# Patient Record
Sex: MALE | Race: WHITE | NOT HISPANIC OR LATINO | Employment: OTHER | URBAN - METROPOLITAN AREA
[De-identification: names, ages, dates, MRNs, and addresses within clinical notes are randomized per-mention and may not be internally consistent; named-entity substitution may affect disease eponyms.]

---

## 2017-02-10 ENCOUNTER — HOSPITAL ENCOUNTER (EMERGENCY)
Facility: HOSPITAL | Age: 72
Discharge: HOME | End: 2017-02-10
Attending: EMERGENCY MEDICINE | Admitting: EMERGENCY MEDICINE
Payer: MEDICARE

## 2017-02-10 VITALS
HEART RATE: 83 BPM | DIASTOLIC BLOOD PRESSURE: 66 MMHG | TEMPERATURE: 97.7 F | RESPIRATION RATE: 16 BRPM | WEIGHT: 233.91 LBS | SYSTOLIC BLOOD PRESSURE: 118 MMHG | OXYGEN SATURATION: 96 %

## 2017-02-10 DIAGNOSIS — S81.832A GUNSHOT WOUND OF LEFT LOWER LEG, INITIAL ENCOUNTER: Primary | ICD-10-CM

## 2017-02-10 PROCEDURE — 99282 EMERGENCY DEPT VISIT SF MDM: CPT

## 2017-02-10 RX ORDER — SIMVASTATIN 20 MG
20 TABLET ORAL
COMMUNITY
End: 2017-12-31

## 2017-02-10 RX ORDER — LISINOPRIL AND HYDROCHLOROTHIAZIDE 20; 12.5 MG/1; MG/1
2 TABLET ORAL DAILY
COMMUNITY
End: 2021-10-19

## 2017-02-10 RX ORDER — FENOFIBRATE 145 MG/1
145 TABLET, COATED ORAL DAILY
COMMUNITY
End: 2018-07-24

## 2017-02-10 RX ORDER — SILICONE ADHESIVE 1.5" X 3"
SHEET (EA) TOPICAL DAILY
COMMUNITY
End: 2017-12-31

## 2017-02-10 RX ORDER — GINSENG 100 MG
1 CAPSULE ORAL 2 TIMES DAILY
Status: DISCONTINUED | OUTPATIENT
Start: 2017-02-10 | End: 2017-02-10 | Stop reason: HOSPADM

## 2017-02-10 RX ORDER — OMEPRAZOLE 20 MG/1
20 CAPSULE, DELAYED RELEASE ORAL DAILY
COMMUNITY

## 2017-02-10 RX ORDER — CARVEDILOL 25 MG/1
25 TABLET ORAL 2 TIMES DAILY WITH MEALS
COMMUNITY

## 2017-02-10 RX ADMIN — BACITRACIN ZINC 1 SMALL APPLICATION: 500 OINTMENT TOPICAL at 17:03

## 2017-12-31 ENCOUNTER — HOSPITAL ENCOUNTER (EMERGENCY)
Facility: HOSPITAL | Age: 72
Discharge: HOME | End: 2017-12-31
Attending: EMERGENCY MEDICINE | Admitting: EMERGENCY MEDICINE
Payer: MEDICARE

## 2017-12-31 VITALS
TEMPERATURE: 97.3 F | WEIGHT: 226.63 LBS | OXYGEN SATURATION: 98 % | DIASTOLIC BLOOD PRESSURE: 70 MMHG | SYSTOLIC BLOOD PRESSURE: 122 MMHG | RESPIRATION RATE: 20 BRPM | HEART RATE: 69 BPM
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
## 2017-12-31 DIAGNOSIS — R53.1 GENERALIZED WEAKNESS: Primary | ICD-10-CM

## 2017-12-31 DIAGNOSIS — E83.42 HYPOMAGNESEMIA: ICD-10-CM

## 2017-12-31 LAB
ALBUMIN SERPL BCP-MCNC: 3.4 G/DL (ref 3.5–5)
ALP SERPL-CCNC: 80 U/L (ref 46–116)
ALT SERPL W P-5'-P-CCNC: 36 U/L (ref 12–78)
ANION GAP SERPL CALCULATED.3IONS-SCNC: 11 MMOL/L (ref 4–13)
AST SERPL W P-5'-P-CCNC: 24 U/L (ref 5–45)
BASOPHILS # BLD AUTO: 0.02 THOUSANDS/ΜL (ref 0–0.1)
BASOPHILS NFR BLD AUTO: 0 % (ref 0–1)
BILIRUB SERPL-MCNC: 0.5 MG/DL (ref 0.2–1)
BUN SERPL-MCNC: 20 MG/DL (ref 5–25)
CALCIUM SERPL-MCNC: 9.1 MG/DL (ref 8.3–10.1)
CHLORIDE SERPL-SCNC: 104 MMOL/L (ref 100–108)
CO2 SERPL-SCNC: 29 MMOL/L (ref 21–32)
CREAT SERPL-MCNC: 1.24 MG/DL (ref 0.6–1.3)
EOSINOPHIL # BLD AUTO: 0.1 THOUSAND/ΜL (ref 0–0.61)
EOSINOPHIL NFR BLD AUTO: 1 % (ref 0–6)
ERYTHROCYTE [DISTWIDTH] IN BLOOD BY AUTOMATED COUNT: 15.7 % (ref 11.6–15.1)
GFR SERPL CREATININE-BSD FRML MDRD: 59 ML/MIN/1.73SQ M
GLUCOSE SERPL-MCNC: 116 MG/DL (ref 65–140)
HCT VFR BLD AUTO: 41.2 % (ref 36.5–49.3)
HGB BLD-MCNC: 12.7 G/DL (ref 12–17)
LYMPHOCYTES # BLD AUTO: 2.18 THOUSANDS/ΜL (ref 0.6–4.47)
LYMPHOCYTES NFR BLD AUTO: 22 % (ref 14–44)
MAGNESIUM SERPL-MCNC: 1.5 MG/DL (ref 1.6–2.6)
MCH RBC QN AUTO: 28 PG (ref 26.8–34.3)
MCHC RBC AUTO-ENTMCNC: 30.8 G/DL (ref 31.4–37.4)
MCV RBC AUTO: 91 FL (ref 82–98)
MONOCYTES # BLD AUTO: 1.14 THOUSAND/ΜL (ref 0.17–1.22)
MONOCYTES NFR BLD AUTO: 12 % (ref 4–12)
NEUTROPHILS # BLD AUTO: 6.31 THOUSANDS/ΜL (ref 1.85–7.62)
NEUTS SEG NFR BLD AUTO: 65 % (ref 43–75)
PLATELET # BLD AUTO: 307 THOUSANDS/UL (ref 149–390)
PMV BLD AUTO: 10.4 FL (ref 8.9–12.7)
POTASSIUM SERPL-SCNC: 3.7 MMOL/L (ref 3.5–5.3)
PROT SERPL-MCNC: 7 G/DL (ref 6.4–8.2)
RBC # BLD AUTO: 4.53 MILLION/UL (ref 3.88–5.62)
SODIUM SERPL-SCNC: 144 MMOL/L (ref 136–145)
TSH SERPL DL<=0.05 MIU/L-ACNC: 1.7 UIU/ML (ref 0.36–3.74)
WBC # BLD AUTO: 9.75 THOUSAND/UL (ref 4.31–10.16)

## 2017-12-31 PROCEDURE — 83735 ASSAY OF MAGNESIUM: CPT | Performed by: EMERGENCY MEDICINE

## 2017-12-31 PROCEDURE — 96360 HYDRATION IV INFUSION INIT: CPT

## 2017-12-31 PROCEDURE — 80053 COMPREHEN METABOLIC PANEL: CPT | Performed by: EMERGENCY MEDICINE

## 2017-12-31 PROCEDURE — 36415 COLL VENOUS BLD VENIPUNCTURE: CPT | Performed by: EMERGENCY MEDICINE

## 2017-12-31 PROCEDURE — 85025 COMPLETE CBC W/AUTO DIFF WBC: CPT | Performed by: EMERGENCY MEDICINE

## 2017-12-31 PROCEDURE — 99284 EMERGENCY DEPT VISIT MOD MDM: CPT

## 2017-12-31 PROCEDURE — 84443 ASSAY THYROID STIM HORMONE: CPT | Performed by: EMERGENCY MEDICINE

## 2017-12-31 RX ORDER — MELATONIN
5000 DAILY
COMMUNITY

## 2017-12-31 RX ORDER — ACETAMINOPHEN 325 MG/1
650 TABLET ORAL 2 TIMES DAILY
COMMUNITY
End: 2021-10-25 | Stop reason: HOSPADM

## 2017-12-31 RX ORDER — PRAVASTATIN SODIUM 40 MG
40 TABLET ORAL DAILY
COMMUNITY
End: 2018-07-24

## 2017-12-31 RX ADMIN — Medication 800 MG: at 07:20

## 2017-12-31 RX ADMIN — SODIUM CHLORIDE 1000 ML: 0.9 INJECTION, SOLUTION INTRAVENOUS at 06:45

## 2017-12-31 NOTE — DISCHARGE INSTRUCTIONS
Hypomagnesemia   WHAT YOU NEED TO KNOW:   Hypomagnesemia is a condition that develops when the amount of magnesium in your body is too low  Magnesium is a mineral that helps your heart, muscles, and nerves work normally  It also helps strengthen your bones  DISCHARGE INSTRUCTIONS:   Return to the emergency department if:   · You have numbness and tingling in your arms or legs  · You have painful muscle spasms and tremors in your arms or legs  · You are not able to move your muscles, and you have trouble thinking clearly  · Your heartbeat is faster than usual, or is irregular  · You have a seizure  Contact your healthcare provider if:   · You have fatigue and muscle tremors or twitching  · You become irritable and have trouble sleeping  · You have questions or concerns about your condition or care  Prevent hypomagnesemia:   · Manage health conditions  by following your treatment plan  Health conditions such as congestive heart failure, diabetes, and chronic diarrhea can put you at risk for hypomagnesemia  · Eat foods that contain magnesium every day  Ask your dietitian or healthcare provider how much magnesium you need each day  · Limit or do not drink alcohol  Alcohol can prevent your body from absorbing magnesium  Alcohol also makes your body release large amounts of magnesium through your urine  · You may need to take a magnesium supplement  Ask your healthcare provider which supplement to take and how often to take it    Foods that contain magnesium:   · Almonds, cashews, peanuts, and peanut butter    · Dark green leafy vegetables, such as spinach    · Raisins, bananas, apples, broccoli, and carrots    · Soy milk and soy beans    · Black beans and kidney beans    · Whole-wheat bread and brown rice    · Shredded-wheat cereal, oatmeal, and other breakfast cereals fortified with magnesium    · Plain low-fat yogurt and milk    · Cooked halibut  Follow up with your healthcare provider or specialist as directed:  Write down your questions so you remember to ask them during your visits  © 2017 [employer] [employer] Information is for End User's use only and may not be sold, redistributed or otherwise used for commercial purposes  All illustrations and images included in CareNotes® are the copyrighted property of A D A M , Inc  or Cali Maldonado  The above information is an  only  It is not intended as medical advice for individual conditions or treatments  Talk to your doctor, nurse or pharmacist before following any medical regimen to see if it is safe and effective for you

## 2017-12-31 NOTE — ED PROVIDER NOTES
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  Pt  denies loc/n/v  History provided by:  Patient   used: No    80 y/o male with a hx of chronic muscle weakness stated due to agent orange exposure presented by EMS after a fall in bathroom and unable to get up  Denies striking head, headache, neck pain or any other injuries  States weakness is not really any worse than usual  Reports he's been trying to get the [place] to approve him for a motorized wheelchair  Has been to physical rehab in past without any improvement and is not interested in that again  Plan labs to r/o any significant electrolyte change, likely discharge  Prior to Admission Medications   Prescriptions Last Dose Informant Patient Reported? Taking?    CYANOCOBALAMIN PO   Yes No   Sig: Take 1,000 mcg by mouth     Liraglutide (VICTOZA) 18 MG/3ML SOPN   Yes No   Sig: Inject 0 6 mg under the skin daily     MAGNESIUM CARBONATE PO   Yes No   Sig: Take 400 mg by mouth daily     acetaminophen (TYLENOL) 325 mg tablet   Yes Yes   Sig: Take 650 mg by mouth 2 (two) times a day   apixaban (ELIQUIS) 5 mg   Yes No   Sig: Take 5 mg by mouth 2 (two) times a day   aspirin 81 MG tablet   Yes No   Sig: Take 81 mg by mouth daily   carvedilol (COREG) 25 mg tablet   Yes No   Sig: Take 25 mg by mouth 2 (two) times a day with meals   cholecalciferol (VITAMIN D3) 1,000 units tablet   Yes Yes   Sig: Take 5,000 Units by mouth daily   co-enzyme Q-10 50 MG capsule   Yes No   Sig: Take 100 mg by mouth daily   fenofibrate (TRICOR) 145 mg tablet   Yes No   Sig: Take 145 mg by mouth daily   insulin detemir (LEVEMIR) 100 units/mL subcutaneous injection   Yes No   Sig: Inject 30 Units under the skin daily at bedtime     lisinopril-hydrochlorothiazide (PRINZIDE,ZESTORETIC) 20-12 5 MG per tablet   Yes No Sig: Take 2 tablets by mouth daily   metFORMIN (GLUCOPHAGE) 1000 MG tablet   Yes No   Sig: Take 500 mg by mouth 2 (two) times a day with meals     omeprazole (PriLOSEC) 20 mg delayed release capsule   Yes No   Sig: Take 20 mg by mouth daily   pravastatin (PRAVACHOL) 40 mg tablet   Yes Yes   Sig: Take 40 mg by mouth daily      Facility-Administered Medications: None       Past Medical History:   Diagnosis Date    Atrial fibrillation ([facility] )     Diabetes ([facility] )     Hypertension        Past Surgical History:   Procedure Laterality Date    CHOLECYSTECTOMY      TOTAL SHOULDER REPLACEMENT Right        No family history on file  I have reviewed and agree with the history as documented  Social History   Substance Use Topics    Smoking status: Former Smoker    Smokeless tobacco: Not on file    Alcohol use No        Review of Systems   Constitutional: Negative for activity change and appetite change  Respiratory: Negative for chest tightness and shortness of breath  Gastrointestinal: Negative for abdominal pain  Musculoskeletal: Negative for back pain and neck pain  Skin: Negative for wound  Neurological: Positive for weakness  Negative for dizziness and headaches  All other systems reviewed and are negative  Physical Exam  ED Triage Vitals [12/31/17 0509]   Temperature Pulse Respirations Blood Pressure SpO2   (!) 97 3 °F (36 3 °C) 69 20 122/70 98 %      Temp Source Heart Rate Source Patient Position - Orthostatic VS BP Location FiO2 (%)   Oral -- -- -- --      Pain Score       No Pain           Orthostatic Vital Signs  Vitals:    12/31/17 0509   BP: 122/70   Pulse: 69       Physical Exam   Constitutional: He is oriented to person, place, and time  He appears well-developed and well-nourished  No distress  HENT:   Head: Normocephalic and atraumatic  Neck: Normal range of motion  No c-spine tenderness  Cardiovascular: Normal rate and regular rhythm      Pulmonary/Chest: Effort normal and breath sounds normal    Abdominal: Soft  There is no tenderness  Musculoskeletal: Normal range of motion  He exhibits no deformity  Neurological: He is alert and oriented to person, place, and time  He exhibits normal muscle tone  Coordination normal    Skin: Skin is warm and dry  Psychiatric: He has a normal mood and affect  His behavior is normal    Nursing note and vitals reviewed  ED Medications  Medications   sodium chloride 0 9 % bolus 1,000 mL (0 mL Intravenous Stopped 12/31/17 0745)   magnesium oxide (MAG-OX) tablet 800 mg (800 mg Oral Given 12/31/17 0720)       Diagnostic Studies  Results Reviewed     Procedure Component Value Units Date/Time    TSH [[id number]]  (Normal) Collected:  12/31/17 0559    Lab Status:  Final result Specimen:  Blood from Arm, Right Updated:  12/31/17 0645     TSH 3RD GENERATON 1 703 uIU/mL     Narrative:         Patients undergoing fluorescein dye angiography may retain small amounts of fluorescein in the body for 48-72 hours post procedure  Samples containing fluorescein can produce falsely depressed TSH values  If the patient had this procedure,a specimen should be resubmitted post fluorescein clearance      Magnesium [[id number]]  (Abnormal) Collected:  12/31/17 0559    Lab Status:  Final result Specimen:  Blood from Arm, Right Updated:  12/31/17 0645     Magnesium 1 5 (L) mg/dL     Comprehensive metabolic panel [[id number]]  (Abnormal) Collected:  12/31/17 0559    Lab Status:  Final result Specimen:  Blood from Arm, Right Updated:  12/31/17 0631     Sodium 144 mmol/L      Potassium 3 7 mmol/L      Chloride 104 mmol/L      CO2 29 mmol/L      Anion Gap 11 mmol/L      BUN 20 mg/dL      Creatinine 1 24 mg/dL      Glucose 116 mg/dL      Calcium 9 1 mg/dL      AST 24 U/L      ALT 36 U/L      Alkaline Phosphatase 80 U/L      Total Protein 7 0 g/dL      Albumin 3 4 (L) g/dL      Total Bilirubin 0 50 mg/dL      eGFR 59 ml/min/1 73sq m     Narrative:         National Kidney Disease Education Program recommendations are as follows:  GFR calculation is accurate only with a steady state creatinine  Chronic Kidney disease less than 60 ml/min/1 73 sq  meters  Kidney failure less than 15 ml/min/1 73 sq  meters  CBC and differential [[id number]]  (Abnormal) Collected:  12/31/17 0559    Lab Status:  Final result Specimen:  Blood from Arm, Right Updated:  12/31/17 0605     WBC 9 75 Thousand/uL      RBC 4 53 Million/uL      Hemoglobin 12 7 g/dL      Hematocrit 41 2 %      MCV 91 fL      MCH 28 0 pg      MCHC 30 8 (L) g/dL      RDW 15 7 (H) %      MPV 10 4 fL      Platelets 540 Thousands/uL      Neutrophils Relative 65 %      Lymphocytes Relative 22 %      Monocytes Relative 12 %      Eosinophils Relative 1 %      Basophils Relative 0 %      Neutrophils Absolute 6 31 Thousands/µL      Lymphocytes Absolute 2 18 Thousands/µL      Monocytes Absolute 1 14 Thousand/µL      Eosinophils Absolute 0 10 Thousand/µL      Basophils Absolute 0 02 Thousands/µL                  No orders to display              Procedures  Procedures       Phone Contacts  ED Phone Contact    ED Course  ED Course                                MDM  Number of Diagnoses or Management Options  Fall, initial encounter:   Generalized weakness:   Hypomagnesemia:   Diagnosis management comments: 80 y/o male with chronic generalized weakness presented after a fall without injuries but unable to get up  No trauma on exam  Labs notable for mild hypomagnesemia  Given supplement  Patient prefers discharge as opposed to rehab for PT  Stable for home at this time         Amount and/or Complexity of Data Reviewed  Clinical lab tests: reviewed and ordered    Patient Progress  Patient progress: stable    CritCare Time    Disposition  Final diagnoses:   Generalized weakness   Fall, initial encounter   Hypomagnesemia     Time reflects when diagnosis was documented in both MDM as applicable and the Disposition within this note     Time User Action Codes Description Comment    12/31/2017  7:00 AM Tea KRUEGER Add [R53 1] Generalized weakness     12/31/2017  7:00 AM Corinne Morrow Add [G20  TGCX] Fall, initial encounter     12/31/2017  7:00 AM Corinne Morrow Add [E83 42] Hypomagnesemia       ED Disposition     ED Disposition Condition Comment    Discharge  Sandra Jean discharge to home/self care      Condition at discharge: Stable        Follow-up Information    None       Discharge Medication List as of 12/31/2017  7:01 AM      CONTINUE these medications which have NOT CHANGED    Details   acetaminophen (TYLENOL) 325 mg tablet Take 650 mg by mouth 2 (two) times a day, Historical Med      cholecalciferol (VITAMIN D3) 1,000 units tablet Take 5,000 Units by mouth daily, Historical Med      pravastatin (PRAVACHOL) 40 mg tablet Take 40 mg by mouth daily, Historical Med      apixaban (ELIQUIS) 5 mg Take 5 mg by mouth 2 (two) times a day, Until Discontinued, Historical Med      aspirin 81 MG tablet Take 81 mg by mouth daily, Until Discontinued, Historical Med      carvedilol (COREG) 25 mg tablet Take 25 mg by mouth 2 (two) times a day with meals, Until Discontinued, Historical Med      co-enzyme Q-10 50 MG capsule Take 100 mg by mouth daily, Until Discontinued, Historical Med      CYANOCOBALAMIN PO Take 1,000 mcg by mouth  , Historical Med      fenofibrate (TRICOR) 145 mg tablet Take 145 mg by mouth daily, Until Discontinued, Historical Med      insulin detemir (LEVEMIR) 100 units/mL subcutaneous injection Inject 30 Units under the skin daily at bedtime  , Historical Med      Liraglutide (VICTOZA) 18 MG/3ML SOPN Inject 0 6 mg under the skin daily  , Historical Med      lisinopril-hydrochlorothiazide (PRINZIDE,ZESTORETIC) 20-12 5 MG per tablet Take 2 tablets by mouth daily, Historical Med      MAGNESIUM CARBONATE PO Take 400 mg by mouth daily  , Historical Med      metFORMIN (GLUCOPHAGE) 1000 MG tablet Take 500 mg by mouth 2 (two) times a day with meals  , Historical Med      omeprazole (PriLOSEC) 20 mg delayed release capsule Take 20 mg by mouth daily, Until Discontinued, Historical Med           No discharge procedures on file      ED Provider  Electronically Signed by           Rox Chaparro MD  01/04/18 8932

## 2017-12-31 NOTE — ED NOTES
Pt  Transported out of dept  In wheelchair, vss, no acute distress       Estela Mata, TEJAS  12/31/17 5865

## 2018-07-24 ENCOUNTER — HOSPITAL ENCOUNTER (EMERGENCY)
Facility: HOSPITAL | Age: 73
Discharge: HOME HEALTH | End: 2018-07-24
Attending: EMERGENCY MEDICINE | Admitting: EMERGENCY MEDICINE
Payer: MEDICARE

## 2018-07-24 VITALS
SYSTOLIC BLOOD PRESSURE: 171 MMHG | TEMPERATURE: 98.6 F | BODY MASS INDEX: 27.8 KG/M2 | HEART RATE: 64 BPM | WEIGHT: 216.49 LBS | DIASTOLIC BLOOD PRESSURE: 77 MMHG | RESPIRATION RATE: 16 BRPM | OXYGEN SATURATION: 99 %

## 2018-07-24 DIAGNOSIS — G62.9 NEUROPATHY: ICD-10-CM

## 2018-07-24 DIAGNOSIS — R03.0 ELEVATED BLOOD PRESSURE READING: Primary | ICD-10-CM

## 2018-07-24 LAB
ALBUMIN SERPL BCP-MCNC: 3.3 G/DL (ref 3.5–5)
ALP SERPL-CCNC: 85 U/L (ref 46–116)
ALT SERPL W P-5'-P-CCNC: 38 U/L (ref 12–78)
ANION GAP SERPL CALCULATED.3IONS-SCNC: 10 MMOL/L (ref 4–13)
AST SERPL W P-5'-P-CCNC: 23 U/L (ref 5–45)
ATRIAL RATE: 55 BPM
BASOPHILS # BLD AUTO: 0.01 THOUSANDS/ΜL (ref 0–0.1)
BASOPHILS NFR BLD AUTO: 0 % (ref 0–1)
BILIRUB SERPL-MCNC: 0.4 MG/DL (ref 0.2–1)
BUN SERPL-MCNC: 22 MG/DL (ref 5–25)
CALCIUM SERPL-MCNC: 8.7 MG/DL (ref 8.3–10.1)
CHLORIDE SERPL-SCNC: 102 MMOL/L (ref 100–108)
CO2 SERPL-SCNC: 30 MMOL/L (ref 21–32)
CREAT SERPL-MCNC: 1.01 MG/DL (ref 0.6–1.3)
EOSINOPHIL # BLD AUTO: 0.11 THOUSAND/ΜL (ref 0–0.61)
EOSINOPHIL NFR BLD AUTO: 1 % (ref 0–6)
ERYTHROCYTE [DISTWIDTH] IN BLOOD BY AUTOMATED COUNT: 16.2 % (ref 11.6–15.1)
GFR SERPL CREATININE-BSD FRML MDRD: 75 ML/MIN/1.73SQ M
GLUCOSE SERPL-MCNC: 142 MG/DL (ref 65–140)
HCT VFR BLD AUTO: 36.6 % (ref 36.5–49.3)
HGB BLD-MCNC: 11.1 G/DL (ref 12–17)
LYMPHOCYTES # BLD AUTO: 1.39 THOUSANDS/ΜL (ref 0.6–4.47)
LYMPHOCYTES NFR BLD AUTO: 15 % (ref 14–44)
MCH RBC QN AUTO: 25.9 PG (ref 26.8–34.3)
MCHC RBC AUTO-ENTMCNC: 30.3 G/DL (ref 31.4–37.4)
MCV RBC AUTO: 86 FL (ref 82–98)
MONOCYTES # BLD AUTO: 1.29 THOUSAND/ΜL (ref 0.17–1.22)
MONOCYTES NFR BLD AUTO: 14 % (ref 4–12)
NEUTROPHILS # BLD AUTO: 6.49 THOUSANDS/ΜL (ref 1.85–7.62)
NEUTS SEG NFR BLD AUTO: 70 % (ref 43–75)
P AXIS: 70 DEGREES
PLATELET # BLD AUTO: 290 THOUSANDS/UL (ref 149–390)
PMV BLD AUTO: 10.4 FL (ref 8.9–12.7)
POTASSIUM SERPL-SCNC: 3.8 MMOL/L (ref 3.5–5.3)
PR INTERVAL: 200 MS
PROT SERPL-MCNC: 6.7 G/DL (ref 6.4–8.2)
QRS AXIS: -83 DEGREES
QRSD INTERVAL: 164 MS
QT INTERVAL: 496 MS
QTC INTERVAL: 474 MS
RBC # BLD AUTO: 4.28 MILLION/UL (ref 3.88–5.62)
SODIUM SERPL-SCNC: 142 MMOL/L (ref 136–145)
T WAVE AXIS: 48 DEGREES
TROPONIN I SERPL-MCNC: <0.02 NG/ML
TSH SERPL DL<=0.05 MIU/L-ACNC: 0.74 UIU/ML (ref 0.36–3.74)
VENTRICULAR RATE: 55 BPM
WBC # BLD AUTO: 9.29 THOUSAND/UL (ref 4.31–10.16)

## 2018-07-24 PROCEDURE — 85025 COMPLETE CBC W/AUTO DIFF WBC: CPT | Performed by: EMERGENCY MEDICINE

## 2018-07-24 PROCEDURE — 99284 EMERGENCY DEPT VISIT MOD MDM: CPT

## 2018-07-24 PROCEDURE — 84484 ASSAY OF TROPONIN QUANT: CPT | Performed by: EMERGENCY MEDICINE

## 2018-07-24 PROCEDURE — 80053 COMPREHEN METABOLIC PANEL: CPT | Performed by: EMERGENCY MEDICINE

## 2018-07-24 PROCEDURE — 93005 ELECTROCARDIOGRAM TRACING: CPT

## 2018-07-24 PROCEDURE — 36415 COLL VENOUS BLD VENIPUNCTURE: CPT | Performed by: EMERGENCY MEDICINE

## 2018-07-24 PROCEDURE — 84443 ASSAY THYROID STIM HORMONE: CPT | Performed by: EMERGENCY MEDICINE

## 2018-07-24 PROCEDURE — 93010 ELECTROCARDIOGRAM REPORT: CPT | Performed by: INTERNAL MEDICINE

## 2018-07-24 RX ORDER — ROSUVASTATIN CALCIUM 5 MG/1
5 TABLET, COATED ORAL DAILY
COMMUNITY
End: 2019-01-17 | Stop reason: HOSPADM

## 2018-07-24 RX ORDER — TRAZODONE HYDROCHLORIDE 50 MG/1
50 TABLET ORAL
COMMUNITY

## 2018-07-24 RX ORDER — SERTRALINE HYDROCHLORIDE 100 MG/1
100 TABLET, FILM COATED ORAL DAILY
COMMUNITY

## 2018-07-24 NOTE — DISCHARGE INSTRUCTIONS
Continue taking your medications as previously prescribed  Record your blood pressure once to twice daily and review these readings with your primary care physician  If your blood pressure remains elevated adjustment may be needed in your daily medication  Referrals have been made for home nursing care and therapies  Please utilize these resources as available to you  Return as needed for any worsening/new concerns  Chronic Hypertension   WHAT YOU NEED TO KNOW:   Hypertension is high blood pressure (BP)  Your BP is the force of your blood moving against the walls of your arteries  Normal BP is less than 120/80  Prehypertension is between 120/80 and 139/89  Hypertension is 140/90 or higher  Hypertension causes your BP to get so high that your heart has to work much harder than normal  This can damage your heart  Chronic hypertension is a long-term condition that you can control with a healthy lifestyle or medicines  A controlled blood pressure helps protect your organs, such as your heart, lungs, brain, and kidneys  DISCHARGE INSTRUCTIONS:   Call 911 for any of the following:   · You have discomfort in your chest that feels like squeezing, pressure, fullness, or pain  · You become confused or have difficulty speaking  · You suddenly feel lightheaded or have trouble breathing  · You have pain or discomfort in your back, neck, jaw, stomach, or arm  Seek care immediately if:   · You have a severe headache or vision loss  · You have weakness in an arm or leg  Contact your healthcare provider if:   · You feel faint, dizzy, confused, or drowsy  · You have been taking your BP medicine and your BP is still higher than your healthcare provider says it should be  · You have questions or concerns about your condition or care  Medicines: You may need any of the following:  · Medicine  may be used to help lower your BP  You may need more than one type of medicine   Take the medicine exactly as directed  · Diuretics  help decrease extra fluid that collects in your body  This will help lower your BP  You may urinate more often while you take this medicine  · Cholesterol medicine  helps lower your cholesterol level  A low cholesterol level helps prevent heart disease and makes it easier to control your blood pressure  · Take your medicine as directed  Contact your healthcare provider if you think your medicine is not helping or if you have side effects  Tell him or her if you are allergic to any medicine  Keep a list of the medicines, vitamins, and herbs you take  Include the amounts, and when and why you take them  Bring the list or the pill bottles to follow-up visits  Carry your medicine list with you in case of an emergency  Follow up with your healthcare provider as directed: You will need to return to have your blood pressure checked and to have other lab tests done  Write down your questions so you remember to ask them during your visits  Manage chronic hypertension:  Talk with your healthcare provider about these and other ways to manage hypertension:  · Take your BP at home  Sit and rest for 5 minutes before you take your BP  Extend your arm and support it on a flat surface  Your arm should be at the same level as your heart  Follow the directions that came with your BP monitor  If possible, take at least 2 BP readings each time  Take your BP at least twice a day at the same times each day, such as morning and evening  Keep a record of your BP readings and bring it to your follow-up visits  Ask your healthcare provider what your blood pressure should be  · Limit sodium (salt) as directed  Too much sodium can affect your fluid balance  Check labels to find low-sodium or no-salt-added foods  Some low-sodium foods use potassium salts for flavor  Too much potassium can also cause health problems   Your healthcare provider will tell you how much sodium and potassium are safe for you to have in a day  He or she may recommend that you limit sodium to 2,300 mg a day  · Follow the meal plan recommended by your healthcare provider  A dietitian or your provider can give you more information on low-sodium plans or the DASH (Dietary Approaches to Stop Hypertension) eating plan  The DASH plan is low in sodium, unhealthy fats, and total fat  It is high in potassium, calcium, and fiber  · Exercise to maintain a healthy weight  Exercise at least 30 minutes per day, on most days of the week  This will help decrease your blood pressure  Ask about the best exercise plan for you  · Decrease stress  This may help lower your BP  Learn ways to relax, such as deep breathing or listening to music  · Limit alcohol  Women should limit alcohol to 1 drink a day  Men should limit alcohol to 2 drinks a day  A drink of alcohol is 12 ounces of beer, 5 ounces of wine, or 1½ ounces of liquor  · Do not smoke  Nicotine and other chemicals in cigarettes and cigars can increase your BP and also cause lung damage  Ask your healthcare provider for information if you currently smoke and need help to quit  E-cigarettes or smokeless tobacco still contain nicotine  Talk to your healthcare provider before you use these products  © 2017 [facility] [facility] Information is for End User's use only and may not be sold, redistributed or otherwise used for commercial purposes  All illustrations and images included in CareNotes® are the copyrighted property of A D A M [employer]  or Cali Maldonado  The above information is an  only  It is not intended as medical advice for individual conditions or treatments  Talk to your doctor, nurse or pharmacist before following any medical regimen to see if it is safe and effective for you

## 2018-07-24 NOTE — SOCIAL WORK
CM met with patient and spouse at bedside  Patient currently struggling with increase in medical concerns  Patient doesn't not have any need for inpatient admission and would benefit greatly from [facility] for PT, OT, SN, and SW services  [facility] but require more service than they can provide  CM will send referral to [facility] for follow up care  Attending will need to complete face to face  CM will follow through discharge  No other needs at this time

## 2018-07-24 NOTE — ED NOTES
Discharge instruction provided to patient  Patient has no questions or concerns at this time        Cele Crandall, RN  07/24/18 3872

## 2018-07-26 NOTE — ED NOTES
7/26/18 at 815am CM placed PC due to concerns with discharge plan  Cm placed call to patient at [phone] (H)  CM review with patient previous plan discussed in ED however, patient indicated that he went to PCP through [facility] and setup VNA and various [place] [id number] services through them  CM was also informed by patient that he spoke with him about his blood pressure concerns and they have him scheduled for appointment outpatient  At this time, patient appears to have setup everything with [facility] although everything was setup through [facility]  Patient indicated that the PCP understood what was going on with his heart and was not picked up through ED  CM informed patient that he could make contact at any point if need needed further follow up  Patient indicated that [facility] did not make contact with him  CM did not make contact with spouse Jose Ribeiro because patient answered the phone and reviewed information with CM

## 2018-10-15 ENCOUNTER — HOSPITAL ENCOUNTER (EMERGENCY)
Facility: HOSPITAL | Age: 73
Discharge: HOME | End: 2018-10-15
Attending: EMERGENCY MEDICINE | Admitting: EMERGENCY MEDICINE
Payer: MEDICARE

## 2018-10-15 VITALS
DIASTOLIC BLOOD PRESSURE: 82 MMHG | TEMPERATURE: 98.1 F | HEART RATE: 63 BPM | RESPIRATION RATE: 18 BRPM | OXYGEN SATURATION: 99 % | SYSTOLIC BLOOD PRESSURE: 163 MMHG

## 2018-10-15 DIAGNOSIS — M71.121 SEPTIC OLECRANON BURSITIS OF RIGHT ELBOW: Primary | ICD-10-CM

## 2018-10-15 PROCEDURE — 87077 CULTURE AEROBIC IDENTIFY: CPT | Performed by: EMERGENCY MEDICINE

## 2018-10-15 PROCEDURE — 99283 EMERGENCY DEPT VISIT LOW MDM: CPT

## 2018-10-15 PROCEDURE — 87186 SC STD MICRODIL/AGAR DIL: CPT | Performed by: EMERGENCY MEDICINE

## 2018-10-15 PROCEDURE — 87147 CULTURE TYPE IMMUNOLOGIC: CPT | Performed by: EMERGENCY MEDICINE

## 2018-10-15 PROCEDURE — 87205 SMEAR GRAM STAIN: CPT | Performed by: EMERGENCY MEDICINE

## 2018-10-15 PROCEDURE — 87070 CULTURE OTHR SPECIMN AEROBIC: CPT | Performed by: EMERGENCY MEDICINE

## 2018-10-15 RX ORDER — MORPHINE SULFATE 15 MG/1
15 TABLET ORAL EVERY 4 HOURS PRN
Qty: 10 TABLET | Refills: 0 | Status: SHIPPED | OUTPATIENT
Start: 2018-10-15 | End: 2018-10-25

## 2018-10-15 RX ORDER — LIDOCAINE HYDROCHLORIDE AND EPINEPHRINE 10; 10 MG/ML; UG/ML
1 INJECTION, SOLUTION INFILTRATION; PERINEURAL ONCE
Status: COMPLETED | OUTPATIENT
Start: 2018-10-15 | End: 2018-10-15

## 2018-10-15 RX ORDER — CEPHALEXIN 500 MG/1
500 CAPSULE ORAL 3 TIMES DAILY
Qty: 30 CAPSULE | Refills: 0 | Status: SHIPPED | OUTPATIENT
Start: 2018-10-15 | End: 2018-10-25

## 2018-10-15 RX ORDER — CEPHALEXIN 250 MG/1
500 CAPSULE ORAL ONCE
Status: COMPLETED | OUTPATIENT
Start: 2018-10-15 | End: 2018-10-15

## 2018-10-15 RX ADMIN — LIDOCAINE HYDROCHLORIDE,EPINEPHRINE BITARTRATE 1 ML: 10; .01 INJECTION, SOLUTION INFILTRATION; PERINEURAL at 17:26

## 2018-10-15 RX ADMIN — CEPHALEXIN 500 MG: 250 CAPSULE ORAL at 17:26

## 2018-10-15 NOTE — DISCHARGE INSTRUCTIONS
Elbow Bursitis   WHAT YOU NEED TO KNOW:   Elbow bursitis is inflammation of the bursa in your elbow  The bursa is a fluid-filled sac that acts as a cushion between a bone and a tendon  A tendon is a cord of strong tissue that connects muscles to bones  The bursa is located right under the point of your elbow  DISCHARGE INSTRUCTIONS:   Medicines:   · NSAIDs:  These medicines decrease swelling, pain, and fever  NSAIDs are available without a doctor's order  Ask your healthcare provider which medicine is right for you  Ask how much to take and when to take it  Take as directed  NSAIDs can cause stomach bleeding and kidney problems if not taken correctly  · Antibiotics: These help fight an infection caused by bacteria  You may need antibiotics if your bursitis is caused by infection  · Take your medicine as directed  Contact your healthcare provider if you think your medicine is not helping or if you have side effects  Tell him of her if you are allergic to any medicine  Keep a list of the medicines, vitamins, and herbs you take  Include the amounts, and when and why you take them  Bring the list or the pill bottles to follow-up visits  Carry your medicine list with you in case of an emergency  Manage your symptoms:   · Rest:  Rest your elbow as much as possible to decrease pain and swelling  Slowly start to do more each day  Return to your daily activities as directed  · Ice:  Ice helps decrease swelling and pain  Ice may also help prevent tissue damage  Use an ice pack, or put crushed ice in a plastic bag  Cover it with a towel and place it on your elbow for 15 to 20 minutes, 3 to 4 times each day, as directed  · Compress:  Healthcare providers may wrap your arm with tape or an elastic bandage to decrease swelling  Loosen the elastic bandage if you start to lose feeling in your fingers  · Elevate:  Raise your elbow above the level of your heart as often as you can   This will help decrease swelling and pain  Prop your elbow on pillows or blankets to keep it elevated comfortably  Physical therapy:  A physical therapist teaches you exercises to help improve movement and strength, and to decrease pain  Prevent another elbow injury:   · Avoid injury and pressure to your elbows: Wear elbow pads or protectors when you play sports  Do not lean on your elbows or clench your fists  Do not tightly  small items, such as tools or pens  · Stretch, warm up, and cool down:  Always stretch and do warmup and cool-down exercises before and after you exercise  This will help loosen your muscles and decrease stress on your elbow  Rest between workouts  Follow up with your healthcare provider as directed:  Write down your questions so you remember to ask them during your visits  Contact your healthcare provider if:   · Your pain and swelling increase  · Your symptoms do not improve after 10 days of treatment  · You have a fever  · You have questions or concerns about your condition or care  © 2017 [address] [facility] Information is for End User's use only and may not be sold, redistributed or otherwise used for commercial purposes  All illustrations and images included in CareNotes® are the copyrighted property of [employer]  or Cali Maldonado  The above information is an  only  It is not intended as medical advice for individual conditions or treatments  Talk to your doctor, nurse or pharmacist before following any medical regimen to see if it is safe and effective for you

## 2018-10-15 NOTE — ED PROVIDER NOTES
History  Chief Complaint   Patient presents with    Elbow Swelling     pt here with right elbow redness and swelling  progressively worsening  was told that it was infected and needs to be evaluated  started as a small opening  History provided by:  Patient and spouse  Elbow Swelling   Location:  Elbow  Elbow location:  R elbow  Injury: yes (Not true injury, patient states he has arm resting on the table during an MRI was in the same position believes through the vibrations machine when he was done with the test is small abrasion over the area)    Pain details:     Quality:  Aching    Radiates to:  Does not radiate    Severity:  Moderate    Onset quality:  Gradual    Duration:  2 days    Timing:  Constant    Progression:  Worsening  Dislocation: no    Relieved by:  None tried  Worsened by:  Nothing  Ineffective treatments:  None tried  Associated symptoms: no fever    Associated symptoms comment:  Overlying skin is becoming erythematous warm and tender      Prior to Admission Medications   Prescriptions Last Dose Informant Patient Reported? Taking?    CYANOCOBALAMIN PO   Yes No   Sig: Take 1,000 mcg by mouth     MAGNESIUM CARBONATE PO   Yes No   Sig: Take 400 mg by mouth daily     acetaminophen (TYLENOL) 325 mg tablet   Yes No   Sig: Take 650 mg by mouth 2 (two) times a day   apixaban (ELIQUIS) 5 mg   Yes No   Sig: Take 5 mg by mouth 2 (two) times a day   carvedilol (COREG) 25 mg tablet   Yes No   Sig: Take 25 mg by mouth 2 (two) times a day with meals   cholecalciferol (VITAMIN D3) 1,000 units tablet   Yes No   Sig: Take 5,000 Units by mouth daily   co-enzyme Q-10 50 MG capsule   Yes No   Sig: Take 100 mg by mouth daily   insulin aspart (NovoLOG) 100 units/mL injection   Yes No   Sig: Inject under the skin 3 (three) times a day before meals Sliding scale   insulin detemir (LEVEMIR) 100 units/mL subcutaneous injection   Yes No   Sig: Inject 30 Units under the skin daily at bedtime lisinopril-hydrochlorothiazide (PRINZIDE,ZESTORETIC) 20-12 5 MG per tablet   Yes No   Sig: Take 2 tablets by mouth daily   omeprazole (PriLOSEC) 20 mg delayed release capsule   Yes No   Sig: Take 20 mg by mouth daily   rosuvastatin (CRESTOR) 5 mg tablet   Yes No   Sig: Take 5 mg by mouth daily   sertraline (ZOLOFT) 100 mg tablet   Yes No   Sig: Take 50 mg by mouth daily   traZODone (DESYREL) 50 mg tablet   Yes No   Sig: Take 50 mg by mouth daily at bedtime   zolpidem (AMBIEN CR) 12 5 MG CR tablet   Yes No   Sig: Take 12 5 mg by mouth daily at bedtime as needed for sleep      Facility-Administered Medications: None       Past Medical History:   Diagnosis Date    [facility] [address]     [facility]     Hypertension        Past Surgical History:   Procedure Laterality Date    ABLATION OF DYSRHYTHMIC FOCUS      for a-fib    CHOLECYSTECTOMY      TOTAL SHOULDER REPLACEMENT Right        History reviewed  No pertinent family history  I have reviewed and agree with the history as documented  Social History   Substance Use Topics    Smoking status: Former Smoker    Smokeless tobacco: Never Used    Alcohol use No        Review of Systems   Constitutional: Negative for fever  Respiratory: Negative for chest tightness and shortness of breath  Cardiovascular: Negative for chest pain  Skin: Negative for rash  Neurological: Negative for dizziness, light-headedness and headaches  Physical Exam  Physical Exam   Constitutional: He appears well-developed  HENT:   Head: Atraumatic  Eyes: Conjunctivae are normal  Right eye exhibits no discharge  Left eye exhibits no discharge  No scleral icterus  Neck: Neck supple  No tracheal deviation present  Pulmonary/Chest: Effort normal  No stridor  No respiratory distress  Musculoskeletal: He exhibits no deformity  Erythema over a swollen right olecranon bursa central area of fluctuance   Neurological: He is alert  He exhibits normal muscle tone  Coordination normal    Skin: Skin is warm and dry  No rash noted  There is erythema  No pallor  Psychiatric: He has a normal mood and affect  Vitals reviewed  Vital Signs  ED Triage Vitals [10/15/18 1646]   Temperature Pulse Respirations Blood Pressure SpO2   98 1 °F (36 7 °C) 63 18 163/82 99 %      Temp Source Heart Rate Source Patient Position - Orthostatic VS BP Location FiO2 (%)   Oral Monitor Sitting Left arm --      Pain Score       No Pain           Vitals:    10/15/18 1646   BP: 163/82   Pulse: 63   Patient Position - Orthostatic VS: Sitting       Visual Acuity      ED Medications  Medications   lidocaine-epinephrine (XYLOCAINE/EPINEPHRINE) 1 %-1:100,000 injection 1 mL (1 mL Infiltration Given 10/15/18 1726)   cephalexin (KEFLEX) capsule 500 mg (500 mg Oral Given 10/15/18 1726)       Diagnostic Studies  Results Reviewed     Procedure Component Value Units Date/Time    Body fluid culture and Gram stain [[id number]] Collected:  10/15/18 1730    Lab Status: In process Specimen: Body Fluid from Abscess Updated:  10/15/18 1734                 No orders to display              Procedures  Orthopedic Injury  Date/Time: 10/15/2018 5:26 PM  Performed by: Kishore Amaya by: Kwaku Smart     Patient Location:  ED  Risks and benefits: Risks, benefits and alternatives were discussed    Consent given by:  Patient  Injury location:  Elbow  Location details:  Right elbow  Injury type: Soft tissue  Neurovascular status: Neurovascularly intact    Distal perfusion: normal    Neurological function: normal    Range of motion: normal    Local anesthesia used?: Yes    Anesthesia:  Local infiltration  Local anesthetic:  Lidocaine 1% without epinephrine  Anesthetic total (ml):  1   Patient with a infected olecranon bursa  , skin was prepped with chlorhexidine and allowed to dry    Then skin was anesthetized using a 27 gauge needle with 1% lidocaine with epi , then changed to an 18 gauge needle, 8 cc of cloudy serous sanguinous appearing fluid was obtained, placed in a sterile urine cup and sent down for culture  Patient tolerated procedure well  Area was covered with bandage           Phone Contacts  ED Phone Contact    ED Course                               MDM  Number of Diagnoses or Management Options  Septic olecranon bursitis of right elbow: new and requires workup  Diagnosis management comments: Infected right olecranon bursa, drain in ER placed on Keflex due to surrounding erythema       Amount and/or Complexity of Data Reviewed  Decide to obtain previous medical records or to obtain history from someone other than the patient: yes  Obtain history from someone other than the patient: yes  Review and summarize past medical records: yes      CritCare Time    Disposition  Final diagnoses:   Septic olecranon bursitis of right elbow     Time reflects when diagnosis was documented in both MDM as applicable and the Disposition within this note     Time User Action Codes Description Comment    10/15/2018  5:15 PM Mary Albarado Add [M37 223] Septic olecranon bursitis of right elbow       ED Disposition     ED Disposition Condition Comment    Discharge  Marilu Acevedo discharge to home/self care      Condition at discharge: Good        Follow-up Information     Follow up With Specialties Details Why Contact Info Additional [phone] [facility] [place] [facility] Call in 1 day To arrange for the next available appointment Diane [address] [place] [address], [address] [place] [address], [phone]          Patient's Medications   Discharge Prescriptions    CEPHALEXIN (KEFLEX) 500 MG CAPSULE    Take 1 capsule (500 mg total) by mouth 3 (three) times a day for 10 days       Start Date: 10/15/2018End Date: 10/25/2018       Order Dose: 500 mg       Quantity: 30 capsule Refills: 0    MORPHINE (MSIR) 15 MG TABLET    Take 1 tablet (15 mg total) by mouth every 4 (four) hours as needed for severe pain for up to 10 days Max Daily Amount: 90 mg       Start Date: 10/15/2018End Date: 10/25/2018       Order Dose: 15 mg       Quantity: 10 tablet    Refills: 0     No discharge procedures on file      ED Provider  Electronically Signed by           Lai Ceballos DO  10/15/18 0505

## 2018-10-18 LAB
BACTERIA SPEC BFLD CULT: ABNORMAL
GRAM STN SPEC: ABNORMAL
GRAM STN SPEC: ABNORMAL

## 2018-11-12 ENCOUNTER — HOSPITAL ENCOUNTER (EMERGENCY)
Facility: HOSPITAL | Age: 73
Discharge: HOME | End: 2018-11-12
Attending: EMERGENCY MEDICINE | Admitting: EMERGENCY MEDICINE
Payer: MEDICARE

## 2018-11-12 VITALS
SYSTOLIC BLOOD PRESSURE: 113 MMHG | RESPIRATION RATE: 18 BRPM | DIASTOLIC BLOOD PRESSURE: 54 MMHG | OXYGEN SATURATION: 100 % | TEMPERATURE: 97.9 F | HEART RATE: 64 BPM

## 2018-11-12 DIAGNOSIS — L03.90 CELLULITIS: ICD-10-CM

## 2018-11-12 DIAGNOSIS — M70.20 OLECRANON BURSITIS: Primary | ICD-10-CM

## 2018-11-12 PROCEDURE — 99283 EMERGENCY DEPT VISIT LOW MDM: CPT

## 2018-11-12 RX ORDER — CLINDAMYCIN HYDROCHLORIDE 150 MG/1
300 CAPSULE ORAL EVERY 8 HOURS SCHEDULED
Qty: 60 CAPSULE | Refills: 0 | Status: SHIPPED | OUTPATIENT
Start: 2018-11-12 | End: 2018-11-22

## 2018-11-12 RX ORDER — CLINDAMYCIN HYDROCHLORIDE 150 MG/1
300 CAPSULE ORAL ONCE
Status: COMPLETED | OUTPATIENT
Start: 2018-11-12 | End: 2018-11-12

## 2018-11-12 RX ORDER — OXYCODONE HYDROCHLORIDE AND ACETAMINOPHEN 5; 325 MG/1; MG/1
1 TABLET ORAL EVERY 4 HOURS PRN
Qty: 15 TABLET | Refills: 0 | Status: SHIPPED | OUTPATIENT
Start: 2018-11-12 | End: 2018-11-22

## 2018-11-12 RX ADMIN — CLINDAMYCIN HYDROCHLORIDE 300 MG: 150 CAPSULE ORAL at 17:25

## 2018-11-12 NOTE — ED PROVIDER NOTES
History  Chief Complaint   Patient presents with    Elbow Swelling     Pt states he was on Keflex for an elbow infection  He reports increased swelling today  Patient presents to the emergency department for evaluation of right elbow redness and swelling similar to 3 weeks ago when he was diagnosed with olecranon bursitis and placed on Keflex  He states the Keflex took the infection away but he did have a diarrheal episode from it  He states about 1 week ago the redness and swelling has returned to the same elbow  He denies trauma fever chills or other complaints at this time  He denies weakness or malaise  He has been eating and drinking appropriately for himself as per the patient and his wife  Prior to Admission Medications   Prescriptions Last Dose Informant Patient Reported? Taking?    CYANOCOBALAMIN PO   Yes No   Sig: Take 1,000 mcg by mouth     MAGNESIUM CARBONATE PO   Yes No   Sig: Take 400 mg by mouth daily     acetaminophen (TYLENOL) 325 mg tablet   Yes No   Sig: Take 650 mg by mouth 2 (two) times a day   apixaban (ELIQUIS) 5 mg   Yes No   Sig: Take 5 mg by mouth 2 (two) times a day   carvedilol (COREG) 25 mg tablet   Yes No   Sig: Take 25 mg by mouth 2 (two) times a day with meals   cholecalciferol (VITAMIN D3) 1,000 units tablet   Yes No   Sig: Take 5,000 Units by mouth daily   co-enzyme Q-10 50 MG capsule   Yes No   Sig: Take 100 mg by mouth daily   insulin aspart (NovoLOG) 100 units/mL injection   Yes No   Sig: Inject under the skin 3 (three) times a day before meals Sliding scale   insulin detemir (LEVEMIR) 100 units/mL subcutaneous injection   Yes No   Sig: Inject 30 Units under the skin daily at bedtime     lisinopril-hydrochlorothiazide (PRINZIDE,ZESTORETIC) 20-12 5 MG per tablet   Yes No   Sig: Take 2 tablets by mouth daily   omeprazole (PriLOSEC) 20 mg delayed release capsule   Yes No   Sig: Take 20 mg by mouth daily   rosuvastatin (CRESTOR) 5 mg tablet   Yes No   Sig: Take 5 mg by mouth daily   sertraline (ZOLOFT) 100 mg tablet   Yes No   Sig: Take 50 mg by mouth daily   traZODone (DESYREL) 50 mg tablet   Yes No   Sig: Take 50 mg by mouth daily at bedtime   zolpidem (AMBIEN CR) 12 5 MG CR tablet   Yes No   Sig: Take 12 5 mg by mouth daily at bedtime as needed for sleep      Facility-Administered Medications: None       Past Medical History:   Diagnosis Date    [facility] [address] [facility]     [facility]     Hypertension        Past Surgical History:   Procedure Laterality Date    ABLATION OF DYSRHYTHMIC FOCUS      for a-fib    CHOLECYSTECTOMY      TOTAL SHOULDER REPLACEMENT Right        History reviewed  No pertinent family history  I have reviewed and agree with the history as documented  Social History   Substance Use Topics    Smoking status: Former Smoker    Smokeless tobacco: Never Used    Alcohol use No        Review of Systems   Constitutional: Negative  Negative for activity change, appetite change, chills, diaphoresis, fatigue and fever  HENT: Negative  Negative for drooling, rhinorrhea, trouble swallowing and voice change  Eyes: Negative  Negative for photophobia and visual disturbance  Respiratory: Negative  Negative for cough, chest tightness, shortness of breath, wheezing and stridor  Cardiovascular: Negative  Negative for chest pain, palpitations and leg swelling  Gastrointestinal: Negative  Negative for abdominal pain, anal bleeding, blood in stool, nausea and vomiting  Endocrine: Negative  Genitourinary: Negative  Negative for dysuria, flank pain, hematuria and urgency  Musculoskeletal: Positive for arthralgias  Negative for joint swelling, neck pain and neck stiffness  Skin: Positive for rash and wound  Allergic/Immunologic: Negative  Neurological: Negative  Negative for dizziness, seizures, syncope, facial asymmetry, weakness, light-headedness, numbness and headaches  Hematological: Negative    Does not bruise/bleed easily  Psychiatric/Behavioral: Negative  Physical Exam  Physical Exam   Constitutional: He is oriented to person, place, and time  He appears well-developed and well-nourished  No distress  Nontoxic appearance  No respiratory distress  Patient looks comfortable and well sitting upright on the stretcher   Musculoskeletal: He exhibits tenderness  He exhibits no edema or deformity  Patient with a mild to moderate size fluid filled bursa with surrounding erythema  Normal range of motion including flexion extension pronation and supination at the elbow  No evidence of septic joint  Normal neurovascular status  Neurological: He is alert and oriented to person, place, and time  He displays normal reflexes  No cranial nerve deficit or sensory deficit  He exhibits normal muscle tone  Coordination normal    Skin: Skin is warm  No rash noted  He is not diaphoretic  There is erythema  Psychiatric: He has a normal mood and affect  His behavior is normal  Judgment and thought content normal    Nursing note and vitals reviewed  Vital Signs  ED Triage Vitals [11/12/18 1617]   Temperature Pulse Respirations Blood Pressure SpO2   97 9 °F (36 6 °C) 64 18 113/54 100 %      Temp Source Heart Rate Source Patient Position - Orthostatic VS BP Location FiO2 (%)   Oral Monitor Sitting Right arm --      Pain Score       --           Vitals:    11/12/18 1617   BP: 113/54   Pulse: 64   Patient Position - Orthostatic VS: Sitting       Visual Acuity      ED Medications  Medications   clindamycin (CLEOCIN) capsule 300 mg (300 mg Oral Given 11/12/18 1725)       Diagnostic Studies  Results Reviewed     None                 No orders to display              Procedures  Procedures       Phone Contacts  ED Phone Contact    ED Course  ED Course as of Nov 12 1800   Mon Nov 12, 2018   1717 Patient is stable for discharge  Will restart on antibiotics this times using clindamycin    Discussed signs and symptoms that would require return to the emergency department  [facility]  CritCare Time    Disposition  Final diagnoses:   Olecranon bursitis   Cellulitis     Time reflects when diagnosis was documented in both MDM as applicable and the Disposition within this note     Time User Action Codes Description Comment    11/12/2018  5:18 PM Henna Aburto Add [M70 20] Olecranon bursitis     11/12/2018  5:18 PM Tejinder Guzman 58 [D19 12] Cellulitis       ED Disposition     ED Disposition Condition Comment    Discharge  Trung Medina discharge to home/self care      Condition at discharge: Stable        Follow-up Information     Follow up With Specialties Details Why [phone], DO Orthopedic Surgery Schedule an appointment as soon as possible for a visit  [address] [place] [address]  [address] [facility]            Discharge Medication List as of 11/12/2018  5:21 PM      START taking these medications    Details   clindamycin (CLEOCIN) 150 mg capsule Take 2 capsules (300 mg total) by mouth every 8 (eight) hours for 10 days, Starting Mon 11/12/2018, Until Thu 11/22/2018, Normal      oxyCODONE-acetaminophen (PERCOCET) 5-325 mg per tablet Take 1 tablet by mouth every 4 (four) hours as needed for moderate pain for up to 10 days Max Daily Amount: 6 tablets, Starting Mon 11/12/2018, Until Thu 11/22/2018, Print         CONTINUE these medications which have NOT CHANGED    Details   acetaminophen (TYLENOL) 325 mg tablet Take 650 mg by mouth 2 (two) times a day, Historical Med      apixaban (ELIQUIS) 5 mg Take 5 mg by mouth 2 (two) times a day, Until Discontinued, Historical Med      carvedilol (COREG) 25 mg tablet Take 25 mg by mouth 2 (two) times a day with meals, Until Discontinued, Historical Med      cholecalciferol (VITAMIN D3) 1,000 units tablet Take 5,000 Units by mouth daily, Historical Med      co-enzyme Q-10 50 MG capsule Take 100 mg by mouth daily, Until Discontinued, Historical Med CYANOCOBALAMIN PO Take 1,000 mcg by mouth  , Historical Med      insulin aspart (NovoLOG) 100 units/mL injection Inject under the skin 3 (three) times a day before meals Sliding scale, Historical Med      insulin detemir (LEVEMIR) 100 units/mL subcutaneous injection Inject 30 Units under the skin daily at bedtime  , Historical Med      lisinopril-hydrochlorothiazide (PRINZIDE,ZESTORETIC) 20-12 5 MG per tablet Take 2 tablets by mouth daily, Historical Med      MAGNESIUM CARBONATE PO Take 400 mg by mouth daily  , Historical Med      omeprazole (PriLOSEC) 20 mg delayed release capsule Take 20 mg by mouth daily, Until Discontinued, Historical Med      rosuvastatin (CRESTOR) 5 mg tablet Take 5 mg by mouth daily, Historical Med      sertraline (ZOLOFT) 100 mg tablet Take 50 mg by mouth daily, Historical Med      traZODone (DESYREL) 50 mg tablet Take 50 mg by mouth daily at bedtime, Historical Med      zolpidem (AMBIEN CR) 12 5 MG CR tablet Take 12 5 mg by mouth daily at bedtime as needed for sleep, Historical Med           No discharge procedures on file      ED Provider  Electronically Signed by           Patricio Jewell MD  11/12/18 1800

## 2018-11-12 NOTE — DISCHARGE INSTRUCTIONS
Cellulitis, Ambulatory Care   GENERAL INFORMATION:   Cellulitis  is a skin infection caused by bacteria  Common symptoms include the following:   · Fever    · A red, warm, swollen area on your skin    · Pain when the area is touched    · Bumps or blisters (abscess) that may drain pus    · Bumpy, raised skin that feels like an orange peel  Seek immediate care for the following symptoms:   · An increase in pain, redness, warmth, and size    · Red streaks coming from the infected area    · A thin, gray-brown discharge coming from your infected skin area    · A crackling under your skin when you touch it    · Purple dots or bumps on your skin, or bleeding under your skin    · New swelling and pain in your legs    · Sudden trouble breathing or chest pain  Treatment for cellulitis  may include medicines to treat the bacterial infection or decrease pain  The infection may need to be cleaned out  Damaged, dead, or infected tissue may need to be cut away to help your wound heal   Manage your symptoms:   · Elevate your wound above the level of your heart  as often as you can  This will help decrease swelling and pain  Prop your wound on pillows or blankets to keep it elevated comfortably  · Clean your wound as directed  You may need to wash the wound with soap and water  Look for signs of infection  · Wear pressure stockings as directed  The stockings are tight and put pressure on your legs  This improves blood flow and decreases swelling  Prevent cellulitis:   · Wash your hands often  Use soap and water  Wash your hands after you use the bathroom, change a child's diapers, or sneeze  Wash your hands before you prepare or eat food  Use lotion to prevent dry, cracked skin  · Do not share personal items, such as towels, clothing, and razors  · Clean exercise equipment  with germ-killing  before and after you use it    Follow up with your healthcare provider as directed:  Write down your questions so you remember to ask them during your visits  CARE AGREEMENT:   You have the right to help plan your care  Learn about your health condition and how it may be treated  Discuss treatment options with your caregivers to decide what care you want to receive  You always have the right to refuse treatment  The above information is an  only  It is not intended as medical advice for individual conditions or treatments  Talk to your doctor, nurse or pharmacist before following any medical regimen to see if it is safe and effective for you  © 2014 [address] is for End User's use only and may not be sold, redistributed or otherwise used for commercial purposes  All illustrations and images included in CareNotes® are the copyrighted property of [employer]  or [employer]  Elbow Bursitis   WHAT YOU NEED TO KNOW:   What is elbow bursitis? Elbow bursitis is inflammation of the bursa in your elbow  The bursa is a fluid-filled sac that acts as a cushion between a bone and a tendon  A tendon is a cord of strong tissue that connects muscles to bones  The bursa is located right under the point of your elbow  What causes elbow bursitis? · An injury, such as a fall    · Overuse of your elbow, such as when you play tennis, vacuum, or swing a hammer    · Pressure on your elbows, such as when you lean on your elbows    · Bacterial infection    · Medical conditions, such as rheumatoid arthritis or gout  What are the signs and symptoms of elbow bursitis? · Pain or tenderness when you move your elbow    · Redness or swelling on or around the point of your elbow    · Decreased movement of your elbow    · Warmth of the skin over your elbow  How is elbow bursitis diagnosed? Your healthcare provider will examine your elbow and ask about your injury or activities  You may need any of the following:  · Blood tests:  Your blood is tested for signs of infection   Healthcare providers may also check for diseases that may be causing your bursitis, such as rheumatoid arthritis  · X-rays: These pictures will show bone position problems, arthritis, or a fracture  · MRI:  This scan uses powerful magnets and a computer to take pictures of your elbow  An MRI may show tissue damage or arthritis  You may be given dye to help the pictures show up better  Tell the healthcare provider if you have ever had an allergic reaction to contrast dye  Do not enter the MRI room with anything metal  Metal can cause serious injury  Tell the healthcare provider if you have any metal in or on your body  · Fluid culture:  Healthcare providers use a needle to drain fluid from your bursa  The fluid will be sent to a lab and tested for infection  Removal of bursa fluid may also help relieve your symptoms  How is elbow bursitis treated? · Medicine:      ¨ NSAIDs:  These medicines decrease swelling, pain, and fever  NSAIDs are available without a doctor's order  Ask your healthcare provider which medicine is right for you  Ask how much to take and when to take it  Take as directed  NSAIDs can cause stomach bleeding and kidney problems if not taken correctly  ¨ Antibiotics: These help fight an infection caused by bacteria  You may need antibiotics if your bursitis is caused by infection  ¨ Steroid injection: This shot will help decrease pain and swelling  · Surgery: You may need surgery to remove your bursa or part of your elbow bone  Surgery is only done when other treatments do not work  What are the risks of elbow bursitis? The infection may spread to nearby joints  You may develop long-term bursitis  This may include pain and severe limitation of movement  How can I manage my symptoms? · Rest:  Rest your elbow as much as possible to decrease pain and swelling  Slowly start to do more each day  Return to your daily activities as directed       · Ice:  Ice helps decrease swelling and pain  Ice may also help prevent tissue damage  Use an ice pack, or put crushed ice in a plastic bag  Cover it with a towel and place it on your elbow for 15 to 20 minutes, 3 to 4 times each day, as directed  · Compress:  Healthcare providers may wrap your arm with tape or an elastic bandage to decrease swelling  Loosen the elastic bandage if you start to lose feeling in your fingers  · Elevate:  Raise your elbow above the level of your heart as often as you can  This will help decrease swelling and pain  Prop your elbow on pillows or blankets to keep it elevated comfortably  · Physical therapy:  A physical therapist teaches you exercises to help improve movement and strength, and to decrease pain  How can I prevent elbow bursitis? · Avoid injury and pressure to your elbows: Wear elbow pads or protectors when you play sports  Do not lean on your elbows or clench your fists  Do not tightly  small items, such as tools or pens  · Stretch, warm up, and cool down:  Always stretch and do warmup and cool-down exercises before and after you exercise  This will help loosen your muscles and decrease stress on your elbows  Rest between workouts  When should I contact my healthcare provider? · Your pain and swelling increase  · Your symptoms do not improve after 10 days of treatment  · You have a fever  · You have questions or concerns about your condition or care  CARE AGREEMENT:   You have the right to help plan your care  Learn about your health condition and how it may be treated  Discuss treatment options with your caregivers to decide what care you want to receive  You always have the right to refuse treatment  The above information is an  only  It is not intended as medical advice for individual conditions or treatments  Talk to your doctor, nurse or pharmacist before following any medical regimen to see if it is safe and effective for you    © 2017 [employer] Information is for End User's use only and may not be sold, redistributed or otherwise used for commercial purposes  All illustrations and images included in CareNotes® are the copyrighted property of [employer]  or [employer]

## 2018-11-24 ENCOUNTER — HOSPITAL ENCOUNTER (INPATIENT)
Facility: HOSPITAL | Age: 73
LOS: 2 days | Discharge: HOME HEALTH | DRG: 872 | End: 2018-11-27
Attending: EMERGENCY MEDICINE | Admitting: HOSPITALIST
Payer: MEDICARE

## 2018-11-24 ENCOUNTER — APPOINTMENT (OUTPATIENT)
Dept: ULTRASOUND IMAGING | Facility: HOSPITAL | Age: 73
DRG: 872 | End: 2018-11-24
Payer: MEDICARE

## 2018-11-24 DIAGNOSIS — M71.121 SEPTIC OLECRANON BURSITIS OF RIGHT ELBOW: Primary | ICD-10-CM

## 2018-11-24 DIAGNOSIS — M70.21 OLECRANON BURSITIS OF RIGHT ELBOW: ICD-10-CM

## 2018-11-24 PROBLEM — R60.0 LOWER EXTREMITY EDEMA: Status: ACTIVE | Noted: 2018-11-24

## 2018-11-24 PROBLEM — I48.91 ATRIAL FIBRILLATION (HCC): Status: ACTIVE | Noted: 2018-11-24

## 2018-11-24 PROBLEM — R53.1 GENERALIZED WEAKNESS: Status: ACTIVE | Noted: 2018-11-24

## 2018-11-24 PROBLEM — I10 HYPERTENSION: Status: ACTIVE | Noted: 2018-11-24

## 2018-11-24 PROBLEM — E11.9 TYPE 2 DIABETES MELLITUS (HCC): Status: ACTIVE | Noted: 2018-11-24

## 2018-11-24 PROBLEM — A41.9 SEPSIS (HCC): Status: ACTIVE | Noted: 2018-11-24

## 2018-11-24 LAB
ALBUMIN SERPL BCP-MCNC: 3.7 G/DL (ref 3.5–5)
ALP SERPL-CCNC: 98 U/L (ref 46–116)
ALT SERPL W P-5'-P-CCNC: 43 U/L (ref 12–78)
ANION GAP SERPL CALCULATED.3IONS-SCNC: 8 MMOL/L (ref 4–13)
APTT PPP: 47 SECONDS (ref 26–38)
AST SERPL W P-5'-P-CCNC: 27 U/L (ref 5–45)
BASOPHILS # BLD AUTO: 0.04 THOUSANDS/ΜL (ref 0–0.1)
BASOPHILS NFR BLD AUTO: 0 % (ref 0–1)
BILIRUB SERPL-MCNC: 0.3 MG/DL (ref 0.2–1)
BUN SERPL-MCNC: 24 MG/DL (ref 5–25)
CALCIUM SERPL-MCNC: 9.3 MG/DL (ref 8.3–10.1)
CHLORIDE SERPL-SCNC: 102 MMOL/L (ref 100–108)
CO2 SERPL-SCNC: 30 MMOL/L (ref 21–32)
CREAT SERPL-MCNC: 1.29 MG/DL (ref 0.6–1.3)
CRP SERPL QL: <3 MG/L
EOSINOPHIL # BLD AUTO: 0.17 THOUSAND/ΜL (ref 0–0.61)
EOSINOPHIL NFR BLD AUTO: 2 % (ref 0–6)
ERYTHROCYTE [DISTWIDTH] IN BLOOD BY AUTOMATED COUNT: 16.8 % (ref 11.6–15.1)
ERYTHROCYTE [SEDIMENTATION RATE] IN BLOOD: 15 MM/HOUR (ref 0–10)
GFR SERPL CREATININE-BSD FRML MDRD: 56 ML/MIN/1.73SQ M
GLUCOSE SERPL-MCNC: 178 MG/DL (ref 65–140)
GLUCOSE SERPL-MCNC: 186 MG/DL (ref 65–140)
GLUCOSE SERPL-MCNC: 198 MG/DL (ref 65–140)
HCT VFR BLD AUTO: 37.5 % (ref 36.5–49.3)
HGB BLD-MCNC: 11.3 G/DL (ref 12–17)
IMM GRANULOCYTES # BLD AUTO: 0.03 THOUSAND/UL (ref 0–0.2)
IMM GRANULOCYTES NFR BLD AUTO: 0 % (ref 0–2)
INR PPP: 1.26 (ref 0.86–1.17)
LACTATE SERPL-SCNC: 2.1 MMOL/L (ref 0.5–2)
LACTATE SERPL-SCNC: 2.5 MMOL/L (ref 0.5–2)
LYMPHOCYTES # BLD AUTO: 1.8 THOUSANDS/ΜL (ref 0.6–4.47)
LYMPHOCYTES NFR BLD AUTO: 17 % (ref 14–44)
MCH RBC QN AUTO: 26 PG (ref 26.8–34.3)
MCHC RBC AUTO-ENTMCNC: 30.1 G/DL (ref 31.4–37.4)
MCV RBC AUTO: 86 FL (ref 82–98)
MONOCYTES # BLD AUTO: 1.14 THOUSAND/ΜL (ref 0.17–1.22)
MONOCYTES NFR BLD AUTO: 11 % (ref 4–12)
NEUTROPHILS # BLD AUTO: 7.24 THOUSANDS/ΜL (ref 1.85–7.62)
NEUTS SEG NFR BLD AUTO: 70 % (ref 43–75)
NRBC BLD AUTO-RTO: 0 /100 WBCS
PLATELET # BLD AUTO: 323 THOUSANDS/UL (ref 149–390)
PMV BLD AUTO: 10.3 FL (ref 8.9–12.7)
POTASSIUM SERPL-SCNC: 4.3 MMOL/L (ref 3.5–5.3)
PROT SERPL-MCNC: 7.7 G/DL (ref 6.4–8.2)
PROTHROMBIN TIME: 15.4 SECONDS (ref 11.8–14.2)
RBC # BLD AUTO: 4.34 MILLION/UL (ref 3.88–5.62)
SODIUM SERPL-SCNC: 140 MMOL/L (ref 136–145)
WBC # BLD AUTO: 10.42 THOUSAND/UL (ref 4.31–10.16)

## 2018-11-24 PROCEDURE — 0R9L3ZX DRAINAGE OF RIGHT ELBOW JOINT, PERCUTANEOUS APPROACH, DIAGNOSTIC: ICD-10-PCS | Performed by: PHYSICIAN ASSISTANT

## 2018-11-24 PROCEDURE — 85610 PROTHROMBIN TIME: CPT | Performed by: PHYSICIAN ASSISTANT

## 2018-11-24 PROCEDURE — 96365 THER/PROPH/DIAG IV INF INIT: CPT

## 2018-11-24 PROCEDURE — 87040 BLOOD CULTURE FOR BACTERIA: CPT | Performed by: PHYSICIAN ASSISTANT

## 2018-11-24 PROCEDURE — 80053 COMPREHEN METABOLIC PANEL: CPT | Performed by: PHYSICIAN ASSISTANT

## 2018-11-24 PROCEDURE — 99284 EMERGENCY DEPT VISIT MOD MDM: CPT

## 2018-11-24 PROCEDURE — 93970 EXTREMITY STUDY: CPT

## 2018-11-24 PROCEDURE — 87070 CULTURE OTHR SPECIMN AEROBIC: CPT | Performed by: PHYSICIAN ASSISTANT

## 2018-11-24 PROCEDURE — 86140 C-REACTIVE PROTEIN: CPT | Performed by: PHYSICIAN ASSISTANT

## 2018-11-24 PROCEDURE — 87205 SMEAR GRAM STAIN: CPT | Performed by: PHYSICIAN ASSISTANT

## 2018-11-24 PROCEDURE — 85025 COMPLETE CBC W/AUTO DIFF WBC: CPT | Performed by: PHYSICIAN ASSISTANT

## 2018-11-24 PROCEDURE — 87147 CULTURE TYPE IMMUNOLOGIC: CPT | Performed by: PHYSICIAN ASSISTANT

## 2018-11-24 PROCEDURE — 83605 ASSAY OF LACTIC ACID: CPT | Performed by: PHYSICIAN ASSISTANT

## 2018-11-24 PROCEDURE — 36415 COLL VENOUS BLD VENIPUNCTURE: CPT | Performed by: PHYSICIAN ASSISTANT

## 2018-11-24 PROCEDURE — 99220 PR INITIAL OBSERVATION CARE/DAY 70 MINUTES: CPT | Performed by: HOSPITALIST

## 2018-11-24 PROCEDURE — 93005 ELECTROCARDIOGRAM TRACING: CPT

## 2018-11-24 PROCEDURE — 85652 RBC SED RATE AUTOMATED: CPT | Performed by: PHYSICIAN ASSISTANT

## 2018-11-24 PROCEDURE — 85730 THROMBOPLASTIN TIME PARTIAL: CPT | Performed by: PHYSICIAN ASSISTANT

## 2018-11-24 PROCEDURE — 82948 REAGENT STRIP/BLOOD GLUCOSE: CPT

## 2018-11-24 RX ORDER — PANTOPRAZOLE SODIUM 40 MG/1
40 TABLET, DELAYED RELEASE ORAL
Status: DISCONTINUED | OUTPATIENT
Start: 2018-11-25 | End: 2018-11-27 | Stop reason: HOSPADM

## 2018-11-24 RX ORDER — TRAZODONE HYDROCHLORIDE 50 MG/1
50 TABLET ORAL
Status: DISCONTINUED | OUTPATIENT
Start: 2018-11-24 | End: 2018-11-27 | Stop reason: HOSPADM

## 2018-11-24 RX ORDER — LISINOPRIL 20 MG/1
20 TABLET ORAL DAILY
Status: DISCONTINUED | OUTPATIENT
Start: 2018-11-24 | End: 2018-11-24

## 2018-11-24 RX ORDER — LISINOPRIL 20 MG/1
20 TABLET ORAL DAILY
Status: DISCONTINUED | OUTPATIENT
Start: 2018-11-24 | End: 2018-11-27 | Stop reason: HOSPADM

## 2018-11-24 RX ORDER — SERTRALINE HYDROCHLORIDE 100 MG/1
100 TABLET, FILM COATED ORAL
Status: DISCONTINUED | OUTPATIENT
Start: 2018-11-24 | End: 2018-11-27 | Stop reason: HOSPADM

## 2018-11-24 RX ORDER — SODIUM CHLORIDE 9 MG/ML
50 INJECTION, SOLUTION INTRAVENOUS CONTINUOUS
Status: DISCONTINUED | OUTPATIENT
Start: 2018-11-24 | End: 2018-11-25

## 2018-11-24 RX ORDER — SODIUM CHLORIDE 9 MG/ML
125 INJECTION, SOLUTION INTRAVENOUS CONTINUOUS
Status: DISCONTINUED | OUTPATIENT
Start: 2018-11-24 | End: 2018-11-24

## 2018-11-24 RX ORDER — OXYCODONE HYDROCHLORIDE 5 MG/1
5 TABLET ORAL EVERY 6 HOURS PRN
Status: DISCONTINUED | OUTPATIENT
Start: 2018-11-24 | End: 2018-11-27 | Stop reason: HOSPADM

## 2018-11-24 RX ORDER — MELATONIN
5000 DAILY
Status: DISCONTINUED | OUTPATIENT
Start: 2018-11-25 | End: 2018-11-27 | Stop reason: HOSPADM

## 2018-11-24 RX ORDER — VANCOMYCIN HYDROCHLORIDE 1 G/200ML
10 INJECTION, SOLUTION INTRAVENOUS EVERY 12 HOURS
Status: DISCONTINUED | OUTPATIENT
Start: 2018-11-25 | End: 2018-11-25

## 2018-11-24 RX ORDER — ONDANSETRON 2 MG/ML
4 INJECTION INTRAMUSCULAR; INTRAVENOUS EVERY 6 HOURS PRN
Status: DISCONTINUED | OUTPATIENT
Start: 2018-11-24 | End: 2018-11-27 | Stop reason: HOSPADM

## 2018-11-24 RX ORDER — ACETAMINOPHEN 325 MG/1
650 TABLET ORAL EVERY 6 HOURS PRN
Status: DISCONTINUED | OUTPATIENT
Start: 2018-11-24 | End: 2018-11-27 | Stop reason: HOSPADM

## 2018-11-24 RX ORDER — OXYCODONE HYDROCHLORIDE AND ACETAMINOPHEN 5; 325 MG/1; MG/1
1 TABLET ORAL EVERY 6 HOURS PRN
Status: DISCONTINUED | OUTPATIENT
Start: 2018-11-24 | End: 2018-11-27 | Stop reason: HOSPADM

## 2018-11-24 RX ORDER — VANCOMYCIN HYDROCHLORIDE 1 G/200ML
10 INJECTION, SOLUTION INTRAVENOUS EVERY 8 HOURS
Status: DISCONTINUED | OUTPATIENT
Start: 2018-11-25 | End: 2018-11-24

## 2018-11-24 RX ORDER — CARVEDILOL 12.5 MG/1
25 TABLET ORAL 2 TIMES DAILY WITH MEALS
Status: DISCONTINUED | OUTPATIENT
Start: 2018-11-24 | End: 2018-11-27 | Stop reason: HOSPADM

## 2018-11-24 RX ORDER — CARVEDILOL 12.5 MG/1
25 TABLET ORAL 2 TIMES DAILY WITH MEALS
Status: DISCONTINUED | OUTPATIENT
Start: 2018-11-24 | End: 2018-11-24

## 2018-11-24 RX ORDER — ZOLPIDEM TARTRATE 5 MG/1
10 TABLET ORAL
Status: DISCONTINUED | OUTPATIENT
Start: 2018-11-24 | End: 2018-11-27 | Stop reason: HOSPADM

## 2018-11-24 RX ADMIN — INSULIN LISPRO 1 UNITS: 100 INJECTION, SOLUTION INTRAVENOUS; SUBCUTANEOUS at 21:37

## 2018-11-24 RX ADMIN — ZOLPIDEM TARTRATE 10 MG: 5 TABLET, COATED ORAL at 21:43

## 2018-11-24 RX ADMIN — SODIUM CHLORIDE 75 ML/HR: 0.9 INJECTION, SOLUTION INTRAVENOUS at 17:44

## 2018-11-24 RX ADMIN — CARVEDILOL 25 MG: 12.5 TABLET, FILM COATED ORAL at 18:33

## 2018-11-24 RX ADMIN — INSULIN DETEMIR 20 UNITS: 100 INJECTION, SOLUTION SUBCUTANEOUS at 21:41

## 2018-11-24 RX ADMIN — TRAZODONE HYDROCHLORIDE 50 MG: 50 TABLET ORAL at 21:37

## 2018-11-24 RX ADMIN — SERTRALINE HYDROCHLORIDE 100 MG: 100 TABLET ORAL at 21:37

## 2018-11-24 RX ADMIN — INSULIN LISPRO 1 UNITS: 100 INJECTION, SOLUTION INTRAVENOUS; SUBCUTANEOUS at 18:36

## 2018-11-24 RX ADMIN — VANCOMYCIN HYDROCHLORIDE 1500 MG: 1 INJECTION, POWDER, LYOPHILIZED, FOR SOLUTION INTRAVENOUS at 15:40

## 2018-11-24 RX ADMIN — LISINOPRIL 20 MG: 20 TABLET ORAL at 18:00

## 2018-11-24 RX ADMIN — CEFTRIAXONE SODIUM 1000 MG: 2 INJECTION, POWDER, FOR SOLUTION INTRAMUSCULAR; INTRAVENOUS at 14:42

## 2018-11-24 RX ADMIN — SODIUM CHLORIDE 1000 ML: 0.9 INJECTION, SOLUTION INTRAVENOUS at 15:14

## 2018-11-24 NOTE — ASSESSMENT & PLAN NOTE
No results found for: HGBA1C    Recent Labs      11/24/18   1630   POCGLU  198*       Blood Sugar Average: Last 72 hrs:  (P) 198   Continue home regimen, evimir 20 units at HS  Carb restricted diet   ACCU checks AC + HS with Lispro insulin sliding scale coverage

## 2018-11-24 NOTE — SEPSIS NOTE
Sepsis Note   Kiah Ramsey 81 y o  male MRN: [id number]  Unit/Bed#: ED 05 Encounter: [id number]            Initial Sepsis Screening     Row Name 11/24/18 5070                Is the patient's history suggestive of a new or worsening infection? (!)  Yes (Proceed)  -JG        Suspected source of infection soft tissue  -JG        Are two or more of the following signs & symptoms of infection both present and new to the patient? No  -JG        Indicate SIRS criteria          If the answer is yes to both questions, suspicion of sepsis is present          If severe sepsis is present AND tissue hypoperfusion perists in the hour after fluid resuscitation or lactate > 4, the patient meets criteria for SEPTIC SHOCK          Are any of the following organ dysfunction criteria present within 6 hours of suspected infection and SIRS criteria that are NOT considered to be chronic conditions?         Organ dysfunction Lactate > 2 0 mmol/L  -JG        Date of presentation of severe sepsis          Time of presentation of severe sepsis          Tissue hypoperfusion persists in the hour after crystalloid fluid administration, evidenced, by either:          Was hypotension present within one hour of the conclusion of crystalloid fluid administration?           Date of presentation of septic shock          Time of presentation of septic shock            User Key  (r) = Recorded By, (t) = Taken By, (c) = Cosigned By    [address] Name Provider Type    [address] Cesar PA-C Physician Assistant

## 2018-11-24 NOTE — ASSESSMENT & PLAN NOTE
Evidence by leukocytosis and and lactic acidosis due to olecranon bursitis of right elbow     Lactic 2 5 -> 2 1, s/p 1 L NS bolus  · Continue gentle IV hydration  · Continue IV Rocephin and vancomycin  · Follow up wound and blood cultures  · Monitor for worsening signs of sepsis  · Repeat lactic acid to less than 2  · Holding HCTZ

## 2018-11-24 NOTE — ED PROVIDER NOTES
History  Chief Complaint   Patient presents with    Elbow Pain     Pt  c/o right elbow swelling with pain for one month  Pt  seen for same in September and had fluid drained, cultured and dx  with Staph and tx with Keflex  Patient presents emergency room after being treated for olecranon bursitis that of was infected  He denies any fever chills  He was 1st on Keflex and then changed to clindamycin  He said that he stopped  the clindamycin 2 days ago  He noticed increased pain and mild redness over the posterior aspect of his right elbow 2 days later  His culture were reviewed and they  grew out 1+ Staph Radhagdunenesis  He denies any fever chills  He denies any nausea or vomiting  He denies diarrhea  Blood sugars have been controlled  Denies any recent illness  He is on Eliquis but he does not recall any recent trauma  Past medical history is positive for atrial fibrillation, diabetes, hypertension  History provided by:  Patient  Elbow Pain   Location:  Elbow  Elbow location:  R elbow  Injury: no    Pain details:     Quality:  Aching    Radiates to:  Does not radiate    Severity:  Moderate    Onset quality:  Sudden    Duration:  2 days    Timing:  Constant    Progression:  Waxing and waning  Handedness:  Right-handed  Dislocation: no    Foreign body present:  No foreign bodies  Prior injury to area:  No  Relieved by:  None tried  Worsened by: Movement (Palpation)  Associated symptoms: decreased range of motion, stiffness and swelling    Associated symptoms: no back pain, no fatigue, no fever, no muscle weakness, no neck pain, no numbness and no tingling    Risk factors: recent illness    Risk factors: no concern for non-accidental trauma, no known bone disorder and no frequent fractures    Risk factors comment:  Olecranon bursitis right elbow      Prior to Admission Medications   Prescriptions Last Dose Informant Patient Reported? Taking?    CYANOCOBALAMIN PO   Yes No   Sig: Take 1,000 mcg by mouth     MAGNESIUM CARBONATE PO   Yes No   Sig: Take 400 mg by mouth daily     acetaminophen (TYLENOL) 325 mg tablet   Yes No   Sig: Take 650 mg by mouth 2 (two) times a day   apixaban (ELIQUIS) 5 mg   Yes No   Sig: Take 5 mg by mouth 2 (two) times a day   carvedilol (COREG) 25 mg tablet   Yes No   Sig: Take 25 mg by mouth 2 (two) times a day with meals   cholecalciferol (VITAMIN D3) 1,000 units tablet   Yes No   Sig: Take 5,000 Units by mouth daily   co-enzyme Q-10 50 MG capsule   Yes No   Sig: Take 100 mg by mouth daily   insulin aspart (NovoLOG) 100 units/mL injection   Yes No   Sig: Inject under the skin 3 (three) times a day before meals Sliding scale   insulin detemir (LEVEMIR) 100 units/mL subcutaneous injection   Yes No   Sig: Inject 30 Units under the skin daily at bedtime     lisinopril-hydrochlorothiazide (PRINZIDE,ZESTORETIC) 20-12 5 MG per tablet   Yes No   Sig: Take 2 tablets by mouth daily   omeprazole (PriLOSEC) 20 mg delayed release capsule   Yes No   Sig: Take 20 mg by mouth daily   rosuvastatin (CRESTOR) 5 mg tablet   Yes No   Sig: Take 5 mg by mouth daily   sertraline (ZOLOFT) 100 mg tablet   Yes No   Sig: Take 50 mg by mouth daily   traZODone (DESYREL) 50 mg tablet   Yes No   Sig: Take 50 mg by mouth daily at bedtime   zolpidem (AMBIEN CR) 12 5 MG CR tablet   Yes No   Sig: Take 12 5 mg by mouth daily at bedtime as needed for sleep      Facility-Administered Medications: None       Past Medical History:   Diagnosis Date    Atrial fibrillation ([facility] [address] [facility]     Diabetes ([facility] [address] [facility]     Hypertension        Past Surgical History:   Procedure Laterality Date    ABLATION OF DYSRHYTHMIC FOCUS      for a-fib    CHOLECYSTECTOMY      TOTAL SHOULDER REPLACEMENT Right        History reviewed  No pertinent family history  I have reviewed and agree with the history as documented      Social History   Substance Use Topics    Smoking status: Former Smoker    Smokeless tobacco: Never Used    Alcohol use No Review of Systems   Constitutional: Positive for activity change  Negative for fatigue and fever  HENT: Negative for congestion, dental problem, ear discharge, ear pain, mouth sores, postnasal drip, rhinorrhea and sore throat  Eyes: Negative for pain, discharge, redness and itching  Respiratory: Negative for cough and wheezing  Cardiovascular: Negative for chest pain  Gastrointestinal: Negative for abdominal pain, diarrhea, nausea and vomiting  Genitourinary: Negative for dysuria, frequency and urgency  Musculoskeletal: Positive for arthralgias and stiffness  Negative for back pain and neck pain  Skin: Positive for color change  Negative for wound  Psychiatric/Behavioral: Negative for confusion  All other systems reviewed and are negative  Physical Exam  Physical Exam   Constitutional: He is oriented to person, place, and time  He appears well-developed and well-nourished  No distress  HENT:   Head: Normocephalic  Right Ear: External ear normal    Left Ear: External ear normal    Nose: Nose normal    Eyes: Conjunctivae are normal  Right eye exhibits no discharge  Left eye exhibits no discharge  Neck: Neck supple  No JVD present  No tracheal deviation present  No thyromegaly present  Cardiovascular: Normal rate, regular rhythm and normal heart sounds  Exam reveals no gallop and no friction rub  No murmur heard  Pulmonary/Chest: Effort normal and breath sounds normal  No stridor  No respiratory distress  He has no wheezes  He has no rales  Musculoskeletal: He exhibits no edema  Examination of the right elbow-there is swelling and erythema over the olecranon bursa  It is tender upon palpation  There is decreased range of motion secondary to guarding and pain  There are palpable pulses over the distal ulnar and radial pulses  Capillary refills less than 3 seconds  Lymphadenopathy:     He has no cervical adenopathy     Neurological: He is alert and oriented to person, place, and time  Skin: Capillary refill takes 2 to 3 seconds  He is not diaphoretic  Inspection of the right elbow-there is an inflamed right olecranon bursa  It is tender upon palpation  It is fluctuant on exam    Psychiatric: He has a normal mood and affect  His behavior is normal  Judgment and thought content normal    Nursing note and vitals reviewed  Vital Signs  ED Triage Vitals   Temperature Pulse Respirations Blood Pressure SpO2   11/24/18 1111 11/24/18 1111 11/24/18 1111 11/24/18 1111 11/24/18 1111   98 1 °F (36 7 °C) 62 20 165/79 100 %      Temp Source Heart Rate Source Patient Position - Orthostatic VS BP Location FiO2 (%)   11/24/18 1111 11/24/18 1403 11/24/18 1403 11/24/18 1403 --   Oral Monitor Sitting Left arm       Pain Score       11/24/18 1111       4           Vitals:    11/24/18 1111 11/24/18 1403 11/24/18 1415 11/24/18 1622   BP: 165/79 (!) 177/82 (!) 177/82 (!) 182/94   Pulse: 62 (!) 52 (!) 54 63   Patient Position - Orthostatic VS:  Sitting  Lying       Visual Acuity      ED Medications  Medications   vancomycin (VANCOCIN) 1,500 mg in sodium chloride 0 9 % 250 mL IVPB (1,500 mg Intravenous New Bag 11/24/18 1540)   sodium chloride 0 9 % infusion (not administered)   ceftriaxone (ROCEPHIN) 1 g/50 mL in dextrose IVPB (0 mg Intravenous Stopped 11/24/18 1540)   sodium chloride 0 9 % bolus 1,000 mL (1,000 mL Intravenous New Bag 11/24/18 1514)       Diagnostic Studies  Results Reviewed     Procedure Component Value Units Date/Time    Lactic acid, plasma [[id number]]  (Abnormal) Collected:  11/24/18 1513    Lab Status:  Final result Specimen:  Blood from Arm, Left Updated:  11/24/18 1556     LACTIC ACID 2 1 (HH) mmol/L     Narrative:         Result may be elevated if tourniquet was used during collection  Body fluid culture and Gram stain [[id number]] Collected:  11/24/18 1454    Lab Status: In process Specimen:   Body Fluid Updated:  11/24/18 1454    APTT [[id number]]  (Abnormal) Collected: 11/24/18 1407    Lab Status:  Final result Specimen:  Blood from Arm, Right Updated:  11/24/18 1429     PTT 47 (H) seconds     Protime-INR [[id number]]  (Abnormal) Collected:  11/24/18 1407    Lab Status:  Final result Specimen:  Blood from Arm, Right Updated:  11/24/18 1429     Protime 15 4 (H) seconds      INR 1 26 (H)    Sedimentation rate, automated [[id number]]  (Abnormal) Collected:  11/24/18 1302    Lab Status:  Final result Specimen:  Blood from Arm, Left Updated:  11/24/18 1426     Sed Rate 15 (H) mm/hour     Lactic acid, plasma [[id number]]  (Abnormal) Collected:  11/24/18 1308    Lab Status:  Final result Specimen:  Blood from Arm, Left Updated:  11/24/18 1401     LACTIC ACID 2 5 (HH) mmol/L     Narrative:         Result may be elevated if tourniquet was used during collection  Comprehensive metabolic panel [[id number]]  (Abnormal) Collected:  11/24/18 1307    Lab Status:  Final result Specimen:  Blood from Arm, Left Updated:  11/24/18 1356     Sodium 140 mmol/L      Potassium 4 3 mmol/L      Chloride 102 mmol/L      CO2 30 mmol/L      ANION GAP 8 mmol/L      BUN 24 mg/dL      Creatinine 1 29 mg/dL      Glucose 178 (H) mg/dL      Calcium 9 3 mg/dL      AST 27 U/L      ALT 43 U/L      Alkaline Phosphatase 98 U/L      Total Protein 7 7 g/dL      Albumin 3 7 g/dL      Total Bilirubin 0 30 mg/dL      eGFR 56 ml/min/1 73sq m     Narrative:         National Kid[employer] Disease Education Program recommendations are as follows:  GFR calculation is accurate only with a steady state creatinine  Chronic Kidney disease less than 60 ml/min/1 73 sq  meters  Kidney failure less than 15 ml/min/1 73 sq  meters      C-reactive protein [[id number]]  (Normal) Collected:  11/24/18 1307    Lab Status:  Final result Specimen:  Blood from Arm, Left Updated:  11/24/18 1352     CRP <3 0 mg/L     CBC and differential [[id number]]  (Abnormal) Collected:  11/24/18 1307    Lab Status:  Final result Specimen:  Blood from Arm, Left Updated:  11/24/18 1324     WBC 10 42 (H) Thousand/uL      RBC 4 34 Million/uL      Hemoglobin 11 3 (L) g/dL      Hematocrit 37 5 %      MCV 86 fL      MCH 26 0 (L) pg      MCHC 30 1 (L) g/dL      RDW 16 8 (H) %      MPV 10 3 fL      Platelets 842 Thousands/uL      nRBC 0 /100 WBCs      Neutrophils Relative 70 %      Immat GRANS % 0 %      Lymphocytes Relative 17 %      Monocytes Relative 11 %      Eosinophils Relative 2 %      Basophils Relative 0 %      Neutrophils Absolute 7 24 Thousands/µL      Immature Grans Absolute 0 03 Thousand/uL      Lymphocytes Absolute 1 80 Thousands/µL      Monocytes Absolute 1 14 Thousand/µL      Eosinophils Absolute 0 17 Thousand/µL      Basophils Absolute 0 04 Thousands/µL     Blood culture #2 [[id number]] Collected:  11/24/18 1313    Lab Status: In process Specimen:  Blood from Arm, Left Updated:  11/24/18 1320    Blood culture #1 [[id number]] Collected:  11/24/18 1313    Lab Status: In process Specimen:  Blood from Arm, Left Updated:  11/24/18 1320                 No orders to display              Procedures  Arthrocentesis  Date/Time: 11/24/2018 12:46 PM  Performed by: Jakub Ward  Authorized by: Jakub Ward     Location:  ED  Verbal consent obtained?: Yes    Consent given by:  Patient  Patient states understanding of procedure being performed: Yes    Site marked: Yes    Radiology Images displayed and confirmed  If images not available, report reviewed: Yes    Time out: Immediately prior to the procedure a time out was called    Indications: Possible infected olecranon bursitis    Body area:  Elbow  Joint:  Right elbow  Joint:  Right elbow  Joint:  Right elbow  Joint:  Right elbow  Joint:  Right elbow  Joint:  Right elbow  Joint:  Right elbow  Joint:  Right elbow  Joint:  Right elbow  Joint:  Right elbow  Joint:  Right elbow  Joint:  Right elbow  Joint:  Right elbow  Joint:  Right elbow  Joint:  Right elbow  Joint:  Right elbow  Joint:  Right elbow  Joint:  Right elbow  Joint:  Right elbow  Joint:  Right elbow  Joint:  Right elbow  Joint:  Right elbow  Joint:  Right elbow  Joint:  Right elbow  Joint:  Right elbow  Joint:  Right elbow  Joint:  Right elbow  Local anesthesia used?: No    Needle size:  18 G  Approach:  Lateral  Aspirate characteristics: jam hematoma, patient is on Eliquis  Patient tolerance:  Patient tolerated the procedure well with no immediate complications   1S6'U and ace bandage applied  Phone Contacts  ED Phone Contact    ED Course  ED Course as of Nov 24 1637   Sat Nov 24, 2018   1412 Lactic is 2 5  Plan IV hydration and repeat                          Initial Sepsis Screening     Row Name 11/24/18 1429                Is the patient's history suggestive of a new or worsening infection? (!)  Yes (Proceed)  -JG        Suspected source of infection soft tissue  -JG        Are two or more of the following signs & symptoms of infection both present and new to the patient? No  -JG        Indicate SIRS criteria          If the answer is yes to both questions, suspicion of sepsis is present          If severe sepsis is present AND tissue hypoperfusion perists in the hour after fluid resuscitation or lactate > 4, the patient meets criteria for SEPTIC SHOCK          Are any of the following organ dysfunction criteria present within 6 hours of suspected infection and SIRS criteria that are NOT considered to be chronic conditions?         Organ dysfunction Lactate > 2 0 mmol/L  -JG        Date of presentation of severe sepsis          Time of presentation of severe sepsis          Tissue hypoperfusion persists in the hour after crystalloid fluid administration, evidenced, by either:          Was hypotension present within one hour of the conclusion of crystalloid fluid administration?           Date of presentation of septic shock          Time of presentation of septic shock            User Key  (r) = Recorded By, (t) = Taken By, (c) = Cosigned By    [address] Name Provider Type    [address] Cesar PA-C Physician Assistant                  MDM  Number of Diagnoses or Management Options  Septic olecranon bursitis of right elbow: new and requires workup     Amount and/or Complexity of Data Reviewed  Clinical lab tests: ordered and reviewed  Tests in the radiology section of CPT®: ordered and reviewed  Tests in the medicine section of CPT®: ordered and reviewed    Risk of Complications, Morbidity, and/or Mortality  Presenting problems: high  Diagnostic procedures: high  Management options: high  General comments: Patient presents to the Emergency Room for recurrent infected a right olecranon bursitis  Was seen 2 times previous for the same condition  He was initially treated with Keflex which failed and the knee was placed on a course of clindamycin which she stopped 2 days ago  Today he complains of recurrence of the olecranon bursa swelling as well as erythema  He denies any fever chills  Was seen and examined  Laboratory tests were performed and reviewed  He did have an elevated white count as well as an elevated lactic acid which I felt was secondary to a dehydration a pattern more than acute sepsis  He did not meet acute SIRS criteria or acute sepsis  He did fail outpatient therapy so I did admit him to the hospital under [facility] for further evaluation and treatment  Was given his 1st dose of Rocephin and vancomycin here in the emergency room      Patient Progress  Patient progress: stable    CritCare Time    Disposition  Final diagnoses:   Septic olecranon bursitis of right elbow     Time reflects when diagnosis was documented in both MDM as applicable and the Disposition within this note     Time User Action Codes Description Comment    11/24/2018  2:58 PM Deysi  Add [[id number]] Septic olecranon bursitis of right elbow       ED Disposition     ED Disposition Condition Comment    Admit  Case was discussed with Dr Gloria Mccormack and the patient's admission status was agreed to be Admission Status: observation status to the service of Dr Keegan Hubbard     Follow-up Information    None         Current Discharge Medication List      CONTINUE these medications which have NOT CHANGED    Details   acetaminophen (TYLENOL) 325 mg tablet Take 650 mg by mouth 2 (two) times a day      apixaban (ELIQUIS) 5 mg Take 5 mg by mouth 2 (two) times a day      carvedilol (COREG) 25 mg tablet Take 25 mg by mouth 2 (two) times a day with meals      cholecalciferol (VITAMIN D3) 1,000 units tablet Take 5,000 Units by mouth daily      co-enzyme Q-10 50 MG capsule Take 100 mg by mouth daily      CYANOCOBALAMIN PO Take 1,000 mcg by mouth        insulin aspart (NovoLOG) 100 units/mL injection Inject under the skin 3 (three) times a day before meals Sliding scale      insulin detemir (LEVEMIR) 100 units/mL subcutaneous injection Inject 30 Units under the skin daily at bedtime        lisinopril-hydrochlorothiazide (PRINZIDE,ZESTORETIC) 20-12 5 MG per tablet Take 2 tablets by mouth daily      MAGNESIUM CARBONATE PO Take 400 mg by mouth daily        omeprazole (PriLOSEC) 20 mg delayed release capsule Take 20 mg by mouth daily      rosuvastatin (CRESTOR) 5 mg tablet Take 5 mg by mouth daily      sertraline (ZOLOFT) 100 mg tablet Take 50 mg by mouth daily      traZODone (DESYREL) 50 mg tablet Take 50 mg by mouth daily at bedtime      zolpidem (AMBIEN CR) 12 5 MG CR tablet Take 12 5 mg by mouth daily at bedtime as needed for sleep           No discharge procedures on file      ED Provider  Electronically Signed by           Cassi Olivarez PA-C  11/24/18 5159

## 2018-11-24 NOTE — ASSESSMENT & PLAN NOTE
Blood pressures elevated on admission, but patient missed his medications today   · Continue Coreg and Lisinopril with holding parameters   · Hold HCTZ in light of acute infection

## 2018-11-24 NOTE — ASSESSMENT & PLAN NOTE
Pt reports chronic BLE edema with erythema to LLE   Hst of AFib on Eliquis  · Obtain bilateral lower venous doppler to rule out acute or chronic DVT

## 2018-11-24 NOTE — H&P
H&P- Milady Bermudez 1945, 81 y o  male MRN: [id number]    Unit/Bed#: -01 Encounter: [id number]    Primary Care Provider: Cuco Barragan MD   Date and time admitted to hospital: 11/24/2018 11:36 AM        * Olecranon bursitis of right elbow   Assessment & Plan    Persistent bursitis  Likely infected WBC 10 42 and lactic acidosis  Prior wound culture from 10/18/2018 grew 1+ staph Lugdenunensis, susceptible to cefazolin and vancomycin  Completed a course of Keflex followed by Clindamycin  · S/p arthrocentesis today in ED with aspirate appearing like jam hematoma  Patient on Eliquis  · Follow up on repeat blood and body fluid cultures  · Continue IV Rocephin and Vancomycin   · Vanco trough prior to 4th dose  · Gentle IV hydration  · Orthopedic consultation for further recommendations  · Pain control     Sepsis ([facility] [address] )   Assessment & Plan    Evidence by leukocytosis and and lactic acidosis due to olecranon bursitis of right elbow  Lactic 2 5 -> 2 1, s/p 1 L NS bolus  · Continue gentle IV hydration  · Continue IV Rocephin and vancomycin  · Follow up wound and blood cultures  · Monitor for worsening signs of sepsis  · Repeat lactic acid to less than 2  · Holding HCTZ     Atrial fibrillation [facility])   Assessment & Plan    S/p Ablation at [facility]   On Eliquis and Coreg at home  · Continue home medications  · Obtain EKG     Lower extremity edema   Assessment & Plan    Pt reports chronic BLE edema with erythema to LLE  Hst of AFib on Eliquis  · Obtain bilateral lower venous doppler to rule out acute or chronic DVT     Hypertension   Assessment & Plan    Blood pressures elevated on admission, but patient missed his medications today   · Continue Coreg and Lisinopril with holding parameters   · Hold HCTZ in light of acute infection      Generalized weakness   Assessment & Plan    Pt reports weakness to BLE and generalized numbness/ tingling and attributes this to exposure to [employer]  Wheelchair bound  · Has a  and VNA services  · PT/OT eval and treat   · Supportive care, fall precautions        Type 2 diabetes mellitus ([facility] [address] )   Assessment & Plan    No results found for: HGBA1C    Recent Labs      11/24/18   1630   POCGLU  198*       Blood Sugar Average: Last 72 hrs:  (P) 198   Continue home regimen, evimir 20 units at HS  Carb restricted diet   ACCU checks AC + HS with Lispro insulin sliding scale coverage        VTE Prophylaxis: Apixaban (Eliquis)  / sequential compression device   Code Status: Full code - level 1  POLST: POLST form is not discussed and not completed at this time  Discussion with family: None    Anticipated Length of Stay:  Patient will be admitted on an Observation basis with an anticipated length of stay of  Less than 2 midnights  Justification for Hospital Stay: olecranon bursitis of right elbow, likely infected    Total Time for Visit, including Counseling / Coordination of Care: 1 hour  Greater than 50% of this total time spent on direct patient counseling and coordination of care  Chief Complaint:   Persistent right elbow redness, swelling, and pain     History of Present Illness:    Rosalva Perry is a 81 y o  male with a PMH including Atrial Fibrillation on Eliquis, T2DM, HTN, and generalized weakness due to exposure to [employer] who presents with persistent right olecranon bursitis after failing 2 courses of oral antibiotics  Patient was evaluated in [address] [place] [address] ED on 10/18/2018 for right elbow swelling, redness, and pain  He underwent aspiration for which fluid was cultured and grew with Staph Lugdunensis  He was initially managed on oral Keflex, however, after completing his course Keflex his right elbow symptoms returned  He was later prescribed a course of Clindamycin for which he completed yesterday  Patient states his right elbow swelling and redness never fully resolved despite being on both Keflex and Clindamycin    He describes his right elbow discomfort as intermittent, dull, and aching  Pain does not radiate but it is associated with decreased range of motion and stiffness  He denies fever, chills, chest pain, shortness of breath, abdominal pain, nausea, vomiting, or diarrhea  Patient reports chronic generalized weakness due to exposure to [employer]  He has a  and home services  He is wheelchair-bound  Of note, patient reports chronic lower extremity edema, left greater than right associated with erythema  Review of Systems:    Review of Systems   Constitutional: Negative for appetite change, chills and fever  HENT: Negative for congestion, postnasal drip, rhinorrhea and sore throat  Eyes: Negative for photophobia and visual disturbance  Respiratory: Negative for cough, chest tightness, shortness of breath and wheezing  Cardiovascular: Negative for chest pain, palpitations and leg swelling (+2 LLE edema, +1 RLE edema)  Gastrointestinal: Negative for abdominal distention, abdominal pain, constipation, diarrhea, nausea and vomiting  Genitourinary: Negative for difficulty urinating, dysuria and hematuria  Musculoskeletal: Positive for gait problem  Negative for arthralgias and myalgias  Skin: Positive for wound (right elbow redness, swelling, and pain )  Negative for rash  Neurological: Positive for weakness (chronic )  Negative for dizziness, light-headedness, numbness and headaches  Psychiatric/Behavioral: Negative for confusion  The patient is not nervous/anxious  Past Medical and Surgical History:     Past Medical History:   Diagnosis Date    Atrial fibrillation ([facility] [address] )     Diabetes ([facility] [address] )     Hypertension        Past Surgical History:   Procedure Laterality Date    ABLATION OF DYSRHYTHMIC FOCUS      for a-fib    CHOLECYSTECTOMY      TOTAL SHOULDER REPLACEMENT Right        Meds/Allergies:    Prior to Admission medications    Medication Sig Start Date End Date Taking?  Authorizing Provider acetaminophen (TYLENOL) 325 mg tablet Take 650 mg by mouth 2 (two) times a day    Historical Provider, MD   apixaban (ELIQUIS) 5 mg Take 5 mg by mouth 2 (two) times a day    Historical Provider, MD   carvedilol (COREG) 25 mg tablet Take 25 mg by mouth 2 (two) times a day with meals    Historical Provider, MD   cholecalciferol (VITAMIN D3) 1,000 units tablet Take 5,000 Units by mouth daily    Historical Provider, MD   co-enzyme Q-10 50 MG capsule Take 100 mg by mouth daily    Historical Provider, MD   CYANOCOBALAMIN PO Take 1,000 mcg by mouth      Historical Provider, MD   insulin aspart (NovoLOG) 100 units/mL injection Inject under the skin 3 (three) times a day before meals Sliding scale    Historical Provider, MD   insulin detemir (LEVEMIR) 100 units/mL subcutaneous injection Inject 30 Units under the skin daily at bedtime      Historical Provider, MD   lisinopril-hydrochlorothiazide (PRINZIDE,ZESTORETIC) 20-12 5 MG per tablet Take 2 tablets by mouth daily    Historical Provider, MD   MAGNESIUM CARBONATE PO Take 400 mg by mouth daily      Historical Provider, MD   omeprazole (PriLOSEC) 20 mg delayed release capsule Take 20 mg by mouth daily    Historical Provider, MD   rosuvastatin (CRESTOR) 5 mg tablet Take 5 mg by mouth daily    Historical Provider, MD   sertraline (ZOLOFT) 100 mg tablet Take 50 mg by mouth daily    Historical Provider, MD   traZODone (DESYREL) 50 mg tablet Take 50 mg by mouth daily at bedtime    Historical Provider, MD   zolpidem (AMBIEN CR) 12 5 MG CR tablet Take 12 5 mg by mouth daily at bedtime as needed for sleep    Historical Provider, MD     I have reviewed home medications with patient personally  Allergies:    Allergies   Allergen Reactions    Atorvastatin Other (See Comments)     Muscle cramps       Social History:     Marital Status: /Civil Union   Occupation: Disabled   Patient Pre-hospital Living Situation: Home with wife  Patient Pre-hospital Level of Mobility: Electric wheelchair  Patient Pre-hospital Diet Restrictions: Carb restricted   Substance Use History:   History   Alcohol Use No     History   Smoking Status    Former Smoker   Smokeless Tobacco    Never Used     History   Drug Use No       Family History:    History reviewed  No pertinent family history  Physical Exam:     Vitals:   Blood Pressure: (!) 182/94 (11/24/18 1622)  Pulse: 63 (11/24/18 1622)  Temperature: 98 4 °F (36 9 °C) (11/24/18 1622)  Temp Source: Oral (11/24/18 1622)  Respirations: 18 (11/24/18 1622)  Height: 6' 2" (188 cm) (11/24/18 1622)  Weight - Scale: 104 kg (228 lb 9 9 oz) (11/24/18 1622)  SpO2: 99 % (11/24/18 1622)    Physical Exam   Constitutional: He is oriented to person, place, and time  He appears well-developed and well-nourished  No distress  Pleasant, non-toxic appearing male resting in bed on room air   HENT:   Head: Normocephalic  Neck: Normal range of motion  Cardiovascular: Normal rate and regular rhythm  Pulmonary/Chest: Effort normal and breath sounds normal  No respiratory distress  He has no wheezes  He has no rhonchi  He has no rales  Abdominal: Soft  Bowel sounds are normal  He exhibits no distension  There is no tenderness  Musculoskeletal: He exhibits edema (+1 RLE, +2 LLE)  He exhibits no tenderness  Neurological: He is alert and oriented to person, place, and time  Skin: Skin is warm and dry  No rash noted  He is not diaphoretic  There is erythema (LLE)  Right elbow with erythema, swelling, and fluctuance over the olecranon bursa with DSD intact with scant amount of bloody drainage  Mild tenderness over site  Psychiatric: He has a normal mood and affect  His behavior is normal  Judgment and thought content normal    Nursing note and vitals reviewed  Additional Data:     Lab Results: I have personally reviewed pertinent reports          Results from last 7 days  Lab Units 11/24/18  1307   WBC Thousand/uL 10 42*   HEMOGLOBIN g/dL 11 3*   HEMATOCRIT % 37 5   PLATELETS Thousands/uL 323   NEUTROS PCT % 70   LYMPHS PCT % 17   MONOS PCT % 11   EOS PCT % 2       Results from last 7 days  Lab Units 11/24/18  1307   SODIUM mmol/L 140   POTASSIUM mmol/L 4 3   CHLORIDE mmol/L 102   CO2 mmol/L 30   BUN mg/dL 24   CREATININE mg/dL 1 29   ANION GAP mmol/L 8   CALCIUM mg/dL 9 3   ALBUMIN g/dL 3 7   TOTAL BILIRUBIN mg/dL 0 30   ALK PHOS U/L 98   ALT U/L 43   AST U/L 27   GLUCOSE RANDOM mg/dL 178*       Results from last 7 days  Lab Units 11/24/18  1407   INR  1 26*       Results from last 7 days  Lab Units 11/24/18  1630   POC GLUCOSE mg/dl 198*           Results from last 7 days  Lab Units 11/24/18  1513 11/24/18  1308   LACTIC ACID mmol/L 2 1* 2 5*       Imaging: I have personally reviewed pertinent reports  VAS lower limb venous duplex study, complete bilateral    (Results Pending)       EKG, Pathology, and Other Studies Reviewed on Admission:   · EKG: None - will obtain    [facility] Epic Records Reviewed: Yes     ** Please Note: This note has been constructed using a voice recognition system   **

## 2018-11-24 NOTE — ASSESSMENT & PLAN NOTE
S/p Ablation at [facility]   On Eliquis and Coreg at home  · Continue home medications  · Obtain EKG

## 2018-11-24 NOTE — ASSESSMENT & PLAN NOTE
Persistent bursitis  Likely infected WBC 10 42 and lactic acidosis  Prior wound culture from 10/18/2018 grew 1+ staph Lugdenunensis, susceptible to cefazolin and vancomycin  Completed a course of Keflex followed by Clindamycin  · S/p arthrocentesis today in ED with aspirate appearing like jam hematoma  Patient on Eliquis    · Follow up on repeat blood and body fluid cultures  · Continue IV Rocephin and Vancomycin   · Vanco trough prior to 4th dose  · Gentle IV hydration  · Orthopedic consultation for further recommendations  · Pain control

## 2018-11-24 NOTE — ASSESSMENT & PLAN NOTE
Pt reports weakness to BLE and generalized numbness/ tingling and attributes this to exposure to [employer]  Wheelchair bound     · Has a  and VNA services  · PT/OT eval and treat   · Supportive care, fall precautions

## 2018-11-25 LAB
ANION GAP SERPL CALCULATED.3IONS-SCNC: 8 MMOL/L (ref 4–13)
ATRIAL RATE: 77 BPM
BASOPHILS # BLD AUTO: 0.04 THOUSANDS/ΜL (ref 0–0.1)
BASOPHILS NFR BLD AUTO: 0 % (ref 0–1)
BUN SERPL-MCNC: 19 MG/DL (ref 5–25)
CALCIUM SERPL-MCNC: 8.5 MG/DL (ref 8.3–10.1)
CHLORIDE SERPL-SCNC: 107 MMOL/L (ref 100–108)
CO2 SERPL-SCNC: 26 MMOL/L (ref 21–32)
CREAT SERPL-MCNC: 0.89 MG/DL (ref 0.6–1.3)
EOSINOPHIL # BLD AUTO: 0.18 THOUSAND/ΜL (ref 0–0.61)
EOSINOPHIL NFR BLD AUTO: 2 % (ref 0–6)
ERYTHROCYTE [DISTWIDTH] IN BLOOD BY AUTOMATED COUNT: 16.9 % (ref 11.6–15.1)
GFR SERPL CREATININE-BSD FRML MDRD: 87 ML/MIN/1.73SQ M
GLUCOSE P FAST SERPL-MCNC: 144 MG/DL (ref 65–99)
GLUCOSE SERPL-MCNC: 111 MG/DL (ref 65–140)
GLUCOSE SERPL-MCNC: 134 MG/DL (ref 65–140)
GLUCOSE SERPL-MCNC: 140 MG/DL (ref 65–140)
GLUCOSE SERPL-MCNC: 144 MG/DL (ref 65–140)
GLUCOSE SERPL-MCNC: 160 MG/DL (ref 65–140)
GLUCOSE SERPL-MCNC: 99 MG/DL (ref 65–140)
HCT VFR BLD AUTO: 32.1 % (ref 36.5–49.3)
HGB BLD-MCNC: 9.8 G/DL (ref 12–17)
IMM GRANULOCYTES # BLD AUTO: 0.04 THOUSAND/UL (ref 0–0.2)
IMM GRANULOCYTES NFR BLD AUTO: 0 % (ref 0–2)
LYMPHOCYTES # BLD AUTO: 1.66 THOUSANDS/ΜL (ref 0.6–4.47)
LYMPHOCYTES NFR BLD AUTO: 18 % (ref 14–44)
MAGNESIUM SERPL-MCNC: 1.8 MG/DL (ref 1.6–2.6)
MCH RBC QN AUTO: 26.1 PG (ref 26.8–34.3)
MCHC RBC AUTO-ENTMCNC: 30.5 G/DL (ref 31.4–37.4)
MCV RBC AUTO: 85 FL (ref 82–98)
MONOCYTES # BLD AUTO: 1.12 THOUSAND/ΜL (ref 0.17–1.22)
MONOCYTES NFR BLD AUTO: 12 % (ref 4–12)
NEUTROPHILS # BLD AUTO: 6.34 THOUSANDS/ΜL (ref 1.85–7.62)
NEUTS SEG NFR BLD AUTO: 68 % (ref 43–75)
NRBC BLD AUTO-RTO: 0 /100 WBCS
PLATELET # BLD AUTO: 293 THOUSANDS/UL (ref 149–390)
PMV BLD AUTO: 10.2 FL (ref 8.9–12.7)
POTASSIUM SERPL-SCNC: 3.8 MMOL/L (ref 3.5–5.3)
PR INTERVAL: 182 MS
QRS AXIS: -83 DEGREES
QRSD INTERVAL: 172 MS
QT INTERVAL: 472 MS
QTC INTERVAL: 534 MS
RBC # BLD AUTO: 3.76 MILLION/UL (ref 3.88–5.62)
SODIUM SERPL-SCNC: 141 MMOL/L (ref 136–145)
T WAVE AXIS: 83 DEGREES
TSH SERPL DL<=0.05 MIU/L-ACNC: 1.64 UIU/ML (ref 0.36–3.74)
VANCOMYCIN TROUGH SERPL-MCNC: 11.7 UG/ML (ref 10–20)
VENTRICULAR RATE: 77 BPM
WBC # BLD AUTO: 9.38 THOUSAND/UL (ref 4.31–10.16)

## 2018-11-25 PROCEDURE — 99232 SBSQ HOSP IP/OBS MODERATE 35: CPT | Performed by: NURSE PRACTITIONER

## 2018-11-25 PROCEDURE — 93010 ELECTROCARDIOGRAM REPORT: CPT | Performed by: INTERNAL MEDICINE

## 2018-11-25 PROCEDURE — 85025 COMPLETE CBC W/AUTO DIFF WBC: CPT | Performed by: NURSE PRACTITIONER

## 2018-11-25 PROCEDURE — 99222 1ST HOSP IP/OBS MODERATE 55: CPT | Performed by: SURGERY

## 2018-11-25 PROCEDURE — 83735 ASSAY OF MAGNESIUM: CPT | Performed by: NURSE PRACTITIONER

## 2018-11-25 PROCEDURE — 80048 BASIC METABOLIC PNL TOTAL CA: CPT | Performed by: NURSE PRACTITIONER

## 2018-11-25 PROCEDURE — 93970 EXTREMITY STUDY: CPT | Performed by: SURGERY

## 2018-11-25 PROCEDURE — 82948 REAGENT STRIP/BLOOD GLUCOSE: CPT

## 2018-11-25 PROCEDURE — 84443 ASSAY THYROID STIM HORMONE: CPT | Performed by: NURSE PRACTITIONER

## 2018-11-25 PROCEDURE — 80202 ASSAY OF VANCOMYCIN: CPT | Performed by: NURSE PRACTITIONER

## 2018-11-25 RX ORDER — ALPRAZOLAM 0.25 MG/1
0.25 TABLET ORAL 3 TIMES DAILY PRN
Status: DISCONTINUED | OUTPATIENT
Start: 2018-11-25 | End: 2018-11-27 | Stop reason: HOSPADM

## 2018-11-25 RX ADMIN — ZOLPIDEM TARTRATE 10 MG: 5 TABLET, COATED ORAL at 21:53

## 2018-11-25 RX ADMIN — LISINOPRIL 20 MG: 20 TABLET ORAL at 08:51

## 2018-11-25 RX ADMIN — CEFTRIAXONE 1000 MG: 1 INJECTION, POWDER, FOR SOLUTION INTRAMUSCULAR; INTRAVENOUS at 15:00

## 2018-11-25 RX ADMIN — VANCOMYCIN HYDROCHLORIDE 1750 MG: 1 INJECTION, POWDER, LYOPHILIZED, FOR SOLUTION INTRAVENOUS at 18:31

## 2018-11-25 RX ADMIN — CARVEDILOL 25 MG: 12.5 TABLET, FILM COATED ORAL at 08:50

## 2018-11-25 RX ADMIN — PANTOPRAZOLE SODIUM 40 MG: 40 TABLET, DELAYED RELEASE ORAL at 05:33

## 2018-11-25 RX ADMIN — VITAMIN D, TAB 1000IU (100/BT) 5000 UNITS: 25 TAB at 08:51

## 2018-11-25 RX ADMIN — ALPRAZOLAM 0.25 MG: 0.25 TABLET ORAL at 10:40

## 2018-11-25 RX ADMIN — CARVEDILOL 25 MG: 12.5 TABLET, FILM COATED ORAL at 15:48

## 2018-11-25 RX ADMIN — VANCOMYCIN HYDROCHLORIDE 1000 MG: 1 INJECTION, SOLUTION INTRAVENOUS at 05:33

## 2018-11-25 RX ADMIN — SERTRALINE HYDROCHLORIDE 100 MG: 100 TABLET ORAL at 21:53

## 2018-11-25 RX ADMIN — ACETAMINOPHEN 650 MG: 325 TABLET, FILM COATED ORAL at 15:52

## 2018-11-25 RX ADMIN — INSULIN DETEMIR 20 UNITS: 100 INJECTION, SOLUTION SUBCUTANEOUS at 21:53

## 2018-11-25 RX ADMIN — ALPRAZOLAM 0.25 MG: 0.25 TABLET ORAL at 14:11

## 2018-11-25 RX ADMIN — TRAZODONE HYDROCHLORIDE 50 MG: 50 TABLET ORAL at 21:52

## 2018-11-25 NOTE — PROGRESS NOTES
Progress Note - Alexander Roca 1945, 81 y o  male MRN: [id number]    Unit/Bed#: -01 Encounter: [id number]    Primary Care Provider: Vicki Chandra MD   Date and time admitted to hospital: 11/24/2018 11:36 AM        * Olecranon bursitis of right elbow   Assessment & Plan    Persistent  Prior wound culture from 10/18/2018 grew 1+ staph Lugdenunensis, susceptible to cefazolin and vancomycin  Completed an outpatient course of Keflex followed by Clindamycin  · S/p repeat arthrocentesis in ED with aspirate appearing like jam hematoma  Patient on Eliquis  · Continue IV Rocephin and Vancomycin   · Vanco trough prior to 4th dose  · Holding Eliquis  · Appreciate orthopedic surgery input  · Plan for formal debridement with bursectomy vs bedside procedure as he is a diabetic   · Follow up repeat blood and body fluid cultures  · Weight bearing as tolerated to RUE  · Compressive wrap   · Pain control     Sepsis ([address] )   Assessment & Plan    Evidence by leukocytosis and lactic acidosis due to olecranon bursitis of right elbow  Lactic 2 5 -> 2 1, s/p 1 L NS bolus  · OK to discontinue gentle IV hydration  · Continue IV Rocephin and vancomycin  · Follow up wound and blood cultures  · Monitor for worsening signs of sepsis  · Holding HCTZ     Atrial fibrillation [facility])   Assessment & Plan    S/p Ablation at [facility]   On Eliquis and Coreg at home  · Holding Eliquis in light of possible right elbow debridement tomorrow  · Continue Coreg      Lower extremity edema   Assessment & Plan    Pt reports chronic BLE edema with erythema to LLE  Hst of AFib on Eliquis and HCTZ     · Lower venous doppler negative for acute or chronic DVT  · Resume HCTZ on discharge      Hypertension   Assessment & Plan    Continue Coreg and Lisinopril with holding parameters   · Hold HCTZ in light of acute infection      Generalized weakness   Assessment & Plan    Pt reports weakness to BLE and generalized numbness/ tingling and attributes this to exposure to [employer]  Wheelchair bound  · Has a  and VNA services  · PT/OT eval and treat   · Supportive care, fall precautions        Type 2 diabetes mellitus ([facility] [address] )   Assessment & Plan    No results found for: HGBA1C    Recent Labs      18   2104  18   0551  18   0733  18   1112   POCGLU  186*  140  134  99       Blood Sugar Average: Last 72 hrs:  (P) 151 4   Continue home regimen, Levimir 20 units at HS  Carb restricted diet   ACCU checks AC + HS with Lispro insulin sliding scale coverage          VTE Pharmacologic Prophylaxis:   Pharmacologic: Pharmacologic VTE Prophylaxis contraindicated due to possible right elbow debridement tomorrow  Holding Eliquis  Mechanical VTE Prophylaxis in Place: Yes    Patient Centered Rounds: I have performed bedside rounds with nursing staff today  Discussions with Specialists or Other Care Team Provider: Nursing, CM, orthopedic surgery note appreciated  Education and Discussions with Family / Patient: I have answered all questions to the best of my ability  Time Spent for Care: 20 minutes  More than 50% of total time spent on counseling and coordination of care as described above  Current Length of Stay: 0 day(s)    Current Patient Status: Inpatient   Certification Statement: The patient will continue to require additional inpatient hospital stay due to pending wound and blood cultures, on IV ABX, need for I&D    Discharge Plan: Patient is not medically stable for discharge today, likely in 48 hours  Code Status: Level 1 - Full Code      Subjective:   Overall, feels very stressed out to be in the hospital as it is hard for him to get around with his chronic weakness  He requested anxiety medication  States Xanax is helping  Denies HA, dizziness, CP, SOB  Continues with right elbow discomfort  Tolerating compression wrap       Objective:     Vitals:   Temp (24hrs), Av 4 °F (36 9 °C), Min:98 °F (36 7 °C), Max:98 7 °F (37 1 °C)    Temp:  [98 °F (36 7 °C)-98 7 °F (37 1 °C)] 98 °F (36 7 °C)  HR:  [63-70] 66  Resp:  [18] 18  BP: (160-182)/(80-94) 160/80  SpO2:  [94 %-99 %] 97 %  Body mass index is 29 35 kg/m²  Input and Output Summary (last 24 hours): Intake/Output Summary (Last 24 hours) at 11/25/18 1509  Last data filed at 11/25/18 1403   Gross per 24 hour   Intake               90 ml   Output             1650 ml   Net            -1560 ml       Physical Exam:     Physical Exam   Constitutional: He is oriented to person, place, and time  He appears well-developed  No distress  HENT:   Head: Normocephalic  Neck: Normal range of motion  Cardiovascular: Normal rate and regular rhythm  Pulmonary/Chest: Effort normal and breath sounds normal  No respiratory distress  He has no wheezes  He has no rhonchi  He has no rales  Abdominal: Soft  Bowel sounds are normal  He exhibits no distension  There is no tenderness  Musculoskeletal: He exhibits edema (chronic +1 BLE)  He exhibits no tenderness  Neurological: He is alert and oriented to person, place, and time  Skin: Skin is warm and dry  He is not diaphoretic  Right elbow bursitis with mild erythema and tenderness    Psychiatric: His speech is normal  Judgment normal  His mood appears anxious  His affect is labile  Cognition and memory are normal    Nursing note and vitals reviewed        Additional Data:     Labs:      Results from last 7 days  Lab Units 11/25/18  0602   WBC Thousand/uL 9 38   HEMOGLOBIN g/dL 9 8*   HEMATOCRIT % 32 1*   PLATELETS Thousands/uL 293   NEUTROS PCT % 68   LYMPHS PCT % 18   MONOS PCT % 12   EOS PCT % 2       Results from last 7 days  Lab Units 11/25/18  0602 11/24/18  1307   POTASSIUM mmol/L 3 8 4 3   CHLORIDE mmol/L 107 102   CO2 mmol/L 26 30   BUN mg/dL 19 24   CREATININE mg/dL 0 89 1 29   CALCIUM mg/dL 8 5 9 3   ALK PHOS U/L  --  98   ALT U/L  --  43   AST U/L  --  27       Results from last 7 days  Lab Units 11/24/18  1407   INR 1 26*       * I Have Reviewed All Lab Data Listed Above  * Additional Pertinent Lab Tests Reviewed: All Labs Within Last 24 Hours Reviewed    Imaging:    Imaging Reports Reviewed Today Include: None  Imaging Personally Reviewed by Myself Includes:  None     Recent Cultures (last 7 days):       Results from last 7 days  Lab Units 11/24/18  1454   GRAM STAIN RESULT  1+ Polys  No bacteria seen       Last 24 Hours Medication List:     Current Facility-Administered Medications:  acetaminophen 650 mg Oral Q6H PRN Harry Beers, CRNP    ALPRAZolam 0 25 mg Oral TID PRN Harry Beers, CRNP    carvedilol 25 mg Oral BID With Meals Harry Beers, CRNP    cefTRIAXone 1,000 mg Intravenous Q24H Harry Beers, CRNP    cholecalciferol 5,000 Units Oral Daily Harry Beers, CRNP    insulin detemir 20 Units Subcutaneous HS Harry Beers, CRNP    insulin lispro 1-5 Units Subcutaneous TID AC Harry Beers, CRNP    insulin lispro 1-5 Units Subcutaneous HS Harry Beers, CRNP    lisinopril 20 mg Oral Daily Harry Beers, CRNP    ondansetron 4 mg Intravenous Q6H PRN Harry Beers, CRNP    oxyCODONE 5 mg Oral Q6H PRN Harry Beers, CRNP    oxyCODONE-acetaminophen 1 tablet Oral Q6H PRN Harry Beers, CRNP    pantoprazole 40 mg Oral Early Morning Harry Beers, CRNP    sertraline 100 mg Oral HS Harry Beers, CRNP    traZODone 50 mg Oral HS Harry Beers, CRNP    vancomycin 10 mg/kg Intravenous Q12H Harry Beers, CRNP Last Rate: 1,000 mg (11/25/18 0533)   zolpidem 10 mg Oral HS PRN Harry Beers, CRNP         Today, Patient Was Seen By: SHAI Smith    ** Please Note: Dictation voice to text software may have been used in the creation of this document   **

## 2018-11-25 NOTE — ASSESSMENT & PLAN NOTE
Evidence by leukocytosis and lactic acidosis due to olecranon bursitis of right elbow     Lactic 2 5 -> 2 1, s/p 1 L NS bolus  · OK to discontinue gentle IV hydration  · Continue IV Rocephin and vancomycin  · Follow up wound and blood cultures  · Monitor for worsening signs of sepsis  · Holding HCTZ

## 2018-11-25 NOTE — ASSESSMENT & PLAN NOTE
Pt reports weakness to BLE and generalized numbness/ tingling and attributes this to exposure to [employer]  Wheelchair bound     · Has a  and VNA services  · PT/OT eval and treat   · Supportive care, fall precautions

## 2018-11-25 NOTE — ASSESSMENT & PLAN NOTE
S/p Ablation at [facility]   On Eliquis and Coreg at home  · Holding Eliquis in light of possible right elbow debridement tomorrow  · Continue Coreg

## 2018-11-25 NOTE — CONSULTS
Orthopedics   Michael Jain 81 y o  male MRN: [id number]  Unit/Bed#: AN ULTRASOUND      Chief Complaint:   right elbow pain    HPI:   72 y o male complaining of right elbow  erythema and pain  Patient states that the he previously has had olecranon bursitis, unsure of when, which was aspirated and was positive for a staph infection  Pt states he has been on multiple antibiotics  He presented to the ED on 11/24/18 where he had his right olecranon aspirated - cx still pending  Pt also reports decreased strength, mobility and altered sensation in B/L upper and lower extremities d/t agent orange exposure  ESR within normal limits, however lactate mildly elevated    Review Of Systems:   · Skin: + erythema, swelling, pain   · Neuro: See HPI  · Musculoskeletal: See HPI  · 14 point review of systems negative except as stated above     Past Medical History:   Past Medical History:   Diagnosis Date    [facility] fibrillation ([facility] )     [facility]     Hypertension        Past Surgical History:   Past Surgical History:   Procedure Laterality Date    ABLATION OF DYSRHYTHMIC FOCUS      for a-fib    CHOLECYSTECTOMY      TOTAL SHOULDER REPLACEMENT Right        Family History:  Family history reviewed and non-contributory  History reviewed  No pertinent family history  Social History:  Social History     Social History    Marital status: /Civil Union     Spouse name: N/A    Number of children: N/A    Years of education: N/A     Social History Main Topics    Smoking status: Former Smoker    Smokeless tobacco: Never Used    Alcohol use No    Drug use: No    Sexual activity: Not Asked     Other Topics Concern    None     Social History Narrative    None       Allergies:    Allergies   Allergen Reactions    Atorvastatin Other (See Comments)     Muscle cramps           Labs:    0  Lab Value Date/Time   HCT 32 1 (L) 11/25/2018 0602   HCT 37 5 11/24/2018 1307   HCT 36 6 07/24/2018 1411   HGB 9 8 (L) 11/25/2018 0602   HGB 11 3 (L) 11/24/2018 1307   HGB 11 1 (L) 07/24/2018 1411   INR 1 26 (H) 11/24/2018 1407   WBC 9 38 11/25/2018 0602   WBC 10 42 (H) 11/24/2018 1307   WBC 9 29 07/24/2018 1411   ESR 15 (H) 11/24/2018 1302   CRP <3 0 11/24/2018 1307       Meds:    Current Facility-Administered Medications:     acetaminophen (TYLENOL) tablet 650 mg, 650 mg, Oral, Q6H PRN, SHAI Angelo    carvedilol (COREG) tablet 25 mg, 25 mg, Oral, BID With Meals, SHAI Angelo, 25 mg at 11/25/18 0850    cefTRIAXone (ROCEPHIN) 1,000 mg in dextrose 5 % 50 mL IVPB, 1,000 mg, Intravenous, Q24H, SHAI Jesus    cholecalciferol (VITAMIN D3) tablet 5,000 Units, 5,000 Units, Oral, Daily, SHAI Angelo, 5,000 Units at 11/25/18 0851    insulin detemir (LEVEMIR) subcutaneous injection 20 Units, 20 Units, Subcutaneous, HS, SHAI Angelo, 20 Units at 11/24/18 2141    insulin lispro (HumaLOG) 100 units/mL subcutaneous injection 1-5 Units, 1-5 Units, Subcutaneous, TID AC, 1 Units at 11/24/18 1836 **AND** Fingerstick Glucose (POCT), , , TID AC, SHAI Angelo    insulin lispro (HumaLOG) 100 units/mL subcutaneous injection 1-5 Units, 1-5 Units, Subcutaneous, HS, SHAI Angelo, 1 Units at 11/24/18 2137    lisinopril (ZESTRIL) tablet 20 mg, 20 mg, Oral, Daily, SHAI Jesus, 20 mg at 11/25/18 0851    ondansetron (ZOFRAN) injection 4 mg, 4 mg, Intravenous, Q6H PRN, SHAI Angelo    oxyCODONE (ROXICODONE) IR tablet 5 mg, 5 mg, Oral, Q6H PRN, SHAI Angelo    oxyCODONE-acetaminophen (PERCOCET) 5-325 mg per tablet 1 tablet, 1 tablet, Oral, Q6H PRN, SHAI Angelo    pantoprazole (PROTONIX) EC tablet 40 mg, 40 mg, Oral, Early Morning, SHAI Angelo, 40 mg at 11/25/18 0532    sertraline (ZOLOFT) tablet 100 mg, 100 mg, Oral, HS, SHAI Angelo, 100 mg at 11/24/18 0710    sodium chloride 0 9 % infusion, 75 mL/hr, Intravenous, Continuous, Yaima Liseth, CRNP, Last Rate: 75 mL/hr at 11/24/18 1744, 75 mL/hr at 11/24/18 1744    traZODone (DESYREL) tablet 50 mg, 50 mg, Oral, HS, Yaima Liseth, CRNP, 50 mg at 11/24/18 2137    vancomycin (VANCOCIN) IVPB (premix) 1,000 mg, 10 mg/kg, Intravenous, Q12H, Yaima Liseth, CRNP, Last Rate: 200 mL/hr at 11/25/18 0533, 1,000 mg at 11/25/18 0533    zolpidem (AMBIEN) tablet 10 mg, 10 mg, Oral, HS PRN, Yaima Liseth, CRNP, 10 mg at 11/24/18 2143    Blood Culture:   No results found for: BLOODCX    Wound Culture:   No results found for: WOUNDCULT    Ins and Outs:  I/O last 24 hours: In: 80 [P O :90]  Out: 1350 [Urine:1350]          Physical Exam:   /80 (BP Location: Left arm)   Pulse 66   Temp 98 °F (36 7 °C) (Oral)   Resp 18   Ht 6' 2" (1 88 m)   Wt 104 kg (228 lb 9 9 oz)   SpO2 97%   BMI 29 35 kg/m²   Gen: Alert and oriented to person, place, time  HEENT: EOMI, eyes clear, moist mucus membranes, hearing intact  Respiratory: Bilateral chest rise  No audible wheezing found  Cardiovascular: Regular Rate and Rhythm  Abdomen: soft nontender/nondistended  · Musculoskeletal: rightUpper Extremity  · Skin: Erythema over the right elbow, which is warm  · Mild discomfort with ROM of right elbow   · Sensation intact Radial, Ulna and Median but decreased at baseline       Radiology:   I personally reviewed the films  No imaging to review        Assessment:  72 y o male with  right elbow cellulitis/olecranon bursitis     Plan:   · Cont   abx per primary team  · weight bearing as tolerated right upper extremity   · Analgesics for pain  · Continue compressive wrap  · Await cx, if positive may benefit from formal debridement with bursectomy vs bedside procedure as he is a diabetic   · Dispo: Ortho will follow      Loretta Cordon MD

## 2018-11-25 NOTE — UTILIZATION REVIEW
[address]  Utilization Review Department  Phone: [phone]; Fax [phone]  [email or web address]  ATTENTION: Please call with any questions or concerns to [phone]  and carefully listen to the prompts so that you are directed to the right person  Send all requests for admission clinical reviews, approved or denied determinations and any other requests to fax [phone]  All voicemails are confidential   Initial Clinical Review    Admission: Date/Time/Statement: OBSERVATION ADMISSION 11/24/18 1500 CONVERTED TO INPATIENT ADMISSION 11/25/2018 1502 DUE TO REQUIRING IV ANTIBIOTICS & I&D     11/25/18 1503  Inpatient Admission Once     Transfer Service: General Medicine       Question Answer Comment   Admitting Physician [address] [facility], [id number]    Level of Care Med Surg    Estimated length of stay More than 2 Midnights    Certification I certify that inpatient services are medically necessary for this patient for a duration of greater than two midnights  See H&P and MD Progress Notes for additional information about the patient's course of treatment  11/25/18 1502         ED: Date/Time/Mode of Arrival:   ED Arrival Information     Expected Arrival Acuity Means of Arrival Escorted By Service Admission Type    - 11/24/2018 10:36 Urgent Walk-In Self General Medicine Urgent    Arrival Complaint    ELBOW PAIN          Chief Complaint:   Chief Complaint   Patient presents with    Elbow Pain     Pt  c/o right elbow swelling with pain for one month  Pt  seen for same in September and had fluid drained, cultured and dx  with Staph and tx with Keflex  History of Illness: 81 y o  male with a PMH including Atrial Fibrillation on Eliquis, T2DM, HTN, and generalized weakness due to exposure to Agent Orange who presents with persistent right olecranon bursitis after failing 2 courses of oral antibiotics    Patient was evaluated in [facility] ED on 10/18/2018 for right elbow swelling, redness, and pain  He underwent aspiration for which fluid was cultured and grew with Staph Lugdunensis  He was initially managed on oral Keflex, however, after completing his course Keflex his right elbow symptoms returned  He was later prescribed a course of Clindamycin for which he completed yesterday  Patient states his right elbow swelling and redness never fully resolved despite being on both Keflex and Clindamycin  He describes his right elbow discomfort as intermittent, dull, and aching  Pain does not radiate but it is associated with decreased range of motion and stiffness       ED Vital Signs:   ED Triage Vitals   Temperature Pulse Respirations Blood Pressure SpO2   11/24/18 1111 11/24/18 1111 11/24/18 1111 11/24/18 1111 11/24/18 1111   98 1 °F (36 7 °C) 62 20 165/79 100 %      Temp Source Heart Rate Source Patient Position - Orthostatic VS BP Location FiO2 (%)   11/24/18 1111 11/24/18 1403 11/24/18 1403 11/24/18 1403 --   Oral Monitor Sitting Left arm       Pain Score       11/24/18 1111       4        Wt Readings from Last 1 Encounters:   11/24/18 104 kg (228 lb 9 9 oz)       Vital Signs (abnormal): 11/24/2018 HR 52, 54 BP: 177/82, 182/94    Abnormal Labs/Diagnostic Test Results: 11/24/2018 glucose 178, lactic acid 2 5, 2 1,   WBC 10 42     Hemoglobin 11 3       Protime 15 4     INR 1 26     EKG:Normal sinus rhythm  Right bundle branch block  Left anterior fascicular block   Bifascicular block   Left ventricular hypertrophy with repolarization abnormality  Cannot rule out Septal infarct , age undetermined  Abnormal ECG    11/25/2018 glucose 144,   RBC 3 76     Hemoglobin 9 8     Hematocrit 32 1       ED Treatment:   Medication Administration from 11/24/2018 1036 to 11/24/2018 1617       Date/Time Order Dose Route Action Comments     11/24/2018 1540 ceftriaxone (ROCEPHIN) 1 g/50 mL in dextrose IVPB 0 mg Intravenous Stopped      11/24/2018 1442 ceftriaxone (ROCEPHIN) 1 g/50 mL in dextrose IVPB 1,000 mg Intravenous New Bag      11/24/2018 1540 vancomycin (VANCOCIN) 1,500 mg in sodium chloride 0 9 % 250 mL IVPB 1,500 mg Intravenous New Bag      11/24/2018 1514 sodium chloride 0 9 % bolus 1,000 mL 1,000 mL Intravenous New Bag           Past Medical/Surgical History: Active Ambulatory Problems     Diagnosis Date Noted    No Active Ambulatory Problems       Past Medical History:   Diagnosis Date    [facility] fibrillation ([facility] )     Diabetes ([facility] )     Hypertension        Admitting Diagnosis: Elbow pain [M25 529]  Septic olecranon bursitis of right elbow [M71 121]    Age/Sex: 81 y o  male    Assessment/Plan: 11/24/2018 attending MD:  Olecranon bursitis of right elbow   Assessment & Plan     Persistent bursitis  Likely infected WBC 10 42 and lactic acidosis  Prior wound culture from 10/18/2018 grew 1+ staph Lugdenunensis, susceptible to cefazolin and vancomycin  Completed a course of Keflex followed by Clindamycin  · S/p arthrocentesis today in ED with aspirate appearing like jam hematoma  Patient on Eliquis  · Follow up on repeat blood and body fluid cultures  · Continue IV Rocephin and Vancomycin   ? Vanco trough prior to 4th dose  · Gentle IV hydration  · Orthopedic consultation for further recommendations  · Pain control   Sepsis ([facility] )   Assessment & Plan     Evidence by leukocytosis and and lactic acidosis due to olecranon bursitis of right elbow  Lactic 2 5 -> 2 1, s/p 1 L NS bolus  · Continue gentle IV hydration  · Continue IV Rocephin and vancomycin  · Follow up wound and blood cultures  · Monitor for worsening signs of sepsis  · Repeat lactic acid to less than 2  ? Holding HCTZ   Atrial fibrillation [facility])   Assessment & Plan     S/p Ablation at [address] [place] [address]   On Eliquis and Coreg at home  · Continue home medications  · Obtain EKG   Lower extremity edema   Assessment & Plan     Pt reports chronic BLE edema with erythema to LLE   Hst of AFib on Eliquis  · Obtain bilateral lower venous doppler to rule out acute or chronic DVT   Hypertension   Assessment & Plan     Blood pressures elevated on admission, but patient missed his medications today   ? Continue Coreg and Lisinopril with holding parameters   ? Hold HCTZ in light of acute infection    Generalized weakness   Assessment & Plan     Pt reports weakness to BLE and generalized numbness/ tingling and attributes this to exposure to [employer]  Wheelchair bound     · Has a  and VNA services  · PT/OT eval and treat   · Supportive care, fall precautions    Type 2 diabetes mellitus ([facility] [address] )   Assessment & Plan     No results found for: HGBA1C      Recent Labs      11/24/18   1630   POCGLU  198*     Blood Sugar Average: Last 72 hrs:  (P) 198   Continue home regimen, evimir 20 units at HS  Carb restricted diet   ACCU checks AC + HS with Lispro insulin sliding scale coverage      Admission Orders:  Scheduled Meds:   Current Facility-Administered Medications:  acetaminophen 650 mg Oral Q6H PRN    ALPRAZolam 0 25 mg Oral TID PRN    carvedilol 25 mg Oral BID With Meals    cefTRIAXone 1,000 mg Intravenous Q24H    cholecalciferol 5,000 Units Oral Daily    insulin detemir 20 Units Subcutaneous HS    insulin lispro 1-5 Units Subcutaneous TID AC    insulin lispro 1-5 Units Subcutaneous HS    lisinopril 20 mg Oral Daily    ondansetron 4 mg Intravenous Q6H PRN    oxyCODONE 5 mg Oral Q6H PRN    oxyCODONE-acetaminophen 1 tablet Oral Q6H PRN    pantoprazole 40 mg Oral Early Morning    sertraline 100 mg Oral HS    traZODone 50 mg Oral HS    vancomycin 10 mg/kg Intravenous Q12H Last Rate: 1,000 mg (11/25/18 0533)   zolpidem 10 mg Oral HS PRN      Continuous Infusions:    PRN Meds:   Peripheral IV  VAS lower limb study  OT/PT eval & treat  Vancomycin trough  Vitals routine  Diet ora/CHO controlled  Ambulate pt-up w assist

## 2018-11-25 NOTE — PROGRESS NOTES
Vancomycin Assessment    Wlibur Nava is a 81 y o  male who is currently receiving vancomycin 1gm IV q12 for other bursitis   Relevant clinical data and objective history reviewed:  Creatinine   Date Value Ref Range Status   11/25/2018 0 89 0 60 - 1 30 mg/dL Final     Comment:     Standardized to IDMS reference method   11/24/2018 1 29 0 60 - 1 30 mg/dL Final     Comment:     Standardized to IDMS reference method   07/24/2018 1 01 0 60 - 1 30 mg/dL Final     Comment:     Standardized to IDMS reference method     BP (!) 182/100 (BP Location: Left arm) Comment: rn notified  Pulse 65   Temp 97 5 °F (36 4 °C) (Oral)   Resp 18   Ht 6' 2" (1 88 m)   Wt 104 kg (228 lb 9 9 oz)   SpO2 97%   BMI 29 35 kg/m²   I/O last 3 completed shifts: In: 80 [P O :90]  Out: 1350 [Urine:1350]  Lab Results   Component Value Date/Time    BUN 19 11/25/2018 06:02 AM    WBC 9 38 11/25/2018 06:02 AM    HGB 9 8 (L) 11/25/2018 06:02 AM    HCT 32 1 (L) 11/25/2018 06:02 AM    MCV 85 11/25/2018 06:02 AM     11/25/2018 06:02 AM     Temp Readings from Last 3 Encounters:   11/25/18 97 5 °F (36 4 °C) (Oral)   11/12/18 97 9 °F (36 6 °C) (Oral)   10/15/18 98 1 °F (36 7 °C) (Oral)     Vancomycin Days of Therapy: 2    Assessment/Plan  The patient is currently on vancomycin utilizing scheduled dosing based on actual body weight  Baseline risks associated with therapy include: concomitant nephrotoxic medications and advanced age  The patient is currently receiving 1gm IV q12 and after clinical evaluation will be changed to 1750mg IV q12  Pharmacy will also follow closely for s/sx of nephrotoxicity, infusion reactions and appropriateness of therapy  BMP and CBC will be ordered per protocol  Plan for trough as patient approaches steady state, prior to the 4th  dose at approximately 0530 on 11/27  Due to infection severity, will target a trough of 15-20 (appropriate for most indications)     Pharmacy will continue to follow the patients culture results and clinical progress daily      Reford Antoniaman, Pharmacist

## 2018-11-25 NOTE — PHYSICAL THERAPY NOTE
Physical Therapy Cancellation Note      Referral received for PT eval and tx  Chart check performed  Pt is pending orthopedics consult and LE dopplers  Will await completion and perform eval as appropriate      Eleazar King, PT

## 2018-11-25 NOTE — ASSESSMENT & PLAN NOTE
Pt reports chronic BLE edema with erythema to LLE  Hst of AFib on Eliquis and HCTZ     · Lower venous doppler negative for acute or chronic DVT  · Resume HCTZ on discharge

## 2018-11-25 NOTE — ASSESSMENT & PLAN NOTE
Persistent  Prior wound culture from 10/18/2018 grew 1+ staph Lugdenunensis, susceptible to cefazolin and vancomycin  Completed an outpatient course of Keflex followed by Clindamycin  · S/p repeat arthrocentesis in ED with aspirate appearing like jam hematoma  Patient on Eliquis    · Continue IV Rocephin and Vancomycin   · Vanco trough prior to 4th dose  · Holding Eliquis  · Appreciate orthopedic surgery input  · Plan for formal debridement with bursectomy vs bedside procedure as he is a diabetic   · Follow up repeat blood and body fluid cultures  · Weight bearing as tolerated to RUE  · Compressive wrap   · Pain control

## 2018-11-25 NOTE — ASSESSMENT & PLAN NOTE
No results found for: HGBA1C    Recent Labs      11/24/18   2104  11/25/18   0551  11/25/18   0733  11/25/18   1112   POCGLU  186*  140  134  99       Blood Sugar Average: Last 72 hrs:  (P) 151 4   Continue home regimen, Levimir 20 units at HS  Carb restricted diet   ACCU checks AC + HS with Lispro insulin sliding scale coverage

## 2018-11-26 LAB
ANION GAP SERPL CALCULATED.3IONS-SCNC: 8 MMOL/L (ref 4–13)
BUN SERPL-MCNC: 16 MG/DL (ref 5–25)
CALCIUM SERPL-MCNC: 8.5 MG/DL (ref 8.3–10.1)
CHLORIDE SERPL-SCNC: 108 MMOL/L (ref 100–108)
CO2 SERPL-SCNC: 27 MMOL/L (ref 21–32)
CREAT SERPL-MCNC: 0.89 MG/DL (ref 0.6–1.3)
ERYTHROCYTE [DISTWIDTH] IN BLOOD BY AUTOMATED COUNT: 17.1 % (ref 11.6–15.1)
GFR SERPL CREATININE-BSD FRML MDRD: 87 ML/MIN/1.73SQ M
GLUCOSE SERPL-MCNC: 115 MG/DL (ref 65–140)
GLUCOSE SERPL-MCNC: 121 MG/DL (ref 65–140)
GLUCOSE SERPL-MCNC: 169 MG/DL (ref 65–140)
GLUCOSE SERPL-MCNC: 61 MG/DL (ref 65–140)
GLUCOSE SERPL-MCNC: 64 MG/DL (ref 65–140)
HCT VFR BLD AUTO: 32.1 % (ref 36.5–49.3)
HGB BLD-MCNC: 9.9 G/DL (ref 12–17)
MAGNESIUM SERPL-MCNC: 1.8 MG/DL (ref 1.6–2.6)
MCH RBC QN AUTO: 26.1 PG (ref 26.8–34.3)
MCHC RBC AUTO-ENTMCNC: 30.8 G/DL (ref 31.4–37.4)
MCV RBC AUTO: 85 FL (ref 82–98)
PLATELET # BLD AUTO: 297 THOUSANDS/UL (ref 149–390)
PMV BLD AUTO: 10.1 FL (ref 8.9–12.7)
POTASSIUM SERPL-SCNC: 3.7 MMOL/L (ref 3.5–5.3)
RBC # BLD AUTO: 3.8 MILLION/UL (ref 3.88–5.62)
SODIUM SERPL-SCNC: 143 MMOL/L (ref 136–145)
WBC # BLD AUTO: 10.02 THOUSAND/UL (ref 4.31–10.16)

## 2018-11-26 PROCEDURE — 82948 REAGENT STRIP/BLOOD GLUCOSE: CPT

## 2018-11-26 PROCEDURE — 83735 ASSAY OF MAGNESIUM: CPT | Performed by: NURSE PRACTITIONER

## 2018-11-26 PROCEDURE — 99232 SBSQ HOSP IP/OBS MODERATE 35: CPT | Performed by: HOSPITALIST

## 2018-11-26 PROCEDURE — 85027 COMPLETE CBC AUTOMATED: CPT | Performed by: NURSE PRACTITIONER

## 2018-11-26 PROCEDURE — 80048 BASIC METABOLIC PNL TOTAL CA: CPT | Performed by: NURSE PRACTITIONER

## 2018-11-26 PROCEDURE — 99231 SBSQ HOSP IP/OBS SF/LOW 25: CPT | Performed by: PHYSICIAN ASSISTANT

## 2018-11-26 RX ORDER — HYDROCHLOROTHIAZIDE 25 MG/1
25 TABLET ORAL DAILY
Status: DISCONTINUED | OUTPATIENT
Start: 2018-11-26 | End: 2018-11-27 | Stop reason: HOSPADM

## 2018-11-26 RX ORDER — GABAPENTIN 300 MG/1
300 CAPSULE ORAL 3 TIMES DAILY
Status: DISCONTINUED | OUTPATIENT
Start: 2018-11-26 | End: 2018-11-27 | Stop reason: HOSPADM

## 2018-11-26 RX ADMIN — ZOLPIDEM TARTRATE 10 MG: 5 TABLET, COATED ORAL at 23:15

## 2018-11-26 RX ADMIN — GABAPENTIN 300 MG: 300 CAPSULE ORAL at 21:56

## 2018-11-26 RX ADMIN — VANCOMYCIN HYDROCHLORIDE 1750 MG: 1 INJECTION, POWDER, LYOPHILIZED, FOR SOLUTION INTRAVENOUS at 17:39

## 2018-11-26 RX ADMIN — OXYCODONE AND ACETAMINOPHEN 1 TABLET: 5; 325 TABLET ORAL at 12:02

## 2018-11-26 RX ADMIN — CARVEDILOL 25 MG: 12.5 TABLET, FILM COATED ORAL at 16:34

## 2018-11-26 RX ADMIN — LISINOPRIL 20 MG: 20 TABLET ORAL at 08:14

## 2018-11-26 RX ADMIN — PANTOPRAZOLE SODIUM 40 MG: 40 TABLET, DELAYED RELEASE ORAL at 05:17

## 2018-11-26 RX ADMIN — TRAZODONE HYDROCHLORIDE 50 MG: 50 TABLET ORAL at 21:56

## 2018-11-26 RX ADMIN — APIXABAN 5 MG: 5 TABLET, FILM COATED ORAL at 17:38

## 2018-11-26 RX ADMIN — SERTRALINE HYDROCHLORIDE 100 MG: 100 TABLET ORAL at 21:56

## 2018-11-26 RX ADMIN — OXYCODONE AND ACETAMINOPHEN 1 TABLET: 5; 325 TABLET ORAL at 18:26

## 2018-11-26 RX ADMIN — VANCOMYCIN HYDROCHLORIDE 1750 MG: 1 INJECTION, POWDER, LYOPHILIZED, FOR SOLUTION INTRAVENOUS at 05:17

## 2018-11-26 RX ADMIN — VITAMIN D, TAB 1000IU (100/BT) 5000 UNITS: 25 TAB at 08:15

## 2018-11-26 RX ADMIN — INSULIN DETEMIR 20 UNITS: 100 INJECTION, SOLUTION SUBCUTANEOUS at 21:56

## 2018-11-26 RX ADMIN — CEFTRIAXONE 1000 MG: 1 INJECTION, POWDER, FOR SOLUTION INTRAMUSCULAR; INTRAVENOUS at 16:34

## 2018-11-26 RX ADMIN — GABAPENTIN 300 MG: 300 CAPSULE ORAL at 17:38

## 2018-11-26 RX ADMIN — HYDROCHLOROTHIAZIDE 25 MG: 25 TABLET ORAL at 13:32

## 2018-11-26 RX ADMIN — CARVEDILOL 25 MG: 12.5 TABLET, FILM COATED ORAL at 08:15

## 2018-11-26 NOTE — PROGRESS NOTES
Progress Note - Anna Cohen 1945, 81 y o  male MRN: [id number]    Unit/Bed#: -Tisha Encounter: [id number]    Primary Care Provider: Monisha Teixeira MD   Date and time admitted to hospital: 11/24/2018 11:36 AM    * Olecranon bursitis of right elbow   Assessment & Plan    Persistent  Prior wound culture from 10/18/2018 grew 1+ staph Lugdenunensis, susceptible to cefazolin and vancomycin  Completed an outpatient course of Keflex followed by Clindamycin  · S/p repeat arthrocentesis in ED with aspirate appearing like jam hematoma  · Continue IV Rocephin and Vancomycin   · Vanco trough prior to 4th dose  · Eliquis held (on for afib); will resume this evening and monitor bursa and hgb  · Appreciate orthopedic surgery input  · Await cultures; continue abx  · May need elective bursectomy if symptoms persist and remain bothersome or activity-limiting  · Body fluid cultures: now growth  · Suspect this is a sterile collection  · Weight bearing as tolerated to RUE  · Compressive wrap    · Pain control     Atrial fibrillation ([address] )   Assessment & Plan    S/p Ablation at [facility]   On Eliquis and Coreg at home  · eliquis held; will resume this evening and monitor bursa  · If rapid reaccumulation, may need a AC "holiday" to allow for complete resolution   · Continue Coreg      Type 2 diabetes mellitus [facility])   Assessment & Plan    No results found for: HGBA1C    Recent Labs      11/25/18   1559  11/25/18   2119  11/26/18   0803  11/26/18   1144   POCGLU  160*  111  61*  121       Blood Sugar Average: Last 72 hrs:  (P) 847 [id number]   Continue home regimen, Levimir 20 units at HS  Carb restricted diet   ACCU checks AC + HS with Lispro insulin sliding scale coverage      Sepsis ([facility] [address] )   Assessment & Plan    Evidence by leukocytosis and lactic acidosis due to olecranon bursitis of right elbow     Lactic 2 5 -> 2 1, s/p 1 L NS bolus  · OK to discontinue gentle IV hydration  · Continue IV Rocephin and vancomycin  · Follow up wound and blood cultures  · Monitor for worsening signs of sepsis  · Holding HCTZ     Lower extremity edema   Assessment & Plan    Pt reports chronic BLE edema with erythema to LLE  Hst of AFib on Eliquis and HCTZ  · Lower venous doppler negative for acute or chronic DVT  · Resume HCTZ on discharge      Generalized weakness   Assessment & Plan    Pt reports weakness to BLE and generalized numbness/ tingling and attributes this to exposure to [address]  Wheelchair bound  · Has a  and VNA services  · PT/OT eval and treat --> may benefit from outpatient OT and/or hand therapy as he is having progressive weakness in his hands   · Supportive care, fall precautions        Hypertension   Assessment & Plan    Continue Coreg and Lisinopril with holding parameters   Have resumed HCTZ          VTE Pharmacologic Prophylaxis:   Pharmacologic: Apixaban (Eliquis)  Mechanical VTE Prophylaxis in Place: No    Patient Centered Rounds: I have performed bedside rounds with nursing staff today  Discussions with Specialists or Other Care Team Provider: orthopedics     Education and Discussions with Family / Patient: patient     Time Spent for Care: 30 minutes  More than 50% of total time spent on counseling and coordination of care as described above  Current Length of Stay: 1 day(s)    Current Patient Status: Inpatient   Certification Statement: The patient will continue to require additional inpatient hospital stay due to olecranon bursitis on IV abx  Discharge Plan / Estimated Discharge Date: pending PT eval; resuming eliquis tonight to see if collection returns  Hopeful dc in 24 hours  Code Status: Level 1 - Full Code    Subjective:   Pt was doing well this am, but now feels his elbow is more painful than earlier  No fevers or chills       Objective:     Vitals:   Temp (24hrs), Av 6 °F (36 4 °C), Min:97 5 °F (36 4 °C), Max:97 7 °F (36 5 °C)    Temp:  [97 5 °F (36 4 °C)-97 7 °F (36 5 °C)] 97 5 °F (36 4 °C)  HR:  [58-71] 71  Resp:  [18] 18  BP: (168-182)/() 177/92  SpO2:  [95 %-97 %] 95 %  Body mass index is 29 35 kg/m²  Input and Output Summary (last 24 hours): Intake/Output Summary (Last 24 hours) at 11/26/18 1311  Last data filed at 11/26/18 0830   Gross per 24 hour   Intake              220 ml   Output             1175 ml   Net             -955 ml       Physical Exam:     Physical Exam   Constitutional: He is oriented to person, place, and time  No distress  HENT:   Head: Normocephalic and atraumatic  Cardiovascular: Normal rate and regular rhythm  Pulmonary/Chest: Effort normal and breath sounds normal  No respiratory distress  He has no wheezes  Abdominal: Soft  Bowel sounds are normal  He exhibits no distension  There is no tenderness  Musculoskeletal: He exhibits edema (less pronounced than on admission, but still with some swelling and recummulation of fluid ) and tenderness  ROM mildly impaired    Neurological: He is alert and oriented to person, place, and time  No cranial nerve deficit  Coordination normal    Skin: Skin is warm and dry  He is not diaphoretic  Nursing note and vitals reviewed  Additional Data:     Labs:      Results from last 7 days  Lab Units 11/26/18  0628 11/25/18  0602   WBC Thousand/uL 10 02 9 38   HEMOGLOBIN g/dL 9 9* 9 8*   HEMATOCRIT % 32 1* 32 1*   PLATELETS Thousands/uL 297 293   NEUTROS PCT %  --  68   LYMPHS PCT %  --  18   MONOS PCT %  --  12   EOS PCT %  --  2       Results from last 7 days  Lab Units 11/26/18  0628  11/24/18  1307   POTASSIUM mmol/L 3 7  < > 4 3   CHLORIDE mmol/L 108  < > 102   CO2 mmol/L 27  < > 30   BUN mg/dL 16  < > 24   CREATININE mg/dL 0 89  < > 1 29   CALCIUM mg/dL 8 5  < > 9 3   ALK PHOS U/L  --   --  98   ALT U/L  --   --  43   AST U/L  --   --  27   < > = values in this interval not displayed      Results from last 7 days  Lab Units 11/24/18  1407   INR  1 26*       * I Have Reviewed All Lab Data Listed Above  * Additional Pertinent Lab Tests Reviewed: All Labs Within Last 24 Hours Reviewed    Imaging:    Imaging Reports Reviewed Today Include:   Imaging Personally Reviewed by Myself Includes:      Recent Cultures (last 7 days):       Results from last 7 days  Lab Units 11/24/18  1454 11/24/18  1313   BLOOD CULTURE   --  No Growth at 24 hrs  No Growth at 24 hrs  GRAM STAIN RESULT  1+ Polys  No bacteria seen  --    BODY FLUID CULTURE, STERILE  No growth  --        Last 24 Hours Medication List:     Current Facility-Administered Medications:  acetaminophen 650 mg Oral Q6H PRN Mich Breech, CRNP    ALPRAZolam 0 25 mg Oral TID PRN Mich Breech, CRNP    carvedilol 25 mg Oral BID With Meals Mich Breech, CRNP    cefTRIAXone 1,000 mg Intravenous Q24H Mich Breech, CRNP Last Rate: 1,000 mg (11/25/18 1500)   cholecalciferol 5,000 Units Oral Daily Mich Breech, CRNP    hydrochlorothiazide 25 mg Oral Daily Mona Pandey MD    insulin detemir 20 Units Subcutaneous HS Mich Breech, CRNP    insulin lispro 1-5 Units Subcutaneous TID AC Mich Breech, CRNP    insulin lispro 1-5 Units Subcutaneous HS Mich Breech, CRNP    lisinopril 20 mg Oral Daily Mich Breech, CRNP    ondansetron 4 mg Intravenous Q6H PRN Mich Breech, CRNP    oxyCODONE 5 mg Oral Q6H PRN Mich Breech, CRNP    oxyCODONE-acetaminophen 1 tablet Oral Q6H PRN Mich Breech, CRNP    pantoprazole 40 mg Oral Early Morning Mich Breech, CRNP    sertraline 100 mg Oral HS Mich Breech, CRNP    traZODone 50 mg Oral HS Mich Breech, CRNP    vancomycin 17 5 mg/kg Intravenous Q12H Adama Werner PA-C Last Rate: 1,750 mg (11/26/18 0517)   zolpidem 10 mg Oral HS PRN Mich Breech, CRNP         Today, Patient Was Seen By: Mona Pandey MD    ** Please Note: This note has been constructed using a voice recognition system  **

## 2018-11-26 NOTE — PROGRESS NOTES
Orthopedics   Sandra Asa 81 y o  male MRN: [id number]  Unit/Bed#: -01      Subjective:  Patient seen and evaluated  Denies any right elbow pain at this time  No numbness or tingling  No fevers or chills      Labs:    0  Lab Value Date/Time   HCT 32 1 (L) 11/26/2018 0628   HCT 32 1 (L) 11/25/2018 0602   HCT 37 5 11/24/2018 1307   HGB 9 9 (L) 11/26/2018 0628   HGB 9 8 (L) 11/25/2018 0602   HGB 11 3 (L) 11/24/2018 1307   INR 1 26 (H) 11/24/2018 1407   WBC 10 02 11/26/2018 0628   WBC 9 38 11/25/2018 0602   WBC 10 42 (H) 11/24/2018 1307   ESR 15 (H) 11/24/2018 1302   CRP <3 0 11/24/2018 1307       Meds:    Current Facility-Administered Medications:     acetaminophen (TYLENOL) tablet 650 mg, 650 mg, Oral, Q6H PRN, SHAI Gaytan, 650 mg at 11/25/18 1552    ALPRAZolam Ivis Checo) tablet 0 25 mg, 0 25 mg, Oral, TID PRN, SHAI Gaytan, 0 25 mg at 11/25/18 1411    carvedilol (COREG) tablet 25 mg, 25 mg, Oral, BID With Meals, ARTEMIO GaytanNP, 25 mg at 11/25/18 1548    cefTRIAXone (ROCEPHIN) 1,000 mg in dextrose 5 % 50 mL IVPB, 1,000 mg, Intravenous, Q24H, SHAI Jesus, Last Rate: 100 mL/hr at 11/25/18 1500, 1,000 mg at 11/25/18 1500    cholecalciferol (VITAMIN D3) tablet 5,000 Units, 5,000 Units, Oral, Daily, Deal Shape, CRNP, 5,000 Units at 11/25/18 0851    insulin detemir (LEVEMIR) subcutaneous injection 20 Units, 20 Units, Subcutaneous, HS, Deal Shape, CRNP, 20 Units at 11/25/18 0112    insulin lispro (HumaLOG) 100 units/mL subcutaneous injection 1-5 Units, 1-5 Units, Subcutaneous, TID AC, 1 Units at 11/24/18 1836 **AND** Fingerstick Glucose (POCT), , , TID AC, Deal Shape, SHAI    insulin lispro (HumaLOG) 100 units/mL subcutaneous injection 1-5 Units, 1-5 Units, Subcutaneous, HS, Deal Shape, CRNP, 1 Units at 11/24/18 2137    lisinopril (ZESTRIL) tablet 20 mg, 20 mg, Oral, Daily, Lyndsey Donnelly, ARTEMIONP, 20 mg at 11/25/18 8153    ondansetron (ZOFRAN) injection 4 mg, 4 mg, Intravenous, Q6H PRN, SHAI Gipson    oxyCODONE (ROXICODONE) IR tablet 5 mg, 5 mg, Oral, Q6H PRN, SHAI Gipson    oxyCODONE-acetaminophen (PERCOCET) 5-325 mg per tablet 1 tablet, 1 tablet, Oral, Q6H PRN, SHAI Gipson    pantoprazole (PROTONIX) EC tablet 40 mg, 40 mg, Oral, Early Morning, SHAI Gipson, 40 mg at 11/26/18 8240    sertraline (ZOLOFT) tablet 100 mg, 100 mg, Oral, HS, SHAI Gipson, 100 mg at 11/25/18 2153    traZODone (DESYREL) tablet 50 mg, 50 mg, Oral, HS, SHAI Gipson, 50 mg at 11/25/18 2152    vancomycin (VANCOCIN) 1,750 mg in sodium chloride 0 9 % 500 mL IVPB, 17 5 mg/kg, Intravenous, Q12H, Homero Hilton PA-C, Last Rate: 250 mL/hr at 11/26/18 0517, 1,750 mg at 11/26/18 0517    zolpidem (AMBIEN) tablet 10 mg, 10 mg, Oral, HS PRN, SHAI Gipson, 10 mg at 11/25/18 2153      Physical exam:  Vitals:    11/25/18 2222   BP: 168/92   Pulse: 58   Resp: 18   Temp: 97 7 °F (36 5 °C)   SpO2: 96%      Right elbow:  · Skin intact  No erythema or ecchymosis  · Recurrent collection noted about the olecranon bursa which is nontender to palpation  · Elbow range of motion is intact and full with minimal discomfort noted at terminal elbow flexion  · Stable to varus and valgus stress  · Neurovascular intact right upper extremity  · 2+ radial pulse  · Olecranon bursa aspirate cultures with no growth to date    _*_*_*_*_*_*_*_*_*_*_*_*_*_*_*_*_*_*_*_*_*_*_*_*_*_*_*_*_*_*_*_*_*_*_*_*_*_*_*_*_*    Assessment: 72 y o male with right elbow cellulitis and likely reactive olecranon bursitis    Plan:  Abx per primary team  WBAT RUE  PT/OT  Pain Control  No surgical indications at this time  Follow-up final cultures      Marco A Soriano, TIFFANI

## 2018-11-26 NOTE — ASSESSMENT & PLAN NOTE
No results found for: HGBA1C    Recent Labs      11/25/18   1559  11/25/18   2119  11/26/18   0803  11/26/18   1144   POCGLU  160*  111  61*  121       Blood Sugar Average: Last 72 hrs:  (P) 419 [id number]   Continue home regimen, Levimir 20 units at HS  Carb restricted diet   ACCU checks AC + HS with Lispro insulin sliding scale coverage

## 2018-11-26 NOTE — ASSESSMENT & PLAN NOTE
Persistent  Prior wound culture from 10/18/2018 grew 1+ staph Lugdenunensis, susceptible to cefazolin and vancomycin  Completed an outpatient course of Keflex followed by Clindamycin  · S/p repeat arthrocentesis in ED with aspirate appearing like jam hematoma      · Continue IV Rocephin and Vancomycin   · Vanco trough prior to 4th dose  · Eliquis held (on for afib); will resume this evening and monitor bursa and hgb  · Appreciate orthopedic surgery input  · Await cultures; continue abx  · May need elective bursectomy if symptoms persist and remain bothersome or activity-limiting  · Body fluid cultures: now growth  · Suspect this is a sterile collection  · Weight bearing as tolerated to RUE  · Compressive wrap    · Pain control

## 2018-11-26 NOTE — PROGRESS NOTES
Vancomycin IV Pharmacy-to-Dose Consultation    Radha Stanley is a 81 y o  male who is currently receiving Vancomycin IV with management by the Pharmacy Consult service  Assessment/Plan:  The patient was reviewed  Renal function is stable and no signs or symptoms of nephrotoxicity and/or infusion reactions were documented in the chart  Based on todays assessment, continue current vancomycin dosing of  1750 mg IV q12h  with a plan for trough to be drawn at 0530  on 11/27  We will continue to follow the patients culture results and clinical progress daily      Jamie Haynes, Pharmacist

## 2018-11-26 NOTE — ASSESSMENT & PLAN NOTE
Pt reports weakness to BLE and generalized numbness/ tingling and attributes this to exposure to [employer]  Wheelchair bound     · Has a  and VNA services  · PT/OT eval and treat --> may benefit from outpatient OT and/or hand therapy as he is having progressive weakness in his hands   · Supportive care, fall precautions

## 2018-11-26 NOTE — ASSESSMENT & PLAN NOTE
S/p Ablation at [facility]   On Eliquis and Coreg at home  · eliquis held; will resume this evening and monitor bursa  · If rapid reaccumulation, may need a AC "holiday" to allow for complete resolution   · Continue Coreg

## 2018-11-26 NOTE — PHYSICIAN ADVISOR
Current patient class: Inpatient  The patient is currently on Hospital Day: 2 at [facility]      The patient was admitted to the hospital at [phone] on 11/25/18 for the following diagnosis:  Elbow pain [M25 529]  Septic olecranon bursitis of right elbow [M71 121]       There is documentation in the medical record of an expected length of stay of at least 2 midnights  The patient is therefore expected to satisfy the 2 midnight benchmark and given the 2 midnight presumption is appropriate for INPATIENT ADMISSION  Given this expectation of a satisfying stay, CMS instructs us that the patient is most often appropriate for inpatient admission under part A provided medical necessity is documented in the chart  After review of the relevant documentation, labs, vital signs and test results, the patient is appropriate for INPATIENT ADMISSION  Admission to the hospital as an inpatient is a complex decision making process which requires the practitioner to consider the patients presenting complaint, history and physical examination and all relevant testing  With this in mind, in this case, the patient was deemed appropriate for INPATIENT ADMISSION  After review of the documentation and testing available at the time of the admission I concur with this clinical determination of medical necessity  Rationale is as follows: The patient is a 81 yrs old Male who presented to the ED at 11/24/2018 11:36 AM with a chief complaint of Elbow Pain (Pt  c/o right elbow swelling with pain for one month  Pt  seen for same in September and had fluid drained, cultured and dx  with Staph and tx with Keflex )     Given the need for further hospitalization, and along with the documentation of medical necessity present in the chart, the patient is appropriate for inpatient admission  The patient is expected to satisfy the 2 midnight benchmark, and will require further acute medical care   The patient does have comorbid conditions which increases the risk for significant adverse outcome  Given this the patient is appropriate for inpatient admission        The patients vitals on arrival were ED Triage Vitals   Temperature Pulse Respirations Blood Pressure SpO2   11/24/18 1111 11/24/18 1111 11/24/18 1111 11/24/18 1111 11/24/18 1111   98 1 °F (36 7 °C) 62 20 165/79 100 %      Temp Source Heart Rate Source Patient Position - Orthostatic VS BP Location FiO2 (%)   11/24/18 1111 11/24/18 1403 11/24/18 1403 11/24/18 1403 --   Oral Monitor Sitting Left arm       Pain Score       11/24/18 1111       4           Past Medical History:   Diagnosis Date    [facility] fibrillation [address] [address]     Diabetes [address] [address] )     Hypertension      Past Surgical History:   Procedure Laterality Date    ABLATION OF DYSRHYTHMIC FOCUS      for a-fib    CHOLECYSTECTOMY      TOTAL SHOULDER REPLACEMENT Right            Consults have been placed to:   IP CONSULT TO ORTHOPEDIC SURGERY  IP CONSULT TO PHARMACY    Vitals:    11/24/18 1622 11/24/18 2152 11/25/18 0700 11/25/18 1558   BP: (!) 182/94 162/82 160/80 (!) 182/100   BP Location: Left arm Left arm Left arm Left arm   Pulse: 63 70 66 65   Resp: 18 18 18 18   Temp: 98 4 °F (36 9 °C) 98 7 °F (37 1 °C) 98 °F (36 7 °C) 97 5 °F (36 4 °C)   TempSrc: Oral Oral Oral Oral   SpO2: 99% 94% 97% 97%   Weight: 104 kg (228 lb 9 9 oz)      Height: 6' 2" (1 88 m)          Most recent labs:    Recent Labs      11/24/18   1307  11/24/18   1407  11/25/18   0602   WBC  10 42*   --   9 38   HGB  11 3*   --   9 8*   HCT  37 5   --   32 1*   PLT  323   --   293   K  4 3   --   3 8   CALCIUM  9 3   --   8 5   BUN  24   --   19   CREATININE  1 29   --   0 89   INR   --   1 26*   --    AST  27   --    --    ALT  43   --    --    ALKPHOS  98   --    --        Scheduled Meds:  Current Facility-Administered Medications:  acetaminophen 650 mg Oral Q6H PRN SHAI Koch    ALPRAZolam 0 25 mg Oral TID PRN [place], CRNP    carvedilol 25 mg Oral BID With Meals [place] Shape, CRNP    cefTRIAXone 1,000 mg Intravenous Q24H [place] Shape, CRNP Last Rate: 1,000 mg (11/25/18 1500)   cholecalciferol 5,000 Units Oral Daily [place] Shape, CRNP    insulin detemir 20 Units Subcutaneous HS [place] Shape, CRNP    insulin lispro 1-5 Units Subcutaneous TID AC [place] Shape, CRNP    insulin lispro 1-5 Units Subcutaneous HS [place] Shape, CRNP    lisinopril 20 mg Oral Daily [place] Shape, CRNP    ondansetron 4 mg Intravenous Q6H PRN [place] Shape, CRNP    oxyCODONE 5 mg Oral Q6H PRN [place] Shape, CRNP    oxyCODONE-acetaminophen 1 tablet Oral Q6H PRN [place] Shape, CRNP    pantoprazole 40 mg Oral Early Morning [place] Shape, CRNP    sertraline 100 mg Oral HS [place] Shape, CRNP    traZODone 50 mg Oral HS [place] Shape, CRNP    vancomycin 17 5 mg/kg Intravenous Q12H Dale Cade PA-C Last Rate: 1,750 mg (11/25/18 1831)   zolpidem 10 mg Oral HS PRN [place] Shape, CRNP      Continuous Infusions:   PRN Meds:   acetaminophen    ALPRAZolam    ondansetron    oxyCODONE    oxyCODONE-acetaminophen    zolpidem    Surgical procedures (if appropriate):

## 2018-11-27 VITALS
RESPIRATION RATE: 18 BRPM | TEMPERATURE: 98.2 F | WEIGHT: 228.62 LBS | HEART RATE: 61 BPM | SYSTOLIC BLOOD PRESSURE: 159 MMHG | HEIGHT: 74 IN | BODY MASS INDEX: 29.34 KG/M2 | DIASTOLIC BLOOD PRESSURE: 75 MMHG | OXYGEN SATURATION: 98 %

## 2018-11-27 LAB
ANION GAP SERPL CALCULATED.3IONS-SCNC: 8 MMOL/L (ref 4–13)
BASOPHILS # BLD AUTO: 0.05 THOUSANDS/ΜL (ref 0–0.1)
BASOPHILS NFR BLD AUTO: 1 % (ref 0–1)
BUN SERPL-MCNC: 15 MG/DL (ref 5–25)
CALCIUM SERPL-MCNC: 8.8 MG/DL (ref 8.3–10.1)
CHLORIDE SERPL-SCNC: 107 MMOL/L (ref 100–108)
CO2 SERPL-SCNC: 27 MMOL/L (ref 21–32)
CREAT SERPL-MCNC: 1.02 MG/DL (ref 0.6–1.3)
EOSINOPHIL # BLD AUTO: 0.17 THOUSAND/ΜL (ref 0–0.61)
EOSINOPHIL NFR BLD AUTO: 2 % (ref 0–6)
ERYTHROCYTE [DISTWIDTH] IN BLOOD BY AUTOMATED COUNT: 16.9 % (ref 11.6–15.1)
GFR SERPL CREATININE-BSD FRML MDRD: 74 ML/MIN/1.73SQ M
GLUCOSE SERPL-MCNC: 103 MG/DL (ref 65–140)
GLUCOSE SERPL-MCNC: 137 MG/DL (ref 65–140)
GLUCOSE SERPL-MCNC: 177 MG/DL (ref 65–140)
GLUCOSE SERPL-MCNC: 88 MG/DL (ref 65–140)
HCT VFR BLD AUTO: 33 % (ref 36.5–49.3)
HGB BLD-MCNC: 10.1 G/DL (ref 12–17)
IMM GRANULOCYTES # BLD AUTO: 0.04 THOUSAND/UL (ref 0–0.2)
IMM GRANULOCYTES NFR BLD AUTO: 0 % (ref 0–2)
LYMPHOCYTES # BLD AUTO: 1.46 THOUSANDS/ΜL (ref 0.6–4.47)
LYMPHOCYTES NFR BLD AUTO: 15 % (ref 14–44)
MCH RBC QN AUTO: 26 PG (ref 26.8–34.3)
MCHC RBC AUTO-ENTMCNC: 30.6 G/DL (ref 31.4–37.4)
MCV RBC AUTO: 85 FL (ref 82–98)
MONOCYTES # BLD AUTO: 1.27 THOUSAND/ΜL (ref 0.17–1.22)
MONOCYTES NFR BLD AUTO: 13 % (ref 4–12)
NEUTROPHILS # BLD AUTO: 6.79 THOUSANDS/ΜL (ref 1.85–7.62)
NEUTS SEG NFR BLD AUTO: 69 % (ref 43–75)
NRBC BLD AUTO-RTO: 0 /100 WBCS
PLATELET # BLD AUTO: 306 THOUSANDS/UL (ref 149–390)
PMV BLD AUTO: 10.2 FL (ref 8.9–12.7)
POTASSIUM SERPL-SCNC: 3.9 MMOL/L (ref 3.5–5.3)
RBC # BLD AUTO: 3.88 MILLION/UL (ref 3.88–5.62)
SODIUM SERPL-SCNC: 142 MMOL/L (ref 136–145)
VANCOMYCIN TROUGH SERPL-MCNC: 25.6 UG/ML (ref 10–20)
WBC # BLD AUTO: 9.78 THOUSAND/UL (ref 4.31–10.16)

## 2018-11-27 PROCEDURE — G8979 MOBILITY GOAL STATUS: HCPCS

## 2018-11-27 PROCEDURE — 97163 PT EVAL HIGH COMPLEX 45 MIN: CPT

## 2018-11-27 PROCEDURE — 80048 BASIC METABOLIC PNL TOTAL CA: CPT | Performed by: PHYSICIAN ASSISTANT

## 2018-11-27 PROCEDURE — 80202 ASSAY OF VANCOMYCIN: CPT | Performed by: PHYSICIAN ASSISTANT

## 2018-11-27 PROCEDURE — 82948 REAGENT STRIP/BLOOD GLUCOSE: CPT

## 2018-11-27 PROCEDURE — G8978 MOBILITY CURRENT STATUS: HCPCS

## 2018-11-27 PROCEDURE — 99231 SBSQ HOSP IP/OBS SF/LOW 25: CPT | Performed by: PHYSICIAN ASSISTANT

## 2018-11-27 PROCEDURE — 85025 COMPLETE CBC W/AUTO DIFF WBC: CPT | Performed by: HOSPITALIST

## 2018-11-27 PROCEDURE — 99239 HOSP IP/OBS DSCHRG MGMT >30: CPT | Performed by: INTERNAL MEDICINE

## 2018-11-27 RX ORDER — OXYCODONE HYDROCHLORIDE 5 MG/1
5 TABLET ORAL EVERY 6 HOURS PRN
Qty: 30 TABLET | Refills: 0 | Status: SHIPPED | OUTPATIENT
Start: 2018-11-27 | End: 2018-12-07

## 2018-11-27 RX ORDER — SACCHAROMYCES BOULARDII 250 MG
250 CAPSULE ORAL 2 TIMES DAILY
Qty: 14 CAPSULE | Refills: 0 | Status: SHIPPED | OUTPATIENT
Start: 2018-11-27 | End: 2018-12-04

## 2018-11-27 RX ORDER — CEPHALEXIN 500 MG/1
500 CAPSULE ORAL 4 TIMES DAILY
Qty: 28 CAPSULE | Refills: 0 | Status: SHIPPED | OUTPATIENT
Start: 2018-11-27 | End: 2018-12-04

## 2018-11-27 RX ORDER — CEPHALEXIN 250 MG/1
500 CAPSULE ORAL 4 TIMES DAILY
Qty: 56 CAPSULE | Refills: 0 | Status: SHIPPED | OUTPATIENT
Start: 2018-11-27 | End: 2018-11-27

## 2018-11-27 RX ADMIN — PANTOPRAZOLE SODIUM 40 MG: 40 TABLET, DELAYED RELEASE ORAL at 05:48

## 2018-11-27 RX ADMIN — VANCOMYCIN HYDROCHLORIDE 1250 MG: 10 INJECTION, POWDER, LYOPHILIZED, FOR SOLUTION INTRAVENOUS at 10:35

## 2018-11-27 RX ADMIN — GABAPENTIN 300 MG: 300 CAPSULE ORAL at 08:06

## 2018-11-27 RX ADMIN — LISINOPRIL 20 MG: 20 TABLET ORAL at 08:06

## 2018-11-27 RX ADMIN — APIXABAN 5 MG: 5 TABLET, FILM COATED ORAL at 17:13

## 2018-11-27 RX ADMIN — CARVEDILOL 25 MG: 12.5 TABLET, FILM COATED ORAL at 16:16

## 2018-11-27 RX ADMIN — GABAPENTIN 300 MG: 300 CAPSULE ORAL at 16:16

## 2018-11-27 RX ADMIN — CEFTRIAXONE 1000 MG: 1 INJECTION, POWDER, FOR SOLUTION INTRAMUSCULAR; INTRAVENOUS at 16:16

## 2018-11-27 RX ADMIN — OXYCODONE AND ACETAMINOPHEN 1 TABLET: 5; 325 TABLET ORAL at 08:21

## 2018-11-27 RX ADMIN — HYDROCHLOROTHIAZIDE 25 MG: 25 TABLET ORAL at 08:06

## 2018-11-27 RX ADMIN — CARVEDILOL 25 MG: 12.5 TABLET, FILM COATED ORAL at 08:06

## 2018-11-27 RX ADMIN — INSULIN LISPRO 1 UNITS: 100 INJECTION, SOLUTION INTRAVENOUS; SUBCUTANEOUS at 11:28

## 2018-11-27 RX ADMIN — VITAMIN D, TAB 1000IU (100/BT) 5000 UNITS: 25 TAB at 08:05

## 2018-11-27 RX ADMIN — APIXABAN 5 MG: 5 TABLET, FILM COATED ORAL at 08:06

## 2018-11-27 NOTE — PLAN OF CARE
MUSCULOSKELETAL - ADULT     Maintain or return mobility to safest level of function Progressing        Potential for Falls     Patient will remain free of falls Progressing        Prexisting or High Potential for Compromised Skin Integrity     Skin integrity is maintained or improved Progressing        SKIN/TISSUE INTEGRITY - ADULT     Skin integrity remains intact Progressing     Incision(s), wounds(s) or drain site(s) healing without S/S of infection Progressing

## 2018-11-27 NOTE — PROGRESS NOTES
Vancomycin Assessment    Michael Jain is a 81 y o  male who is currently receiving vancomycin 1750mg IV q12 for other bursitis   Relevant clinical data and objective history reviewed:  Creatinine   Date Value Ref Range Status   11/27/2018 1 02 0 60 - 1 30 mg/dL Final     Comment:     Standardized to IDMS reference method   11/26/2018 0 89 0 60 - 1 30 mg/dL Final     Comment:     Standardized to IDMS reference method   11/25/2018 0 89 0 60 - 1 30 mg/dL Final     Comment:     Standardized to IDMS reference method     /82 (BP Location: Left arm)   Pulse 59   Temp 97 9 °F (36 6 °C) (Oral)   Resp 18   Ht 6' 2" (1 88 m)   Wt 104 kg (228 lb 9 9 oz)   SpO2 96%   BMI 29 35 kg/m²   I/O last 3 completed shifts: In: 220 [P O :220]  Out: 1625 [Urine:1625]  Lab Results   Component Value Date/Time    BUN 15 11/27/2018 05:38 AM    WBC 9 78 11/27/2018 05:38 AM    HGB 10 1 (L) 11/27/2018 05:38 AM    HCT 33 0 (L) 11/27/2018 05:38 AM    MCV 85 11/27/2018 05:38 AM     11/27/2018 05:38 AM     Temp Readings from Last 3 Encounters:   11/26/18 97 9 °F (36 6 °C) (Oral)   11/12/18 97 9 °F (36 6 °C) (Oral)   10/15/18 98 1 °F (36 7 °C) (Oral)     Vancomycin Days of Therapy: 3    Assessment/Plan  The patient is currently on vancomycin utilizing scheduled dosing  Baseline risks associated with therapy include: concomitant nephrotoxic medications and advanced age  The patient is receiving 1750mg IV q12 with the most recent vancomycin level being at steady-state and supratherapeutic based on a goal of 15-20 (appropriate for most indications) ; therefore, after clinical evaluation will be changed to 1250mg IV q12   Pharmacy will continue to follow closely for s/sx of nephrotoxicity, infusion reactions and appropriateness of therapy  BMP and CBC will be ordered per protocol  Plan for trough as patient approaches steady state, prior to the 4th  dose at approximately 2200 on 11/28   Pharmacy will continue to follow the patients culture results and clinical progress daily      Melanie Nair, Pharmacist

## 2018-11-27 NOTE — DISCHARGE INSTRUCTIONS
Discharge Instructions - Orthopedics  Marcelyn Aase 81 y o  male MRN: [id number]  Unit/Bed#: -01    Weight Bearing Status:                                           Weight bearing as tolerated right upper extremity    Pain:  Continue analgesics as directed    PT/OT:  Continue PT/OT on outpatient basis as/if directed    Appt Instructions:    If your symptoms persist or worsen for your right elbow bursitis, please call the clinic at [phone] to f/u with Dr Celestino Bear

## 2018-11-27 NOTE — PHYSICAL THERAPY NOTE
PHYSICAL THERAPY EVALUATION  NAME:  Emanuel Edward  DATE: 11/27/18    AGE:   81 y o  Mrn:   [id number]  ADMIT DX:  Elbow pain [M25 529]  Septic olecranon bursitis of right elbow [M71 121]    Past Medical History:   Diagnosis Date    Atrial fibrillation ([facility] [address] )     Diabetes ([facility] )     Hypertension      Length Of Stay: 2  Performed at least 2 patient identifiers during session: Name, Zoya Bailey and ID bracelet    PHYSICAL THERAPY EVALUATION :    11/27/18 1205   Note Type   Note type Eval only   Pain Assessment   Pain Assessment 0-10   Pain Score 8   Pain Location Shoulder   Pain Orientation Left   Clinical Progression Not changed   Effect of Pain on Daily Activities limited overhead mobility   Patient's Stated Pain Goal No pain   Hospital Pain Intervention(s) Ambulation/increased activity;Repositioned   Response to Interventions tolerated well   Home Living   Type of [address] Two level  (has ramp to enter home)   Home Equipment Wheelchair-electric;Stair glide  (ramp, rail for bed, pole for bed)   Prior Function   Level of Midnight Needs assistance with ADLs and functional mobility; Needs assistance with IADLs   Lives With Spouse; Family   Receives Help From Family   ADL Assistance Needs assistance   IADLs Needs assistance   Falls in the last 6 months 1 to 4  (transfer to wheelchair )   Vocational On disability   Restrictions/Precautions   Weight Bearing Precautions Per Order Yes   RUE Weight Bearing Per Order WBAT   Other Precautions Pain; Fall Risk;Bed Alarm   General   Additional Pertinent History Per H&P: 81 y o  male with a PMH including Atrial Fibrillation on Eliquis, T2DM, HTN, and generalized weakness due to exposure to Agent Orange who presents with persistent right olecranon bursitis after failing 2 courses of oral antibiotics  Patient reports chronic generalized weakness due to exposure to Agent Orange   Per PT eval: Pt has limited shoulder mobility bilaterally and L LE is significantly weaker than R LE  Pt uses electric wheelchair for mobilization, performs stand pivot transfers in/out of chair  Family/Caregiver Present Yes  (daughter)   Cognition   Overall Cognitive Status WFL   Arousal/Participation Alert   Orientation Level Oriented X4   Memory Within functional limits   Following Commands Follows all commands and directions without difficulty   RUE Assessment   RUE Assessment X  (Sh ROM <70 degrees, WFL elbow ROM, limited  strength)   LUE Assessment   LUE Assessment X  (Sh ROM <70 degrees)   Strength RLE   R Hip Flexion 3+/5   R Knee Extension 4/5   R Ankle Dorsiflexion 4/5   R Ankle Plantar Flexion 4/5   Strength LLE   L Hip Flexion 2+/5   L Knee Extension 2+/5   L Ankle Dorsiflexion 0/5   L Ankle Plantar Flexion 0/5   Coordination   Movements are Fluid and Coordinated 0   Coordination and Movement Description bradykinetic   Sensation X  (vision and hearing intact)   Light Touch   RLE Light Touch Absent   RLE Light Touch Comments at toes, ankles   LLE Light Touch Absent   LLE Light Touch Comments at toes, ankles   Bed Mobility   Supine to Sit 5  Supervision   Additional items Assist x 1;HOB elevated; Bedrails; Increased time required   Sit to Supine Unable to assess   Additional items (pt seated EOB at end of session)   Transfers   Sit to Stand 4  Minimal assistance  (Pt is able to stand for >5 seconds )   Additional items Assist x 1;Bedrails; Increased time required  (steadying assist)   Stand to Sit 4  Minimal assistance   Additional items Assist x 1;Bedrails; Increased time required  (steadying A for controlled descent)   Ambulation/Elevation   Gait pattern Not appropriate   Balance   Static Sitting Fair   Static Standing Poor   Endurance Deficit   Endurance Deficit Yes   Endurance Deficit Description Pt states that if he does too much, he will be unable to get out of bed tomorrow   Activity Tolerance   Activity Tolerance Patient limited by pain; Patient limited by fatigue   Nurse Made Aware Spoke to Herve Ayala RN   Assessment   Prognosis Fair   Problem List Decreased strength;Decreased range of motion;Decreased endurance; Impaired balance;Decreased mobility; Decreased coordination;Pain;Orthopedic restrictions   Assessment Pt is a 81 y o  male that [facility] w/ olecranon bursitis of R elbow  Prior to admission, pt lived in a 2 story home, has a ramp to enter, w/ family, where he used an electric wheelchair for mobility, needing assistance for ADLs  Upon evaluation: pt requires supervision for bed mobility w/ HOB elevated, min A for transfers, and is able to stand for <5 seconds w/ unilateral UE support and steadying assist  Pt states that once he is in his own environment, he feels that he will be able to safely transfer and mobilize as needed and that he has plenty of support to help him when necessary  Pt's condition is currently unstable/unpredictable given the functional mobility deficits above, HTN, decreased H&H, T2DM, neuropathy in B LE, and increased risk of falls w/ mobility  From PT/ mobility standpoint, recommendation at time of d/c would be home w/ family support to maximize pt's functional independence  Recommend d/c from IPPT services at this time  Barriers to Discharge None   Barriers to Discharge Comments Pt has accomodations at home and seems to have support as needed according to pt   Goals   Patient Goals to get back home and have his normal set up   Treatment Day 0   Plan   Treatment/Interventions Functional transfer training;LE strengthening/ROM; Therapeutic exercise; Endurance training;Patient/family training;Bed mobility;Spoke to nursing   Recommendation   Recommendation Home with family support   Equipment Recommended Other (Comment)  (electric wheelchair)   Barthel Index   Feeding 5   Bathing 0   Grooming Score 0   Dressing Score 5   Bladder Score 10   Bowels Score 10   Toilet Use Score 5   Transfers (Bed/Chair) Score 5   Mobility (Level Surface) Score 0   Stairs Score 0   Barthel Index Score 40        Lenin Mccormick, SPT

## 2018-11-27 NOTE — OCCUPATIONAL THERAPY NOTE
Occupational Therapy Screen        Patient Name: Emanuel Edward  Today's Date: 11/27/2018    OT orders received and chart review completed  Spoke w/ PT, Edwar Howard  Per PT, pt has no concerns managing self care upon discharge from acute care  Taty Alas reports pt has support / assist and DME at home  No OT needs at this time   D/C OT    Lanie Bloch, OTR/L

## 2018-11-27 NOTE — SOCIAL WORK
CM met with pt at bedside to determine if pt had any DC needs  Pt stated he has [facility] nurses through the [place] and his home has ramps and lifts in place  Pt stated he lives with his family and has no dc needs

## 2018-11-27 NOTE — PROGRESS NOTES
Progress Note - Orthopedics   Nicci Kline 81 y o  male MRN: [id number]  Unit/Bed#: -01      Subjective:    72 y o male with right elbow olecranon bursitis  No acute events, no fevers or chills  Pain is overall well controlled, elbow feeling better since yesterday  No numbness or tingling      Labs:    0  Lab Value Date/Time   HCT 33 0 (L) 11/27/2018 0538   HCT 32 1 (L) 11/26/2018 0628   HCT 32 1 (L) 11/25/2018 0602   HGB 10 1 (L) 11/27/2018 0538   HGB 9 9 (L) 11/26/2018 0628   HGB 9 8 (L) 11/25/2018 0602   INR 1 26 (H) 11/24/2018 1407   WBC 9 78 11/27/2018 0538   WBC 10 02 11/26/2018 0628   WBC 9 38 11/25/2018 0602   ESR 15 (H) 11/24/2018 1302   CRP <3 0 11/24/2018 1307       Meds:    Current Facility-Administered Medications:     acetaminophen (TYLENOL) tablet 650 mg, 650 mg, Oral, Q6H PRN, SHAI Dickey, 650 mg at 11/25/18 1552    ALPRAZolam Clark Niece) tablet 0 25 mg, 0 25 mg, Oral, TID PRN, SHAI Dickey, 0 25 mg at 11/25/18 1411    apixaban (ELIQUIS) tablet 5 mg, 5 mg, Oral, BID, Teddy Corley MD, 5 mg at 11/27/18 0806    carvedilol (COREG) tablet 25 mg, 25 mg, Oral, BID With Meals, SHAI Dickey, 25 mg at 11/27/18 0806    cefTRIAXone (ROCEPHIN) 1,000 mg in dextrose 5 % 50 mL IVPB, 1,000 mg, Intravenous, Q24H, SHAI Jesus, Last Rate: 100 mL/hr at 11/26/18 1634, 1,000 mg at 11/26/18 1634    cholecalciferol (VITAMIN D3) tablet 5,000 Units, 5,000 Units, Oral, Daily, SHAI Dickey, 5,000 Units at 11/27/18 0805    gabapentin (NEURONTIN) capsule 300 mg, 300 mg, Oral, TID, Teddy Corley MD, 300 mg at 11/27/18 0806    hydrochlorothiazide (HYDRODIURIL) tablet 25 mg, 25 mg, Oral, Daily, Teddy Corley MD, 25 mg at 11/27/18 0806    insulin detemir (LEVEMIR) subcutaneous injection 20 Units, 20 Units, Subcutaneous, HS, SHAI Dickey, 20 Units at 11/26/18 2156    insulin lispro (HumaLOG) 100 units/mL subcutaneous injection 1-5 Units, 1-5 Units, Subcutaneous, TID AC, 1 Units at 11/24/18 1836 **AND** Fingerstick Glucose (POCT), , , TID AC, Harry Beers, CRNP    insulin lispro (HumaLOG) 100 units/mL subcutaneous injection 1-5 Units, 1-5 Units, Subcutaneous, HS, Harry Beers, CRNP, 1 Units at 11/24/18 2137    lisinopril (ZESTRIL) tablet 20 mg, 20 mg, Oral, Daily, Harry Beers, CRNP, 20 mg at 11/27/18 0806    ondansetron (ZOFRAN) injection 4 mg, 4 mg, Intravenous, Q6H PRN, Harry Beers, CRNP    oxyCODONE (ROXICODONE) IR tablet 5 mg, 5 mg, Oral, Q6H PRN, Harry Beers, CRNP    oxyCODONE-acetaminophen (PERCOCET) 5-325 mg per tablet 1 tablet, 1 tablet, Oral, Q6H PRN, Harry Beers, CRNP, 1 tablet at 11/27/18 0818    pantoprazole (PROTONIX) EC tablet 40 mg, 40 mg, Oral, Early Morning, Harry Beers, CRNP, 40 mg at 11/27/18 0548    sertraline (ZOLOFT) tablet 100 mg, 100 mg, Oral, HS, Harry Beers, CRNP, 100 mg at 11/26/18 2156    traZODone (DESYREL) tablet 50 mg, 50 mg, Oral, HS, Harry Beers, CRNP, 50 mg at 11/26/18 2156    vancomycin (VANCOCIN) 1,250 mg in sodium chloride 0 9 % 250 mL IVPB, 1,250 mg, Intravenous, Q12H, Homero Hilton PA-C    zolpidem (AMBIEN) tablet 10 mg, 10 mg, Oral, HS PRN, Harry Beers, CRNP, 10 mg at 11/26/18 2315    Blood Culture:   Lab Results   Component Value Date    BLOODCX No Growth at 48 hrs  11/24/2018    BLOODCX No Growth at 48 hrs  11/24/2018       Wound Culture:   No results found for: WOUNDCULT    Ins and Outs:  I/O last 24 hours:   In: 220 [P O :220]  Out: 3844 [[id number]]          Physical:  Vitals:    11/27/18 0700   BP: (!) 190/88   Pulse: 63   Resp: 18   Temp: 97 7 °F (36 5 °C)   SpO2: 97%     Musculoskeletal: right Upper Extremity  · Skin intact, no ecchymosis  · Collection of fluid over the olecranon process, soft, fluctuant and warm to touch but not tender to palpation  · Full elbow ROM  · Sensation intact throughout RUE  · 2+ radial pulse    _*_*_*_*_*_*_*_*_*_*_*_*_*_*_*_*_*_*_*_*_*_*_*_*_*_*_*_*_*_*_*_*_*_*_*_*_*_*_*_*_*    Assessment:    72 y o male with right olecranon bursitis and cellulitis     Plan:  · WBAT RUE  · Abx per primary  · PT/OT  · Analgesics  · DVT ppx  · F/u in final cultures, at this time no indication for surgical I&D  If cultures found to be positive pt will benefit from I&D of the bursa, OR vs bedside  If cultures are negative, might consider therapeutic aspiration again and have pt follow up outpatient with holli - Dr Sina Jerez - if re-accumulation occurs      Venancio Santiago PA-C

## 2018-11-27 NOTE — DISCHARGE SUMMARY
Discharge Summary - [address] [facility]    Patient Information: Henrik Chu 81 y o  male MRN: [id number]  Unit/Bed#: -01 Encounter: [id number]    Discharging Physician / Practitioner: Carla Clemens MD  PCP: Rayburn Favre, MD  Admission Date: 11/24/2018  Discharge Date: 11/27/18    Disposition:     Home    Reason for Admission:  Right elbow pain and swelling    Discharge Diagnoses:     Principal Problem:    Olecranon bursitis of right elbow  Active Problems:    Hypertension    Atrial fibrillation (HCC)    Type 2 diabetes mellitus ([facility] [address] )    Lower extremity edema    Generalized weakness    Sepsis ([facility] )  Resolved Problems:    * No resolved hospital problems  *      Consultations During Hospital Stay:  · Orthopedics    Procedures Performed:     · None      Hospital Course:     Henrik Chu is a 81 y o  male patient who originally presented to the hospital on 11/24/2018 due to persistent right elbow redness swelling and pain  Patient was last seen in the emergency department on 10/18/2018 for right elbow swelling redness and pain and he underwent aspiration  The cultures grew 1+ staph Lugdenunensis  Patient was treated with Keflex and then with clindamycin as an outpatient  However in spite of taking the antibiotic he did not see much improvement  He came to the emergency department for evaluation, again joint was aspirated  There was no growth on the culture this time  Patient was again seen by Orthopedics and recommended no inpatient intervention  Patient's symptoms have improved with IV vancomycin and ceftriaxone  He is being discharged with p o  Keflex  Was also to follow up with Orthopedics in outpatient setting  Condition at Discharge: good     Discharge Day Visit / Exam:     Subjective:  Feeling okay today  Offers no complaints    Swelling and pain may be somewhat better  Vitals: Blood Pressure: (!) 190/88 (11/27/18 0700)  Pulse: 63 (11/27/18 0700)  Temperature: 97 7 °F (36 5 °C) (11/27/18 0700)  Temp Source: Oral (11/27/18 0700)  Respirations: 18 (11/27/18 0700)  Height: 6' 2" (188 cm) (11/24/18 1622)  Weight - Scale: 104 kg (228 lb 9 9 oz) (11/24/18 1622)  SpO2: 97 % (11/27/18 0700)  Exam:  Physical Exam     Gen -Patient comfortable at rest  Neck- Supple  No thyromegaly or lymphadenopathy  Lungs-Clear bilaterally without any wheeze or rales   Heart S1-S2, regular rate and rhythm, no murmurs  Abdomen-soft nontender, no organomegaly  Bowel sounds present  Extremities-no cyanosi,  clubbing or edema  Right elbow erythema and swelling  No drainage  Skin- no rash  Neuro-nonfocal     Discharge instructions/Information to patient and family:   See after visit summary for information provided to patient and family  Provisions for Follow-Up Care:  See after visit summary for information related to follow-up care and any pertinent home health orders  Planned Readmission: no     Discharge Statement:  I spent 35 minutes discharging the patient  This time was spent on the day of discharge  I had direct contact with the patient on the day of discharge  Greater than 50% of the total time was spent examining patient, answering all patient questions, arranging and discussing plan of care with patient as well as directly providing post-discharge instructions  Additional time then spent on discharge activities  Discharge Medications:  See after visit summary for reconciled discharge medications provided to patient and family        ** Please Note: This note has been constructed using a voice recognition system **

## 2018-11-28 LAB
BACTERIA SPEC BFLD CULT: NO GROWTH
GRAM STN SPEC: NORMAL
GRAM STN SPEC: NORMAL

## 2018-11-28 NOTE — PROGRESS NOTES
Informed by the lab that 1/2 of patient's blood cultures had returned with a positive preliminary results of Gram positive cocci in clusters  Second culture prelim result is still negative for any growth at 72 hours  Called to discuss with patient and wife  Wife states that overall patient is happy to be home and does not feel his symptoms have worsened  No fevers or chills upon his arrival home  Discussed with wife that should patient worsen clinically for if any fever or chills or to developed, she should present with the patient to the ER immediately  Continue p o  Keflex and assured her that we will follow up on final results of the blood cultures  Wife expresses her understanding and will relay this to her

## 2018-11-29 LAB
BACTERIA BLD CULT: ABNORMAL
BACTERIA BLD CULT: NORMAL
GRAM STN SPEC: ABNORMAL

## 2018-11-29 NOTE — PROGRESS NOTES
I called the patient regards to his positive blood culture  This positive for Staph coag-negative on 1 set  Patient is not febrile  He is feeling well  He was discharged home on antibiotics  He has a follow-up appointment with Orthopedics to discuss resection of the olecranon bursa  He will return to the emergency room if he has any other issues or signs of infection

## 2018-12-10 ENCOUNTER — OFFICE VISIT (OUTPATIENT)
Dept: OBGYN CLINIC | Facility: CLINIC | Age: 73
End: 2018-12-10
Payer: MEDICARE

## 2018-12-10 ENCOUNTER — APPOINTMENT (OUTPATIENT)
Dept: LAB | Facility: CLINIC | Age: 73
End: 2018-12-10
Payer: MEDICARE

## 2018-12-10 ENCOUNTER — TRANSCRIBE ORDERS (OUTPATIENT)
Dept: LAB | Facility: CLINIC | Age: 73
End: 2018-12-10

## 2018-12-10 ENCOUNTER — APPOINTMENT (OUTPATIENT)
Dept: RADIOLOGY | Facility: CLINIC | Age: 73
End: 2018-12-10
Payer: MEDICARE

## 2018-12-10 VITALS
SYSTOLIC BLOOD PRESSURE: 147 MMHG | HEART RATE: 61 BPM | HEIGHT: 74 IN | BODY MASS INDEX: 29.35 KG/M2 | DIASTOLIC BLOOD PRESSURE: 74 MMHG

## 2018-12-10 DIAGNOSIS — M25.521 PAIN IN RIGHT ELBOW: ICD-10-CM

## 2018-12-10 DIAGNOSIS — M70.21 OLECRANON BURSITIS OF RIGHT ELBOW: ICD-10-CM

## 2018-12-10 DIAGNOSIS — M70.21 OLECRANON BURSITIS OF RIGHT ELBOW: Primary | ICD-10-CM

## 2018-12-10 LAB
ANION GAP SERPL CALCULATED.3IONS-SCNC: 11 MMOL/L (ref 4–13)
BUN SERPL-MCNC: 32 MG/DL (ref 5–25)
CALCIUM SERPL-MCNC: 8.9 MG/DL (ref 8.3–10.1)
CHLORIDE SERPL-SCNC: 104 MMOL/L (ref 100–108)
CO2 SERPL-SCNC: 26 MMOL/L (ref 21–32)
CREAT SERPL-MCNC: 1.09 MG/DL (ref 0.6–1.3)
GFR SERPL CREATININE-BSD FRML MDRD: 68 ML/MIN/1.73SQ M
GLUCOSE SERPL-MCNC: 246 MG/DL (ref 65–140)
POTASSIUM SERPL-SCNC: 3.6 MMOL/L (ref 3.5–5.3)
SODIUM SERPL-SCNC: 141 MMOL/L (ref 136–145)

## 2018-12-10 PROCEDURE — 99213 OFFICE O/P EST LOW 20 MIN: CPT | Performed by: SURGERY

## 2018-12-10 PROCEDURE — 36415 COLL VENOUS BLD VENIPUNCTURE: CPT

## 2018-12-10 PROCEDURE — 80048 BASIC METABOLIC PNL TOTAL CA: CPT

## 2018-12-10 PROCEDURE — 73080 X-RAY EXAM OF ELBOW: CPT

## 2018-12-10 NOTE — H&P
Meryl TAPIA  Attending, Orthopaedic Surgery  Hand, Wrist, and Elbow Surgery  [facility]      ORTHOPAEDIC HAND, WRIST, AND ELBOW OFFICE  VISIT       ASSESSMENT/PLAN:      Right olecranon bursitis     Surgical intervention was discussed with the patient and his wife today  They agree to proceed with surgery  The patient will need clearance from his PCP and cardiologist prior to surgery  A discussion was had about the blood thinners the patient is on and how that could effect surgery  Risks and benefits were discussed and all questions were answered  Would prefer if patient was off of Eliquis for 3 days prior to surgery    The patient verbalized understanding of exam findings and treatment plan  We engaged in the shared decision-making process and treatment options were discussed at length with the patient  Surgical and conservative management discussed today along with risks and benefits  Diagnoses and all orders for this visit:    Olecranon bursitis of right elbow    Pain in right elbow  -     XR elbow 3+ vw right; Future        Follow Up:  Return for after surgery   To Do Next Visit:  Re-evaluation of current issue and Sutures out      General Discussions:      Operative Discussions:  Standard Consent: The risks and benefits of the procedure were explained to the patient, which include, but are not limited to: Bleeding, infection, recurrence, pain, scar, damage to tendons, damage to nerves, and damage to blood vessels, failure to give desired results and complications related to anesthesia  These risks, along with alternative conservative treatment options, and postoperative protocols were voiced back and understood by the patient  All questions were answered to the patient's satisfaction  The patient agrees to comply with a standard postoperative protocol, and is willing to proceed  Education was provided via written and auditory forms    There were no barriers to learning  Written handouts regarding wound care, incision and scar care, and general preoperative information was provided to the patient  Prior to surgery, the patient may be requested to stop all anti-inflammatory medications  Prophylactic aspirin, Plavix, and Coumadin may be allowed to be continued  Medications including vitamin E , ginkgo, and fish oil are requested to be stopped approximately one week prior to surgery  Hypertensive medications and beta blockers, if taken, should be continued  ____________________________________________________________________________________________________________________________________________      CHIEF COMPLAINT:  Chief Complaint   Patient presents with    Right Elbow - Pain       SUBJECTIVE:  Anna Cohen is a 81y o  year old RHD male who presents with right elbow pain  He was seen in the ED 11/24 for olecranon bursitis of right elbow with evidence of infection  Today he still has symptoms associated with the bursitis  His pain has not improved  He has been compliant in taking his antibiotics  He is here today to discuss a potential surgical intervention  Prior treatment   · NSAIDsNo   · Injections No   · Bracing/Orthotics No    Physical Therapy No     I have personally reviewed all the relevant PMH, PSH, SH, FH, Medications and allergies      PAST MEDICAL HISTORY:  Past Medical History:   Diagnosis Date    [facility] [address] [facility]     [facility]     Hypertension        PAST SURGICAL HISTORY:  Past Surgical History:   Procedure Laterality Date    ABLATION OF DYSRHYTHMIC FOCUS      for a-fib    CHOLECYSTECTOMY      TOTAL SHOULDER REPLACEMENT Right        FAMILY HISTORY:  History reviewed  No pertinent family history      SOCIAL HISTORY:  Social History   Substance Use Topics    Smoking status: Former Smoker    Smokeless tobacco: Never Used    Alcohol use No       MEDICATIONS:    Current Outpatient Prescriptions:     acetaminophen (TYLENOL) 325 mg tablet, Take 650 mg by mouth 2 (two) times a day, Disp: , Rfl:     apixaban (ELIQUIS) 5 mg, Take 5 mg by mouth 2 (two) times a day, Disp: , Rfl:     carvedilol (COREG) 25 mg tablet, Take 25 mg by mouth 2 (two) times a day with meals, Disp: , Rfl:     cholecalciferol (VITAMIN D3) 1,000 units tablet, Take 5,000 Units by mouth daily, Disp: , Rfl:     co-enzyme Q-10 50 MG capsule, Take 100 mg by mouth daily, Disp: , Rfl:     CYANOCOBALAMIN PO, Take 1,000 mcg by mouth  , Disp: , Rfl:     insulin aspart (NovoLOG) 100 units/mL injection, Inject under the skin 3 (three) times a day before meals Sliding scale, Disp: , Rfl:     insulin detemir (LEVEMIR) 100 units/mL subcutaneous injection, Inject 30 Units under the skin daily at bedtime  , Disp: , Rfl:     lisinopril-hydrochlorothiazide (PRINZIDE,ZESTORETIC) 20-12 5 MG per tablet, Take 2 tablets by mouth daily, Disp: , Rfl:     MAGNESIUM CARBONATE PO, Take 400 mg by mouth daily  , Disp: , Rfl:     omeprazole (PriLOSEC) 20 mg delayed release capsule, Take 20 mg by mouth daily, Disp: , Rfl:     rosuvastatin (CRESTOR) 5 mg tablet, Take 5 mg by mouth daily, Disp: , Rfl:     sertraline (ZOLOFT) 100 mg tablet, Take 50 mg by mouth daily, Disp: , Rfl:     traZODone (DESYREL) 50 mg tablet, Take 50 mg by mouth daily at bedtime, Disp: , Rfl:     zolpidem (AMBIEN CR) 12 5 MG CR tablet, Take 12 5 mg by mouth daily at bedtime as needed for sleep, Disp: , Rfl:     ALLERGIES:  Allergies   Allergen Reactions    Atorvastatin Other (See Comments)     Muscle cramps           REVIEW OF SYSTEMS:  Review of Systems   Constitutional: Negative for chills, fever and unexpected weight change  HENT: Negative for hearing loss, nosebleeds and sore throat  Eyes: Negative for pain, redness and visual disturbance  Respiratory: Negative for cough, shortness of breath and wheezing  Cardiovascular: Negative for chest pain, palpitations and leg swelling  Gastrointestinal: Negative for abdominal pain, nausea and vomiting  Endocrine: Negative for polydipsia and polyuria  Genitourinary: Negative for dysuria and hematuria  Musculoskeletal: Positive for arthralgias and myalgias  Skin: Negative for rash and wound  Neurological: Negative for dizziness, numbness and headaches  Psychiatric/Behavioral: Negative for agitation, decreased concentration and suicidal ideas  VITALS:  Vitals:    12/10/18 1127   BP: 147/74   Pulse: 61       LABS:  HgA1c: No results found for: HGBA1C  BMP:   Lab Results   Component Value Date    CALCIUM 8 8 11/27/2018    K 3 9 11/27/2018    CO2 27 11/27/2018     11/27/2018    BUN 15 11/27/2018    CREATININE 1 02 11/27/2018       _____________________________________________________  PHYSICAL EXAMINATION:  General: well developed and well nourished, alert, oriented times 3 and appears comfortable  Psychiatric: Normal  HEENT: Normocephalic, Atraumatic Trachea Midline, No torticollis  Pulmonary: No audible wheezing or respiratory distress   Cardiovascular: No pitting edema, 2+ radial pulse   Skin: olecrone bursitisi   Neurovascular: Sensation Intact to the Median, Ulnar, Radial Nerve, Motor Intact to the Median, Ulnar, Radial Nerve and Pulses Intact  Musculoskeletal: Normal, except as noted in detailed exam and in HPI        MUSCULOSKELETAL EXAMINATION:  Right Elbow :  There is swelling about the elbow localizing to the olecranon bursa   There is no ecchymosis and positive erythema   The ROM is 0 extension, 140 flexion, 80 supination, 80pronation   There is no  varus /no valgus instability       ___________________________________________________  STUDIES REVIEWED:  I have personally reviewed AP lateral and oblique radiographs of the right elbow demonstrates no acute fracture dislocation no large traction osteophyte noted          PROCEDURES PERFORMED:  Procedures  No Procedures performed today    _____________________________________________________      Alis Carey    I,:   Franchesca Romo MA am acting as a scribe while in the presence of the attending physician :        I,:   Sarina Lunsford MD personally performed the services described in this documentation    as scribed in my presence :                    Attestation signed by Sarina Lunsford MD at 12/10/2018 12:07 PM:  I saw and examined the patient personally and agree with my physician extender's note and plan   I formulated  the plan and gave  explicit instructions to the patient

## 2018-12-10 NOTE — PROGRESS NOTES
Caro TAPIA  Attending, Orthopaedic Surgery  Hand, Wrist, and Elbow Surgery  [facility]      ORTHOPAEDIC HAND, WRIST, AND ELBOW OFFICE  VISIT       ASSESSMENT/PLAN:      Right olecranon bursitis     Surgical intervention was discussed with the patient and his wife today  They agree to proceed with surgery  The patient will need clearance from his PCP and cardiologist prior to surgery  A discussion was had about the blood thinners the patient is on and how that could effect surgery  Risks and benefits were discussed and all questions were answered  Would prefer if patient was off of Eliquis for 3 days prior to surgery    The patient verbalized understanding of exam findings and treatment plan  We engaged in the shared decision-making process and treatment options were discussed at length with the patient  Surgical and conservative management discussed today along with risks and benefits  Diagnoses and all orders for this visit:    Olecranon bursitis of right elbow    Pain in right elbow  -     XR elbow 3+ vw right; Future        Follow Up:  Return for after surgery   To Do Next Visit:  Re-evaluation of current issue and Sutures out      General Discussions:      Operative Discussions:  Standard Consent: The risks and benefits of the procedure were explained to the patient, which include, but are not limited to: Bleeding, infection, recurrence, pain, scar, damage to tendons, damage to nerves, and damage to blood vessels, failure to give desired results and complications related to anesthesia  These risks, along with alternative conservative treatment options, and postoperative protocols were voiced back and understood by the patient  All questions were answered to the patient's satisfaction  The patient agrees to comply with a standard postoperative protocol, and is willing to proceed  Education was provided via written and auditory forms    There were no barriers to learning  Written handouts regarding wound care, incision and scar care, and general preoperative information was provided to the patient  Prior to surgery, the patient may be requested to stop all anti-inflammatory medications  Prophylactic aspirin, Plavix, and Coumadin may be allowed to be continued  Medications including vitamin E , ginkgo, and fish oil are requested to be stopped approximately one week prior to surgery  Hypertensive medications and beta blockers, if taken, should be continued  ____________________________________________________________________________________________________________________________________________      CHIEF COMPLAINT:  Chief Complaint   Patient presents with    Right Elbow - Pain       SUBJECTIVE:  Rosendo Rhodes is a 81y o  year old RHD male who presents with right elbow pain  He was seen in the ED 11/24 for olecranon bursitis of right elbow with evidence of infection  Today he still has symptoms associated with the bursitis  His pain has not improved  He has been compliant in taking his antibiotics  He is here today to discuss a potential surgical intervention  Prior treatment   · NSAIDsNo   · Injections No   · Bracing/Orthotics No    Physical Therapy No     I have personally reviewed all the relevant PMH, PSH, SH, FH, Medications and allergies      PAST MEDICAL HISTORY:  Past Medical History:   Diagnosis Date    [facility] fibrillation ([facility] [address] [facility]     Diabetes ([facility] [address] [facility]     Hypertension        PAST SURGICAL HISTORY:  Past Surgical History:   Procedure Laterality Date    ABLATION OF DYSRHYTHMIC FOCUS      for a-fib    CHOLECYSTECTOMY      TOTAL SHOULDER REPLACEMENT Right        FAMILY HISTORY:  History reviewed  No pertinent family history      SOCIAL HISTORY:  Social History   Substance Use Topics    Smoking status: Former Smoker    Smokeless tobacco: Never Used    Alcohol use No       MEDICATIONS:    Current Outpatient Prescriptions:     acetaminophen (TYLENOL) 325 mg tablet, Take 650 mg by mouth 2 (two) times a day, Disp: , Rfl:     apixaban (ELIQUIS) 5 mg, Take 5 mg by mouth 2 (two) times a day, Disp: , Rfl:     carvedilol (COREG) 25 mg tablet, Take 25 mg by mouth 2 (two) times a day with meals, Disp: , Rfl:     cholecalciferol (VITAMIN D3) 1,000 units tablet, Take 5,000 Units by mouth daily, Disp: , Rfl:     co-enzyme Q-10 50 MG capsule, Take 100 mg by mouth daily, Disp: , Rfl:     CYANOCOBALAMIN PO, Take 1,000 mcg by mouth  , Disp: , Rfl:     insulin aspart (NovoLOG) 100 units/mL injection, Inject under the skin 3 (three) times a day before meals Sliding scale, Disp: , Rfl:     insulin detemir (LEVEMIR) 100 units/mL subcutaneous injection, Inject 30 Units under the skin daily at bedtime  , Disp: , Rfl:     lisinopril-hydrochlorothiazide (PRINZIDE,ZESTORETIC) 20-12 5 MG per tablet, Take 2 tablets by mouth daily, Disp: , Rfl:     MAGNESIUM CARBONATE PO, Take 400 mg by mouth daily  , Disp: , Rfl:     omeprazole (PriLOSEC) 20 mg delayed release capsule, Take 20 mg by mouth daily, Disp: , Rfl:     rosuvastatin (CRESTOR) 5 mg tablet, Take 5 mg by mouth daily, Disp: , Rfl:     sertraline (ZOLOFT) 100 mg tablet, Take 50 mg by mouth daily, Disp: , Rfl:     traZODone (DESYREL) 50 mg tablet, Take 50 mg by mouth daily at bedtime, Disp: , Rfl:     zolpidem (AMBIEN CR) 12 5 MG CR tablet, Take 12 5 mg by mouth daily at bedtime as needed for sleep, Disp: , Rfl:     ALLERGIES:  Allergies   Allergen Reactions    Atorvastatin Other (See Comments)     Muscle cramps           REVIEW OF SYSTEMS:  Review of Systems   Constitutional: Negative for chills, fever and unexpected weight change  HENT: Negative for hearing loss, nosebleeds and sore throat  Eyes: Negative for pain, redness and visual disturbance  Respiratory: Negative for cough, shortness of breath and wheezing  Cardiovascular: Negative for chest pain, palpitations and leg swelling  Gastrointestinal: Negative for abdominal pain, nausea and vomiting  Endocrine: Negative for polydipsia and polyuria  Genitourinary: Negative for dysuria and hematuria  Musculoskeletal: Positive for arthralgias and myalgias  Skin: Negative for rash and wound  Neurological: Negative for dizziness, numbness and headaches  Psychiatric/Behavioral: Negative for agitation, decreased concentration and suicidal ideas  VITALS:  Vitals:    12/10/18 1127   BP: 147/74   Pulse: 61       LABS:  HgA1c: No results found for: HGBA1C  BMP:   Lab Results   Component Value Date    CALCIUM 8 8 11/27/2018    K 3 9 11/27/2018    CO2 27 11/27/2018     11/27/2018    BUN 15 11/27/2018    CREATININE 1 02 11/27/2018       _____________________________________________________  PHYSICAL EXAMINATION:  General: well developed and well nourished, alert, oriented times 3 and appears comfortable  Psychiatric: Normal  HEENT: Normocephalic, Atraumatic Trachea Midline, No torticollis  Pulmonary: No audible wheezing or respiratory distress   Cardiovascular: No pitting edema, 2+ radial pulse   Skin: olecrone bursitisi   Neurovascular: Sensation Intact to the Median, Ulnar, Radial Nerve, Motor Intact to the Median, Ulnar, Radial Nerve and Pulses Intact  Musculoskeletal: Normal, except as noted in detailed exam and in HPI        MUSCULOSKELETAL EXAMINATION:  Right Elbow :  There is swelling about the elbow localizing to the olecranon bursa   There is no ecchymosis and positive erythema   The ROM is 0 extension, 140 flexion, 80 supination, 80pronation   There is no  varus /no valgus instability       ___________________________________________________  STUDIES REVIEWED:  I have personally reviewed AP lateral and oblique radiographs of the right elbow demonstrates no acute fracture dislocation no large traction osteophyte noted          PROCEDURES PERFORMED:  Procedures  No Procedures performed today    _____________________________________________________      Rao Doran    I,:   Jenelle Carrillo MA am acting as a scribe while in the presence of the attending physician :        I,:   Berta Quan MD personally performed the services described in this documentation    as scribed in my presence :

## 2019-01-09 ENCOUNTER — APPOINTMENT (INPATIENT)
Dept: RADIOLOGY | Facility: HOSPITAL | Age: 74
DRG: 872 | End: 2019-01-09
Payer: MEDICARE

## 2019-01-09 ENCOUNTER — HOSPITAL ENCOUNTER (INPATIENT)
Facility: HOSPITAL | Age: 74
LOS: 8 days | Discharge: HOME HEALTH | DRG: 872 | End: 2019-01-17
Attending: EMERGENCY MEDICINE | Admitting: INTERNAL MEDICINE
Payer: MEDICARE

## 2019-01-09 DIAGNOSIS — R78.81 BACTEREMIA: ICD-10-CM

## 2019-01-09 DIAGNOSIS — R26.2 AMBULATORY DYSFUNCTION: ICD-10-CM

## 2019-01-09 DIAGNOSIS — M48.00 SPINAL STENOSIS: ICD-10-CM

## 2019-01-09 DIAGNOSIS — I10 ESSENTIAL HYPERTENSION: ICD-10-CM

## 2019-01-09 DIAGNOSIS — R53.1 GENERALIZED WEAKNESS: Primary | ICD-10-CM

## 2019-01-09 DIAGNOSIS — A41.9 SEPSIS, DUE TO UNSPECIFIED ORGANISM: ICD-10-CM

## 2019-01-09 PROBLEM — R50.9 FEVER: Status: ACTIVE | Noted: 2019-01-09

## 2019-01-09 LAB
ALBUMIN SERPL BCP-MCNC: 3 G/DL (ref 3.5–5)
ALP SERPL-CCNC: 102 U/L (ref 46–116)
ALT SERPL W P-5'-P-CCNC: 56 U/L (ref 12–78)
ANION GAP SERPL CALCULATED.3IONS-SCNC: 9 MMOL/L (ref 4–13)
AST SERPL W P-5'-P-CCNC: 49 U/L (ref 5–45)
ATRIAL RATE: 66 BPM
BASOPHILS # BLD MANUAL: 0 THOUSAND/UL (ref 0–0.1)
BASOPHILS NFR MAR MANUAL: 0 % (ref 0–1)
BILIRUB SERPL-MCNC: 0.4 MG/DL (ref 0.2–1)
BUN SERPL-MCNC: 26 MG/DL (ref 5–25)
CALCIUM SERPL-MCNC: 9.1 MG/DL (ref 8.3–10.1)
CHLORIDE SERPL-SCNC: 101 MMOL/L (ref 100–108)
CK MB SERPL-MCNC: 1.6 % (ref 0–2.5)
CK MB SERPL-MCNC: 1.7 % (ref 0–2.5)
CK MB SERPL-MCNC: 8.4 NG/ML (ref 0–5)
CK MB SERPL-MCNC: 8.9 NG/ML (ref 0–5)
CK SERPL-CCNC: 524 U/L (ref 39–308)
CK SERPL-CCNC: 530 U/L (ref 39–308)
CO2 SERPL-SCNC: 28 MMOL/L (ref 21–32)
CREAT SERPL-MCNC: 1.19 MG/DL (ref 0.6–1.3)
CRP SERPL QL: >90 MG/L
CRP SERPL QL: >90 MG/L
EOSINOPHIL # BLD MANUAL: 0 THOUSAND/UL (ref 0–0.4)
EOSINOPHIL NFR BLD MANUAL: 0 % (ref 0–6)
ERYTHROCYTE [DISTWIDTH] IN BLOOD BY AUTOMATED COUNT: 16.6 % (ref 11.6–15.1)
ERYTHROCYTE [SEDIMENTATION RATE] IN BLOOD: 32 MM/HOUR (ref 0–10)
GFR SERPL CREATININE-BSD FRML MDRD: 61 ML/MIN/1.73SQ M
GLUCOSE SERPL-MCNC: 138 MG/DL (ref 65–140)
GLUCOSE SERPL-MCNC: 98 MG/DL (ref 65–140)
HCT VFR BLD AUTO: 36.7 % (ref 36.5–49.3)
HGB BLD-MCNC: 11 G/DL (ref 12–17)
HOLD SPECIMEN: NORMAL
LACTATE SERPL-SCNC: 1.4 MMOL/L (ref 0.5–2)
LACTATE SERPL-SCNC: 2.1 MMOL/L (ref 0.5–2)
LYMPHOCYTES # BLD AUTO: 0.66 THOUSAND/UL (ref 0.6–4.47)
LYMPHOCYTES # BLD AUTO: 9 % (ref 14–44)
MCH RBC QN AUTO: 25.1 PG (ref 26.8–34.3)
MCHC RBC AUTO-ENTMCNC: 30 G/DL (ref 31.4–37.4)
MCV RBC AUTO: 84 FL (ref 82–98)
MONOCYTES # BLD AUTO: 0.88 THOUSAND/UL (ref 0–1.22)
MONOCYTES NFR BLD: 12 % (ref 4–12)
NEUTROPHILS # BLD MANUAL: 5.79 THOUSAND/UL (ref 1.85–7.62)
NEUTS SEG NFR BLD AUTO: 79 % (ref 43–75)
NRBC BLD AUTO-RTO: 0 /100 WBCS
OVALOCYTES BLD QL SMEAR: PRESENT
P AXIS: 77 DEGREES
PLATELET # BLD AUTO: 256 THOUSANDS/UL (ref 149–390)
PLATELET BLD QL SMEAR: ADEQUATE
PMV BLD AUTO: 10.4 FL (ref 8.9–12.7)
POTASSIUM SERPL-SCNC: 3.9 MMOL/L (ref 3.5–5.3)
PR INTERVAL: 198 MS
PROT SERPL-MCNC: 7.3 G/DL (ref 6.4–8.2)
QRS AXIS: -84 DEGREES
QRSD INTERVAL: 164 MS
QT INTERVAL: 446 MS
QTC INTERVAL: 467 MS
RBC # BLD AUTO: 4.39 MILLION/UL (ref 3.88–5.62)
SODIUM SERPL-SCNC: 138 MMOL/L (ref 136–145)
T WAVE AXIS: 68 DEGREES
TOTAL CELLS COUNTED SPEC: 100
TSH SERPL DL<=0.05 MIU/L-ACNC: 1.86 UIU/ML (ref 0.36–3.74)
VENTRICULAR RATE: 66 BPM
WBC # BLD AUTO: 7.33 THOUSAND/UL (ref 4.31–10.16)

## 2019-01-09 PROCEDURE — 85652 RBC SED RATE AUTOMATED: CPT | Performed by: INTERNAL MEDICINE

## 2019-01-09 PROCEDURE — 84145 PROCALCITONIN (PCT): CPT | Performed by: INTERNAL MEDICINE

## 2019-01-09 PROCEDURE — 86140 C-REACTIVE PROTEIN: CPT | Performed by: INTERNAL MEDICINE

## 2019-01-09 PROCEDURE — 85007 BL SMEAR W/DIFF WBC COUNT: CPT | Performed by: EMERGENCY MEDICINE

## 2019-01-09 PROCEDURE — 87040 BLOOD CULTURE FOR BACTERIA: CPT | Performed by: INTERNAL MEDICINE

## 2019-01-09 PROCEDURE — 71045 X-RAY EXAM CHEST 1 VIEW: CPT

## 2019-01-09 PROCEDURE — 82553 CREATINE MB FRACTION: CPT | Performed by: INTERNAL MEDICINE

## 2019-01-09 PROCEDURE — 87181 SC STD AGAR DILUTION PER AGT: CPT | Performed by: INTERNAL MEDICINE

## 2019-01-09 PROCEDURE — 36415 COLL VENOUS BLD VENIPUNCTURE: CPT | Performed by: EMERGENCY MEDICINE

## 2019-01-09 PROCEDURE — 96360 HYDRATION IV INFUSION INIT: CPT

## 2019-01-09 PROCEDURE — 80053 COMPREHEN METABOLIC PANEL: CPT | Performed by: EMERGENCY MEDICINE

## 2019-01-09 PROCEDURE — 1123F ACP DISCUSS/DSCN MKR DOCD: CPT | Performed by: INTERNAL MEDICINE

## 2019-01-09 PROCEDURE — 93005 ELECTROCARDIOGRAM TRACING: CPT

## 2019-01-09 PROCEDURE — 87631 RESP VIRUS 3-5 TARGETS: CPT | Performed by: INTERNAL MEDICINE

## 2019-01-09 PROCEDURE — 84443 ASSAY THYROID STIM HORMONE: CPT | Performed by: INTERNAL MEDICINE

## 2019-01-09 PROCEDURE — 99223 1ST HOSP IP/OBS HIGH 75: CPT | Performed by: INTERNAL MEDICINE

## 2019-01-09 PROCEDURE — 93010 ELECTROCARDIOGRAM REPORT: CPT | Performed by: INTERNAL MEDICINE

## 2019-01-09 PROCEDURE — 87077 CULTURE AEROBIC IDENTIFY: CPT | Performed by: INTERNAL MEDICINE

## 2019-01-09 PROCEDURE — 82948 REAGENT STRIP/BLOOD GLUCOSE: CPT

## 2019-01-09 PROCEDURE — 83605 ASSAY OF LACTIC ACID: CPT | Performed by: INTERNAL MEDICINE

## 2019-01-09 PROCEDURE — 85027 COMPLETE CBC AUTOMATED: CPT | Performed by: EMERGENCY MEDICINE

## 2019-01-09 PROCEDURE — 99285 EMERGENCY DEPT VISIT HI MDM: CPT

## 2019-01-09 PROCEDURE — 82550 ASSAY OF CK (CPK): CPT | Performed by: INTERNAL MEDICINE

## 2019-01-09 RX ORDER — PANTOPRAZOLE SODIUM 40 MG/1
40 TABLET, DELAYED RELEASE ORAL
Status: DISCONTINUED | OUTPATIENT
Start: 2019-01-10 | End: 2019-01-17 | Stop reason: HOSPADM

## 2019-01-09 RX ORDER — ONDANSETRON 2 MG/ML
4 INJECTION INTRAMUSCULAR; INTRAVENOUS EVERY 6 HOURS PRN
Status: DISCONTINUED | OUTPATIENT
Start: 2019-01-09 | End: 2019-01-17 | Stop reason: HOSPADM

## 2019-01-09 RX ORDER — ACETAMINOPHEN 325 MG/1
650 TABLET ORAL ONCE
Status: COMPLETED | OUTPATIENT
Start: 2019-01-09 | End: 2019-01-09

## 2019-01-09 RX ORDER — CARVEDILOL 12.5 MG/1
25 TABLET ORAL ONCE
Status: COMPLETED | OUTPATIENT
Start: 2019-01-09 | End: 2019-01-09

## 2019-01-09 RX ORDER — SODIUM CHLORIDE 9 MG/ML
125 INJECTION, SOLUTION INTRAVENOUS CONTINUOUS
Status: DISCONTINUED | OUTPATIENT
Start: 2019-01-09 | End: 2019-01-12

## 2019-01-09 RX ORDER — SERTRALINE HYDROCHLORIDE 100 MG/1
100 TABLET, FILM COATED ORAL DAILY
Status: DISCONTINUED | OUTPATIENT
Start: 2019-01-10 | End: 2019-01-17 | Stop reason: HOSPADM

## 2019-01-09 RX ORDER — IBUPROFEN 400 MG/1
400 TABLET ORAL EVERY 6 HOURS PRN
Status: DISCONTINUED | OUTPATIENT
Start: 2019-01-09 | End: 2019-01-17 | Stop reason: HOSPADM

## 2019-01-09 RX ORDER — TRAZODONE HYDROCHLORIDE 50 MG/1
50 TABLET ORAL
Status: DISCONTINUED | OUTPATIENT
Start: 2019-01-09 | End: 2019-01-10

## 2019-01-09 RX ORDER — GABAPENTIN 300 MG/1
300 CAPSULE ORAL 3 TIMES DAILY
Status: DISCONTINUED | OUTPATIENT
Start: 2019-01-10 | End: 2019-01-10

## 2019-01-09 RX ORDER — CHOLECALCIFEROL (VITAMIN D3) 125 MCG
1000 CAPSULE ORAL DAILY
Status: DISCONTINUED | OUTPATIENT
Start: 2019-01-10 | End: 2019-01-17 | Stop reason: HOSPADM

## 2019-01-09 RX ORDER — ACETAMINOPHEN 325 MG/1
650 TABLET ORAL EVERY 6 HOURS PRN
Status: DISCONTINUED | OUTPATIENT
Start: 2019-01-09 | End: 2019-01-17 | Stop reason: HOSPADM

## 2019-01-09 RX ORDER — LORAZEPAM 2 MG/ML
0.5 INJECTION INTRAMUSCULAR ONCE
Status: COMPLETED | OUTPATIENT
Start: 2019-01-09 | End: 2019-01-09

## 2019-01-09 RX ORDER — GABAPENTIN 300 MG/1
300 CAPSULE ORAL 3 TIMES DAILY
COMMUNITY
End: 2019-07-13 | Stop reason: HOSPADM

## 2019-01-09 RX ORDER — ACETAMINOPHEN 325 MG/1
650 TABLET ORAL 2 TIMES DAILY
Status: DISCONTINUED | OUTPATIENT
Start: 2019-01-09 | End: 2019-01-17 | Stop reason: HOSPADM

## 2019-01-09 RX ORDER — ZOLPIDEM TARTRATE 5 MG/1
10 TABLET ORAL
Status: DISCONTINUED | OUTPATIENT
Start: 2019-01-09 | End: 2019-01-17 | Stop reason: HOSPADM

## 2019-01-09 RX ORDER — CHOLECALCIFEROL (VITAMIN D3) 125 MCG
100 CAPSULE ORAL DAILY
Status: DISCONTINUED | OUTPATIENT
Start: 2019-01-10 | End: 2019-01-17 | Stop reason: HOSPADM

## 2019-01-09 RX ORDER — GABAPENTIN 300 MG/1
300 CAPSULE ORAL ONCE
Status: COMPLETED | OUTPATIENT
Start: 2019-01-09 | End: 2019-01-09

## 2019-01-09 RX ORDER — ACETAMINOPHEN 325 MG/1
650 TABLET ORAL EVERY 6 HOURS PRN
Status: DISCONTINUED | OUTPATIENT
Start: 2019-01-09 | End: 2019-01-09

## 2019-01-09 RX ORDER — CARVEDILOL 12.5 MG/1
25 TABLET ORAL 2 TIMES DAILY WITH MEALS
Status: DISCONTINUED | OUTPATIENT
Start: 2019-01-10 | End: 2019-01-17 | Stop reason: HOSPADM

## 2019-01-09 RX ORDER — MELATONIN
5000 DAILY
Status: DISCONTINUED | OUTPATIENT
Start: 2019-01-10 | End: 2019-01-17 | Stop reason: HOSPADM

## 2019-01-09 RX ORDER — TRAZODONE HYDROCHLORIDE 50 MG/1
50 TABLET ORAL ONCE
Status: COMPLETED | OUTPATIENT
Start: 2019-01-09 | End: 2019-01-09

## 2019-01-09 RX ADMIN — VANCOMYCIN HYDROCHLORIDE 1500 MG: 1 INJECTION, POWDER, LYOPHILIZED, FOR SOLUTION INTRAVENOUS at 22:35

## 2019-01-09 RX ADMIN — CEFEPIME HYDROCHLORIDE 2000 MG: 2 INJECTION, POWDER, FOR SOLUTION INTRAVENOUS at 21:15

## 2019-01-09 RX ADMIN — IBUPROFEN 400 MG: 400 TABLET ORAL at 22:35

## 2019-01-09 RX ADMIN — SODIUM CHLORIDE 125 ML/HR: 0.9 INJECTION, SOLUTION INTRAVENOUS at 21:14

## 2019-01-09 RX ADMIN — ACETAMINOPHEN 650 MG: 325 TABLET, FILM COATED ORAL at 20:22

## 2019-01-09 RX ADMIN — SODIUM CHLORIDE 250 ML: 0.9 INJECTION, SOLUTION INTRAVENOUS at 15:18

## 2019-01-09 RX ADMIN — ACETAMINOPHEN 650 MG: 325 TABLET, FILM COATED ORAL at 21:21

## 2019-01-09 RX ADMIN — GABAPENTIN 300 MG: 300 CAPSULE ORAL at 20:28

## 2019-01-09 RX ADMIN — TRAZODONE HYDROCHLORIDE 50 MG: 50 TABLET ORAL at 20:28

## 2019-01-09 RX ADMIN — LORAZEPAM 0.5 MG: 2 INJECTION INTRAMUSCULAR; INTRAVENOUS at 20:23

## 2019-01-09 RX ADMIN — CARVEDILOL 25 MG: 12.5 TABLET, FILM COATED ORAL at 20:30

## 2019-01-09 NOTE — ED NOTES
Patient requesting meal at this time-okay to order diet per MD [facility], RN  01/09/19 0903

## 2019-01-09 NOTE — ED PROVIDER NOTES
History  Chief Complaint   Patient presents with    Weakness - Generalized     Pt states that he has been feeling fatigued and unable to stand on his own to transfer to his wheelchair  Pt states that he normally is wheelchair bound but now needs assistance to stand up  72 YR  MALE - WITH SEVERAL CHRONIC MEDICAL CONDITIONS--  MINIMALLY AMBULATORY AT BASELINE- USUALLY IS KEN TO STAND AND TRANSFER- SINCE YESTERDAY - HAS BEEN UNABLE TO STAND  AT ALL- WITH GENERALIZED WEAKNESS-- NO OTHER COM PS-- NO FEVERS/ URI/COUGH- NO CP/SOB/ NO ABD PAIN - N/V/D- NO MELENA/BRBPR- NOP  COMPS- NO RASH SKIN BREAKDOWN        History provided by:  Patient and relative   used: No        Prior to Admission Medications   Prescriptions Last Dose Informant Patient Reported? Taking?    CYANOCOBALAMIN PO   Yes Yes   Sig: Take 1,000 mcg by mouth     MAGNESIUM CARBONATE PO   Yes Yes   Sig: Take 400 mg by mouth daily     acetaminophen (TYLENOL) 325 mg tablet   Yes Yes   Sig: Take 650 mg by mouth 2 (two) times a day   apixaban (ELIQUIS) 5 mg   Yes Yes   Sig: Take 5 mg by mouth 2 (two) times a day   carvedilol (COREG) 25 mg tablet   Yes Yes   Sig: Take 25 mg by mouth 2 (two) times a day with meals   cholecalciferol (VITAMIN D3) 1,000 units tablet   Yes Yes   Sig: Take 5,000 Units by mouth daily   co-enzyme Q-10 50 MG capsule   Yes Yes   Sig: Take 100 mg by mouth daily   gabapentin (NEURONTIN) 300 mg capsule   Yes Yes   Sig: Take 100 mg by mouth 3 (three) times a day   insulin aspart (NovoLOG) 100 units/mL injection   Yes Yes   Sig: Inject under the skin 3 (three) times a day before meals Sliding scale   insulin detemir (LEVEMIR) 100 units/mL subcutaneous injection   Yes Yes   Sig: Inject 30 Units under the skin daily at bedtime     lisinopril-hydrochlorothiazide (PRINZIDE,ZESTORETIC) 20-12 5 MG per tablet   Yes Yes   Sig: Take 2 tablets by mouth daily   omeprazole (PriLOSEC) 20 mg delayed release capsule   Yes Yes   Sig: Take 20 mg by mouth daily   rosuvastatin (CRESTOR) 5 mg tablet   Yes Yes   Sig: Take 5 mg by mouth daily   sertraline (ZOLOFT) 100 mg tablet   Yes Yes   Sig: Take 50 mg by mouth daily   traZODone (DESYREL) 50 mg tablet   Yes Yes   Sig: Take 50 mg by mouth daily at bedtime   zolpidem (AMBIEN CR) 12 5 MG CR tablet   Yes Yes   Sig: Take 12 5 mg by mouth daily at bedtime as needed for sleep      Facility-Administered Medications: None       Past Medical History:   Diagnosis Date    [facility]     [facility]     H/O agent Orange exposure     Hypertension        Past Surgical History:   Procedure Laterality Date    ABLATION OF DYSRHYTHMIC FOCUS      for a-fib    CHOLECYSTECTOMY      TOTAL SHOULDER REPLACEMENT Right        History reviewed  No pertinent family history  I have reviewed and agree with the history as documented  Social History   Substance Use Topics    Smoking status: Former Smoker     Quit date: 1/1/1987    Smokeless tobacco: Never Used    Alcohol use No        Review of Systems   Constitutional: Positive for activity change  Negative for appetite change, chills, diaphoresis, fatigue, fever and unexpected weight change  HENT: Negative  Eyes: Negative  Respiratory: Negative  Cardiovascular: Negative  Gastrointestinal: Negative  Endocrine: Negative  Genitourinary: Negative  Musculoskeletal: Negative  Skin: Negative  Allergic/Immunologic: Negative  Neurological: Negative  Hematological: Negative  Psychiatric/Behavioral: Negative  Physical Exam  Physical Exam   Constitutional: He is oriented to person, place, and time  No distress  AVSS--  PULSE OX 99 % ON RA- INTERPRETATION IS NORMAL- NO INTERVENTION - IN NAD- CHRONICALLY ILL APPEARING   HENT:   Head: Normocephalic and atraumatic  Eyes: Pupils are equal, round, and reactive to light  Conjunctivae and EOM are normal  Right eye exhibits no discharge  Left eye exhibits no discharge  No scleral icterus  MM PINK   Neck: Normal range of motion  Neck supple  No JVD present  No tracheal deviation present  No thyromegaly present  NO PMT C/T/LS / SPINE   Cardiovascular: Normal rate, regular rhythm, normal heart sounds and intact distal pulses  Exam reveals no gallop and no friction rub  No murmur heard  Pulmonary/Chest: Effort normal and breath sounds normal  No stridor  No respiratory distress  He has no wheezes  He has no rales  He exhibits no tenderness  Abdominal: Soft  Bowel sounds are normal  He exhibits no distension and no mass  There is no tenderness  There is no rebound and no guarding  No hernia  SOFT- NT- NO PERITONEAL SIGNS- NO CVA TENDERNESS- NO PULSATILE ABD MASS/BRUIT   Musculoskeletal: He exhibits no tenderness or deformity  EQUAL BILATERAL RADIAL/DP PULSES- 1 PLSU BEL PRETIBIAL EDEMA- NT- NO ASSYM/ ERYTHEMA   Lymphadenopathy:     He has no cervical adenopathy  Neurological: He is alert and oriented to person, place, and time  A sensory deficit is present  No cranial nerve deficit  PT UNABLE TO RAISE BOTH ARMS ABOVE SHOULDERS- OLD- PT WITH 3/5 BEL STRENGHT PROX/DISTALLY -- DECREASED SENSATION FROM TOES TO MID THIGH BILATERALLY -- OLD- FROM NEUROPATHY   Skin: Capillary refill takes less than 2 seconds  No rash noted  He is not diaphoretic  No erythema  There is pallor  Psychiatric:   SAD AFFECT   Nursing note and vitals reviewed        Vital Signs  ED Triage Vitals   Temperature Pulse Respirations Blood Pressure SpO2   01/09/19 1310 01/09/19 1310 01/09/19 1310 01/09/19 1310 01/09/19 1310   97 5 °F (36 4 °C) 66 16 125/55 100 %      Temp Source Heart Rate Source Patient Position - Orthostatic VS BP Location FiO2 (%)   01/09/19 1310 01/09/19 1310 01/09/19 1522 01/09/19 1522 --   Oral Monitor Lying Left arm       Pain Score       01/09/19 1522       No Pain           Vitals:    01/09/19 1310 01/09/19 1522   BP: 125/55 140/67   Pulse: 66 67   Patient Position - Orthostatic VS:  Lying       Visual Acuity      ED Medications  Medications   sodium chloride 0 9 % bolus 250 mL (250 mL Intravenous New Bag 1/9/19 1518)       Diagnostic Studies  Results Reviewed     Procedure Component Value Units Date/Time    CBC and differential [[id number]]  (Abnormal) Collected:  01/09/19 1516    Lab Status:  Final result Specimen:  Blood from Hand, Right Updated:  01/09/19 1607     WBC 7 33 Thousand/uL      RBC 4 39 Million/uL      Hemoglobin 11 0 (L) g/dL      Hematocrit 36 7 %      MCV 84 fL      MCH 25 1 (L) pg      MCHC 30 0 (L) g/dL      RDW 16 6 (H) %      MPV 10 4 fL      Platelets 900 Thousands/uL      nRBC 0 /100 WBCs     Narrative: This is an appended report  These results have been appended to a previously verified report  Comprehensive metabolic panel [[id number]]  (Abnormal) Collected:  01/09/19 1516    Lab Status:  Final result Specimen:  Blood from Hand, Right Updated:  01/09/19 1545     Sodium 138 mmol/L      Potassium 3 9 mmol/L      Chloride 101 mmol/L      CO2 28 mmol/L      ANION GAP 9 mmol/L      BUN 26 (H) mg/dL      Creatinine 1 19 mg/dL      Glucose 98 mg/dL      Calcium 9 1 mg/dL      AST 49 (H) U/L      ALT 56 U/L      Alkaline Phosphatase 102 U/L      Total Protein 7 3 g/dL      Albumin 3 0 (L) g/dL      Total Bilirubin 0 40 mg/dL      eGFR 61 ml/min/1 73sq m     Narrative:         National Kidney Disease Education Program recommendations are as follows:  GFR calculation is accurate only with a steady state creatinine  Chronic Kidney disease less than 60 ml/min/1 73 sq  meters  Kidney failure less than 15 ml/min/1 73 sq  meters  Critz draw [[id number]] Collected:  01/09/19 1516    Lab Status: In process Specimen:  Blood Updated:  01/09/19 1525    Narrative: The following orders were created for panel order Critz draw    Procedure                               Abnormality         Status                     --------- -----------         ------                     Buffy León Top on SRYR[[id number]]                           In process                 Green / Black tube on WMPY[[id number]]                       In process                   Please view results for these tests on the individual orders  No orders to display              Procedures  Procedures       Phone Contacts  ED Phone Contact    ED Course  ED Course as of Jan 09 1638 Wed Jan 09, 2019   1614 - ER MD NOTE- RECENT LABS REVIEWED BY ER MD -- DISPOSITION D/W PT AND DAUGHTER WHO IS NOW PRESENT--- PT WIFE HAS PARKINSONS// ISSUES AT HOME WITH CARE-- THEY HAVE SOMEONE [facility] FOR 10 HRS A WEEK -- BUT THEY ARE GOING TO LOSE HER SERVICES-- PT OFFERED HOPS ADMIT FOR POSSIBLE REHAB PLACEMENT-- PT WANTS TO TALK ABOUT THIS WITH DAUGHTER -- PT IS VERY EMOTIONAL    1618 - ER MD NOTE- AFTER DISCUSSION WITH DAUGHTER - PT FEELS THAT HE CAN NOT GO HOME  WITH PRESENT DECREASED MOBILITY -- WILL TIGER TEXT SLIM FOR ADMIT    1635 ER MD NOTE-  CASE D/W--  TIRSE- CASE D/W WITH HER- WHO LOOKED UP PT'S INFORMATION- PT WILL NOT BE ABLE TO BE PLACED FROM ER- WILL NEED 3 DAY ADMIT-  EVEN THOUGH PT WAS ADMITTED 11/24/18- SLIM PAGED                                MDM  CritCare Time    Disposition  Final diagnoses:   None     ED Disposition     None      Follow-up Information    None         Patient's Medications   Discharge Prescriptions    No medications on file     No discharge procedures on file      ED Provider  Electronically Signed by           Lito Ortiz MD  01/09/19 8693

## 2019-01-09 NOTE — ED PROCEDURE NOTE
PROCEDURE  ECG 12 Lead Documentation  Date/Time: 1/9/2019 3:29 PM  Performed by: Maeve Cisneros  Authorized by: Maeve Cisneros     Indications / Diagnosis:  WEAKNESS  ECG reviewed by me, the ED Provider: yes    Patient location:  ED and bedside  Previous ECG:     Previous ECG:  Compared to current    Comparison ECG info:  11/24/18    Similarity:  No change    Comparison to cardiac monitor: Yes    Interpretation:     Interpretation: non-specific    Rate:     ECG rate:  66    ECG rate assessment: normal    Rhythm:     Rhythm: sinus rhythm    Ectopy:     Ectopy: none    QRS:     QRS axis:  Left    QRS intervals:   Wide  Conduction:     Conduction: abnormal      Abnormal conduction: complete RBBB    ST segments:     ST segments:  Normal  T waves:     T waves: flattening      Flattening:  AVL, V1 and V2  Other findings:     Other findings: U wave    Comments:      NO ECG SIGNS OF ISCHEMIA/ INJURY         Jimbo Pino MD  01/09/19 3313

## 2019-01-09 NOTE — H&P
History and Physical - [address] [place] [address] Internal Medicine    Patient Information: Milady Bermudez 72 y o  male MRN: [id number]  Unit/Bed#: ED 25 Encounter: [id number]  Admitting Physician: Niko Milan DO  PCP: Cuco Barragan MD  Date of Admission:  01/09/19    Assessment/Plan:    Hospital Problem List:     Principal Problem:    Fever  Active Problems:    Hypertension    Atrial fibrillation ([facility] )    Type 2 diabetes mellitus ([facility] )    Generalized weakness      Plan for the Primary Problem(s):  · Fever -   · On examination of patient today, he does have rigors  A rectal temperature reveals a temperature over 102  · Antibiotics - vancomycin and cefepime initially  · Antipyretic - Tylenol will be given  · Evaluation -   · We will obtain blood cultures x2   · Check urinalysis with reflex to culture  · Check influenza PCR  · Get a chest x-ray and procalcitonin level  · Get ESR and CRP  Check a lactic acid level  · Given the increasing weakness and pain in the back will check MRI of the cervical and lumbar spines  · Infectious Disease consultation if source of infection remains elusive  · Ambulatory Dysfunction -   · PT / OT evaluation  · MRI Cervical and Lumbar Spine  · Consider neurosurgery vs  Orthopedic evaluation  Plan for Additional Problems:   · Diabetes Mellitus type 2 - levemir at home dose  Insulin sliding scale  Monitor blood sugars  · Atrial Fibrillation -   · Rate / Rhythm control - coreg  · Anticoagulation - eliquis  · Essential Hypertension - continue home meds  Monitor blood pressures  ** Updated daughters at bedside  VTE Prophylaxis: Apixaban (Eliquis)  / sequential compression device   Code Status: Full Code - Level 1  POLST: There is no POLST form on file for this patient (pre-hospital)    Anticipated Length of Stay:  Patient will be admitted on an Inpatient basis with an anticipated length of stay of  > 2 midnights     Justification for Hospital Stay: evaluation and treatment of above disease  Total Time for Visit, including Counseling / Coordination of Care: 45 minutes  Greater than 50% of this total time spent on direct patient counseling and coordination of care  Chief Complaint:   Ambulatory dysfunction / weakness    History of Present Illness:    Michelle Rascon is a 72 y o  male who presents with ambulatory dysfunction  The patient does have chronic ambulatory dysfunction with history of a lumbar fusion in 2012 as well as a prior lumbar spinal surgery in 2011  The patient states that he never recovered since that time  However, it was in April of 2018 that the patient had progressively worsening weakness in his upper body as well as the lower body  The patient, around that time, had been prescribed a motorized wheelchair  The patient has been able to get around with the wheelchair although does complain that this wheelchair barely fit through his doorways at home and has had visiting nurse aides from the [place] who help with limited physical therapy for about 10 hours per week he states  The patient states that he has had increasing weakness of the upper body over the last 6-7 months and can even left in arm to put a cup of coffee into the microwave  The patient attributes a lot of his weakness dating back for quite some time to atrophy related to Agent Orange exposure  The patient states that he was told that he needs a fusion in his cervical spine but that he cannot get this done right now  The patient has been functioning in the wheelchair but normally can transfer from 1 chair to another  However, over the last couple of days the patient notes that he is so weak that he cannot even make a transfer from 1 chair to another  The patient has had headaches off and on for couple weeks on most days of the week  The patient denies any vision changes or blurring  He denies any sore throat, cough, or shortness of breath    The patient has had general aches and has also had some increased back pain appearing very uncomfortable during my visit with him while lying on the stretcher in the emergency department  The patient, while I am seeing him, does become emotional and his voice begins to quiver  The patient then does begin to have rigors  Initially I did feel that this may be anxiety reaction but he did feel slightly warm to touch and I did ask for nursing to obtain a rectal temperature as his mouth is currently dry and rectal temperature came back at 102 8  Thus far in the emergency department, no infectious workup has been done but will be initiating this at the time of admission  The patient has not noticed any fevers or chills at home  On talking with the emergency room attending physician he did not have any rigors earlier when he had assessed the patient  The patient denies any dysuria but did have an episode of incontinence in the emergency department  The patient denies any flank pain  He denies any diarrhea, abdominal pain, or nausea/vomiting  The patient did recently have an olecranon bursitis in the right arm which was treated in November 2018  The patient was treated, at that time, with IV vancomycin as well as ceftriaxone  Review of Systems:    Review of Systems   Constitutional: Positive for fatigue  Negative for chills, diaphoresis and fever (but this is discovered in the ER)  HENT: Negative for congestion, ear discharge, ear pain, rhinorrhea, sinus pain, sinus pressure and sore throat  Eyes: Negative  Respiratory: Negative for cough, shortness of breath and wheezing  Cardiovascular: Negative  Negative for chest pain, palpitations and leg swelling  Gastrointestinal: Negative  Negative for abdominal distention, abdominal pain, constipation, diarrhea, nausea and vomiting  Endocrine: Negative  Genitourinary: Negative for difficulty urinating, dysuria, flank pain, frequency, hematuria and urgency     Musculoskeletal: Positive for back pain and myalgias  Negative for neck pain  Skin: Negative for rash  Neurological: Positive for weakness and headaches  Negative for dizziness, syncope, speech difficulty and light-headedness  All other systems reviewed and are negative  Past Medical and Surgical History:     Past Medical History:   Diagnosis Date    [facility]     Diabetes [facility]     H/O agent Orange exposure     Hypertension     PTSD (post-traumatic stress disorder)        Past Surgical History:   Procedure Laterality Date    ABLATION OF DYSRHYTHMIC FOCUS      for a-fib    CHOLECYSTECTOMY      TOTAL SHOULDER REPLACEMENT Right        Meds/Allergies:    Prior to Admission medications    Medication Sig Start Date End Date Taking?  Authorizing Provider   acetaminophen (TYLENOL) 325 mg tablet Take 650 mg by mouth 2 (two) times a day   Yes Historical Provider, MD   apixaban (ELIQUIS) 5 mg Take 5 mg by mouth 2 (two) times a day   Yes Historical Provider, MD   carvedilol (COREG) 25 mg tablet Take 25 mg by mouth 2 (two) times a day with meals   Yes Historical Provider, MD   cholecalciferol (VITAMIN D3) 1,000 units tablet Take 5,000 Units by mouth daily   Yes Historical Provider, MD   co-enzyme Q-10 50 MG capsule Take 100 mg by mouth daily   Yes Historical Provider, MD   CYANOCOBALAMIN PO Take 1,000 mcg by mouth     Yes Historical Provider, MD   gabapentin (NEURONTIN) 300 mg capsule Take 100 mg by mouth 3 (three) times a day   Yes Historical Provider, MD   insulin aspart (NovoLOG) 100 units/mL injection Inject under the skin 3 (three) times a day before meals Sliding scale   Yes Historical Provider, MD   insulin detemir (LEVEMIR) 100 units/mL subcutaneous injection Inject 30 Units under the skin daily at bedtime     Yes Historical Provider, MD   lisinopril-hydrochlorothiazide (PRINZIDE,ZESTORETIC) 20-12 5 MG per tablet Take 2 tablets by mouth daily   Yes Historical Provider, MD   MAGNESIUM CARBONATE PO Take 400 mg by mouth daily Yes Historical Provider, MD   omeprazole (PriLOSEC) 20 mg delayed release capsule Take 20 mg by mouth daily   Yes Historical Provider, MD   rosuvastatin (CRESTOR) 5 mg tablet Take 5 mg by mouth daily   Yes Historical Provider, MD   sertraline (ZOLOFT) 100 mg tablet Take 50 mg by mouth daily   Yes Historical Provider, MD   traZODone (DESYREL) 50 mg tablet Take 50 mg by mouth daily at bedtime   Yes Historical Provider, MD   zolpidem (AMBIEN CR) 12 5 MG CR tablet Take 12 5 mg by mouth daily at bedtime as needed for sleep   Yes Historical Provider, MD     I have reviewed home medications with patient personally  Allergies: Allergies   Allergen Reactions    Atorvastatin Other (See Comments)     Muscle cramps       Social History:     Marital Status: /Civil Union   Occupation:   Patient Pre-hospital Living Situation: lives at home  Has [employer] services  Patient Pre-hospital Level of Mobility: motorized wheelchair but can do transfers  Patient Pre-hospital Diet Restrictions: None  Substance Use History:   History   Alcohol Use No     History   Smoking Status    Former Smoker    Quit date: 1/1/1987   Smokeless Tobacco    Never Used     History   Drug Use No       Family History:    non-contributory    Physical Exam:     Vitals:   Blood Pressure: (!) 240/120 (01/09/19 1958)  Pulse: (!) 107 (01/09/19 1958)  Temperature: (!) 102 8 °F (39 3 °C) (01/09/19 1958)  Temp Source: Rectal (01/09/19 1958)  Respirations: 22 (01/09/19 1958)  Height: 6' 2" (188 cm) (01/09/19 1522)  Weight - Scale: 97 5 kg (215 lb) (01/09/19 1522)  SpO2: 99 % (01/09/19 1522)    Physical Exam   Constitutional: He is oriented to person, place, and time  He appears distressed (appears uncomfortable wtih back pain and also has shaking chills/ rigors  )  HENT:   Mouth/Throat: No oropharyngeal exudate  Dry oral mucosa   Eyes: Pupils are equal, round, and reactive to light  Conjunctivae are normal    Neck: Neck supple  No JVD present  Cardiovascular:   No murmur heard  Mildly tachycardic rate with slightly irregular rhythm   Pulmonary/Chest: Effort normal and breath sounds normal  He has no wheezes  He has no rales  Abdominal: Soft  Bowel sounds are normal  He exhibits no distension  There is no tenderness  There is no rebound  Musculoskeletal: He exhibits no edema or tenderness  Tenderness in the lumbar region  Lymphadenopathy:     He has no cervical adenopathy  Neurological: He is alert and oriented to person, place, and time  No cranial nerve deficit  4/5 motor to RLE and 3-4 / 5 to the LLE   5/5  bilaterally  At elbow he is 4 5 / 5 - 5/5 bilaterally  He is a tangential historian  Skin: No rash noted  He is not diaphoretic  There is pallor  Warm to touch   Psychiatric:   Tearful / emotional at times when talking about his disability  Vitals reviewed  Additional Data:     Lab Results: I have personally reviewed pertinent reports  Results from last 7 days  Lab Units 01/09/19  1516   WBC Thousand/uL 7 33   HEMOGLOBIN g/dL 11 0*   HEMATOCRIT % 36 7   PLATELETS Thousands/uL 256   LYMPHO PCT % 9*   MONO PCT % 12   EOS PCT % 0       Results from last 7 days  Lab Units 01/09/19  1516   POTASSIUM mmol/L 3 9   CHLORIDE mmol/L 101   CO2 mmol/L 28   BUN mg/dL 26*   CREATININE mg/dL 1 19   CALCIUM mg/dL 9 1   ALK PHOS U/L 102   ALT U/L 56   AST U/L 49*           Imaging:     No results found  Allscripts / Epic Records Reviewed: Yes     ** Please Note: This note has been constructed using a voice recognition system   **

## 2019-01-10 ENCOUNTER — APPOINTMENT (INPATIENT)
Dept: RADIOLOGY | Facility: HOSPITAL | Age: 74
DRG: 872 | End: 2019-01-10
Payer: MEDICARE

## 2019-01-10 LAB
ALBUMIN SERPL BCP-MCNC: 1.3 G/DL (ref 3.5–5)
ALBUMIN SERPL BCP-MCNC: 2 G/DL (ref 3.5–5)
ALP SERPL-CCNC: 61 U/L (ref 46–116)
ALP SERPL-CCNC: 93 U/L (ref 46–116)
ALT SERPL W P-5'-P-CCNC: 26 U/L (ref 12–78)
ALT SERPL W P-5'-P-CCNC: 41 U/L (ref 12–78)
ANION GAP SERPL CALCULATED.3IONS-SCNC: 11 MMOL/L (ref 4–13)
ANION GAP SERPL CALCULATED.3IONS-SCNC: 9 MMOL/L (ref 4–13)
AST SERPL W P-5'-P-CCNC: 26 U/L (ref 5–45)
AST SERPL W P-5'-P-CCNC: 45 U/L (ref 5–45)
BASOPHILS # BLD MANUAL: 0 THOUSAND/UL (ref 0–0.1)
BASOPHILS NFR MAR MANUAL: 0 % (ref 0–1)
BILIRUB SERPL-MCNC: 0.2 MG/DL (ref 0.2–1)
BILIRUB SERPL-MCNC: 0.4 MG/DL (ref 0.2–1)
BUN SERPL-MCNC: 22 MG/DL (ref 5–25)
BUN SERPL-MCNC: 30 MG/DL (ref 5–25)
CALCIUM SERPL-MCNC: 5.6 MG/DL (ref 8.3–10.1)
CALCIUM SERPL-MCNC: 7.5 MG/DL (ref 8.3–10.1)
CHLORIDE SERPL-SCNC: 104 MMOL/L (ref 100–108)
CHLORIDE SERPL-SCNC: 114 MMOL/L (ref 100–108)
CO2 SERPL-SCNC: 17 MMOL/L (ref 21–32)
CO2 SERPL-SCNC: 24 MMOL/L (ref 21–32)
CREAT SERPL-MCNC: 0.99 MG/DL (ref 0.6–1.3)
CREAT SERPL-MCNC: 1.43 MG/DL (ref 0.6–1.3)
EOSINOPHIL # BLD MANUAL: 0 THOUSAND/UL (ref 0–0.4)
EOSINOPHIL NFR BLD MANUAL: 0 % (ref 0–6)
ERYTHROCYTE [DISTWIDTH] IN BLOOD BY AUTOMATED COUNT: 17 % (ref 11.6–15.1)
FLUAV AG SPEC QL: NORMAL
FLUBV AG SPEC QL: NORMAL
GFR SERPL CREATININE-BSD FRML MDRD: 49 ML/MIN/1.73SQ M
GFR SERPL CREATININE-BSD FRML MDRD: 76 ML/MIN/1.73SQ M
GLUCOSE SERPL-MCNC: 104 MG/DL (ref 65–140)
GLUCOSE SERPL-MCNC: 143 MG/DL (ref 65–140)
GLUCOSE SERPL-MCNC: 151 MG/DL (ref 65–140)
GLUCOSE SERPL-MCNC: 155 MG/DL (ref 65–140)
GLUCOSE SERPL-MCNC: 159 MG/DL (ref 65–140)
GLUCOSE SERPL-MCNC: 170 MG/DL (ref 65–140)
HCT VFR BLD AUTO: 28.2 % (ref 36.5–49.3)
HGB BLD-MCNC: 8.5 G/DL (ref 12–17)
LYMPHOCYTES # BLD AUTO: 0.68 THOUSAND/UL (ref 0.6–4.47)
LYMPHOCYTES # BLD AUTO: 5 % (ref 14–44)
MCH RBC QN AUTO: 25.1 PG (ref 26.8–34.3)
MCHC RBC AUTO-ENTMCNC: 30.1 G/DL (ref 31.4–37.4)
MCV RBC AUTO: 83 FL (ref 82–98)
MONOCYTES # BLD AUTO: 0.81 THOUSAND/UL (ref 0–1.22)
MONOCYTES NFR BLD: 6 % (ref 4–12)
NEUTROPHILS # BLD MANUAL: 12.06 THOUSAND/UL (ref 1.85–7.62)
NEUTS BAND NFR BLD MANUAL: 1 % (ref 0–8)
NEUTS SEG NFR BLD AUTO: 88 % (ref 43–75)
NRBC BLD AUTO-RTO: 0 /100 WBCS
PLATELET # BLD AUTO: 200 THOUSANDS/UL (ref 149–390)
PLATELET BLD QL SMEAR: ADEQUATE
PMV BLD AUTO: 10.5 FL (ref 8.9–12.7)
POTASSIUM SERPL-SCNC: 2.9 MMOL/L (ref 3.5–5.3)
POTASSIUM SERPL-SCNC: 4.5 MMOL/L (ref 3.5–5.3)
PROCALCITONIN SERPL-MCNC: 2.71 NG/ML
PROT SERPL-MCNC: 3.6 G/DL (ref 6.4–8.2)
PROT SERPL-MCNC: 5.4 G/DL (ref 6.4–8.2)
RBC # BLD AUTO: 3.38 MILLION/UL (ref 3.88–5.62)
RSV B RNA SPEC QL NAA+PROBE: NORMAL
S PYO AG THROAT QL: NEGATIVE
SODIUM SERPL-SCNC: 137 MMOL/L (ref 136–145)
SODIUM SERPL-SCNC: 142 MMOL/L (ref 136–145)
TOTAL CELLS COUNTED SPEC: 100
WBC # BLD AUTO: 13.55 THOUSAND/UL (ref 4.31–10.16)

## 2019-01-10 PROCEDURE — 99232 SBSQ HOSP IP/OBS MODERATE 35: CPT | Performed by: INTERNAL MEDICINE

## 2019-01-10 PROCEDURE — 70100 X-RAY EXAM OF JAW <4VIEWS: CPT

## 2019-01-10 PROCEDURE — 82948 REAGENT STRIP/BLOOD GLUCOSE: CPT

## 2019-01-10 PROCEDURE — 80053 COMPREHEN METABOLIC PANEL: CPT | Performed by: PHYSICIAN ASSISTANT

## 2019-01-10 PROCEDURE — 87040 BLOOD CULTURE FOR BACTERIA: CPT | Performed by: INTERNAL MEDICINE

## 2019-01-10 PROCEDURE — 73552 X-RAY EXAM OF FEMUR 2/>: CPT

## 2019-01-10 PROCEDURE — 85007 BL SMEAR W/DIFF WBC COUNT: CPT | Performed by: INTERNAL MEDICINE

## 2019-01-10 PROCEDURE — 85027 COMPLETE CBC AUTOMATED: CPT | Performed by: INTERNAL MEDICINE

## 2019-01-10 PROCEDURE — 80053 COMPREHEN METABOLIC PANEL: CPT | Performed by: INTERNAL MEDICINE

## 2019-01-10 PROCEDURE — 87430 STREP A AG IA: CPT | Performed by: INTERNAL MEDICINE

## 2019-01-10 RX ORDER — ALBUMIN (HUMAN) 12.5 G/50ML
25 SOLUTION INTRAVENOUS ONCE
Status: COMPLETED | OUTPATIENT
Start: 2019-01-10 | End: 2019-01-10

## 2019-01-10 RX ORDER — GABAPENTIN 300 MG/1
300 CAPSULE ORAL
Status: DISCONTINUED | OUTPATIENT
Start: 2019-01-11 | End: 2019-01-12

## 2019-01-10 RX ADMIN — SODIUM CHLORIDE 125 ML/HR: 0.9 INJECTION, SOLUTION INTRAVENOUS at 18:56

## 2019-01-10 RX ADMIN — SODIUM CHLORIDE 1000 ML: 0.9 INJECTION, SOLUTION INTRAVENOUS at 06:34

## 2019-01-10 RX ADMIN — VANCOMYCIN HYDROCHLORIDE 1500 MG: 1 INJECTION, POWDER, LYOPHILIZED, FOR SOLUTION INTRAVENOUS at 11:09

## 2019-01-10 RX ADMIN — CARVEDILOL 25 MG: 12.5 TABLET, FILM COATED ORAL at 18:33

## 2019-01-10 RX ADMIN — GABAPENTIN 300 MG: 300 CAPSULE ORAL at 18:33

## 2019-01-10 RX ADMIN — ALBUMIN HUMAN 25 G: 0.25 SOLUTION INTRAVENOUS at 07:47

## 2019-01-10 RX ADMIN — SERTRALINE HYDROCHLORIDE 100 MG: 100 TABLET ORAL at 14:52

## 2019-01-10 RX ADMIN — INSULIN LISPRO 1 UNITS: 100 INJECTION, SOLUTION INTRAVENOUS; SUBCUTANEOUS at 21:15

## 2019-01-10 RX ADMIN — SODIUM CHLORIDE 1000 ML: 0.9 INJECTION, SOLUTION INTRAVENOUS at 03:45

## 2019-01-10 RX ADMIN — VANCOMYCIN HYDROCHLORIDE 1500 MG: 1 INJECTION, POWDER, LYOPHILIZED, FOR SOLUTION INTRAVENOUS at 21:16

## 2019-01-10 RX ADMIN — ACETAMINOPHEN 650 MG: 325 TABLET, FILM COATED ORAL at 22:07

## 2019-01-10 RX ADMIN — CEFEPIME HYDROCHLORIDE 2000 MG: 2 INJECTION, POWDER, FOR SOLUTION INTRAVENOUS at 20:26

## 2019-01-10 RX ADMIN — ACETAMINOPHEN 650 MG: 325 TABLET, FILM COATED ORAL at 18:33

## 2019-01-10 RX ADMIN — CEFEPIME HYDROCHLORIDE 2000 MG: 2 INJECTION, POWDER, FOR SOLUTION INTRAVENOUS at 10:30

## 2019-01-10 RX ADMIN — SODIUM CHLORIDE 125 ML/HR: 0.9 INJECTION, SOLUTION INTRAVENOUS at 06:37

## 2019-01-10 RX ADMIN — INSULIN DETEMIR 22 UNITS: 100 INJECTION, SOLUTION SUBCUTANEOUS at 21:13

## 2019-01-10 RX ADMIN — Medication 100 MG: at 14:53

## 2019-01-10 RX ADMIN — VITAMIN D, TAB 1000IU (100/BT) 5000 UNITS: 25 TAB at 14:53

## 2019-01-10 RX ADMIN — CYANOCOBALAMIN TAB 500 MCG 1000 MCG: 500 TAB at 14:52

## 2019-01-10 RX ADMIN — GABAPENTIN 300 MG: 300 CAPSULE ORAL at 20:26

## 2019-01-10 RX ADMIN — LISINOPRIL: 20 TABLET ORAL at 14:51

## 2019-01-10 RX ADMIN — APIXABAN 5 MG: 5 TABLET, FILM COATED ORAL at 18:33

## 2019-01-10 NOTE — OCCUPATIONAL THERAPY NOTE
Occupational Therapy Note        Patient Name: Iraida Sanon  Today's Date: 1/10/2019      OT orders received and chart review completed  Spoke w/ PT, Ann Albright  Per RN, pt not appropriate for OT eval at this time  Will continue to follow to complete eval as appropriate and schedule allows      Black Bailey, OT

## 2019-01-10 NOTE — UTILIZATION REVIEW
Initial Clinical Review    Admission: Date/Time/Statement: 1/9/19 @ 1701     Orders Placed This Encounter   Procedures    Inpatient Admission (expected length of stay for this patient is greater than two midnights)     Standing Status:   Standing     Number of Occurrences:   1     Order Specific Question:   Admitting Physician     Answer:   Esteban Halsted [[id number]]     Order Specific Question:   Level of Care     Answer:   Med Surg [[id number]]     Order Specific Question:   Estimated length of stay     Answer:   More than 2 Midnights     Order Specific Question:   Certification     Answer:   I certify that inpatient services are medically necessary for this patient for a duration of greater than two midnights  See H&P and MD Progress Notes for additional information about the patient's course of treatment  ED: Date/Time/Mode of Arrival:   ED Arrival Information     Expected Arrival Acuity Means of Arrival Escorted By Service Admission Type    - 1/9/2019 13:00 Urgent Ambulance [facility] General Medicine Urgent    Arrival Complaint    weakness          Chief Complaint:   Chief Complaint   Patient presents with    Weakness - Generalized     Pt states that he has been feeling fatigued and unable to stand on his own to transfer to his wheelchair  Pt states that he normally is wheelchair bound but now needs assistance to stand up  History of Illness: 72 y o  male who presents with ambulatory dysfunction  The patient does have chronic ambulatory dysfunction with history of a lumbar fusion in 2012 as well as a prior lumbar spinal surgery in 2011  The patient states that he never recovered since that time  However, it was in April of 2018 that the patient had progressively worsening weakness in his upper body as well as the lower body  The patient, around that time, had been prescribed a motorized wheelchair    The patient has been able to get around with the wheelchair The patient states that he has had increasing weakness of the upper body over the last 6-7 months and can even left in arm to put a cup of coffee into the microwave  The patient attributes a lot of his weakness dating back for quite some time to atrophy related to Agent Orange exposure  The patient states that he was told that he needs a fusion in his cervical spine but that he cannot get this done right now  The patient has been functioning in the wheelchair but normally can transfer from 1 chair to another  However, over the last couple of days the patient notes that he is so weak that he cannot even make a transfer from 1 chair to another  The patient has had headaches off and on for couple weeks on most days of the week  The patient has had general aches and has also had some increased back pain appearing very uncomfortable during my visit with him while lying on the stretcher in the emergency department  The patient, while I am seeing him, does become emotional and his voice begins to quiver  The patient then does begin to have rigors  Initially I did feel that this may be anxiety reaction but he did feel slightly warm to touch and I did ask for nursing to obtain a rectal temperature as his mouth is currently dry and rectal temperature came back at 102 8  Thus far in the emergency department, no infectious workup has been done but will be initiating this at the time of admission  The patient has not noticed any fevers or chills at home  On talking with the emergency room attending physician he did not have any rigors earlier when he had assessed the patient  The patient denies any dysuria but did have an episode of incontinence in the emergency department  The patient did recently have an olecranon bursitis in the right arm which was treated in November 2018   The patient was treated, at that time, with IV vancomycin as well as ceftriaxone        ED Vital Signs:   ED Triage Vitals   Temperature Pulse Respirations Blood Pressure SpO2 01/09/19 1310 01/09/19 1310 01/09/19 1310 01/09/19 1310 01/09/19 1310   97 5 °F (36 4 °C) 66 16 125/55 100 %      Temp Source Heart Rate Source Patient Position - Orthostatic VS BP Location FiO2 (%)   01/09/19 1310 01/09/19 1310 01/09/19 1522 01/09/19 1522 --   Oral Monitor Lying Left arm       Pain Score       01/09/19 1522       No Pain        Wt Readings from Last 1 Encounters:   01/10/19 100 kg (220 lb 14 4 oz)       Vital Signs (abnormal):   01/09/19 2030   102 8 °F (39 3 °C)   109   25   180/90  --  96 %  --  --   BP: manual at 01/09/19 2030   SpO2: room air at 01/09/19 2030 01/09/19 1958   102 8 °F (39 3 °C)   107  22   240/120  --  --  --       10/19 0542  97 5 °F (36 4 °C)  63  --   84/50  --  --  --  Lying   01/10/19 0300  99 1 °F (37 3 °C)  70  18   72/50  --  98 %  Nasal cannula       Exam - patient with visual hallucinations and disoriented while in ED>       Abnormal Labs/Diagnostic Test Results:   Wbc 7 33, hgb 11, hct 36 7  Bun 26  Albumin 3    procalcitonin 2 71  CRP >90 0  Total   MB 8 4    CxR- Cardiomegaly with possible vascular congestion  1/10/2019-  Wbc 13 55  hgb 8 5, hct 28 2     0548 -  K 2 9  Calcium 5 6  Total protein 3 6  Albumin 1 3    0713- Bun 30  Creatinine 1 43  Glucose 151  Calcium 7 5  Total protein 5 4  Albumin 2        ED Treatment: blood cutures  Medication Administration from 01/09/2019 1300 to 01/09/2019 2038       Date/Time Order Dose Route Action Comments     01/09/2019 1623 sodium chloride 0 9 % bolus 250 mL 0 mL Intravenous Stopped      01/09/2019 1518 sodium chloride 0 9 % bolus 250 mL 250 mL Intravenous New Bag      01/09/2019 2022 acetaminophen (TYLENOL) tablet 650 mg 650 mg Oral Given      01/09/2019 2023 LORazepam (ATIVAN) 2 mg/mL injection 0 5 mg 0 5 mg Intravenous Given      01/09/2019 2030 carvedilol (COREG) tablet 25 mg 25 mg Oral Given      01/09/2019 2028 traZODone (DESYREL) tablet 50 mg 50 mg Oral Given      01/09/2019 2028 gabapentin (NEURONTIN) capsule 300 mg 300 mg Oral Given           Past Medical/Surgical History:   Past Medical History:   Diagnosis Date    Atrial fibrillation ([facility] [address]     Diabetes ([facility] [address] [facility]     H/O agent Orange exposure     Hypertension     PTSD (post-traumatic stress disorder)        Admitting Diagnosis: Weakness [R53 1]  Generalized weakness [R53 1]  Ambulatory dysfunction [R26 2]    Age/Sex: 72 y o  male    · Assessment/Plan: Fever -   ? On examination of patient today, he does have rigors  A rectal temperature reveals a temperature over 102  ? Antibiotics - vancomycin and cefepime initially  ? Antipyretic - Tylenol will be given  ? Evaluation -   ? We will obtain blood cultures x2   ? Check urinalysis with reflex to culture  ? Check influenza PCR  ? Get a chest x-ray and procalcitonin level  ? Get ESR and CRP  Check a lactic acid level  ? Given the increasing weakness and pain in the back will check MRI of the cervical and lumbar spines  ? Infectious Disease consultation if source of infection remains elusive  · Ambulatory Dysfunction -   ? PT / OT evaluation  ? MRI Cervical and Lumbar Spine  ? Consider neurosurgery vs  Orthopedic evaluation      Plan for Additional Problems:   · Diabetes Mellitus type 2 - levemir at home dose  Insulin sliding scale  Monitor blood sugars  · Atrial Fibrillation -   ? Rate / Rhythm control - coreg  ? Anticoagulation - eliquis  Essential Hypertension - continue home meds    Monitor blood pressures    Admission Orders:  1/9/2019  1701 INPATIENT   Scheduled Meds:   Current Facility-Administered Medications:  acetaminophen 650 mg Oral BID    acetaminophen 650 mg Oral Q6H PRN    apixaban 5 mg Oral BID    carvedilol 25 mg Oral BID With Meals    cefepime 2,000 mg Intravenous Q12H Last Rate: 2,000 mg (01/10/19 1030)   cholecalciferol 5,000 Units Oral Daily    co-enzyme Q-10 100 mg Oral Daily    cyanocobalamin 1,000 mcg Oral Daily    gabapentin 300 mg Oral TID    ibuprofen 400 mg Oral Q6H PRN    insulin detemir 22 Units Subcutaneous HS    insulin lispro 1-5 Units Subcutaneous HS    insulin lispro 2-12 Units Subcutaneous TID AC    lisinopril-hydrochlorothiazide (PRINZIDE 20/12  5) combo dose  Oral Daily    magnesium oxide 400 mg Oral Daily    ondansetron 4 mg Intravenous Q6H PRN    pantoprazole 40 mg Oral Early Morning    sertraline 100 mg Oral Daily    sodium chloride 125 mL/hr Intravenous Continuous Last Rate: 125 mL/hr (01/10/19 6957)   traZODone 50 mg Oral HS    vancomycin 15 mg/kg Intravenous Q12H Last Rate: 1,500 mg (01/10/19 3310)   zolpidem 10 mg Oral HS PRN      Continuous Infusions:   sodium chloride 125 mL/hr Last Rate: 125 mL/hr (01/10/19 0050)     PRN Meds:     Ibuprofen - used x 1  Fingerstick glucose qid  scds  Xray right femur     Urine culture  PT/OT

## 2019-01-10 NOTE — PHYSICAL THERAPY NOTE
PHYSICAL THERAPY NOTE          Patient Name: Frantz Kurtz  LZESY'Y Date: 1/10/2019  PT consult received  PT attempted evaluation, but cancelled 2* per RN, Edmar Corrigan, patient not appropriate for PT at this time  PT will continue to follow for initial evaluation as schedule permits and patient appropriate      Hernando Do, PT,DPT

## 2019-01-10 NOTE — ED NOTES
Patient with change in mental status during SLIM evaluation  Patient appears disoriented and having visual hallucinations  MD made aware  Placed orders appropriately  Patient with increased BP, tachycardia, and febrile  Patient sweaty and diaphoretic-change in skin color to pale  Daughters at bedside  Will evaluate further by ER MD prior to transportation to floor  Called floor RN to make aware of patient change in status  Patient's bed changed from urination episode prior to obtaining rectal temperature of 102 8  Patient repositioned and re-dressed for comfort care measures  Transported to floor with RN and tech monitoring  ER MD will place orders for night-time and rescue medications  Blood cultures and other BW obtained per verbal order by CHRISTOPHER CAMILO  2nd IV placed to [facility] without difficulty         [id number] [facility] 28, RN  01/09/19 2444

## 2019-01-10 NOTE — SOCIAL WORK
CM receive call from Magalie Swain at [facility]  Patient is currently active with them and will require NICKY prior to discharge  CM will place referral for NICKY for patient

## 2019-01-10 NOTE — PLAN OF CARE
Problem: Potential for Falls  Goal: Patient will remain free of falls  INTERVENTIONS:  - Assess patient frequently for physical needs  -  Identify cognitive and physical deficits and behaviors that affect risk of falls    -  [place] fall precautions as indicated by assessment   - Educate patient/family on patient safety including physical limitations  - Instruct patient to call for assistance with activity based on assessment  - Modify environment to reduce risk of injury  - Consider OT/PT consult to assist with strengthening/mobility   Outcome: Progressing

## 2019-01-11 ENCOUNTER — APPOINTMENT (INPATIENT)
Dept: MRI IMAGING | Facility: HOSPITAL | Age: 74
DRG: 872 | End: 2019-01-11
Payer: MEDICARE

## 2019-01-11 ENCOUNTER — APPOINTMENT (INPATIENT)
Dept: CT IMAGING | Facility: HOSPITAL | Age: 74
DRG: 872 | End: 2019-01-11
Payer: MEDICARE

## 2019-01-11 ENCOUNTER — APPOINTMENT (INPATIENT)
Dept: NON INVASIVE DIAGNOSTICS | Facility: HOSPITAL | Age: 74
DRG: 872 | End: 2019-01-11
Payer: MEDICARE

## 2019-01-11 ENCOUNTER — APPOINTMENT (INPATIENT)
Dept: ULTRASOUND IMAGING | Facility: HOSPITAL | Age: 74
DRG: 872 | End: 2019-01-11
Payer: MEDICARE

## 2019-01-11 LAB
ALBUMIN SERPL BCP-MCNC: 2.2 G/DL (ref 3.5–5)
ALP SERPL-CCNC: 87 U/L (ref 46–116)
ALT SERPL W P-5'-P-CCNC: 34 U/L (ref 12–78)
ANION GAP SERPL CALCULATED.3IONS-SCNC: 10 MMOL/L (ref 4–13)
AST SERPL W P-5'-P-CCNC: 29 U/L (ref 5–45)
BACTERIA UR QL AUTO: ABNORMAL /HPF
BASOPHILS # BLD MANUAL: 0.06 THOUSAND/UL (ref 0–0.1)
BASOPHILS NFR MAR MANUAL: 1 % (ref 0–1)
BILIRUB SERPL-MCNC: 0.3 MG/DL (ref 0.2–1)
BILIRUB UR QL STRIP: NEGATIVE
BUN SERPL-MCNC: 34 MG/DL (ref 5–25)
CALCIUM SERPL-MCNC: 7.8 MG/DL (ref 8.3–10.1)
CHLORIDE SERPL-SCNC: 108 MMOL/L (ref 100–108)
CLARITY UR: CLEAR
CO2 SERPL-SCNC: 23 MMOL/L (ref 21–32)
COLOR UR: YELLOW
CREAT SERPL-MCNC: 1.33 MG/DL (ref 0.6–1.3)
EOSINOPHIL # BLD MANUAL: 0.24 THOUSAND/UL (ref 0–0.4)
EOSINOPHIL NFR BLD MANUAL: 4 % (ref 0–6)
ERYTHROCYTE [DISTWIDTH] IN BLOOD BY AUTOMATED COUNT: 17.1 % (ref 11.6–15.1)
GFR SERPL CREATININE-BSD FRML MDRD: 53 ML/MIN/1.73SQ M
GLUCOSE SERPL-MCNC: 122 MG/DL (ref 65–140)
GLUCOSE SERPL-MCNC: 131 MG/DL (ref 65–140)
GLUCOSE SERPL-MCNC: 140 MG/DL (ref 65–140)
GLUCOSE SERPL-MCNC: 217 MG/DL (ref 65–140)
GLUCOSE SERPL-MCNC: 278 MG/DL (ref 65–140)
GLUCOSE UR STRIP-MCNC: NEGATIVE MG/DL
HCT VFR BLD AUTO: 26.9 % (ref 36.5–49.3)
HGB BLD-MCNC: 8.2 G/DL (ref 12–17)
HGB UR QL STRIP.AUTO: NEGATIVE
KETONES UR STRIP-MCNC: NEGATIVE MG/DL
LEUKOCYTE ESTERASE UR QL STRIP: NEGATIVE
LG PLATELETS BLD QL SMEAR: PRESENT
LYMPHOCYTES # BLD AUTO: 0.48 THOUSAND/UL (ref 0.6–4.47)
LYMPHOCYTES # BLD AUTO: 8 % (ref 14–44)
MCH RBC QN AUTO: 25.7 PG (ref 26.8–34.3)
MCHC RBC AUTO-ENTMCNC: 30.5 G/DL (ref 31.4–37.4)
MCV RBC AUTO: 84 FL (ref 82–98)
MONOCYTES # BLD AUTO: 0.71 THOUSAND/UL (ref 0–1.22)
MONOCYTES NFR BLD: 12 % (ref 4–12)
NEUTROPHILS # BLD MANUAL: 4.46 THOUSAND/UL (ref 1.85–7.62)
NEUTS BAND NFR BLD MANUAL: 2 % (ref 0–8)
NEUTS SEG NFR BLD AUTO: 73 % (ref 43–75)
NITRITE UR QL STRIP: NEGATIVE
NON-SQ EPI CELLS URNS QL MICRO: ABNORMAL /HPF
NRBC BLD AUTO-RTO: 0 /100 WBCS
OTHER STN SPEC: ABNORMAL
PH UR STRIP.AUTO: 6 [PH] (ref 4.5–8)
PLATELET # BLD AUTO: 194 THOUSANDS/UL (ref 149–390)
PLATELET BLD QL SMEAR: ADEQUATE
PMV BLD AUTO: 10.9 FL (ref 8.9–12.7)
POTASSIUM SERPL-SCNC: 3.6 MMOL/L (ref 3.5–5.3)
PROCALCITONIN SERPL-MCNC: 7.18 NG/ML
PROT SERPL-MCNC: 5.6 G/DL (ref 6.4–8.2)
PROT UR STRIP-MCNC: ABNORMAL MG/DL
RBC # BLD AUTO: 3.19 MILLION/UL (ref 3.88–5.62)
RBC #/AREA URNS AUTO: ABNORMAL /HPF
SODIUM SERPL-SCNC: 141 MMOL/L (ref 136–145)
SP GR UR STRIP.AUTO: 1.02 (ref 1–1.03)
TOTAL CELLS COUNTED SPEC: 100
UROBILINOGEN UR QL STRIP.AUTO: 0.2 E.U./DL
VANCOMYCIN TROUGH SERPL-MCNC: 24.4 UG/ML (ref 10–20)
WBC # BLD AUTO: 5.95 THOUSAND/UL (ref 4.31–10.16)
WBC #/AREA URNS AUTO: ABNORMAL /HPF

## 2019-01-11 PROCEDURE — 76770 US EXAM ABDO BACK WALL COMP: CPT

## 2019-01-11 PROCEDURE — 84145 PROCALCITONIN (PCT): CPT | Performed by: INTERNAL MEDICINE

## 2019-01-11 PROCEDURE — 82948 REAGENT STRIP/BLOOD GLUCOSE: CPT

## 2019-01-11 PROCEDURE — 85007 BL SMEAR W/DIFF WBC COUNT: CPT | Performed by: INTERNAL MEDICINE

## 2019-01-11 PROCEDURE — 80053 COMPREHEN METABOLIC PANEL: CPT | Performed by: INTERNAL MEDICINE

## 2019-01-11 PROCEDURE — 99232 SBSQ HOSP IP/OBS MODERATE 35: CPT | Performed by: INTERNAL MEDICINE

## 2019-01-11 PROCEDURE — 80202 ASSAY OF VANCOMYCIN: CPT | Performed by: INTERNAL MEDICINE

## 2019-01-11 PROCEDURE — 72158 MRI LUMBAR SPINE W/O & W/DYE: CPT

## 2019-01-11 PROCEDURE — 99223 1ST HOSP IP/OBS HIGH 75: CPT | Performed by: INTERNAL MEDICINE

## 2019-01-11 PROCEDURE — 93306 TTE W/DOPPLER COMPLETE: CPT | Performed by: INTERNAL MEDICINE

## 2019-01-11 PROCEDURE — A9585 GADOBUTROL INJECTION: HCPCS | Performed by: INTERNAL MEDICINE

## 2019-01-11 PROCEDURE — 93306 TTE W/DOPPLER COMPLETE: CPT

## 2019-01-11 PROCEDURE — 70486 CT MAXILLOFACIAL W/O DYE: CPT

## 2019-01-11 PROCEDURE — 72156 MRI NECK SPINE W/O & W/DYE: CPT

## 2019-01-11 PROCEDURE — 81001 URINALYSIS AUTO W/SCOPE: CPT | Performed by: INTERNAL MEDICINE

## 2019-01-11 PROCEDURE — 85027 COMPLETE CBC AUTOMATED: CPT | Performed by: INTERNAL MEDICINE

## 2019-01-11 RX ORDER — VANCOMYCIN HYDROCHLORIDE 1 G/200ML
1000 INJECTION, SOLUTION INTRAVENOUS EVERY 12 HOURS
Status: DISCONTINUED | OUTPATIENT
Start: 2019-01-11 | End: 2019-01-11

## 2019-01-11 RX ADMIN — Medication 100 MG: at 08:44

## 2019-01-11 RX ADMIN — PANTOPRAZOLE SODIUM 40 MG: 40 TABLET, DELAYED RELEASE ORAL at 05:47

## 2019-01-11 RX ADMIN — SODIUM CHLORIDE 125 ML/HR: 0.9 INJECTION, SOLUTION INTRAVENOUS at 02:21

## 2019-01-11 RX ADMIN — INSULIN LISPRO 4 UNITS: 100 INJECTION, SOLUTION INTRAVENOUS; SUBCUTANEOUS at 16:56

## 2019-01-11 RX ADMIN — GABAPENTIN 300 MG: 300 CAPSULE ORAL at 21:30

## 2019-01-11 RX ADMIN — GADOBUTROL 9 ML: 604.72 INJECTION INTRAVENOUS at 13:47

## 2019-01-11 RX ADMIN — CYANOCOBALAMIN TAB 500 MCG 1000 MCG: 500 TAB at 08:44

## 2019-01-11 RX ADMIN — VANCOMYCIN HYDROCHLORIDE 1000 MG: 1 INJECTION, SOLUTION INTRAVENOUS at 14:32

## 2019-01-11 RX ADMIN — INSULIN DETEMIR 22 UNITS: 100 INJECTION, SOLUTION SUBCUTANEOUS at 21:30

## 2019-01-11 RX ADMIN — CEFEPIME HYDROCHLORIDE 2000 MG: 2 INJECTION, POWDER, FOR SOLUTION INTRAVENOUS at 08:42

## 2019-01-11 RX ADMIN — ACETAMINOPHEN 650 MG: 325 TABLET, FILM COATED ORAL at 16:55

## 2019-01-11 RX ADMIN — CARVEDILOL 25 MG: 12.5 TABLET, FILM COATED ORAL at 16:54

## 2019-01-11 RX ADMIN — CARVEDILOL 25 MG: 12.5 TABLET, FILM COATED ORAL at 08:44

## 2019-01-11 RX ADMIN — CEFTRIAXONE SODIUM 2000 MG: 10 INJECTION, POWDER, FOR SOLUTION INTRAVENOUS at 20:45

## 2019-01-11 RX ADMIN — MAGNESIUM OXIDE TAB 400 MG (241.3 MG ELEMENTAL MG) 400 MG: 400 (241.3 MG) TAB at 08:44

## 2019-01-11 RX ADMIN — INSULIN LISPRO 3 UNITS: 100 INJECTION, SOLUTION INTRAVENOUS; SUBCUTANEOUS at 21:31

## 2019-01-11 RX ADMIN — VITAMIN D, TAB 1000IU (100/BT) 5000 UNITS: 25 TAB at 08:44

## 2019-01-11 RX ADMIN — SODIUM CHLORIDE 125 ML/HR: 0.9 INJECTION, SOLUTION INTRAVENOUS at 13:50

## 2019-01-11 RX ADMIN — SERTRALINE HYDROCHLORIDE 100 MG: 100 TABLET ORAL at 08:44

## 2019-01-11 RX ADMIN — ACETAMINOPHEN 650 MG: 325 TABLET, FILM COATED ORAL at 08:44

## 2019-01-11 NOTE — PROGRESS NOTES
Vancomycin Assessment    Prieto Poe is a 72 y o  male who is currently receiving vancomycin 1500 mg IV q12h for bacteremia     Relevant clinical data and objective history reviewed:  Creatinine   Date Value Ref Range Status   01/11/2019 1 33 (H) 0 60 - 1 30 mg/dL Final     Comment:     Standardized to IDMS reference method   01/10/2019 1 43 (H) 0 60 - 1 30 mg/dL Final     Comment:     Standardized to IDMS reference method   01/10/2019 0 99 0 60 - 1 30 mg/dL Final     Comment:     Standardized to IDMS reference method     /69 (BP Location: Left arm)   Pulse 57   Temp (!) 97 4 °F (36 3 °C) (Oral)   Resp 20   Ht 6' 3" (1 905 m)   Wt 103 kg (227 lb 1 2 oz)   SpO2 99%   BMI 28 38 kg/m²   I/O last 3 completed shifts: In: 12 [P O :960]  Out: -   Lab Results   Component Value Date/Time    BUN 34 (H) 01/11/2019 03:45 AM    WBC 5 95 01/11/2019 03:45 AM    HGB 8 2 (L) 01/11/2019 03:45 AM    HCT 26 9 (L) 01/11/2019 03:45 AM    MCV 84 01/11/2019 03:45 AM     01/11/2019 03:45 AM     Temp Readings from Last 3 Encounters:   01/11/19 (!) 97 4 °F (36 3 °C) (Oral)   11/27/18 98 2 °F (36 8 °C) (Oral)   11/12/18 97 9 °F (36 6 °C) (Oral)     Vancomycin Days of Therapy: 3    Assessment/Plan  The patient is currently on vancomycin utilizing scheduled dosing based on adjusted body weight (due to obesity)  Baseline risks associated with therapy include: pre-existing renal impairment, concomitant nephrotoxic medications and advanced age  The patient is currently receiving 1500 mg IV q12h and after clinical evaluation will be changed to 1000 mg iv q12h due to supra-therapeutic trough Pharmacy will also follow closely for s/sx of nephrotoxicity, infusion reactions and appropriateness of therapy  BMP and CBC will be ordered per protocol    Plan for trough as patient approaches steady state, prior to the 5th  dose at approximately 1430 on 01/13  Due to infection severity, will target a trough of 15-20 (appropriate for most indications)   Pharmacy will continue to follow the patients culture results and clinical progress daily      Anna Cesar, Pharmacist

## 2019-01-11 NOTE — PROGRESS NOTES
[facility] Internal Medicine Progress Note  Patient: Iraida Sanon 72 y o  male   MRN: [id number]  PCP: Mamta Richardson MD  Unit/Bed#: -Tisha Encounter: [id number]  Date Of Visit: 01/11/19    Assessment:    Principal Problem:    Fever  Active Problems:    Hypertension    Atrial fibrillation (HCC)    Type 2 diabetes mellitus ([address] )    Generalized weakness      Plan:    · Bacteremia with Gram Positive Cocci / Sepsis POA-   · Source - dental vs  Respiratory vs  Osteo spine  Echo negative for endocarditis  Right elbow does not seem tender, swollen or inflamed to suggest a bursitis to be present  · Antibiotics - vancomycin and cefepime initially  Scale back when possible based on culture results  · Antipyretic - Tylenol will be given  · Evaluation -   · Follow up final results of original blood cultures and repeat blood cultures  · Nursing never able to get a urinalysis with reflex to culture due to incontinence  Now, unfortunately, it would not likely show anything  · Trend WBC counts and procalcitonin levels  · Check MRI of the cervical and lumbar spines  · Mandibular xray shows moderate periodontal disease  · No clear vegetations on transthoracic echocardiogram   · Follow up recommendations from Infectious Disease consultation  · Ambulatory Dysfunction -   · PT / OT evaluation  · MRI Cervical and Lumbar Spine  · Consider neurosurgery vs  Orthopedic evaluation  · Diabetes Mellitus type 2 - levemir at home dose  Insulin sliding scale  Monitor blood sugars  · Atrial Fibrillation -   · Rate / Rhythm control - coreg  · Anticoagulation - Eliquis may be restarted now  · Acute Kidney Injury -   · Continue holding gabapentin, lisinopril HCTZ, Trazodone for now  · Get renal ultrasound  · Continue IV fluids for now  Likely this was related to hypotension overnight  · If any worsening, then will get nephrology evaluation as well  · Monitor I/O   · Essential Hypertension - continue home meds    Monitor blood pressures  VTE Pharmacologic Prophylaxis:   Pharmacologic: Apixaban (Eliquis)  Mechanical VTE Prophylaxis in Place: Yes    Patient Centered Rounds: I have performed bedside rounds with nursing staff today  Discussions with Specialists or Other Care Team Provider: Libertad Smith with ID team     Education and Discussions with Family / Patient:  Wife updated at bedside  Time Spent for Care: 30 minutes  More than 50% of total time spent on counseling and coordination of care as described above  Current Length of Stay: 2 day(s)    Current Patient Status: Inpatient   Certification Statement: The patient will continue to require additional inpatient hospital stay due to evaluation and treatment of infection / bacteremia  Discharge Plan / Estimated Discharge Date: to be determined  Code Status: Level 1 - Full Code      Subjective: The patient continues to feel better than when he came in  He has had no further fevers or rigors  The patient did tell us about the dental issues that he has had recently  Additionally the patient has been having problems with his right olecranon bursa for some time but recently it has felt okay  The patient has no diarrhea, abdominal bloating, or pain  He denies any nausea or vomiting  The patient's mobility is still not doing well overall but he will be seen by physical therapy after his MRI is back  The patient did just go for his MRI this afternoon  Objective:     Vitals:   Temp (24hrs), Av 9 °F (36 6 °C), Min:97 4 °F (36 3 °C), Max:98 4 °F (36 9 °C)    Temp:  [97 4 °F (36 3 °C)-98 4 °F (36 9 °C)] 98 1 °F (36 7 °C)  HR:  [56-62] 56  Resp:  [18-20] 18  BP: (114-126)/(64-70) 126/69  SpO2:  [97 %-100 %] 97 %  Body mass index is 28 38 kg/m²  Input and Output Summary (last 24 hours):        Intake/Output Summary (Last 24 hours) at 19 1416  Last data filed at 19 1350   Gross per 24 hour   Intake             1140 ml   Output 400 ml   Net              740 ml       Physical Exam:     Physical Exam   Constitutional: He is oriented to person, place, and time  More comfortable again today  HENT:   Mouth/Throat: Oropharynx is clear and moist  No oropharyngeal exudate  Eyes: Pupils are equal, round, and reactive to light  Conjunctivae are normal    Cardiovascular: Normal rate  Slightly irregular rhythm   Pulmonary/Chest: Effort normal  He has no wheezes  He has no rales  Abdominal: Soft  Bowel sounds are normal  He exhibits no distension  There is no tenderness  Musculoskeletal: He exhibits no edema  Neurological: He is alert and oriented to person, place, and time  Motor is 3/5 to left lower extremity and 4/5 proximal RLE and 5/5 distal RLE  Motor in the  is 5/5 bilaterally, but at shoulder, range of motion is grossly limited with strength of 4/5 bilaterally  Skin:   Overall his color looks better without pallor as he had yesterday  Vitals reviewed  Additional Data:     Labs:      Results from last 7 days  Lab Units 01/11/19  0345   WBC Thousand/uL 5 95   HEMOGLOBIN g/dL 8 2*   HEMATOCRIT % 26 9*   PLATELETS Thousands/uL 194   LYMPHO PCT % 8*   MONO PCT % 12   EOS PCT % 4       Results from last 7 days  Lab Units 01/11/19  0345   POTASSIUM mmol/L 3 6   CHLORIDE mmol/L 108   CO2 mmol/L 23   BUN mg/dL 34*   CREATININE mg/dL 1 33*   CALCIUM mg/dL 7 8*   ALK PHOS U/L 87   ALT U/L 34   AST U/L 29           * I Have Reviewed All Lab Data Listed Above  * Additional Pertinent Lab Tests Reviewed:  [facility] Admission Reviewed    Imaging:    Imaging Reports Reviewed Today Include: all imaging for admission  Imaging Personally Reviewed by Myself Includes:  None    Recent Cultures (last 7 days):       Results from last 7 days  Lab Units 01/09/19 2106 01/09/19 2027   GRAM STAIN RESULT   --  Gram positive cocci in chains  Gram positive cocci in pairs and chains   INFLUENZA B PCR  None Detected  -- RSV PCR  None Detected  --        Last 24 Hours Medication List:     Current Facility-Administered Medications:  acetaminophen 650 mg Oral BID Carolin Day, DO    acetaminophen 650 mg Oral Q6H PRN Carolin Day, DO    carvedilol 25 mg Oral BID With Meals Carolin Day, DO    cefepime 2,000 mg Intravenous Q12H Carolin Day, DO Last Rate: 2,000 mg (01/11/19 8853)   cholecalciferol 5,000 Units Oral Daily Carolin Day, DO    co-enzyme Q-10 100 mg Oral Daily Carolin Day, DO    cyanocobalamin 1,000 mcg Oral Daily Carolin Day, DO    gabapentin 300 mg Oral HS Carolin Day, DO    ibuprofen 400 mg Oral Q6H PRN Paul Farley PA-C    insulin detemir 22 Units Subcutaneous HS Carolin Day, DO    insulin lispro 1-5 Units Subcutaneous HS Marty Fields, DO    insulin lispro 2-12 Units Subcutaneous TID AC Marty Black, DO    magnesium oxide 400 mg Oral Daily Carolin Day, DO    ondansetron 4 mg Intravenous Q6H PRN Carolin Day, DO    pantoprazole 40 mg Oral Early Morning Carolin Day, DO    sertraline 100 mg Oral Daily Carolin Day, DO    sodium chloride 125 mL/hr Intravenous Continuous Carolin Day, DO Last Rate: 125 mL/hr (01/11/19 1350)   vancomycin 1,000 mg Intravenous Q12H Carolin Day, DO    zolpidem 10 mg Oral HS PRN Carolinalejandro Lizs, DO         Today, Patient Was Seen By: Carolin Bernstein DO    ** Please Note: This note has been constructed using a voice recognition system   **

## 2019-01-11 NOTE — PROGRESS NOTES
[facility] Internal Medicine Progress Note  Patient: Emanuel Edward 72 y o  male   MRN: [id number]  PCP: Lynette De Leon MD  Unit/Bed#: -01 Encounter: [id number]  Date Of Visit: 01/10/19    Assessment:    Principal Problem:    Fever  Active Problems:    Hypertension    Atrial fibrillation (HCC)    Type 2 diabetes mellitus (HCC)    Generalized weakness      Plan:    · Bacteremia with Gram Positive Cocci / Sepsis POA-   · Antibiotics - vancomycin and cefepime initially  · Antipyretic - Tylenol will be given  · Evaluation -   · Follow up final results of blood cultures  · Check urinalysis with reflex to culture  · Trend WBC counts and procalcitonin levels  · Check MRI of the cervical and lumbar spines  · Get an Echocardiogram   · Infectious Disease consultation to help investigate this bacteremia  · Repeat blood cultures  · Ambulatory Dysfunction -   · PT / OT evaluation  · MRI Cervical and Lumbar Spine  · Consider neurosurgery vs  Orthopedic evaluation  · Diabetes Mellitus type 2 - levemir at home dose  Insulin sliding scale  Monitor blood sugars  · Atrial Fibrillation -   · Rate / Rhythm control - coreg  · Anticoagulation - eliquis will be held due to SANDRA  · Acute Kidney Injury -   · Hold gabapentin, lisinopril HCTZ, Trazodone for now  · Continue IV fluids for now  Likely this was related to hypotension overnight  · If no improvement, then imaging kidneys  If any worsening, then will get nephrology evaluation as well  · Monitor I/O   · Essential Hypertension - continue home meds  Monitor blood pressures  VTE Pharmacologic Prophylaxis:   Pharmacologic: Apixaban (Eliquis)  Mechanical VTE Prophylaxis in Place: Yes    Patient Centered Rounds: I have performed bedside rounds with nursing staff today      Discussions with Specialists or Other Care Team Provider: None    Education and Discussions with Family / Patient:  Family updated at bedside (patient's wife)    Time Spent for Care: 27 minutes  More than 50% of total time spent on counseling and coordination of care as described above  Current Length of Stay: 1 day(s)    Current Patient Status: Inpatient   Certification Statement: The patient will continue to require additional inpatient hospital stay due to evaluation and treatment of infection / bacteremia  Discharge Plan / Estimated Discharge Date: to be determined  Code Status: Level 1 - Full Code      Subjective: The patient has been afebrile today  He has no worsening of the back pain or any weakness  He has had still persistent urinary incontinence which has been a problem for the last 1 week  He has no bowel incontinence  Patient denies any diarrhea or upset stomach  The patient has had no nausea or vomiting  Today the patient's blood cultures came back positive with Gram-positive cocci  Objective:     Vitals:   Temp (24hrs), Av 2 °F (37 9 °C), Min:97 5 °F (36 4 °C), Max:104 4 °F (40 2 °C)    Temp:  [97 5 °F (36 4 °C)-104 4 °F (40 2 °C)] 97 7 °F (36 5 °C)  HR:  [] 61  Resp:  [18-25] 18  BP: ()/() 119/64  SpO2:  [93 %-100 %] 100 %  Body mass index is 27 61 kg/m²  Input and Output Summary (last 24 hours):     No intake or output data in the 24 hours ending 01/10/19 1937    Physical Exam:     Physical Exam   Constitutional: He is oriented to person, place, and time  The patient appears much more comfortable today  He has no rigors  No motion a liability  The patient is not diaphoretic  HENT:   Mouth/Throat: Oropharynx is clear and moist  No oropharyngeal exudate  Eyes: Pupils are equal, round, and reactive to light  Conjunctivae are normal    Neck: Neck supple  No JVD present  Cardiovascular: Normal rate  Slightly irregular rhythm   Pulmonary/Chest: Effort normal  He has no wheezes  He has no rales  Abdominal: Soft  Bowel sounds are normal  He exhibits no distension  There is no tenderness     Musculoskeletal: He exhibits no edema    Lymphadenopathy:     He has no cervical adenopathy  Neurological: He is alert and oriented to person, place, and time  Skin:   Overall his color looks better without pallor as he had yesterday  Additional Data:     Labs:      Results from last 7 days  Lab Units 01/10/19  0746   WBC Thousand/uL 13 55*   HEMOGLOBIN g/dL 8 5*   HEMATOCRIT % 28 2*   PLATELETS Thousands/uL 200   LYMPHO PCT % 5*   MONO PCT % 6   EOS PCT % 0       Results from last 7 days  Lab Units 01/10/19  0713   POTASSIUM mmol/L 4 5   CHLORIDE mmol/L 104   CO2 mmol/L 24   BUN mg/dL 30*   CREATININE mg/dL 1 43*   CALCIUM mg/dL 7 5*   ALK PHOS U/L 93   ALT U/L 41   AST U/L 45           * I Have Reviewed All Lab Data Listed Above  * Additional Pertinent Lab Tests Reviewed:  Qasim Minaya Admission Reviewed    Imaging:    Imaging Reports Reviewed Today Include: all imaging for admission  Imaging Personally Reviewed by Myself Includes:  None    Recent Cultures (last 7 days):       Results from last 7 days  Lab Units 01/09/19 2106 01/09/19 2027   GRAM STAIN RESULT   --  Gram positive cocci in pairs and chains  Gram positive cocci in chains   INFLUENZA B PCR  None Detected  --    RSV PCR  None Detected  --        Last 24 Hours Medication List:     Current Facility-Administered Medications:  acetaminophen 650 mg Oral BID Clarnce Kinds, DO    acetaminophen 650 mg Oral Q6H PRN Clarnce Kinds, DO    apixaban 5 mg Oral BID Clarnce Kinds, DO    carvedilol 25 mg Oral BID With Meals Clarnce Kinds, DO    cefepime 2,000 mg Intravenous Q12H Clarnce Kinds, DO Last Rate: 2,000 mg (01/10/19 1030)   cholecalciferol 5,000 Units Oral Daily aMrty Fields, DO    co-enzyme Q-10 100 mg Oral Daily Clarnce Kinds, DO    cyanocobalamin 1,000 mcg Oral Daily Clarnce Kinds, DO    gabapentin 300 mg Oral TID Clarnce Kinds, DO    ibuprofen 400 mg Oral Q6H PRN Josesito Birch PA-C    insulin detemir 22 Units Subcutaneous HS Marty Donnie, DO    insulin lispro 1-5 Units Subcutaneous HS Colon Reamer, DO    insulin lispro 2-12 Units Subcutaneous TID AC Marty Fields, DO    lisinopril-hydrochlorothiazide (PRINZIDE 20/12  5) combo dose  Oral Daily Colon Reamer, DO    magnesium oxide 400 mg Oral Daily Colon Reamer, DO    ondansetron 4 mg Intravenous Q6H PRN Colon Reamer, DO    pantoprazole 40 mg Oral Early Morning Colon Reamer, DO    sertraline 100 mg Oral Daily Colon Reamer, DO    sodium chloride 125 mL/hr Intravenous Continuous Colon Reamer, DO Last Rate: 125 mL/hr (01/10/19 6696)   traZODone 50 mg Oral HS Colon Reamer, DO    vancomycin 15 mg/kg Intravenous Q12H Colon Reamer, DO Last Rate: 1,500 mg (01/10/19 1109)   zolpidem 10 mg Oral HS PRN Colon Reamer, DO         Today, Patient Was Seen By: Colon Reamer, DO    ** Please Note: This note has been constructed using a voice recognition system   **

## 2019-01-11 NOTE — CONSULTS
Consultation - Infectious Disease   Ulysses Forbes 72 y o  male MRN: [id number]  Unit/Bed#: -01 Encounter: [id number]    IMPRESSION & RECOMMENDATIONS:   1  Sepsis  POA  Fever and leukocytosis  Secondary to alpha hemolytic strep bacteremia  T-max was 104 4°  Flu/RSV PCR was negative  Urinalysis was bland  Patient's procalcitonin is elevated at 7  18  TTE was performed and was negative for valvular vegetation  Patient has MRI of the lumbar and cervical spine results pending, concerned that his bacteremia may have seeded given his excruciating back pain  Today the patient is clinically stable  He is afebrile and his WBC count has normalized  -antibiotic as below  -check CBCD and BMP tomorrow  -follow up blood cultures  -follow up repeat blood cultures  -follow-up MRI results of the lumbar and cervical spine  -monitor vitals  -supportive care    2  Alpha hemolytic strep bacteremia  Likely secondary to oral source  Patient with recent foreign body stuck in his left lower gum  He does still have some palpable fluid in his right elbow but the elbow itself does not appear erythematous or painful at this time  I reviewed patient's x-ray of the mandible which showed peridontal disease and dental caries along the lower posterior molars  Patient would benefit from additional imaging with CT scan as well as assessment by OMFS  Patient had TTE today which was negative for valvular vegetation  He does have hardware in his right shoulder but is not experiencing any right shoulder pain or erythema over the joint at this time  Concern has bacteremia may have seeded to his back given his excruciating back pain, MRI of the cervical lumbar spine results are pending  Patient has been taking IV vancomycin and IV cefepime    Will deescalate patient to IV ceftriaxone today   -stop IV vancomycin  -stop IV cefepime  -start high-dose IV ceftriaxone, 2 g every 24 hr  -check CBC and BMP tomorrow  -follow up blood cultures  -follow up repeat blood cultures  -check CT of the facial bones  -follow-up MRI of the cervical lumbar spine  -monitor vitals    3  Acute kidney injury  Unfortunately patient's creatinine has continued to increase since his admission  Consider Nephrology consult   -check BMP tomorrow  -dose adjust antibiotics renal function as needed  -consider Nephrology consult    4  Type 2 diabetes mellitus  Patient's blood cultures since admission have ranged between 122 and 170  No hemoglobin A1c in medical record to review  Recommend tight glycemic control for overall health as well as in the setting of acute infection   -blood glucose management per primary service  -recommend checking hemoglobin A1c      5  AFib  Patient is now back on his Eliquis  Discussed in detail with patient and his daughter at the bedside  Discussed in detail with [facility] attending, Dr Zayda Bustamante  HISTORY OF PRESENT ILLNESS:  Reason for Consult:  Sepsis due to unspecified organism, bacteremia  HPI: Emanuel Edward is a 72y o  year old male who presented to the [facility] Emergency Department on 01/09/2019 with generalized weakness  Patient has been significantly debilitated for while and has limited ability to ambulate  He has no other acute complaints  Upon arrival to the ED the patient's temperature was 97 5° with a heart rate of 66  His white blood cell count was 7 33  His creatinine was 1 19 with a GFR of 61  Patient became very emotional in the ED and upon further conversation he admitted that he is really unable to take care of himself anymore  He is also upset because his wife has advanced Parkinson's disease and he does not feel that he can care for her either  The patient was admitted for additional medical management  Shortly after his admission the patient had a change in mental status  He developed a fever of 102 8  Blood cultures were checked  A flu/RSV PCR was checked    Urinalysis was performed  His procalcitonin was checked and was elevated at 2 71  A chest x-ray was performed which showed cardiomegaly with possible vascular congestion  Patient was started on IV vancomycin and cefepime  Patient reported significant pain in his lower back  MRI of the lumbar and cervical spine are pending  TTE was performed today and was negative  Patient's procalcitonin has increased to 7 18 today  His blood cultures are now showing alpha hemolytic strep  Repeat blood cultures are pending  We have been asked for formal culture for sepsis due to unspecified organism in bacteremia  Patient's daughter is at the bedside  She reports he's had many skin issues over the past year  Reports he routinely has toe wounds from jamming his feet with his electric wheel chair  Also reports that he had a large bursitis of the R elbow which required drainage and antibiotics at [facility]  Reports he was supposed to go back for a second drainage but never did because he felt it was better  Reports she was told it was a staph infection  The patient reports that recently he had pain in the L lower jaw and found a "piece of plastic" stuck into his gum  Reports he pulled it out and the pain went away  Patient has a past medical and surgical history significant for hypertension, AFib, PTSD, type 2 diabetes mellitus, Agent Orange exposure, cholecystectomy, right shoulder replacement, and cardiac ablation  He has an allergy to atorvastatin  REVIEW OF SYSTEMS:  Patient reports he is having a lot of back pain  Reports pain is worse in the neck and the lumbar region  He reports he normally has trouble moving his legs but he feels the problems worse today because of the pain  Reports he has had some sinus congestion and runny nose with clear discharge  No sore throat or cough  He does not feel short of breath  Denies chest pain  Denies abdominal pain, nausea, vomiting, diarrhea  Denies dysuria    Denies headache and dizziness  Reports his feelings of weakness persist and he feels he has not got any stronger since his admission  He does not have pain in his left lower jaw today  He is able to chew and swallow his food without difficulty  A complete 12 point system-based review of systems is otherwise negative  PAST MEDICAL HISTORY:  Past Medical History:   Diagnosis Date    [facility] [address] [facility]     Diabetes [facility] [address] [facility]     H/O agent Orange exposure     Hypertension     PTSD (post-traumatic stress disorder)      Past Surgical History:   Procedure Laterality Date    ABLATION OF DYSRHYTHMIC FOCUS      for a-fib    CHOLECYSTECTOMY      TOTAL SHOULDER REPLACEMENT Right      FAMILY HISTORY:  Non-contributory    SOCIAL HISTORY:  Social History   History   Alcohol Use No     History   Drug Use No     History   Smoking Status    Former Smoker    Quit date: 1987   Smokeless Tobacco    Never Used     ALLERGIES:  Allergies   Allergen Reactions    Atorvastatin Other (See Comments)     Muscle cramps     MEDICATIONS:  All current active medications have been reviewed      ANTIBIOTICS:  Vancomycin 3  Cefepime 3  Antibiotics 3    PHYSICAL EXAM:  Temp:  [97 4 °F (36 3 °C)-98 4 °F (36 9 °C)] 98 1 °F (36 7 °C)  HR:  [56-62] 56  Resp:  [18-20] 18  BP: (114-126)/(64-70) 126/69  SpO2:  [97 %-100 %] 97 %  Temp (24hrs), Av 9 °F (36 6 °C), Min:97 4 °F (36 3 °C), Max:98 4 °F (36 9 °C)  Current: Temperature: 98 1 °F (36 7 °C)    Intake/Output Summary (Last 24 hours) at 19 1138  Last data filed at 19 1183   Gross per 24 hour   Intake             1140 ml   Output              200 ml   Net              940 ml     General Appearance:  Appearing chronically ill, in no distress, somewhat disheveled   Head:  Normocephalic, without obvious abnormality, atraumatic   Eyes:  Conjunctiva pink and sclera anicteric, both eyes   Nose: Nares normal, mucosa normal, no drainage   Throat: Oropharynx moist without lesions   Mouth: Poor lower dentition with multiple caries and silver crowns, some teeth are missing on the lower jaw, gums do not appear acutely inflamed or erythematous   Neck: Supple, symmetrical, no adenopathy, no tenderness/mass/nodules   Back:   Symmetric, no curvature, ROM limited due to pain, no CVA tenderness   Lungs:   Clear to auscultation bilaterally, respirations unlabored   Chest Wall:  No tenderness or deformity   Heart:  RRR; no murmur, rub or gallop   Abdomen:   Soft, non-tender, non-distended, positive bowel sounds    Extremities: No cyanosis or clubbing, no edema   Skin: No rashes or lesions  Small intact scabs scattered along the right toes, no erythema or edema, right foot is cold to touch; patient with a superficial ulceration on the left dorsal 2nd toe, small amount of serous drainage on Band-Aid, trace surrounding erythema which extends down the left 2nd toe, left foot is cold to touch   Lymph nodes: Cervical, supraclavicular nodes normal   Neurologic: Alert and oriented times 3, lower extremity strength extremely diminished, equal hand grasps, follows all commands     LABS, IMAGING, & OTHER STUDIES:  Lab Results:  I have personally reviewed pertinent labs      Results from last 7 days  Lab Units 01/11/19  0345 01/10/19  0746 01/09/19  1516   WBC Thousand/uL 5 95 13 55* 7 33   HEMOGLOBIN g/dL 8 2* 8 5* 11 0*   PLATELETS Thousands/uL 194 200 256       Results from last 7 days  Lab Units 01/11/19  0345 01/10/19  0713 01/10/19  0548   POTASSIUM mmol/L 3 6 4 5 2 9*   CHLORIDE mmol/L 108 104 114*   CO2 mmol/L 23 24 17*   BUN mg/dL 34* 30* 22   CREATININE mg/dL 1 33* 1 43* 0 99   EGFR ml/min/1 73sq m 53 49 76   CALCIUM mg/dL 7 8* 7 5* 5 6*   AST U/L 29 45 26   ALT U/L 34 41 26   ALK PHOS U/L 87 93 61       Results from last 7 days  Lab Units 01/09/19  2106 01/09/19 2027   GRAM STAIN RESULT   --  Gram positive cocci in chains  Gram positive cocci in pairs and chains   INFLUENZA B PCR  None Detected  --    RSV PCR  None Detected  --      Imaging Studies:   I have personally reviewed pertinent imaging study reports and images in PACS  XR MANDIBLE 1/11/19  FINDINGS:  Moderate periodontal disease   Dental caries involving the lower posterior most molars bilaterally   Fractured crown of the lower right posterior most molar  No evidence of acute fracture  No lytic or blastic lesion  The paranasal sinuses are clear  Impression:     Moderate periodontal disease  XR FEMUR 2 VW RIGHT 1/10/19  FINDINGS:  There is no fracture or dislocation  No degenerative changes  No lytic or blastic lesions are seen   Bowel gas projecting over the right inferior pubic ramus limits evaluation of the pubic bone  Vascular calcifications are noted medial and dorsal to the femur   There appears to be a 1 2 cm metallic or heavily calcified object projecting over the distal medial thigh   On the lateral projection however, this appears to be extrinsic to the patient  Impression:     No acute osseous abnormality   No radiopaque foreign body identified to preclude the patient from MR imaging  XR CHEST PORTABLE 1/10/19  FINDINGS:  Heart shadow is enlarged but unchanged from prior exam   The lungs are clear  There may be mild vascular congestion though this appearance may be technical  No pneumothorax or pleural effusion  There is severe degenerative change of the right shoulder  Impression:     Cardiomegaly with possible vascular congestion  This would be better evaluated with PA and lateral x-rays there is clinical concern       Other Studies:   Repeat blood cultures are pending

## 2019-01-11 NOTE — PHYSICAL THERAPY NOTE
PHYSICAL THERAPY NOTE  Patient Name: Milady Bermudez  Today's Date: 1/11/2019  MRI performed, but imaging not read  Will follow   Luiz Lester PT

## 2019-01-12 PROBLEM — R26.2 AMBULATORY DYSFUNCTION: Status: ACTIVE | Noted: 2019-01-09

## 2019-01-12 PROBLEM — N17.9 AKI (ACUTE KIDNEY INJURY) (HCC): Status: RESOLVED | Noted: 2019-01-12 | Resolved: 2019-01-12

## 2019-01-12 PROBLEM — A41.9 SEPSIS (HCC): Status: RESOLVED | Noted: 2018-11-24 | Resolved: 2019-01-12

## 2019-01-12 PROBLEM — N17.9 AKI (ACUTE KIDNEY INJURY) (HCC): Status: ACTIVE | Noted: 2019-01-12

## 2019-01-12 PROBLEM — R78.81 BACTEREMIA: Status: ACTIVE | Noted: 2019-01-12

## 2019-01-12 LAB
ANION GAP SERPL CALCULATED.3IONS-SCNC: 11 MMOL/L (ref 4–13)
BUN SERPL-MCNC: 27 MG/DL (ref 5–25)
CALCIUM SERPL-MCNC: 8.1 MG/DL (ref 8.3–10.1)
CHLORIDE SERPL-SCNC: 109 MMOL/L (ref 100–108)
CO2 SERPL-SCNC: 22 MMOL/L (ref 21–32)
CREAT SERPL-MCNC: 1.12 MG/DL (ref 0.6–1.3)
GFR SERPL CREATININE-BSD FRML MDRD: 66 ML/MIN/1.73SQ M
GLUCOSE SERPL-MCNC: 115 MG/DL (ref 65–140)
GLUCOSE SERPL-MCNC: 134 MG/DL (ref 65–140)
GLUCOSE SERPL-MCNC: 180 MG/DL (ref 65–140)
GLUCOSE SERPL-MCNC: 191 MG/DL (ref 65–140)
GLUCOSE SERPL-MCNC: 213 MG/DL (ref 65–140)
POTASSIUM SERPL-SCNC: 3.8 MMOL/L (ref 3.5–5.3)
SODIUM SERPL-SCNC: 142 MMOL/L (ref 136–145)

## 2019-01-12 PROCEDURE — 99232 SBSQ HOSP IP/OBS MODERATE 35: CPT | Performed by: INTERNAL MEDICINE

## 2019-01-12 PROCEDURE — 82948 REAGENT STRIP/BLOOD GLUCOSE: CPT

## 2019-01-12 PROCEDURE — 80048 BASIC METABOLIC PNL TOTAL CA: CPT | Performed by: INTERNAL MEDICINE

## 2019-01-12 RX ORDER — LISINOPRIL 20 MG/1
20 TABLET ORAL DAILY
Status: DISCONTINUED | OUTPATIENT
Start: 2019-01-12 | End: 2019-01-13

## 2019-01-12 RX ORDER — TRAZODONE HYDROCHLORIDE 50 MG/1
50 TABLET ORAL
Status: DISCONTINUED | OUTPATIENT
Start: 2019-01-12 | End: 2019-01-17 | Stop reason: HOSPADM

## 2019-01-12 RX ORDER — GABAPENTIN 300 MG/1
300 CAPSULE ORAL 3 TIMES DAILY
Status: DISCONTINUED | OUTPATIENT
Start: 2019-01-12 | End: 2019-01-17 | Stop reason: HOSPADM

## 2019-01-12 RX ORDER — HYDRALAZINE HYDROCHLORIDE 20 MG/ML
10 INJECTION INTRAMUSCULAR; INTRAVENOUS EVERY 6 HOURS PRN
Status: DISCONTINUED | OUTPATIENT
Start: 2019-01-12 | End: 2019-01-17 | Stop reason: HOSPADM

## 2019-01-12 RX ADMIN — PANTOPRAZOLE SODIUM 40 MG: 40 TABLET, DELAYED RELEASE ORAL at 06:10

## 2019-01-12 RX ADMIN — ACETAMINOPHEN 650 MG: 325 TABLET, FILM COATED ORAL at 08:04

## 2019-01-12 RX ADMIN — INSULIN DETEMIR 22 UNITS: 100 INJECTION, SOLUTION SUBCUTANEOUS at 21:35

## 2019-01-12 RX ADMIN — INSULIN LISPRO 4 UNITS: 100 INJECTION, SOLUTION INTRAVENOUS; SUBCUTANEOUS at 12:00

## 2019-01-12 RX ADMIN — SERTRALINE HYDROCHLORIDE 100 MG: 100 TABLET ORAL at 08:05

## 2019-01-12 RX ADMIN — LISINOPRIL 20 MG: 20 TABLET ORAL at 16:07

## 2019-01-12 RX ADMIN — INSULIN LISPRO 2 UNITS: 100 INJECTION, SOLUTION INTRAVENOUS; SUBCUTANEOUS at 16:08

## 2019-01-12 RX ADMIN — INSULIN LISPRO 1 UNITS: 100 INJECTION, SOLUTION INTRAVENOUS; SUBCUTANEOUS at 21:36

## 2019-01-12 RX ADMIN — ACETAMINOPHEN 650 MG: 325 TABLET, FILM COATED ORAL at 17:05

## 2019-01-12 RX ADMIN — MAGNESIUM OXIDE TAB 400 MG (241.3 MG ELEMENTAL MG) 400 MG: 400 (241.3 MG) TAB at 08:04

## 2019-01-12 RX ADMIN — CARVEDILOL 25 MG: 12.5 TABLET, FILM COATED ORAL at 16:06

## 2019-01-12 RX ADMIN — GABAPENTIN 300 MG: 300 CAPSULE ORAL at 16:06

## 2019-01-12 RX ADMIN — APIXABAN 5 MG: 5 TABLET, FILM COATED ORAL at 17:05

## 2019-01-12 RX ADMIN — SODIUM CHLORIDE 125 ML/HR: 0.9 INJECTION, SOLUTION INTRAVENOUS at 07:52

## 2019-01-12 RX ADMIN — GABAPENTIN 300 MG: 300 CAPSULE ORAL at 21:35

## 2019-01-12 RX ADMIN — CARVEDILOL 25 MG: 12.5 TABLET, FILM COATED ORAL at 08:05

## 2019-01-12 RX ADMIN — TRAZODONE HYDROCHLORIDE 50 MG: 50 TABLET ORAL at 21:35

## 2019-01-12 RX ADMIN — CYANOCOBALAMIN TAB 500 MCG 1000 MCG: 500 TAB at 08:05

## 2019-01-12 RX ADMIN — VITAMIN D, TAB 1000IU (100/BT) 5000 UNITS: 25 TAB at 08:04

## 2019-01-12 RX ADMIN — Medication 100 MG: at 08:05

## 2019-01-12 RX ADMIN — CEFTRIAXONE SODIUM 2000 MG: 10 INJECTION, POWDER, FOR SOLUTION INTRAVENOUS at 21:35

## 2019-01-12 RX ADMIN — SODIUM CHLORIDE 125 ML/HR: 0.9 INJECTION, SOLUTION INTRAVENOUS at 00:51

## 2019-01-12 NOTE — CONSULTS
The patient's Vancomycin therapy has been completed/discontinued  Thank you for this consult; Pharmacy will sign-off now

## 2019-01-12 NOTE — PHYSICAL THERAPY NOTE
Physical Therapy Cancellation Note        Chart check performed  Pt is pending neurosurgery consult regarding MRI findings  Will follow and perform eval as appropriate      Jose Alberto Lopez PT

## 2019-01-12 NOTE — PROGRESS NOTES
Progress Note - Infectious Disease   [place] Vicky 72 y o  male MRN: [id number]  Unit/Bed#: -01 Encounter: [id number]      Impression/Recommendations:  1  Sepsis  POA  Fever and leukocytosis  Due to #2  Flu/RSV PCR was negative  Urinalysis was bland  Clinically stable  Improving  -antibiotic as below  -Follow temperatures closely  -supportive care     2  Alpha hemolytic strep bacteremia  Likely secondary to #2  Repeat blood cultures and TTE negative    -continue high-dose IV ceftriaxone  -  -follow up repeat blood cultures     3  Dental infection  History of foreign body versus broken tooth left mandible  CT also shows periapical abscess right maxillary 2nd molar    -outpatient dental evaluation  4   Acute on chronic back pain  Consider discitis/osteomyelitis given #2      -follow up MRI     5  Acute kidney injury  Likely multifactorial   Improving  -check BMP tomorrow  -dose adjust antibiotics renal function as needed     6  Type 2 diabetes mellitus    -blood glucose management per primary service  -recommend checking hemoglobin A1c      Antibiotics:  Ceftriaxone #2  Antibiotics #4    Subjective:  Patient seen on AM rounds  Denies fevers, chills, sweats, nausea, vomiting, or diarrhea  24 Hour Events:  No documented fevers, chills, sweats, nausea, vomiting, or diarrhea  Objective:  Vitals:  Temp:  [98 °F (36 7 °C)-98 2 °F (36 8 °C)] 98 1 °F (36 7 °C)  HR:  [56-64] 63  Resp:  [18-20] 20  BP: (129-182)/(65-90) 182/90  SpO2:  [96 %-98 %] 98 %  Temp (24hrs), Av 1 °F (36 7 °C), Min:98 °F (36 7 °C), Max:98 2 °F (36 8 °C)  Current: Temperature: 98 1 °F (36 7 °C)    Physical Exam:   General:  No acute distress  Psychiatric:  Awake and alert  Mouth:  Multiple capped teeth    Gum discoloration right posterior maxilla without tenderness  Pulmonary:  Normal respiratory excursion without accessory muscle use  CV:  No murmurs  Abdomen:  Soft, nontender  Extremities:  No edema  Skin:  No brody    Lab Results:  I have personally reviewed pertinent labs  Results from last 7 days  Lab Units 01/12/19  0605 01/11/19  0345 01/10/19  0713 01/10/19  0548   POTASSIUM mmol/L 3 8 3 6 4 5 2 9*   CHLORIDE mmol/L 109* 108 104 114*   CO2 mmol/L 22 23 24 17*   BUN mg/dL 27* 34* 30* 22   CREATININE mg/dL 1 12 1 33* 1 43* 0 99   EGFR ml/min/1 73sq m 66 53 49 76   CALCIUM mg/dL 8 1* 7 8* 7 5* 5 6*   AST U/L  --  29 45 26   ALT U/L  --  34 41 26   ALK PHOS U/L  --  87 93 61       Results from last 7 days  Lab Units 01/11/19  0345 01/10/19  0746 01/09/19  1516   WBC Thousand/uL 5 95 13 55* 7 33   HEMOGLOBIN g/dL 8 2* 8 5* 11 0*   PLATELETS Thousands/uL 194 200 256       Results from last 7 days  Lab Units 01/10/19  2109 01/09/19  2106 01/09/19  2027   BLOOD CULTURE  No Growth at 24 hrs  No Growth at 24 hrs   --  Alpha Hemolytic Streptococcus NOT Enterococcus, NOT S  pneumoniae*  Alpha Hemolytic Streptococcus NOT Enterococcus, NOT S  pneumoniae*   GRAM STAIN RESULT   --   --  Gram positive cocci in pairs and chains  Gram positive cocci in chains   INFLUENZA B PCR   --  None Detected  --    RSV PCR   --  None Detected  --        Imaging Studies:   I have personally reviewed pertinent imaging study reports and images in PACS  EKG, Pathology, and Other Studies:   I have personally reviewed pertinent reports

## 2019-01-12 NOTE — PROGRESS NOTES
[address] Internal Medicine Progress Note  Patient: Marcelyn Aase 72 y o  male   MRN: [id number]  PCP: Pavan Damian MD  Unit/Bed#: -Tisha Encounter: [id number]  Date Of Visit: 01/12/19    Assessment:    Principal Problem:    Bacteremia  Active Problems:    Hypertension    Atrial fibrillation ([facility] [address] )    Type 2 diabetes mellitus ([facility] [address] )    Generalized weakness    Ambulatory dysfunction      Plan:    · Alpha Hemolytic Strep Bacteremia with Sepsis POA-   · Source - Likely due to dental infection  · Antibiotics - Rocephin high dose currently  Antibiotics day # 4  · Antipyretic - Tylenol will be given  · Evaluation -   · Follow up final results of repeat blood cultures  · Nursing never able to get a urinalysis with reflex to culture due to incontinence  Now, unfortunately, it would not likely show anything  · Trend WBC counts and procalcitonin levels  · CT facial bones shows a tiny periapical abscess involving roots of the right maxillary 2nd molar  Outpatient dental evaluation  · Negative testing -   · Urinalysis negative  · No clear vegetations on transthoracic echocardiogram   · No osteomyelitis on MRI of the cervical and lumbar spines  · ID team following  · Ambulatory Dysfunction -   · PT / OT evaluation  · Severe spinal disease seen on MRI imaging  We can consult neurosurgery to see patient  · Consider neurosurgery vs  Orthopedic evaluation  · Diabetes Mellitus type 2 - levemir at home dose  Insulin sliding scale  Monitor blood sugars  · Atrial Fibrillation -   · Rate / Rhythm control - coreg  · Anticoagulation - Eliquis being restarted  · Acute Kidney Injury -   · Resolved  · Resume home doses of gabapentin and trazodone  · No obstruction on renal ultrasound  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  · Hold IV fluids  · Monitor I/O   · BMP in am   · Essential Hypertension - resume lisinopril, but hold HCTZ still  Add PRN hydralazine  OK to stop IV fluids  Monitor blood pressures  VTE Pharmacologic Prophylaxis:   Pharmacologic: Apixaban (Eliquis)  Mechanical VTE Prophylaxis in Place: Yes    Patient Centered Rounds: I have performed bedside rounds with nursing staff today  Discussions with Specialists or Other Care Team Provider: Dr Eric Acevedo  Education and Discussions with Family / Patient:  Updated daughter at bedside  Time Spent for Care: 30 minutes  More than 50% of total time spent on counseling and coordination of care as described above  Current Length of Stay: 3 day(s)    Current Patient Status: Inpatient   Certification Statement: The patient will continue to require additional inpatient hospital stay due to evaluation and treatment of infection / bacteremia  Discharge Plan / Estimated Discharge Date: to be determined  Code Status: Level 1 - Full Code      Subjective: The patient continues to feel better each day  He has had no fevers or chills  No rigors  The patient had a little bit of nausea around lunchtime so he skipped lunch but otherwise seems to be eating and drinking okay  He denies any abdominal pain, diarrhea, or constipation  The patient denies any dyspnea  His blood pressures have been running a little bit higher today having been off his lisinopril and hydrochlorothiazide and running IV fluids continuously since admission  Objective:     Vitals:   Temp (24hrs), Av 1 °F (36 7 °C), Min:98 °F (36 7 °C), Max:98 2 °F (36 8 °C)    Temp:  [98 °F (36 7 °C)-98 2 °F (36 8 °C)] 98 1 °F (36 7 °C)  HR:  [61-64] 64  Resp:  [18-20] 18  BP: (129-182)/(65-90) 165/79  SpO2:  [96 %-98 %] 97 %  Body mass index is 27 69 kg/m²  Input and Output Summary (last 24 hours):        Intake/Output Summary (Last 24 hours) at 19 1435  Last data filed at 19 1101   Gross per 24 hour   Intake              880 ml   Output              929 ml   Net              -49 ml       Physical Exam:     Physical Exam   Constitutional: He is oriented to person, place, and time  No distress  Seen is much more awake alert and sitting up at a 45 degree angle today which is better than previous visits  HENT:   Mouth/Throat: Oropharynx is clear and moist  No oropharyngeal exudate  Eyes: Pupils are equal, round, and reactive to light  Conjunctivae are normal    Cardiovascular: Normal rate  Slightly irregular rhythm   Pulmonary/Chest: Effort normal  He has no wheezes  He has no rales  Abdominal: Soft  Bowel sounds are normal  He exhibits no distension  There is no tenderness  Obese   Musculoskeletal: He exhibits no edema  Neurological: He is alert and oriented to person, place, and time  Motor is 3/5 to left lower extremity and 4/5 proximal RLE and 5/5 distal RLE  Motor in the  is 5/5 bilaterally, but at shoulder, range of motion is grossly limited with strength of 4/5 bilaterally  Skin: Skin is warm and dry  Vitals reviewed  Additional Data:     Labs:      Results from last 7 days  Lab Units 01/11/19  0345   WBC Thousand/uL 5 95   HEMOGLOBIN g/dL 8 2*   HEMATOCRIT % 26 9*   PLATELETS Thousands/uL 194   LYMPHO PCT % 8*   MONO PCT % 12   EOS PCT % 4       Results from last 7 days  Lab Units 01/12/19  0605 01/11/19  0345   POTASSIUM mmol/L 3 8 3 6   CHLORIDE mmol/L 109* 108   CO2 mmol/L 22 23   BUN mg/dL 27* 34*   CREATININE mg/dL 1 12 1 33*   CALCIUM mg/dL 8 1* 7 8*   ALK PHOS U/L  --  87   ALT U/L  --  34   AST U/L  --  29           * I Have Reviewed All Lab Data Listed Above  * Additional Pertinent Lab Tests Reviewed: All Labs Within Last 24 Hours Reviewed    Imaging:    Imaging Reports Reviewed Today Include: MRI spines, renal ultrasound, and CT facial bones  Imaging Personally Reviewed by Myself Includes:  None    Recent Cultures (last 7 days):       Results from last 7 days  Lab Units 01/10/19  2109 01/09/19  2106 01/09/19  2027   BLOOD CULTURE  No Growth at 24 hrs    No Growth at 24 hrs   --  Streptococcus anginosus*  Gemella morbillorum* Streptococcus constellatus*   GRAM STAIN RESULT   --   --  Gram positive cocci in chains  Gram positive cocci in pairs and chains   INFLUENZA B PCR   --  None Detected  --    RSV PCR   --  None Detected  --        Last 24 Hours Medication List:     Current Facility-Administered Medications:  acetaminophen 650 mg Oral BID Armani Soto, DO    acetaminophen 650 mg Oral Q6H PRN Armani Soto DO    carvedilol 25 mg Oral BID With Meals Armani Soto DO    cefTRIAXone 2,000 mg Intravenous Q24H SHAI Hernandez Last Rate: 2,000 mg (01/11/19 2045)   cholecalciferol 5,000 Units Oral Daily Armani Soto, DO    co-enzyme Q-10 100 mg Oral Daily Armani Soto, DO    cyanocobalamin 1,000 mcg Oral Daily Armani Soto, DO    gabapentin 300 mg Oral HS Armani Soto, DO    ibuprofen 400 mg Oral Q6H PRN Mayra Akhtar PA-C    insulin detemir 22 Units Subcutaneous HS Armani Soto, DO    insulin lispro 1-5 Units Subcutaneous HS Marty Fields, DO    insulin lispro 2-12 Units Subcutaneous TID AC Marty Bustamante, DO    magnesium oxide 400 mg Oral Daily Marty Fields,     ondansetron 4 mg Intravenous Q6H PRN Armani Soto, DO    pantoprazole 40 mg Oral Early Morning Armani Soto, DO    sertraline 100 mg Oral Daily Armani Soto,     sodium chloride 125 mL/hr Intravenous Continuous Armani Soto DO Last Rate: 125 mL/hr (01/12/19 0752)   zolpidem 10 mg Oral HS PRN Armani Soto DO         Today, Patient Was Seen By: Armani Soto DO    ** Please Note: This note has been constructed using a voice recognition system   **

## 2019-01-13 LAB
ALBUMIN SERPL BCP-MCNC: 2.3 G/DL (ref 3.5–5)
ALP SERPL-CCNC: 105 U/L (ref 46–116)
ALT SERPL W P-5'-P-CCNC: 33 U/L (ref 12–78)
ANION GAP SERPL CALCULATED.3IONS-SCNC: 9 MMOL/L (ref 4–13)
AST SERPL W P-5'-P-CCNC: 21 U/L (ref 5–45)
BASOPHILS # BLD AUTO: 0.02 THOUSANDS/ΜL (ref 0–0.1)
BASOPHILS NFR BLD AUTO: 0 % (ref 0–1)
BILIRUB SERPL-MCNC: 0.2 MG/DL (ref 0.2–1)
BUN SERPL-MCNC: 21 MG/DL (ref 5–25)
CALCIUM SERPL-MCNC: 8.4 MG/DL (ref 8.3–10.1)
CHLORIDE SERPL-SCNC: 110 MMOL/L (ref 100–108)
CO2 SERPL-SCNC: 24 MMOL/L (ref 21–32)
CREAT SERPL-MCNC: 0.99 MG/DL (ref 0.6–1.3)
EOSINOPHIL # BLD AUTO: 0.1 THOUSAND/ΜL (ref 0–0.61)
EOSINOPHIL NFR BLD AUTO: 1 % (ref 0–6)
ERYTHROCYTE [DISTWIDTH] IN BLOOD BY AUTOMATED COUNT: 16.8 % (ref 11.6–15.1)
GFR SERPL CREATININE-BSD FRML MDRD: 76 ML/MIN/1.73SQ M
GLUCOSE SERPL-MCNC: 158 MG/DL (ref 65–140)
GLUCOSE SERPL-MCNC: 167 MG/DL (ref 65–140)
GLUCOSE SERPL-MCNC: 167 MG/DL (ref 65–140)
GLUCOSE SERPL-MCNC: 76 MG/DL (ref 65–140)
GLUCOSE SERPL-MCNC: 78 MG/DL (ref 65–140)
HCT VFR BLD AUTO: 28.1 % (ref 36.5–49.3)
HGB BLD-MCNC: 8.6 G/DL (ref 12–17)
IMM GRANULOCYTES # BLD AUTO: 0.04 THOUSAND/UL (ref 0–0.2)
IMM GRANULOCYTES NFR BLD AUTO: 1 % (ref 0–2)
LYMPHOCYTES # BLD AUTO: 1.62 THOUSANDS/ΜL (ref 0.6–4.47)
LYMPHOCYTES NFR BLD AUTO: 23 % (ref 14–44)
MCH RBC QN AUTO: 25.5 PG (ref 26.8–34.3)
MCHC RBC AUTO-ENTMCNC: 30.6 G/DL (ref 31.4–37.4)
MCV RBC AUTO: 83 FL (ref 82–98)
MONOCYTES # BLD AUTO: 0.79 THOUSAND/ΜL (ref 0.17–1.22)
MONOCYTES NFR BLD AUTO: 11 % (ref 4–12)
NEUTROPHILS # BLD AUTO: 4.64 THOUSANDS/ΜL (ref 1.85–7.62)
NEUTS SEG NFR BLD AUTO: 64 % (ref 43–75)
NRBC BLD AUTO-RTO: 0 /100 WBCS
PLATELET # BLD AUTO: 263 THOUSANDS/UL (ref 149–390)
PMV BLD AUTO: 10.3 FL (ref 8.9–12.7)
POTASSIUM SERPL-SCNC: 3.7 MMOL/L (ref 3.5–5.3)
PROT SERPL-MCNC: 6.3 G/DL (ref 6.4–8.2)
RBC # BLD AUTO: 3.37 MILLION/UL (ref 3.88–5.62)
SODIUM SERPL-SCNC: 143 MMOL/L (ref 136–145)
WBC # BLD AUTO: 7.21 THOUSAND/UL (ref 4.31–10.16)

## 2019-01-13 PROCEDURE — 99232 SBSQ HOSP IP/OBS MODERATE 35: CPT | Performed by: INTERNAL MEDICINE

## 2019-01-13 PROCEDURE — 80053 COMPREHEN METABOLIC PANEL: CPT | Performed by: INTERNAL MEDICINE

## 2019-01-13 PROCEDURE — 97163 PT EVAL HIGH COMPLEX 45 MIN: CPT

## 2019-01-13 PROCEDURE — 97530 THERAPEUTIC ACTIVITIES: CPT

## 2019-01-13 PROCEDURE — 85025 COMPLETE CBC W/AUTO DIFF WBC: CPT | Performed by: INTERNAL MEDICINE

## 2019-01-13 PROCEDURE — G8978 MOBILITY CURRENT STATUS: HCPCS

## 2019-01-13 PROCEDURE — 82948 REAGENT STRIP/BLOOD GLUCOSE: CPT

## 2019-01-13 PROCEDURE — G8979 MOBILITY GOAL STATUS: HCPCS

## 2019-01-13 RX ORDER — LISINOPRIL 20 MG/1
20 TABLET ORAL 2 TIMES DAILY
Status: DISCONTINUED | OUTPATIENT
Start: 2019-01-13 | End: 2019-01-17 | Stop reason: HOSPADM

## 2019-01-13 RX ORDER — FUROSEMIDE 20 MG/1
20 TABLET ORAL ONCE
Status: COMPLETED | OUTPATIENT
Start: 2019-01-13 | End: 2019-01-13

## 2019-01-13 RX ADMIN — INSULIN LISPRO 1 UNITS: 100 INJECTION, SOLUTION INTRAVENOUS; SUBCUTANEOUS at 22:10

## 2019-01-13 RX ADMIN — Medication 100 MG: at 08:07

## 2019-01-13 RX ADMIN — INSULIN LISPRO 2 UNITS: 100 INJECTION, SOLUTION INTRAVENOUS; SUBCUTANEOUS at 17:32

## 2019-01-13 RX ADMIN — CEFTRIAXONE SODIUM 2000 MG: 10 INJECTION, POWDER, FOR SOLUTION INTRAVENOUS at 21:00

## 2019-01-13 RX ADMIN — INSULIN DETEMIR 22 UNITS: 100 INJECTION, SOLUTION SUBCUTANEOUS at 22:09

## 2019-01-13 RX ADMIN — VITAMIN D, TAB 1000IU (100/BT) 5000 UNITS: 25 TAB at 08:02

## 2019-01-13 RX ADMIN — ACETAMINOPHEN 650 MG: 325 TABLET, FILM COATED ORAL at 17:23

## 2019-01-13 RX ADMIN — LISINOPRIL 20 MG: 20 TABLET ORAL at 17:23

## 2019-01-13 RX ADMIN — ACETAMINOPHEN 650 MG: 325 TABLET, FILM COATED ORAL at 08:01

## 2019-01-13 RX ADMIN — CARVEDILOL 25 MG: 12.5 TABLET, FILM COATED ORAL at 08:02

## 2019-01-13 RX ADMIN — MAGNESIUM OXIDE TAB 400 MG (241.3 MG ELEMENTAL MG) 400 MG: 400 (241.3 MG) TAB at 08:07

## 2019-01-13 RX ADMIN — APIXABAN 5 MG: 5 TABLET, FILM COATED ORAL at 17:23

## 2019-01-13 RX ADMIN — GABAPENTIN 300 MG: 300 CAPSULE ORAL at 22:09

## 2019-01-13 RX ADMIN — LISINOPRIL 20 MG: 20 TABLET ORAL at 08:07

## 2019-01-13 RX ADMIN — FUROSEMIDE 20 MG: 20 TABLET ORAL at 11:17

## 2019-01-13 RX ADMIN — HYDRALAZINE HYDROCHLORIDE 10 MG: 20 INJECTION INTRAMUSCULAR; INTRAVENOUS at 12:02

## 2019-01-13 RX ADMIN — SERTRALINE HYDROCHLORIDE 100 MG: 100 TABLET ORAL at 08:02

## 2019-01-13 RX ADMIN — TRAZODONE HYDROCHLORIDE 50 MG: 50 TABLET ORAL at 22:09

## 2019-01-13 RX ADMIN — CYANOCOBALAMIN TAB 500 MCG 1000 MCG: 500 TAB at 08:07

## 2019-01-13 RX ADMIN — APIXABAN 5 MG: 5 TABLET, FILM COATED ORAL at 08:06

## 2019-01-13 RX ADMIN — INSULIN LISPRO 2 UNITS: 100 INJECTION, SOLUTION INTRAVENOUS; SUBCUTANEOUS at 12:02

## 2019-01-13 RX ADMIN — GABAPENTIN 300 MG: 300 CAPSULE ORAL at 17:22

## 2019-01-13 RX ADMIN — CARVEDILOL 25 MG: 12.5 TABLET, FILM COATED ORAL at 17:22

## 2019-01-13 RX ADMIN — PANTOPRAZOLE SODIUM 40 MG: 40 TABLET, DELAYED RELEASE ORAL at 05:46

## 2019-01-13 RX ADMIN — GABAPENTIN 300 MG: 300 CAPSULE ORAL at 08:06

## 2019-01-13 NOTE — PROGRESS NOTES
[address] Internal Medicine Progress Note  Patient: Julio Cesar Moser 72 y o  male   MRN: [id number]  PCP: Rosemary Matta MD  Unit/Bed#: -01 Encounter: [id number]  Date Of Visit: 01/13/19    Assessment:    Principal Problem:    Bacteremia  Active Problems:    Hypertension    Atrial fibrillation ([facility] [address] )    Type 2 diabetes mellitus ([facility] [address] )    Generalized weakness    Ambulatory dysfunction      Plan:    · Alpha Hemolytic Strep Bacteremia with Sepsis POA-   · Source - Likely due to dental infection  · Antibiotics - Rocephin high dose currently  Antibiotics day # 5  · Antipyretic - Tylenol will be given  · Evaluation -   · Follow up final results of repeat blood cultures  · Nursing never able to get a urinalysis with reflex to culture due to incontinence  Now, unfortunately, it would not likely show anything  · Trend WBC counts and procalcitonin levels  · CT facial bones shows a tiny periapical abscess involving roots of the right maxillary 2nd molar  Outpatient dental evaluation  · Negative testing -   · Urinalysis negative  · No clear vegetations on transthoracic echocardiogram   · No osteomyelitis on MRI of the cervical and lumbar spines  · ID team following  · Ambulatory Dysfunction / Spinal Stenosis -   · PT / OT evaluation  · Severe spinal disease seen on MRI imaging  Follow up recommendations from neurosurgery  · Diabetes Mellitus type 2 - levemir at home dose  Insulin sliding scale  Monitor blood sugars  · Atrial Fibrillation -   · Rate / Rhythm control - coreg  · Anticoagulation - Eliquis  · Acute Kidney Injury -   · Resolved  · No obstruction on renal ultrasound  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  · Monitor I/O   · BMP in am   · Essential Hypertension - Change lisinopril to 40 mg daily, but hold HCTZ still  Will give single dose lasix 20 mg PO x 1 today  PRN hydralazine  Monitor blood pressures         VTE Pharmacologic Prophylaxis:   Pharmacologic: Apixaban (Eliquis)  Mechanical VTE Prophylaxis in Place: Yes    Patient Centered Rounds: I have performed bedside rounds with nursing staff today  Discussions with Specialists or Other Care Team Provider: None today  Education and Discussions with Family / Patient:  Patient only  Time Spent for Care: 30 minutes  More than 50% of total time spent on counseling and coordination of care as described above  Current Length of Stay: 4 day(s)    Current Patient Status: Inpatient   Certification Statement: The patient will continue to require additional inpatient hospital stay due to evaluation and treatment of infection / bacteremia  Discharge Plan / Estimated Discharge Date: to be determined  Code Status: Level 1 - Full Code      Subjective: The patient has no acute complaints today  Overall his pain is tolerable and controlled with complaints only of his typical sciatica pain  The patient states that he would like to get home to his wife who has Parkinson's disease with mental status issues related to it as soon as he can and he is eager to work with physical therapy to see where he stands  The patient has no diarrhea but is having good bowel movements  The patient denies any nausea  The patient has had no fevers or chills  His blood pressure is running a little bit high still today  The patient states that his urine output has started to really  and the last 24 hours  Objective:     Vitals:   Temp (24hrs), Av 1 °F (36 7 °C), Min:97 9 °F (36 6 °C), Max:98 2 °F (36 8 °C)    Temp:  [97 9 °F (36 6 °C)-98 2 °F (36 8 °C)] 97 9 °F (36 6 °C)  HR:  [60-65] 60  Resp:  [18] 18  BP: (152-189)/(76-98) 178/98  SpO2:  [95 %-97 %] 95 %  Body mass index is 28 22 kg/m²  Input and Output Summary (last 24 hours):        Intake/Output Summary (Last 24 hours) at 19 0912  Last data filed at 19 0430   Gross per 24 hour   Intake             1000 ml   Output              900 ml   Net              100 ml       Physical Exam: Physical Exam   Constitutional: He is oriented to person, place, and time  No distress  Seen is much more awake alert and sitting up at a 45 degree angle today which is better than previous visits  HENT:   Mouth/Throat: Oropharynx is clear and moist  No oropharyngeal exudate  Eyes: Pupils are equal, round, and reactive to light  Conjunctivae are normal    Cardiovascular: Normal rate  Slightly irregular rhythm   Pulmonary/Chest: Effort normal  He has no wheezes  He has no rales  Abdominal: Soft  Bowel sounds are normal  He exhibits no distension  There is no tenderness  Obese   Musculoskeletal: He exhibits no edema  Neurological: He is alert and oriented to person, place, and time  Motor is 3/5 to left lower extremity and 4/5 proximal RLE and 5/5 distal RLE  Motor in the  is 5/5 bilaterally, but at shoulder, range of motion is grossly limited with strength of 4/5 bilaterally  Skin: Skin is warm and dry  Vitals reviewed  Additional Data:     Labs:      Results from last 7 days  Lab Units 01/13/19  0448   WBC Thousand/uL 7 21   HEMOGLOBIN g/dL 8 6*   HEMATOCRIT % 28 1*   PLATELETS Thousands/uL 263   NEUTROS PCT % 64   LYMPHS PCT % 23   MONOS PCT % 11   EOS PCT % 1       Results from last 7 days  Lab Units 01/13/19  0448   POTASSIUM mmol/L 3 7   CHLORIDE mmol/L 110*   CO2 mmol/L 24   BUN mg/dL 21   CREATININE mg/dL 0 99   CALCIUM mg/dL 8 4   ALK PHOS U/L 105   ALT U/L 33   AST U/L 21           * I Have Reviewed All Lab Data Listed Above  * Additional Pertinent Lab Tests Reviewed: All Labs Within Last 24 Hours Reviewed    Imaging:    Imaging Reports Reviewed Today Include: No new imaging to review  Imaging Personally Reviewed by Myself Includes:  None    Recent Cultures (last 7 days):       Results from last 7 days  Lab Units 01/10/19  2109 01/09/19  2106 01/09/19  2027   BLOOD CULTURE  No Growth at 48 hrs  No Growth at 48 hrs    --  Streptococcus anginosus*  Gemella morbillorum* Streptococcus constellatus*   GRAM STAIN RESULT   --   --  Gram positive cocci in chains  Gram positive cocci in pairs and chains   INFLUENZA B PCR   --  None Detected  --    RSV PCR   --  None Detected  --        Last 24 Hours Medication List:     Current Facility-Administered Medications:  acetaminophen 650 mg Oral BID Ryan Spinner, DO    acetaminophen 650 mg Oral Q6H PRN Ryan Spinner, DO    apixaban 5 mg Oral BID Ryan Spinner, DO    carvedilol 25 mg Oral BID With Meals Ryan Spinner, DO    cefTRIAXone 2,000 mg Intravenous Q24H SHAI Woodruff Last Rate: 2,000 mg (01/12/19 2139)   cholecalciferol 5,000 Units Oral Daily Ryan Spinner, DO    co-enzyme Q-10 100 mg Oral Daily Ryan Spinner, DO    cyanocobalamin 1,000 mcg Oral Daily Ryan Spinner, DO    gabapentin 300 mg Oral TID Ryan Spinner, DO    hydrALAZINE 10 mg Intravenous Q6H PRN Ryan Spinner, DO    ibuprofen 400 mg Oral Q6H PRN Terra Becker PA-C    insulin detemir 22 Units Subcutaneous HS Ryan Spinner, DO    insulin lispro 1-5 Units Subcutaneous HS Marty Fields, DO    insulin lispro 2-12 Units Subcutaneous TID AC Marty Naylor Flair, DO    lisinopril 20 mg Oral Daily Marty Fields, DO    magnesium oxide 400 mg Oral Daily Marty Fields, DO    ondansetron 4 mg Intravenous Q6H PRN Ryan Spinner, DO    pantoprazole 40 mg Oral Early Morning Ryan Spinner, DO    sertraline 100 mg Oral Daily Ryan Spinner, DO    traZODone 50 mg Oral HS Ryan Spinner, DO    zolpidem 10 mg Oral HS PRN Ryan Spinner, DO         Today, Patient Was Seen By: Ryan Henson DO    ** Please Note: This note has been constructed using a voice recognition system   **

## 2019-01-13 NOTE — PHYSICAL THERAPY NOTE
PHYSICAL THERAPY EVALUATION NOTE    Patient Name: Bryanna Tai  Today's Date: 1/13/2019  AGE:   72 y o  Mrn:   [id number]  ADMIT DX:  Weakness [R53 1]  Generalized weakness [R53 1]  Ambulatory dysfunction [R26 2]    Past Medical History:   Diagnosis Date    Atrial fibrillation ([facility] [address] )     Diabetes ([facility] [address] )     H/O agent Orange exposure     Hypertension     PTSD (post-traumatic stress disorder)      Length Of Stay: 4  PHYSICAL THERAPY EVALUATION :    01/13/19 1232   Pain Assessment   Pain Assessment No/denies pain   Home Living   Type of [address] [place] [address] Two level;Ramped entrance; Other (Comment)  (stairglide to 2nd floor)   Additional Comments lives w/ spouse and step son  mostly independently w/ ADLs  mobilizes w/ power wheelchair  performs stand pivot transfer w/o assistive device  no falls in last 6 months  Prior Function   Comments pt seen supine in bed  agreed to participate in PT eval  denied pain or dizziness  states feeling weak and tired  expresses concern regarding ability to function at home and wife's ability to assist    Restrictions/Precautions   Other Precautions Bed Alarm; Fall Risk   General   Additional Pertinent History contacted Dr Landon Bain by phone prior to session - confirmed pt is appropriate for PT eval and mobility assessment despite pending Neurosurgery consult     Family/Caregiver Present No   Cognition   Arousal/Participation Alert   Orientation Level Oriented X4;Other (Comment)  (pt was identified w/ full name, birth date)   Following Commands Follows one step commands with increased time or repetition   Comments room air resting pulse ox 95% and 86 BPM    RUE Assessment   RUE Assessment X  (3/5, shoulder 3-/5)   LUE Assessment   LUE Assessment X  (3/5, shoulder 3-/5)   RLE Assessment   RLE Assessment X  (3/5, ankle 3-/5)   LLE Assessment   LLE Assessment X  (3-/5, ankle dorsiflexion 1/5 plantarflexion 2+/5)   Coordination   Movements are Fluid and Coordinated 0   Coordination and Movement Description impaired coordination all extremities   Sensation X  (light touch impaired hands and right foot, absent left foot)   Bed Mobility   Supine to Sit 2  Maximal assistance   Additional items Assist x 1;HOB elevated; Increased time required;Verbal cues;LE management   Additional Comments pt stood 15 seconds, 20 seconds w/ total x1  seated rest break x 4 minutes  additional standing not possible due to fatigue  pt was unable to stand pivot transfer despite total assist    Transfers   Sit to Stand 1  Dependent   Additional items Assist x 1; Increased time required;Verbal cues  (for hand placement, LE positioning)   Stand to Sit 2  Maximal assistance   Additional items Assist x 1;Verbal cues  (for hand placement, controlled descent)   Stand pivot Unable to assess   Ambulation/Elevation   Gait pattern Not appropriate   Balance   Static Sitting Fair -   Dynamic Sitting Poor   Static Standing Zero   Activity Tolerance   Activity Tolerance Patient limited by fatigue   Nurse Made Aware spoke to Dr Paulina DONNELLY, Svetlana Cortes CM   Assessment   Prognosis Fair   Problem List Decreased strength;Decreased range of motion;Decreased endurance; Impaired balance;Decreased mobility; Decreased coordination;Decreased safety awareness; Impaired sensation   Assessment Pt presents with ambulatory dysfunction  Over the last couple of days pt notes that he is so weak that he cannot even make a transfer from 1 chair to another  Dx: alpha hemolytic strep bacteremia w/ sepsis, ambulatory dysfunction, spinal stenosis, DM, a-fib, SANDRA, and essential HTN  order placed for PT eval and tx  pt presents w/ comorbidities of a-fib, DM, agent orange exposure, HTN, PTSD, and right total shoulder replacement and personal factors of living in 2 story house and mobilizing w/ assistive device   pt presents w/ weakness, decreased ROM, decreased endurance, impaired balance, altered sensation, impaired coordination, decreased safety awareness and fall risk  these impairments are evident in findings from physical examination (weakness, decreased ROM and impaired coordination), mobility assessment (need for max to total assist for bed mobility and transfers, inability to mobilize out of bed, need for input for mobility technique), and Barthel Index: 30/100  pt needed input for mobility technique and safety  pt is at risk for falls due to physical and safety awareness deficits  pt's clinical presentation is unstable/unpredictable (evident in need for assist w/ all phases of mobility when usually mobilizing independently, inability to mobilize out of bed, need for input for mobility technique/safety)  pt needs inpatient PT tx to improve mobility deficits and progress mobility as appropriate  discharge recommendation is for inpatient rehab to reduce fall risk and maximize level of functional independence  Goals   Patient Goals be able to get up   STG Expiration Date 01/23/19   Short Term Goal #1 pt will: Increase bilateral LE strength 1/2 grade to facilitate independent mobility, Perform all bed mobility tasks w/ minx1 to decrease fall risk factors, Perform sit <---> stand transfers w/ minx1 to improve independence, Complete stand pivot transfer with least restrictive assistive device w/ modx1 w/o LOB, Increase static standing balance 1 grade to decrease risk for falls, Complete exercise program independently to increase activity tolerance, Tolerate 3 hr OOB to faciliate upright tolerance, Tolerate standing 2 minutes w/ least restrictive assistive device w/ minx1 to facilitate functional task performance and Improve Barthel Index score to 55 or greater to facilitate independence  Plan   Treatment/Interventions Functional transfer training;LE strengthening/ROM; Therapeutic exercise; Endurance training;Patient/family training;Equipment eval/education; Bed mobility   PT Frequency 5x/wk   Recommendation   Recommendation Short-term skilled PT   Barthel Index   Feeding 10   Bathing 0   Grooming Score 0   Dressing Score 0   Bladder Score 5   Bowels Score 10   Toilet Use Score 0   Transfers (Bed/Chair) Score 5   Mobility (Level Surface) Score 0   Stairs Score 0   Barthel Index Score 30     Skilled PT recommended while in hospital and upon DC to progress pt toward treatment goals       Brea Green, PT

## 2019-01-13 NOTE — PLAN OF CARE
Problem: Potential for Falls  Goal: Patient will remain free of falls  INTERVENTIONS:  - Assess patient frequently for physical needs  -  Identify cognitive and physical deficits and behaviors that affect risk of falls    -  [place] fall precautions as indicated by assessment   - Educate patient/family on patient safety including physical limitations  - Instruct patient to call for assistance with activity based on assessment  - Modify environment to reduce risk of injury  - Consider OT/PT consult to assist with strengthening/mobility   Outcome: Progressing      Problem: Prexisting or High Potential for Compromised Skin Integrity  Goal: Skin integrity is maintained or improved  INTERVENTIONS:  - Identify patients at risk for skin breakdown  - Assess and monitor skin integrity  - Assess and monitor nutrition and hydration status  - Monitor labs (i e  albumin)  - Assess for incontinence   - Turn and reposition patient  - Assist with mobility/ambulation  - Relieve pressure over bony prominences  - Avoid friction and shearing  - Provide appropriate hygiene as needed including keeping skin clean and dry  - Evaluate need for skin moisturizer/barrier cream  - Collaborate with interdisciplinary team (i e  Nutrition, Rehabilitation, etc )   - Patient/family teaching   Outcome: Progressing

## 2019-01-13 NOTE — PLAN OF CARE
Problem: PHYSICAL THERAPY ADULT  Goal: Performs mobility at highest level of function for planned discharge setting  See evaluation for individualized goals  Treatment/Interventions: Functional transfer training, LE strengthening/ROM, Therapeutic exercise, Endurance training, Patient/family training, Equipment eval/education, Bed mobility          See flowsheet documentation for full assessment, interventions and recommendations  Outcome: Progressing  Prognosis: Fair  Problem List: Decreased strength, Decreased range of motion, Decreased endurance, Impaired balance, Decreased mobility, Decreased coordination, Decreased safety awareness, Impaired sensation  Assessment: Therapist introduced roller walker use w/ mobility to address impairments noted during evaluation  Pt was noted to have improvement in mobility status w/ use of walker w/ introduction of stand pivot transfer and decreased level of assistance  Pt was unable to mobilize back to bed and needed total assist  Pt is unable to mobilize on edge of bed adequately to warrant use of slideboard  Pt continues to be at risk for falling  continued inpatient PT tx is indicated to reduce fall risk factors and progress mobility training as appropriate  Recommendation: Short-term skilled PT          See flowsheet documentation for full assessment

## 2019-01-13 NOTE — PLAN OF CARE
Problem: PHYSICAL THERAPY ADULT  Goal: Performs mobility at highest level of function for planned discharge setting  See evaluation for individualized goals  Treatment/Interventions: Functional transfer training, LE strengthening/ROM, Therapeutic exercise, Endurance training, Patient/family training, Equipment eval/education, Bed mobility          See flowsheet documentation for full assessment, interventions and recommendations  Prognosis: Fair  Problem List: Decreased strength, Decreased range of motion, Decreased endurance, Impaired balance, Decreased mobility, Decreased coordination, Decreased safety awareness, Impaired sensation  Assessment: Pt presents with ambulatory dysfunction  Over the last couple of days pt notes that he is so weak that he cannot even make a transfer from 1 chair to another  Dx: alpha hemolytic strep bacteremia w/ sepsis, ambulatory dysfunction, spinal stenosis, DM, a-fib, SANDRA, and essential HTN  order placed for PT eval and tx  pt presents w/ comorbidities of a-fib, DM, agent orange exposure, HTN, PTSD, and right total shoulder replacement and personal factors of living in 2 story house and mobilizing w/ assistive device  pt presents w/ weakness, decreased ROM, decreased endurance, impaired balance, altered sensation, impaired coordination, decreased safety awareness and fall risk  these impairments are evident in findings from physical examination (weakness, decreased ROM and impaired coordination), mobility assessment (need for max to total assist for bed mobility and transfers, inability to mobilize out of bed, need for input for mobility technique), and Barthel Index: 30/100  pt needed input for mobility technique and safety  pt is at risk for falls due to physical and safety awareness deficits   pt's clinical presentation is unstable/unpredictable (evident in need for assist w/ all phases of mobility when usually mobilizing independently, inability to mobilize out of bed, need for input for mobility technique/safety)  pt needs inpatient PT tx to improve mobility deficits and progress mobility as appropriate  discharge recommendation is for inpatient rehab to reduce fall risk and maximize level of functional independence  Recommendation: Short-term skilled PT          See flowsheet documentation for full assessment

## 2019-01-13 NOTE — PHYSICAL THERAPY NOTE
PHYSICAL THERAPY TREATMENT NOTE    Patient Name: Nicci Kline  WRFWK'M Date: 1/13/2019 01/13/19 1302   Pain Assessment   Pain Assessment No/denies pain   Restrictions/Precautions   Other Precautions Bed Alarm; Fall Risk   General   Chart Reviewed Yes   Family/Caregiver Present No   Cognition   Arousal/Participation Alert; Cooperative   Attention Attends with cues to redirect   Orientation Level Oriented X4;Other (Comment)  (pt was identified w/ full name, birth date)   Following Commands Follows one step commands with increased time or repetition   Subjective   Subjective pt agreed to participate in PT intervention  Bed Mobility   Additional Comments pt completed stand pivot transfer to chair using roller walker  pt was unable to complete stand pivot transfer back to bed  therapist performed dependent stand pivot transfer using trunk support  Transfers   Sit to Stand 2  Maximal assistance   Additional items Assist x 1; Increased time required;Verbal cues  (for hand placement, LE positioning)   Stand to Sit 2  Maximal assistance   Additional items Assist x 1;Verbal cues  (for hand placement)   Stand pivot 2  Maximal assistance   Additional items Assist x 1; Increased time required;Verbal cues  (for walker placement, posture, LE positioning)   Additional Comments when seated on edge of bed, pt performed lateral mobilization 3 feet bilaterally w/ total x1  utilized pad under pt to facilitate lateral movement  pt is not appropriate slideboard transfers     Ambulation/Elevation   Gait pattern Not appropriate   Assistive Device Rolling walker   Balance   Static Sitting Fair -   Dynamic Sitting Poor   Static Standing Zero  (w/ roller walker)   Activity Tolerance   Activity Tolerance Patient limited by fatigue   Nurse Made Aware spoke to Dr Tk DONNELLY, Tika Brown CM   Assessment   Prognosis Fair   Problem List Decreased strength;Decreased range of motion;Decreased endurance; Impaired balance;Decreased mobility; Decreased coordination;Decreased safety awareness; Impaired sensation   Assessment Therapist introduced roller walker use w/ mobility to address impairments noted during evaluation  Pt was noted to have improvement in mobility status w/ use of walker w/ introduction of stand pivot transfer and decreased level of assistance  Pt was unable to mobilize back to bed and needed total assist  Pt is unable to mobilize on edge of bed adequately to warrant use of slideboard  Pt continues to be at risk for falling  continued inpatient PT tx is indicated to reduce fall risk factors and progress mobility training as appropriate  Goals   Patient Goals be able to get up   STG Expiration Date 01/23/19   Short Term Goal #1 pt will: Increase bilateral LE strength 1/2 grade to facilitate independent mobility, Perform all bed mobility tasks w/ minx1 to decrease fall risk factors, Perform sit <---> stand transfers w/ minx1 to improve independence, Complete stand pivot transfer with least restrictive assistive device w/ modx1 w/o LOB, Increase static standing balance 1 grade to decrease risk for falls, Complete exercise program independently to increase activity tolerance, Tolerate 3 hr OOB to faciliate upright tolerance, Tolerate standing 2 minutes w/ least restrictive assistive device w/ minx1 to facilitate functional task performance and Improve Barthel Index score to 55 or greater to facilitate independence  Treatment Day 1   Plan   Treatment/Interventions Functional transfer training;LE strengthening/ROM; Therapeutic exercise; Endurance training;Patient/family training;Equipment eval/education; Bed mobility   Progress Progressing toward goals   PT Frequency 5x/wk   Recommendation   Recommendation Short-term skilled PT   Equipment Recommended Other (Comment)  (roller walker)   Additional Comments pt needs dependent means for out of bed mobilization (mechanical lift or hovermatt transition to shuttle chair)  Skilled inpatient PT recommended while in hospital to progress pt toward treatment goals      Domenic Marino, PT

## 2019-01-14 ENCOUNTER — TELEPHONE (OUTPATIENT)
Dept: NEUROSURGERY | Facility: CLINIC | Age: 74
End: 2019-01-14

## 2019-01-14 LAB
ANION GAP SERPL CALCULATED.3IONS-SCNC: 8 MMOL/L (ref 4–13)
BACTERIA BLD CULT: ABNORMAL
BUN SERPL-MCNC: 17 MG/DL (ref 5–25)
CALCIUM SERPL-MCNC: 8.5 MG/DL (ref 8.3–10.1)
CHLORIDE SERPL-SCNC: 109 MMOL/L (ref 100–108)
CO2 SERPL-SCNC: 26 MMOL/L (ref 21–32)
CREAT SERPL-MCNC: 1.03 MG/DL (ref 0.6–1.3)
GFR SERPL CREATININE-BSD FRML MDRD: 73 ML/MIN/1.73SQ M
GLUCOSE SERPL-MCNC: 100 MG/DL (ref 65–140)
GLUCOSE SERPL-MCNC: 101 MG/DL (ref 65–140)
GLUCOSE SERPL-MCNC: 106 MG/DL (ref 65–140)
GLUCOSE SERPL-MCNC: 84 MG/DL (ref 65–140)
GLUCOSE SERPL-MCNC: 94 MG/DL (ref 65–140)
GRAM STN SPEC: ABNORMAL
POTASSIUM SERPL-SCNC: 4.2 MMOL/L (ref 3.5–5.3)
SODIUM SERPL-SCNC: 143 MMOL/L (ref 136–145)

## 2019-01-14 PROCEDURE — 99232 SBSQ HOSP IP/OBS MODERATE 35: CPT | Performed by: INTERNAL MEDICINE

## 2019-01-14 PROCEDURE — 82948 REAGENT STRIP/BLOOD GLUCOSE: CPT

## 2019-01-14 PROCEDURE — 80048 BASIC METABOLIC PNL TOTAL CA: CPT | Performed by: INTERNAL MEDICINE

## 2019-01-14 PROCEDURE — 99222 1ST HOSP IP/OBS MODERATE 55: CPT | Performed by: NEUROLOGICAL SURGERY

## 2019-01-14 PROCEDURE — 99233 SBSQ HOSP IP/OBS HIGH 50: CPT | Performed by: INTERNAL MEDICINE

## 2019-01-14 RX ORDER — AMLODIPINE BESYLATE 5 MG/1
5 TABLET ORAL DAILY
Status: DISCONTINUED | OUTPATIENT
Start: 2019-01-14 | End: 2019-01-17

## 2019-01-14 RX ADMIN — APIXABAN 5 MG: 5 TABLET, FILM COATED ORAL at 08:25

## 2019-01-14 RX ADMIN — SERTRALINE HYDROCHLORIDE 100 MG: 100 TABLET ORAL at 08:26

## 2019-01-14 RX ADMIN — GABAPENTIN 300 MG: 300 CAPSULE ORAL at 21:47

## 2019-01-14 RX ADMIN — HYDRALAZINE HYDROCHLORIDE 10 MG: 20 INJECTION INTRAMUSCULAR; INTRAVENOUS at 22:56

## 2019-01-14 RX ADMIN — MAGNESIUM OXIDE TAB 400 MG (241.3 MG ELEMENTAL MG) 400 MG: 400 (241.3 MG) TAB at 08:26

## 2019-01-14 RX ADMIN — ACETAMINOPHEN 650 MG: 325 TABLET, FILM COATED ORAL at 08:26

## 2019-01-14 RX ADMIN — GABAPENTIN 300 MG: 300 CAPSULE ORAL at 17:30

## 2019-01-14 RX ADMIN — INSULIN DETEMIR 22 UNITS: 100 INJECTION, SOLUTION SUBCUTANEOUS at 21:47

## 2019-01-14 RX ADMIN — AMLODIPINE BESYLATE 5 MG: 5 TABLET ORAL at 13:15

## 2019-01-14 RX ADMIN — Medication 100 MG: at 08:26

## 2019-01-14 RX ADMIN — VITAMIN D, TAB 1000IU (100/BT) 5000 UNITS: 25 TAB at 08:26

## 2019-01-14 RX ADMIN — HYDRALAZINE HYDROCHLORIDE 10 MG: 20 INJECTION INTRAMUSCULAR; INTRAVENOUS at 03:18

## 2019-01-14 RX ADMIN — ACETAMINOPHEN 650 MG: 325 TABLET, FILM COATED ORAL at 05:48

## 2019-01-14 RX ADMIN — PANTOPRAZOLE SODIUM 40 MG: 40 TABLET, DELAYED RELEASE ORAL at 05:48

## 2019-01-14 RX ADMIN — CYANOCOBALAMIN TAB 500 MCG 1000 MCG: 500 TAB at 08:25

## 2019-01-14 RX ADMIN — CARVEDILOL 25 MG: 12.5 TABLET, FILM COATED ORAL at 17:30

## 2019-01-14 RX ADMIN — ACETAMINOPHEN 650 MG: 325 TABLET, FILM COATED ORAL at 17:31

## 2019-01-14 RX ADMIN — CARVEDILOL 25 MG: 12.5 TABLET, FILM COATED ORAL at 08:26

## 2019-01-14 RX ADMIN — LISINOPRIL 20 MG: 20 TABLET ORAL at 08:25

## 2019-01-14 RX ADMIN — GABAPENTIN 300 MG: 300 CAPSULE ORAL at 08:25

## 2019-01-14 RX ADMIN — APIXABAN 5 MG: 5 TABLET, FILM COATED ORAL at 17:31

## 2019-01-14 RX ADMIN — TRAZODONE HYDROCHLORIDE 50 MG: 50 TABLET ORAL at 21:47

## 2019-01-14 RX ADMIN — LISINOPRIL 20 MG: 20 TABLET ORAL at 17:31

## 2019-01-14 RX ADMIN — CEFTRIAXONE SODIUM 2000 MG: 10 INJECTION, POWDER, FOR SOLUTION INTRAVENOUS at 21:50

## 2019-01-14 NOTE — UTILIZATION REVIEW
Continued Stay Review    Date: 2019    Vital Signs:Temp (24hrs), Av 1 °F (36 7 °C), Min:97 9 °F (36 6 °C), Max:98 2 °F (36 8 °C)     Temp:  [97 9 °F (36 6 °C)-98 2 °F (36 8 °C)] 97 9 °F (36 6 °C)  HR:  [60-65] 60  Resp:  [18] 18  BP: (152-189)/(76-98) 178/98  SpO2:  [95 %-97 %] 95 %  Body mass index is 28 22 kg/m²  Medications:   Scheduled Meds:   Current Facility-Administered Medications:  acetaminophen 650 mg Oral BID    acetaminophen 650 mg Oral Q6H PRN    apixaban 5 mg Oral BID    carvedilol 25 mg Oral BID With Meals    cefTRIAXone 2,000 mg Intravenous Q24H Last Rate: 2,000 mg (19 2100)   cholecalciferol 5,000 Units Oral Daily    co-enzyme Q-10 100 mg Oral Daily    cyanocobalamin 1,000 mcg Oral Daily    gabapentin 300 mg Oral TID    hydrALAZINE 10 mg Intravenous Q6H PRN    ibuprofen 400 mg Oral Q6H PRN    insulin detemir 22 Units Subcutaneous HS    insulin lispro 1-5 Units Subcutaneous HS    insulin lispro 2-12 Units Subcutaneous TID AC    lisinopril 20 mg Oral BID    magnesium oxide 400 mg Oral Daily    ondansetron 4 mg Intravenous Q6H PRN    pantoprazole 40 mg Oral Early Morning    sertraline 100 mg Oral Daily    traZODone 50 mg Oral HS    zolpidem 10 mg Oral HS PRN      Continuous Infusions:    PRN Meds:     hydrALAZINE 10 mg iv - used x 1  Abnormal Labs/Diagnostic Results:   Cl 110  Total protein 6 3  Albumin 2 3  Serial glucose qid - 78; 167; 167; 158  Wbc 7 21   hgb 8 6, hct 28 1  Blood culture [[id number]] (Abnormal)  Collected: 19   Lab Status: Preliminary result Specimen: Blood from Hand, Left Updated: 19    Blood Culture Gemella morbillorum (A)     Streptococcus constellatus (A)       Age/Sex: 72 y o  male     · Assessment/Plan: Alpha Hemolytic Strep Bacteremia with Sepsis POA-   ? Source - Likely due to dental infection  ? Antibiotics - Rocephin high dose currently  Antibiotics day # 5   ? Antipyretic - Tylenol will be given  ?  Evaluation - ? Follow up final results of repeat blood cultures  ? Nursing never able to get a urinalysis with reflex to culture due to incontinence  Now, unfortunately, it would not likely show anything  ? Trend WBC counts and procalcitonin levels  ? CT facial bones shows a tiny periapical abscess involving roots of the right maxillary 2nd molar  Outpatient dental evaluation  ? Negative testing -   ? Urinalysis negative  ? No clear vegetations on transthoracic echocardiogram   ? No osteomyelitis on MRI of the cervical and lumbar spines  ? ID team following  · Ambulatory Dysfunction / Spinal Stenosis -   ? PT / OT evaluation  ? Severe spinal disease seen on MRI imaging  Follow up recommendations from neurosurgery  · Diabetes Mellitus type 2 - levemir at home dose  Insulin sliding scale  Monitor blood sugars  · Atrial Fibrillation -   ? Rate / Rhythm control - coreg  ? Anticoagulation - Eliquis  · Acute Kidney Injury -   ? Resolved  ? 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 Monitor I/O   ? BMP in am   Essential Hypertension - Change lisinopril to 40 mg daily, but hold HCTZ still  Will give single dose lasix 20 mg PO x 1 today  PRN hydralazine  Monitor blood pressures      Discharge Plan: PT recommends short term skilled PT

## 2019-01-14 NOTE — TELEPHONE ENCOUNTER
----- Message from Chanel Alonso PA-C sent at 1/14/2019  3:19 PM EST -----  Patient seen in consult by Dr Candance Gala at [facility] 1/14/19  Please call and offer outpatient appointment as indicated in consult note  Approximately 4-6 weeks as patient currently being treated for a infection  Thank you!

## 2019-01-14 NOTE — CONSULTS
Consult Note - Neurosurgery   Anna Cohen 72 y o  male MRN: [id number]  Unit/Bed#: -01 Encounter: [id number]    I have personally obtain history and examined patient  I have personally reviewed case including all pertinent investigations/studies  No neurosurgical intervention anticipated  Ongoing medical/ID management as outlined by primary teams  Time spent > 45 minutes  More than 50% of total time spent on counseling and coordination of care as described above    Assessment:    73 yo male, chronically immobile in wheelchair w hx of multiple back surgeries / DM / AF / ARF / admitted for strep sepsis  Longstanding back pain and leg weakness since 2012 when he underwent 2 lumbar surgeries at [facility], with L>R leg weakness and foot drop  Hx of fall, requiring walker x 5-6 yrs, motorized wheelchair x 1 yr  Generalized UE weakness/deconditioning  MRI demonstrate previous L3-5 decompression and fusion with supra-adjacent L2/3 stenosis, and multilevel cervical spinal spondylotic degeneration with C4/5 stenosis w/o cord compression or signal change  Plan:    1  No neurosurgical intervention indicated in the setting of chronic mobility and active systemic medical issues    2  Given chronicity of condition, in the setting of multiple previous back operations, further surgery unlikely to improve function or quality of life    3  Recommend PT, dietician, pain management as part of comprehensive outpatient fu plan    4  May fu with neurosurgery service as OP      Subjective/Objective   Chief Complaint: my legs feel weak    Subjective: awake and cooperative    Objective: alert and orientated    Intake/Output       01/14/19 0701 - 01/15/19 0700      [id number] Total       Intake    Total Intake -- -- --       Output    Urine  200  -- 200    Urine 200 -- 200    Total Output 200 -- 200       Net I/O     -200 -- -200          Invasive Devices     Peripheral Intravenous Line            Peripheral IV 01/12/19 Right Forearm 1 day                Physical Exam: /88 (BP Location: Left arm)   Pulse 62   Temp 97 7 °F (36 5 °C) (Oral)   Resp 18   Ht 6' 3" (1 905 m)   Wt 107 kg (235 lb 3 7 oz)   SpO2 96%   BMI 29 40 kg/m²     Awake, alert and cooperative  Full strength bilateral UE  Normal fine motor and coordination  Normal DTR and sensation  Moderate restriction in cervical ROM  Grade 3+/5 left hip flexor/knee extension/ankles  Reduced sensation in left leg in nonradicular distribution  Symmetrically flattened DTR bilateral LE  Negative SLR  General Appearance:    Alert, cooperative, no distress, appears stated age   Head:    Normocephalic, without obvious abnormality, atraumatic   Eyes:    PERRL, conjunctiva/corneas clear, EOM's intact, fundi     benign, both eyes        Ears:    Normal TM's and external ear canals, both ears   Nose:   Nares normal, septum midline, mucosa normal, no drainage    or sinus tenderness   Throat:   Lips, mucosa, and tongue normal; teeth and gums normal   Neck:   Supple, symmetrical, trachea midline, no adenopathy;        thyroid:  No enlargement/tenderness/nodules; no carotid    bruit or JVD   Back:     Symmetric, no curvature, ROM normal, no CVA tenderness   Lungs:     Clear to auscultation bilaterally, respirations unlabored   Chest wall:    No tenderness or deformity   Heart:    Regular rate and rhythm, S1 and S2 normal, no murmur, rub   or gallop   Abdomen:     Soft, non-tender, bowel sounds active all four quadrants,     no masses, no organomegaly   Genitalia:    Normal male without lesion, discharge or tenderness   Rectal:    Normal tone, normal prostate, no masses or tenderness;    guaiac negative stool   Extremities:   Extremities normal, atraumatic, no cyanosis or edema   Pulses:   2+ and symmetric all extremities   Skin:   Skin color, texture, turgor normal, no rashes or lesions   Lymph nodes:   Cervical, supraclavicular, and axillary nodes normal   Neurologic: CNII-XII intact  Normal strength, sensation and reflexes       throughout       Vitals: Blood pressure 168/88, pulse 62, temperature 97 7 °F (36 5 °C), temperature source Oral, resp  rate 18, height 6' 3" (1 905 m), weight 107 kg (235 lb 3 7 oz), SpO2 96 %  ,Body mass index is 29 4 kg/m²  Hemodynamic Monitoring: MAP:      Lab Results:   Lab Results   Component Value Date    SODIUM 143 01/14/2019     (H) 01/14/2019    CO2 26 01/14/2019    BUN 17 01/14/2019    CREATININE 1 03 01/14/2019    CALCIUM 8 5 01/14/2019    EGFR 73 01/14/2019       Imaging Studies: I have personally reviewed pertinent films in PACS    EKG, Pathology, and Other Studies: I have personally reviewed pertinent reports        VTE Pharmacologic Prophylaxis: Sequential compression device (Venodyne)     VTE Mechanical Prophylaxis: sequential compression device

## 2019-01-14 NOTE — PROGRESS NOTES
Progress Note - Infectious Disease   Ulysses Forbes 72 y o  male MRN: [id number]  Unit/Bed#: -01 Encounter: [id number]    Impression/Plan:  1  Sepsis  POA  Fever and leukocytosis  Secondary to Streptococcus bacteremia  T-max was 104 4°  Flu/RSV PCR was negative  Urinalysis was bland  Patient's procalcitonin was elevated at 7  18  TTE was performed and was negative for valvular vegetation  Patient MRI of the lumbar and cervical spine which did not show any drainable abscess or signs of acute infection  Today the patient is clinically stable  He is afebrile and his WBC count has normalized  Repeat blood cultures are negative after 72 hr   I suspect he may have an overlying viral sinusitis given his congestion and runny nose   -antibiotic as below  -monitor CBC and BMP  -follow up repeat blood cultures  -monitor vitals  -supportive care     2  Streptococcus bacteremia  Likely secondary to oral source  Patient with recent foreign body stuck in his left lower gum, also has several broken teeth and crowns  I reviewed patient's CT of the facial bones which showed a periapical abscess of the right maxillary 2nd molar  This will require follow-up as an outpatient with his dentist  Patient had TTE which was negative for valvular vegetation, may want MEGHA for completion  His repeat blood culture negative after 72 hrs  Patient has been on IV ceftriaxone and is tolerating without difficulty  He will require 14 days of IV antibiotics  -continue IV ceftriaxone through 1/22/19 for a total of 14 days of treatment  -monitor CBC and BMP  -weekly CBCD and BMP while on IV antibiotics  -follow up repeat blood cultures  -he should repeat blood cultures 2 weeks after completion of antibiotics to ensure clearance   -consider MEGHA  -okay to place PICC line now that repeat cultures are clear 72 hours  -monitor vitals     3  Acute kidney injury  Creatinine has improved    -monitor BMP  -dose adjust antibiotics renal function as needed    4  Back pain  I reviewed patient's MRI results of the cervical and lumbar spine  They showed worsening degenerative disease, multilevel facet joint disease, moderate to severe foraminal narrowing, and worsening central canal narrowing  No evidence of infection or drainable abscess  There is a neurosurgery consult pending   -follow up neurosurgery consult     5  Type 2 diabetes mellitus  Patient's blood cultures since admission have ranged between 76 and 278  No hemoglobin A1c in medical record to review  Recommend tight glycemic control for overall health as well as in the setting of acute infection   -blood glucose management per primary service  -recommend checking hemoglobin A1c       6  AFib  Patient is now back on his Eliquis  Updated SLIM attending, Dr Cyndy Ahn, with recommendations  Antibiotics:  Ceftriaxone 4  Antibiotics 6    Subjective:  Patient reports that he feels horrible today; Reports he just does not feel himself; Is having chills, shakes, and sweats but knows that he has not had a fever; reports he is tired and a bit achy overall; nausea, vomiting, diarrhea; is not coughing and denies shortness of breath; no pain; reports he has been getting a lot of Lasix and is tired of urinating all the time; reports his lot of sinus congestion and runny nose today; No new symptoms  Objective:  Vitals:  Temp:  [97 3 °F (36 3 °C)-98 6 °F (37 °C)] 97 7 °F (36 5 °C)  HR:  [60-79] 62  Resp:  [18] 18  BP: (150-191)/(60-96) 168/88  SpO2:  [92 %-99 %] 96 %  Temp (24hrs), Av °F (36 7 °C), Min:97 3 °F (36 3 °C), Max:98 6 °F (37 °C)  Current: Temperature: 97 7 °F (36 5 °C)    Physical Exam:   General Appearance:  Alert, interactive, somewhat disheveled, tired   Throat: Oropharynx moist without lesions      Mouth: Poor dentition, multiple caries and silver crowns, is missing some teeth on the lower jaw, no erythema or inflammation of the gums   Lungs:   Clear to auscultation bilaterally; no wheezes, rhonchi or rales; respirations unlabored   Heart:  RRR; no murmur, rub or gallop   Abdomen:   Soft, non-tender, non-distended, positive bowel sounds  Extremities: No clubbing or cyanosis, no edema   Skin: No new rashes or lesions  Labs, Imaging, & Other studies:   All pertinent labs and imaging studies were personally reviewed    Results from last 7 days  Lab Units 01/13/19  0448 01/11/19  0345 01/10/19  0746   WBC Thousand/uL 7 21 5 95 13 55*   HEMOGLOBIN g/dL 8 6* 8 2* 8 5*   PLATELETS Thousands/uL 263 194 200       Results from last 7 days  Lab Units 01/14/19  0445 01/13/19  0448  01/11/19  0345 01/10/19  0713   POTASSIUM mmol/L 4 2 3 7  < > 3 6 4 5   CHLORIDE mmol/L 109* 110*  < > 108 104   CO2 mmol/L 26 24  < > 23 24   BUN mg/dL 17 21  < > 34* 30*   CREATININE mg/dL 1 03 0 99  < > 1 33* 1 43*   EGFR ml/min/1 73sq m 73 76  < > 53 49   CALCIUM mg/dL 8 5 8 4  < > 7 8* 7 5*   AST U/L  --  21  --  29 45   ALT U/L  --  33  --  34 41   ALK PHOS U/L  --  105  --  87 93   < > = values in this interval not displayed  Results from last 7 days  Lab Units 01/10/19  2109 01/09/19  2106 01/09/19  2027   BLOOD CULTURE  No Growth at 72 hrs    No Growth at 72 hrs   --  Gemella morbillorum*  Streptococcus constellatus*  Streptococcus anginosus*   GRAM STAIN RESULT   --   --  Gram positive cocci in pairs and chains  Gram positive cocci in chains   INFLUENZA B PCR   --  None Detected  --    RSV PCR   --  None Detected  --

## 2019-01-14 NOTE — PROGRESS NOTES
[facility] Progress Note  Patient: Carlyle Paget 72 y o  male   MRN: [id number]  PCP: Annika Pepe MD  Unit/Bed#: [facility] Encounter: [id number]  Date Of Visit: 01/14/19    Assessment:    Principal Problem:    Bacteremia  Active Problems:    Hypertension    Atrial fibrillation ([facility] [address] )    Type 2 diabetes mellitus ([facility] [address] )    Generalized weakness    Ambulatory dysfunction      Plan:    · Alpha Hemolytic Strep Bacteremia with Sepsis POA-   · Source - Likely due to dental infection  · Antibiotics - Rocephin high dose currently  Antibiotics day # 6  Will need to determine when ID OK with going to PO antibiotic  · Antipyretic - Tylenol will be given  · Evaluation -   · Follow up final results of repeat blood cultures  · Nursing never able to get a urinalysis with reflex to culture due to incontinence  Now, unfortunately, it would not likely show anything  · Trend WBC counts and procalcitonin levels  · CT facial bones shows a tiny periapical abscess involving roots of the right maxillary 2nd molar  Outpatient dental evaluation  · Negative testing -   · Urinalysis negative  · No clear vegetations on transthoracic echocardiogram   · No osteomyelitis on MRI of the cervical and lumbar spines  · ID team following  · Ambulatory Dysfunction / Spinal Stenosis -   · PT / OT evaluation recommending rehab  Case  · Severe spinal disease seen on MRI imaging  Follow up recommendations from neurosurgery after they see him  · Diabetes Mellitus type 2 - levemir at home dose  Insulin sliding scale  Monitor blood sugars  · Atrial Fibrillation -   · Rate / Rhythm control - coreg  · Anticoagulation - Eliquis  · Acute Kidney Injury -   · Resolved  · No obstruction on renal ultrasound  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  · Monitor I/O   · BMP in am   · Essential Hypertension - Lisinopril 20 mg PO bid (home regimen)  PRN hydralazine  Add norvasc 5 mg PO daily  Monitor blood pressures         VTE Pharmacologic Prophylaxis:   Pharmacologic: Apixaban (Eliquis)  Mechanical VTE Prophylaxis in Place: Yes    Patient Centered Rounds: I have performed bedside rounds with nursing staff today  Discussions with Specialists or Other Care Team Provider: None today  Education and Discussions with Family / Patient:  Patient only  Time Spent for Care: 30 minutes  More than 50% of total time spent on counseling and coordination of care as described above  Current Length of Stay: 5 day(s)    Current Patient Status: Inpatient   Certification Statement: The patient will continue to require additional inpatient hospital stay due to evaluation and treatment of infection / bacteremia  Discharge Plan / Estimated Discharge Date: next 24 - 48 hours  Need neurosurgery evaluation to be done and need to finalize disposition - recommended for short term rehab, but will he go, is the question  Code Status: Level 1 - Full Code      Subjective: The patient needed a lot of assistance with physical therapy yesterday  It is a question as to how he would do at home and whether he will be able to get around  So far he seems to want to go home when he is medically ready rather than going to a rehab  Case management will be talking with the patient and will continue to have physical therapy evaluations  Objective:     Vitals:   Temp (24hrs), Av °F (36 7 °C), Min:97 3 °F (36 3 °C), Max:98 6 °F (37 °C)    Temp:  [97 3 °F (36 3 °C)-98 6 °F (37 °C)] 97 7 °F (36 5 °C)  HR:  [60-79] 62  Resp:  [18] 18  BP: (150-191)/(60-96) 168/88  SpO2:  [92 %-99 %] 96 %  Body mass index is 29 4 kg/m²  Input and Output Summary (last 24 hours):        Intake/Output Summary (Last 24 hours) at 19 1024  Last data filed at 19 2201   Gross per 24 hour   Intake              600 ml   Output              950 ml   Net             -350 ml       Physical Exam:     Physical Exam   Constitutional: He is oriented to person, place, and time  No distress  Patient is sitting up in the chair today  He is sleeping on my arrival but awakens to voice  HENT:   Mouth/Throat: Oropharynx is clear and moist  No oropharyngeal exudate  Eyes: Pupils are equal, round, and reactive to light  Conjunctivae are normal    Cardiovascular: Normal rate  Slightly irregular rhythm   Pulmonary/Chest: Effort normal  He has no wheezes  He has no rales  Abdominal: Soft  Bowel sounds are normal  He exhibits no distension  There is no tenderness  Obese   Musculoskeletal: He exhibits no edema  Neurological: He is alert and oriented to person, place, and time  Motor is 4/5 to proximal lower extremities bilaterally and 5/5 distal RLE  Motor in the  is 5/5 bilaterally, but at shoulder, range of motion is grossly limited with strength of 4/5 bilaterally  Skin: Skin is warm and dry  Vitals reviewed  Additional Data:     Labs:      Results from last 7 days  Lab Units 01/13/19  0448   WBC Thousand/uL 7 21   HEMOGLOBIN g/dL 8 6*   HEMATOCRIT % 28 1*   PLATELETS Thousands/uL 263   NEUTROS PCT % 64   LYMPHS PCT % 23   MONOS PCT % 11   EOS PCT % 1       Results from last 7 days  Lab Units 01/14/19  0445 01/13/19  0448   POTASSIUM mmol/L 4 2 3 7   CHLORIDE mmol/L 109* 110*   CO2 mmol/L 26 24   BUN mg/dL 17 21   CREATININE mg/dL 1 03 0 99   CALCIUM mg/dL 8 5 8 4   ALK PHOS U/L  --  105   ALT U/L  --  33   AST U/L  --  21           * I Have Reviewed All Lab Data Listed Above  * Additional Pertinent Lab Tests Reviewed: All Labs Within Last 24 Hours Reviewed    Imaging:    Imaging Reports Reviewed Today Include: No new imaging to review  Imaging Personally Reviewed by Myself Includes:  None    Recent Cultures (last 7 days):       Results from last 7 days  Lab Units 01/10/19  2109 01/09/19  2106 01/09/19  2027   BLOOD CULTURE  No Growth at 72 hrs    No Growth at 72 hrs   --  Gemella morbillorum*  Streptococcus constellatus*  Streptococcus anginosus*   GRAM STAIN RESULT   --   --  Gram positive cocci in pairs and chains  Gram positive cocci in chains   INFLUENZA B PCR   --  None Detected  --    RSV PCR   --  None Detected  --        Last 24 Hours Medication List:     Current Facility-Administered Medications:  acetaminophen 650 mg Oral BID Edgar Arrow, DO    acetaminophen 650 mg Oral Q6H PRN Edgar Arrow, DO    apixaban 5 mg Oral BID Edgar Arrow, DO    carvedilol 25 mg Oral BID With Meals Edgar Arrow, DO    cefTRIAXone 2,000 mg Intravenous Q24H SHAI Underwood Last Rate: 2,000 mg (01/13/19 2100)   cholecalciferol 5,000 Units Oral Daily Edgar Arrow, DO    co-enzyme Q-10 100 mg Oral Daily Edgar Arrow, DO    cyanocobalamin 1,000 mcg Oral Daily Edgar Arrow, DO    gabapentin 300 mg Oral TID Edgar Arrow, DO    hydrALAZINE 10 mg Intravenous Q6H PRN Edgar Arrow, DO    ibuprofen 400 mg Oral Q6H PRN Jelena Brunson PA-C    insulin detemir 22 Units Subcutaneous HS Edgar Arrow, DO    insulin lispro 1-5 Units Subcutaneous HS Marty Fields, DO    insulin lispro 2-12 Units Subcutaneous TID [facility], DO    lisinopril 20 mg Oral BID Edgar Arrow, DO    magnesium oxide 400 mg Oral Daily Marty Fields, DO    ondansetron 4 mg Intravenous Q6H PRN Edgar Arrow, DO    pantoprazole 40 mg Oral Early Morning Edgar Arrow, DO    sertraline 100 mg Oral Daily Edgar Arrow, DO    traZODone 50 mg Oral HS Edgar Arrow, DO    zolpidem 10 mg Oral HS PRN Edgar Arrow, DO         Today, Patient Was Seen By: Edgar Shaw DO    ** Please Note: This note has been constructed using a voice recognition system   **

## 2019-01-15 LAB
ANION GAP SERPL CALCULATED.3IONS-SCNC: 10 MMOL/L (ref 4–13)
BACTERIA BLD CULT: ABNORMAL
BACTERIA BLD CULT: ABNORMAL
BUN SERPL-MCNC: 15 MG/DL (ref 5–25)
CALCIUM SERPL-MCNC: 8.7 MG/DL (ref 8.3–10.1)
CHLORIDE SERPL-SCNC: 107 MMOL/L (ref 100–108)
CO2 SERPL-SCNC: 26 MMOL/L (ref 21–32)
CREAT SERPL-MCNC: 0.87 MG/DL (ref 0.6–1.3)
GFR SERPL CREATININE-BSD FRML MDRD: 87 ML/MIN/1.73SQ M
GLUCOSE SERPL-MCNC: 138 MG/DL (ref 65–140)
GLUCOSE SERPL-MCNC: 151 MG/DL (ref 65–140)
GLUCOSE SERPL-MCNC: 452 MG/DL (ref 65–140)
GLUCOSE SERPL-MCNC: 54 MG/DL (ref 65–140)
GLUCOSE SERPL-MCNC: 68 MG/DL (ref 65–140)
GLUCOSE SERPL-MCNC: 90 MG/DL (ref 65–140)
GRAM STN SPEC: ABNORMAL
POTASSIUM SERPL-SCNC: 3.7 MMOL/L (ref 3.5–5.3)
SODIUM SERPL-SCNC: 143 MMOL/L (ref 136–145)

## 2019-01-15 PROCEDURE — 82948 REAGENT STRIP/BLOOD GLUCOSE: CPT

## 2019-01-15 PROCEDURE — 99232 SBSQ HOSP IP/OBS MODERATE 35: CPT | Performed by: INTERNAL MEDICINE

## 2019-01-15 PROCEDURE — 80048 BASIC METABOLIC PNL TOTAL CA: CPT | Performed by: INTERNAL MEDICINE

## 2019-01-15 PROCEDURE — 99222 1ST HOSP IP/OBS MODERATE 55: CPT | Performed by: INTERNAL MEDICINE

## 2019-01-15 PROCEDURE — 99233 SBSQ HOSP IP/OBS HIGH 50: CPT | Performed by: INTERNAL MEDICINE

## 2019-01-15 RX ADMIN — CYANOCOBALAMIN TAB 500 MCG 1000 MCG: 500 TAB at 09:00

## 2019-01-15 RX ADMIN — ACETAMINOPHEN 650 MG: 325 TABLET, FILM COATED ORAL at 04:42

## 2019-01-15 RX ADMIN — CARVEDILOL 25 MG: 12.5 TABLET, FILM COATED ORAL at 16:40

## 2019-01-15 RX ADMIN — TRAZODONE HYDROCHLORIDE 50 MG: 50 TABLET ORAL at 21:47

## 2019-01-15 RX ADMIN — CEFTRIAXONE SODIUM 2000 MG: 10 INJECTION, POWDER, FOR SOLUTION INTRAVENOUS at 19:42

## 2019-01-15 RX ADMIN — GABAPENTIN 300 MG: 300 CAPSULE ORAL at 21:47

## 2019-01-15 RX ADMIN — GABAPENTIN 300 MG: 300 CAPSULE ORAL at 16:40

## 2019-01-15 RX ADMIN — Medication 100 MG: at 08:58

## 2019-01-15 RX ADMIN — LISINOPRIL 20 MG: 20 TABLET ORAL at 09:07

## 2019-01-15 RX ADMIN — ACETAMINOPHEN 650 MG: 325 TABLET, FILM COATED ORAL at 17:23

## 2019-01-15 RX ADMIN — AMLODIPINE BESYLATE 5 MG: 5 TABLET ORAL at 09:00

## 2019-01-15 RX ADMIN — CARVEDILOL 25 MG: 12.5 TABLET, FILM COATED ORAL at 08:58

## 2019-01-15 RX ADMIN — INSULIN DETEMIR 22 UNITS: 100 INJECTION, SOLUTION SUBCUTANEOUS at 21:47

## 2019-01-15 RX ADMIN — MAGNESIUM OXIDE TAB 400 MG (241.3 MG ELEMENTAL MG) 400 MG: 400 (241.3 MG) TAB at 09:00

## 2019-01-15 RX ADMIN — INSULIN LISPRO 12 UNITS: 100 INJECTION, SOLUTION INTRAVENOUS; SUBCUTANEOUS at 17:22

## 2019-01-15 RX ADMIN — VITAMIN D, TAB 1000IU (100/BT) 5000 UNITS: 25 TAB at 08:57

## 2019-01-15 RX ADMIN — APIXABAN 5 MG: 5 TABLET, FILM COATED ORAL at 17:23

## 2019-01-15 RX ADMIN — SERTRALINE HYDROCHLORIDE 100 MG: 100 TABLET ORAL at 09:00

## 2019-01-15 RX ADMIN — LISINOPRIL 20 MG: 20 TABLET ORAL at 17:23

## 2019-01-15 RX ADMIN — GABAPENTIN 300 MG: 300 CAPSULE ORAL at 09:01

## 2019-01-15 RX ADMIN — ACETAMINOPHEN 650 MG: 325 TABLET, FILM COATED ORAL at 08:59

## 2019-01-15 RX ADMIN — PANTOPRAZOLE SODIUM 40 MG: 40 TABLET, DELAYED RELEASE ORAL at 06:05

## 2019-01-15 NOTE — PLAN OF CARE
Problem: DISCHARGE PLANNING - CARE MANAGEMENT  Goal: Discharge to post-acute care or home with appropriate resources  INTERVENTIONS:  - Conduct assessment to determine patient/family and health care team treatment goals, and need for post-acute services based on payer coverage, community resources, and patient preferences, and barriers to discharge  - Address psychosocial, clinical, and financial barriers to discharge as identified in assessment in conjunction with the patient/family and health care team  - Arrange appropriate level of post-acute services according to patients   needs and preference and payer coverage in collaboration with the physician and health care team  - Communicate with and update the patient/family, physician, and health care team regarding progress on the discharge plan  - Arrange appropriate transportation to post-acute venues  Outcome: Progressing  LOS 6 DAYS  GLOS 3  PATIENT IS NOT A BUNDLE  PATIENT IS NOT A READMISSION  Patient currently lives in a bi level home in [place] [facility] with his spouse  Patient reported to  that his home has 8 steps from basement to 2nd floor  Patient reported to  that he hines in garage, uses electric wheelchair from basement to stick lift and uses a stair lift from basement to second floor  Patient reported that he has a electric wheelchair on second floor as well  Patient denies Hx of rehab and reported to  that he is current with [facility]  CM reviewed with patient PT recommendation for rehab however, patient does not have interested in rehab and wants to go home with his spouse  Patient is currently refusing rehab and is interested in transitioning from [facility] to [facility]  CM reviewed ID's note and determined that patient is going to require PICC placement with IV abx at home 1x per day   CM reviewed with patient options for home infusion and was informed that he uses the [place] for other medication but would be interested in having referral sent to [facility] infusion center to determine what the cost will be out of pocket  CM will wait for IV abx script from ID and will fax attach items in a referral to both [facility] and [facility] infusion  CM will send updated clinical information to [facility] and will send referral to [facility] Infusion [facility]  Patient requested that CM [facility] contact [facility] and requested the occupational therapist make contact with patient  Patient reported that his spouse is POA  Patient reported to CM that his spouse will pick him up at discharge  Patient does not work and will require IV Abx, [facility] for infusion, PT, and OT at discharge  CM will continue to follow through discharge  CM reviewed d/c planning process including the following: identifying help at home, patient preference for d/c planning needs, [facility] L[facility], availability of treatment team to discuss questions or concerns patient and/or family may have regarding understanding medications and recognizing signs and symptoms once discharged  CM also encouraged patient to follow up with all recommended appointments after discharge  Patient advised of importance for patient and family to participate in managing patients medical well being

## 2019-01-15 NOTE — CONSULTS
Consultation - Cardiology Team One  Henrik Chu 72 y o  male MRN: [id number]  Unit/Bed#: -01 Encounter: [id number]    Consults    Physician Requesting Consult: Ynes Aburto MD  Reason for Consult / Principal Problem:  MEGHA for bacteremia      Assessment/ Plan  Sepsis: 2/2 alpha hemolytic strep bacteremia likely from odontogenic source with possible dental abscess noted on CT scan  No osteomyelitis on MRI of cervical lumbar spines  No evidence of valvular vegetations on transthoracic echocardiogram   ID following and requesting MEGHA  Atrial fibrillation:  s/p ablation in 2014 [facility] in [place]  Rate controlled with Coreg and anticoagulated on Eliquis  Hypertension:  Average /85 on amlodipine 5 mg, carvedilol 25 mg b i d , lisinopril 20 mg b i d  Diabetes:  Management per primary team  SANDRA:  Now resolved  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  Recommendations:  NPO after midnight for MEGHA  The patient denies any difficulty swallowing or loose dentition  The procedure was explained to the patient in detail along with the risks versus benefits and patient is willing to proceed    _____________________________________________________________    CC:  Fever    History of Present Illness   HPI: Henrik Chu is a 72y o  year old male who has a history of atrial fibrillation anticoagulated on Eliquis, hypertension, hyperlipidemia, diabetes, back pain who presented to the emergency room 01/09/2019 with complaints of fever  Patient was admitted with sepsis secondary to Streptococcus bacteremia with max temperature 104° F   Patient developed acute kidney injury during admission on 01/10 with creatinine 1 43 which has since trended down to 0 87  Echocardiogram from 01/11/2019 was without evidence of valvular vegetation  Please see complete report below  Patient has been followed by infectious disease and Neurosurgery    ID feels the source of bacteremia may be odontogenic with possible dental abscess noted on CT scan  A PICC line has been ordered for which Krystle has been held  Radha Ram is also recommending a MEGHA therefore Cardiology has been consulted  Patient resting in bed during consultation denies any chest pain, shortness of breath, palpitations, lightheaded dizziness, syncope, near syncope, orthopnea, PND or lower extremity edema  He was last seen by cardiologist in 2014 when he underwent an AFib ablation at [facility] in [place]  Prior to his ablation patient states he is very symptomatic in AFib but since 2014 has not had any recurrence  Patient denies any difficulty swallowing or loose dentition  He has been followed by his primary care physician since moving to [facility]  Echocardiogram 01/11/2019:  Preserved left ventricular function with an EF of 60%, no wall motion abnormality, upper limits of normal right ventricular size with normal function, mild ROXANNA, no significant valvular disease or evidence of valvular vegetation  EKG reviewed personally:    1/9/19 normal sinus rhythm at a rate of 66 beats per minute with left axis deviation and right bundle-branch block  Compared to prior EKG from 11/2018 normal sinus rhythm with bifascicular block was noted  Telemetry reviewed personally:  Not on telemetry      Review of Systems   Constitution: Negative  Negative for chills and weakness  Cardiovascular: Negative for chest pain, dyspnea on exertion, near-syncope, orthopnea, palpitations and paroxysmal nocturnal dyspnea  Respiratory: Negative  Negative for shortness of breath  Gastrointestinal: Negative for diarrhea and nausea  Psychiatric/Behavioral: Negative for altered mental status and suicidal ideas  All other systems reviewed and are negative      Historical Information   Past Medical History:   Diagnosis Date    [facility] fibrillation [facility]     Diabetes ([facility] )     H/O agent Orange exposure     Hypertension     PTSD (post-traumatic stress disorder)      Past Surgical History:   Procedure Laterality Date    ABLATION OF DYSRHYTHMIC FOCUS      for a-fib    CHOLECYSTECTOMY      TOTAL SHOULDER REPLACEMENT Right      History   Alcohol Use No     History   Drug Use No     History   Smoking Status    Former Smoker    Quit date: 1/1/1987   Smokeless Tobacco    Never Used     Family History: History reviewed  No pertinent family history      Meds/Allergies   all current active meds have been reviewed, current meds:   Current Facility-Administered Medications   Medication Dose Route Frequency    acetaminophen (TYLENOL) tablet 650 mg  650 mg Oral BID    acetaminophen (TYLENOL) tablet 650 mg  650 mg Oral Q6H PRN    amLODIPine (NORVASC) tablet 5 mg  5 mg Oral Daily    apixaban (ELIQUIS) tablet 5 mg  5 mg Oral BID    carvedilol (COREG) tablet 25 mg  25 mg Oral BID With Meals    cefTRIAXone (ROCEPHIN) 2,000 mg in dextrose 5 % 50 mL IVPB  2,000 mg Intravenous Q24H    cholecalciferol (VITAMIN D3) tablet 5,000 Units  5,000 Units Oral Daily    co-enzyme Q-10 capsule 100 mg  100 mg Oral Daily    cyanocobalamin (VITAMIN B-12) tablet 1,000 mcg  1,000 mcg Oral Daily    gabapentin (NEURONTIN) capsule 300 mg  300 mg Oral TID    hydrALAZINE (APRESOLINE) injection 10 mg  10 mg Intravenous Q6H PRN    ibuprofen (MOTRIN) tablet 400 mg  400 mg Oral Q6H PRN    insulin detemir (LEVEMIR) subcutaneous injection 22 Units  22 Units Subcutaneous HS    insulin lispro (HumaLOG) 100 units/mL subcutaneous injection 1-5 Units  1-5 Units Subcutaneous HS    insulin lispro (HumaLOG) 100 units/mL subcutaneous injection 2-12 Units  2-12 Units Subcutaneous TID AC    lisinopril (ZESTRIL) tablet 20 mg  20 mg Oral BID    magnesium oxide (MAG-OX) tablet 400 mg  400 mg Oral Daily    ondansetron (ZOFRAN) injection 4 mg  4 mg Intravenous Q6H PRN    pantoprazole (PROTONIX) EC tablet 40 mg  40 mg Oral Early Morning    sertraline (ZOLOFT) tablet 100 mg  100 mg Oral Daily    traZODone (DESYREL) tablet 50 mg  50 mg Oral HS    zolpidem (AMBIEN) tablet 10 mg  10 mg Oral HS PRN    and PTA meds:   Prior to Admission Medications   Prescriptions Last Dose Informant Patient Reported? Taking?    CYANOCOBALAMIN PO   Yes Yes   Sig: Take 1,000 mcg by mouth     MAGNESIUM CARBONATE PO   Yes Yes   Sig: Take 400 mg by mouth daily     ZOLPIDEM TARTRATE ER PO   Yes Yes   Sig: Take 10 mg by mouth daily at bedtime as needed for sleep     acetaminophen (TYLENOL) 325 mg tablet   Yes Yes   Sig: Take 650 mg by mouth 2 (two) times a day   apixaban (ELIQUIS) 5 mg   Yes Yes   Sig: Take 5 mg by mouth 2 (two) times a day   carvedilol (COREG) 25 mg tablet   Yes Yes   Sig: Take 25 mg by mouth 2 (two) times a day with meals   cholecalciferol (VITAMIN D3) 1,000 units tablet   Yes Yes   Sig: Take 5,000 Units by mouth daily   co-enzyme Q-10 50 MG capsule   Yes Yes   Sig: Take 100 mg by mouth daily   gabapentin (NEURONTIN) 300 mg capsule   Yes Yes   Sig: Take 300 mg by mouth 3 (three) times a day     insulin aspart (NovoLOG) 100 units/mL injection   Yes Yes   Sig: Inject under the skin 3 (three) times a day before meals Sliding scale   insulin detemir (LEVEMIR) 100 units/mL subcutaneous injection   Yes Yes   Sig: Inject 22 Units under the skin daily at bedtime     lisinopril-hydrochlorothiazide (PRINZIDE,ZESTORETIC) 20-12 5 MG per tablet   Yes Yes   Sig: Take 2 tablets by mouth daily   omeprazole (PriLOSEC) 20 mg delayed release capsule   Yes Yes   Sig: Take 20 mg by mouth daily   rosuvastatin (CRESTOR) 5 mg tablet   Yes Yes   Sig: Take 5 mg by mouth daily   sertraline (ZOLOFT) 100 mg tablet   Yes Yes   Sig: Take 100 mg by mouth daily     traZODone (DESYREL) 50 mg tablet   Yes Yes   Sig: Take 50 mg by mouth daily at bedtime      Facility-Administered Medications: None          Allergies   Allergen Reactions    Atorvastatin Other (See Comments)     Muscle cramps       Objective Vitals: Blood pressure 140/72, pulse 66, temperature 97 5 °F (36 4 °C), temperature source Oral, resp  rate 18, height 6' 3" (1 905 m), weight 102 kg (223 lb 15 8 oz), SpO2 97 %  ,     Body mass index is 28 kg/m²  ,     Systolic (02ZHH), IYK:809 , Min:140 , ZWO:128     Diastolic (88ICC), BHA:61, Min:72, Max:90        Intake/Output Summary (Last 24 hours) at 01/15/19 1336  Last data filed at 01/15/19 0901   Gross per 24 hour   Intake                0 ml   Output             1775 ml   Net            -1775 ml     Weight (last 2 days)     Date/Time   Weight    01/15/19 0600  102 (223 99)    01/14/19 0600  107 (235 23)    01/13/19 0555  102 (225 75)            Invasive Devices     Peripheral Intravenous Line            Peripheral IV 01/12/19 Right Forearm 2 days                Physical Exam   Constitutional: He is oriented to person, place, and time  He appears well-developed and well-nourished  HENT:   Head: Normocephalic and atraumatic  Neck: Normal range of motion  Neck supple  Cardiovascular: Normal rate, regular rhythm and normal heart sounds  Pulmonary/Chest: Effort normal and breath sounds normal  No respiratory distress  He has no wheezes  He has no rales  Abdominal: Soft  Bowel sounds are normal    Musculoskeletal: Normal range of motion  Neurological: He is alert and oriented to person, place, and time  Weakness bilateral upper extremities and lower extremities   Skin: Skin is warm and dry  Psychiatric: He has a normal mood and affect  His behavior is normal  Judgment and thought content normal    Nursing note and vitals reviewed          LABORATORY RESULTS:    Results from last 7 days  Lab Units 01/09/19  2111 01/09/19  2027   CK TOTAL U/L 524* 530*   CK MB INDEX % 1 6 1 7     CBC with diff:   Results from last 7 days  Lab Units 01/13/19  0448 01/11/19  0345 01/10/19  0746 01/09/19  1516   WBC Thousand/uL 7 21 5 95 13 55* 7 33   HEMOGLOBIN g/dL 8 6* 8 2* 8 5* 11 0*   HEMATOCRIT % 28 1* 26 9* 28 2* 36 7   MCV fL 83 84 83 84   PLATELETS Thousands/uL 263 194 200 256   MCH pg 25 5* 25 7* 25 1* 25 1*   MCHC g/dL 30 6* 30 5* 30 1* 30 0*   RDW % 16 8* 17 1* 17 0* 16 6*   MPV fL 10 3 10 9 10 5 10 4   NRBC AUTO /100 WBCs 0 0 0 0       CMP:  Results from last 7 days  Lab Units 01/15/19  0440 01/14/19  0445 01/13/19  0448 01/12/19  0605 01/11/19  0345 01/10/19  0713 01/10/19  0548 01/09/19  1516   POTASSIUM mmol/L 3 7 4 2 3 7 3 8 3 6 4 5 2 9* 3 9   CHLORIDE mmol/L 107 109* 110* 109* 108 104 114* 101   CO2 mmol/L 26 26 24 22 23 24 17* 28   BUN mg/dL 15 17 21 27* 34* 30* 22 26*   CREATININE mg/dL 0 87 1 03 0 99 1 12 1 33* 1 43* 0 99 1 19   CALCIUM mg/dL 8 7 8 5 8 4 8 1* 7 8* 7 5* 5 6* 9 1   AST U/L  --   --  21  --  29 45 26 49*   ALT U/L  --   --  33  --  34 41 26 56   ALK PHOS U/L  --   --  105  --  87 93 61 102   EGFR ml/min/1 73sq m 87 73 76 66 53 49 76 61     BMP:  Results from last 7 days  Lab Units 01/15/19  0440 01/14/19  0445 01/13/19  0448 01/12/19  0605 01/11/19  0345 01/10/19  0713 01/10/19  0548   POTASSIUM mmol/L 3 7 4 2 3 7 3 8 3 6 4 5 2 9*   CHLORIDE mmol/L 107 109* 110* 109* 108 104 114*   CO2 mmol/L 26 26 24 22 23 24 17*   BUN mg/dL 15 17 21 27* 34* 30* 22   CREATININE mg/dL 0 87 1 03 0 99 1 12 1 33* 1 43* 0 99   CALCIUM mg/dL 8 7 8 5 8 4 8 1* 7 8* 7 5* 5 6*       No results found for: NTBNP       Results from last 7 days  Lab Units 01/09/19  2111   TSH 3RD GENERATON uIU/mL 1 865         Lipid Profile:   No results found for: CHOL  No results found for: HDL  No results found for: LDLCALC  No results found for: TRIG      Cardiac testing:   Results for orders placed during the hospital encounter of 01/09/19   Echo complete with contrast if indicated    Narrative [facility] [facility]  [phone]    Transthoracic Echocardiogram  2D, M-mode, Doppler, and Color Doppler    Study date:  11-Jan-2019    Patient: Haroon Green  MR number: [id number]  Account number: [id number]  : 1945  Age: 72 years  Gender: Male  Status: Inpatient  Location: Bedside  Height: 75 in  Weight: 226 6 lb  BP: 240/ 120 mmHg    Indications: Bacteremia - R/O endocarditis    Diagnoses: R78 81 - Bacteremia    Sonographer:  Donneta Lesch, RDCS  Primary Physician:  Gurjit Sifuentes MD  Referring Physician:  Sekou Guillermo DO  Group:  [id number]  Interpreting Physician:  Ericka Nguyễn MD    IMPRESSIONS:  There was no diagnostic echocardiographic evidence for valvular vegetation  SUMMARY    LEFT VENTRICLE:  Systolic function was normal  Ejection fraction was estimated to be 60 %  There were no regional wall motion abnormalities  RIGHT VENTRICLE:  The size was at the upper limits of normal   Systolic function was normal     HISTORY: PRIOR HISTORY: Afib, DM, Hypertension, Ablation, Former Smoker    PROCEDURE: The procedure was performed at the bedside  This was a routine study  The transthoracic approach was used  The study included complete 2D imaging, M-mode, complete spectral Doppler, and color Doppler  The heart rate was 62 bpm,  at the start of the study  Images were obtained from the parasternal, apical, subcostal, and suprasternal notch acoustic windows  Image quality was adequate  LEFT VENTRICLE: Size was normal  Systolic function was normal  Ejection fraction was estimated to be 60 %  There were no regional wall motion abnormalities  Wall thickness was normal  DOPPLER: The ratio of early ventricular filling to  atrial contraction velocities was within the normal range  Left ventricular diastolic function parameters were normal     RIGHT VENTRICLE: The size was at the upper limits of normal  Systolic function was normal     LEFT ATRIUM: The atrium was mildly dilated  RIGHT ATRIUM: The atrium was mildly dilated  MITRAL VALVE: Valve structure was normal  There was normal leaflet separation  DOPPLER: There was no evidence for stenosis   There was trace regurgitation  AORTIC VALVE: The valve was trileaflet  Leaflets exhibited mildly increased thickness and normal cuspal separation  DOPPLER: There was no evidence for stenosis  There was no regurgitation  TRICUSPID VALVE: The valve structure was normal  There was normal leaflet separation  DOPPLER: There was no evidence for stenosis  There was mild regurgitation  Estimated peak PA pressure was 25 mmHg  PULMONIC VALVE: Leaflets exhibited normal thickness, no calcification, and normal cuspal separation  DOPPLER: The transpulmonic velocity was within the normal range  There was no regurgitation  PERICARDIUM: There was no pericardial effusion  A pericardial fat pad was present  The pericardium was normal in appearance  AORTA: The root exhibited normal size  SYSTEM MEASUREMENT TABLES    2D  %FS: 21 39 %  Ao Diam: 3 05 cm  EDV(Teich): 140 88 ml  EF(Teich): 42 99 %  ESV(Teich): 80 32 ml  IVSd: 1 18 cm  LA Area: 19 17 cm2  LA Diam: 4 18 cm  LVEDV MOD A4C: 101 07 ml  LVEF MOD A4C: 62 38 %  LVESV MOD A4C: 38 02 ml  LVIDd: 5 39 cm  LVIDs: 4 24 cm  LVLd A4C: 8 26 cm  LVLs A4C: 6 97 cm  LVPWd: 1 13 cm  RA Area: 21 95 cm2  RVIDd: 3 61 cm  SV MOD A4C: 63 04 ml  SV(Teich): 60 56 ml    CW  TR MaxP 43 mmHg  TR Vmax: 2 52 m/s    MM  TAPSE: 2 15 cm    PW  E': 0 08 m/s  E/E': 9 58  MV A Adelfo: 0 43 m/s  MV [place]: 2 59 m/s2  MV DecT: 280 22 ms  MV E Adelfo: 0 73 m/s  MV E/A Ratio: 1 68  MV PHT: 81 26 ms  MVA By PHT: 2 71 cm2    [employer] Accredited Echocardiography Laboratory    Prepared and electronically signed by    Marj Martinez MD  Signed 2019 14:10:37         Imaging: I have personally reviewed pertinent reports  Xr Mandible < 4 Vw    Result Date: 2019  Narrative: MANDIBLE INDICATION:   evaluate for dental caries in bacteremic patient    COMPARISON:  None VIEWS:  XR MANDIBLE < 4 VW Images: 5 FINDINGS: Moderate periodontal disease    Dental caries involving the lower posterior most molars bilaterally  Fractured crown of the lower right posterior most molar  No evidence of acute fracture  No lytic or blastic lesion  The paranasal sinuses are clear  Impression: Moderate periodontal disease  Workstation performed: [id number]     Xr Chest Portable    Result Date: 1/10/2019  Narrative: CHEST INDICATION:   fever  COMPARISON:  12/17/2013 EXAM PERFORMED/VIEWS:  XR CHEST PORTABLE FINDINGS: Heart shadow is enlarged but unchanged from prior exam  The lungs are clear  There may be mild vascular congestion though this appearance may be technical  No pneumothorax or pleural effusion  There is severe degenerative change of the right shoulder  Impression: Cardiomegaly with possible vascular congestion  This would be better evaluated with PA and lateral x-rays there is clinical concern  Workstation performed: [id number]     Xr Femur 2 Vw Right    Result Date: 1/10/2019  Narrative: RIGHT FEMUR INDICATION:   r/o metallic foreign body  COMPARISON:  None VIEWS:  XR FEMUR 2 VW RIGHT FINDINGS: There is no fracture or dislocation  No degenerative changes  No lytic or blastic lesions are seen  Bowel gas projecting over the right inferior pubic ramus limits evaluation of the pubic bone  Vascular calcifications are noted medial and dorsal to the femur  There appears to be a 1 2 cm metallic or heavily calcified object projecting over the distal medial thigh  On the lateral projection however, this appears to be extrinsic to the patient  Impression: No acute osseous abnormality  No radiopaque foreign body identified to preclude the patient from MR imaging  Workstation performed: [id number]     Ct Facial Bones Wo Contrast    Result Date: 1/11/2019  Narrative: CT FACIAL BONES WITHOUT INTRAVENOUS CONTRAST INDICATION:   Alpha strep bacteremia  COMPARISON: None  TECHNIQUE:  Axial CT images were obtained through the facial bones with additional sagittal and coronal reconstructions   Radiation dose length product (DLP) for this visit:  555 mGy-cm   This examination, like all CT scans performed in the [facility], was performed utilizing techniques to minimize radiation dose exposure, including the use of iterative reconstruction and automated exposure control  IMAGE QUALITY:  Diagnostic  FINDINGS: FACIAL BONES:  No evidence of acute maxillofacial fracture  Hemangioma in the mental process of the mandible measuring 2 1 cm  Minimal periapical lucency surrounding the roots of the right maxillary 2nd molar  Minimal lucency surrounding the left maxillary 1st premolar implant may be postsurgical   No evidence of mandibular periapical abscess C4 anterolisthesis of 4 mm, likely chronic and secondary to severe disc and facet degenerative change, new since the MRI from 2011  ORBITS:  Intact globes  No retro-orbital inflammation or proptosis  No extraocular muscle inflammation  Normal caliber of the optic nerves SINUSES:  Minimal mucosal thickening in the maxillary sinuses and ethmoid air cells  No significant paranasal sinus disease  Visualized mastoid air cells and middle ears are well pneumatized and clear  SOFT TISSUES:  No soft tissue inflammation, gas or collection  No tonsillar inflammation or lymphadenopathy  Impression:   1  Possible tiny periapical abscess involving the roots of the right maxillary 2nd molar  No evidence of cellulitis or abscess  2   No significant paranasal sinus disease  Workstation performed: [id number]     Mri Cervical Spine W Wo Contrast    Result Date: 1/11/2019  Narrative: MRI CERVICAL SPINE WITH AND WITHOUT CONTRAST INDICATION: back pain   COMPARISON:  11 TECHNIQUE:  Sagittal T1, sagittal T2, sagittal inversion recovery, axial 2D merge and axial T2  Sagittal T1 and axial T1 postcontrast   IV Contrast:  9 mL of gadobutrol injection (MULTI-DOSE) IMAGE QUALITY:  Diagnostic   FINDINGS: ALIGNMENT:  There is a anterolisthesis of there is anterolisthesis of C4 over C5 with posterior uncovering of the disc, this is new from the previous cyst study MARROW SIGNAL:  Normal marrow signal is identified within the visualized bony structures  No discrete marrow lesion  CERVICAL AND VISUALIZED UPPER THORACIC CORD:  Normal signal within the visualized cord  PREVERTEBRAL AND PARASPINAL SOFT TISSUES:  Normal  VISUALIZED POSTERIOR FOSSA:  The visualized posterior fossa demonstrates no abnormal signal  CERVICAL DISC SPACES: C2-C3:  C2-3 level demonstrates the facet joint disease on the left side with uncovertebral spurring with severe left foraminal narrowing  There is no significant central canal narrowing  There is moderate right foraminal narrowing  There is no significant central canal narrowing  C3-C4:  Demonstrates ridging  There is facet joint disease and uncovertebral spurring  There is moderate left foraminal narrowing and mild right foraminal narrowing  C4-C5:  There is severe disc desiccation with severe loss of disc height  There is anterolisthesis of C4 over C5 with posterior uncovering of disc  There is broad-based disc bulge  There is uncovertebral spurring  There is severe right foraminal narrowing and there is severe central canal narrowing  There is mild left foraminal narrowing C5-C6:  Demonstrates disc desiccation with loss of foot disc height  There is a ventral ridging and uncovertebral spurring with facet joint disease with moderate left foraminal narrowing and severe right foraminal narrowing  There is mild central canal  narrowing C6-C7:  Demonstrates ridging with uncovertebral spurring  There is loss of disc height  There is severe left foraminal narrowing and severe right foraminal narrowing    There is mild central canal narrowing C7-T1:  Normal  UPPER THORACIC DISC SPACES:  Normal  POSTCONTRAST IMAGING:  There is no enhancing intramedullary lesion seen     Impression: Degenerative disc disease at C 4-5, C5-6 and C6/7 with loss of disc height Anterolisthesis of C4 over C5 with posterior uncovering of disc with ventral ridging and disc bulge with the severe central canal narrowing at C4-5 along with severe right foraminal narrowing Severe bilateral foraminal narrowing at C6/7 Moderate left foraminal narrowing and severe right foraminal narrowing and mild central canal narrowing at C5-6 Moderate left foraminal narrowing at C3-4 Severe left foraminal narrowing at C2-3 Multilevel facet joint disease No enhancing intramedullary lesion seen Workstation performed: [id number]     Mri Lumbar Spine W Wo Contrast    Result Date: 1/11/2019  Narrative: MRI LUMBAR SPINE WITH AND WITHOUT CONTRAST INDICATION: back pain   COMPARISON:  August 31, 2012 TECHNIQUE:  Sagittal T1, sagittal T2, sagittal inversion recovery, axial T1 and axial T2, coronal T2  Sagittal and axial T1 postcontrast  IV Contrast:  9 mL of gadobutrol injection (MULTI-DOSE) IMAGE QUALITY:  Diagnostic FINDINGS: VERTEBRAL BODIES: There is retrolisthesis of L2 over L3  No scoliosis  No compression fracture  Normal marrow signal is identified within the visualized bony structures  No discrete marrow lesion  SACRUM:  Normal signal within the sacrum  No evidence of insufficiency or stress fracture  DISTAL CORD AND CONUS:  Normal size and signal within the distal cord and conus  PARASPINAL SOFT TISSUES:  Paraspinal soft tissues are unremarkable  LOWER THORACIC DISC SPACES:  Normal disc height and signal   No disc herniation, canal stenosis or foraminal narrowing  LUMBAR DISC SPACES:  There is a posterior lumbar fusion extending from L2 through L4 Laminectomy noted at L2, L3-L4 L1-L2:  There is a central left paracentral disc protrusion which extends cranially in relation to the lower body of the L1 vertebra and effaces the epidural fat and impinges upon the traversing nerve roots    The severe left and moderate right foraminal narrowing and severe central canal narrowing L2-L3:  Demonstrates mild retrolisthesis of L2 over L3  There is no significant central canal narrowing  There is no significant right foraminal narrowing  There is mild to moderate left foraminal narrowing L3-L4:  Demonstrates ventral ridging with no significant central canal narrowing  There is mild right and no significant left foraminal narrowing L4-L5:  There is disc desiccation with severe loss of disc height with intervertebral osteochondrosis there is broad-based disc bulge with osteophyte  There is posterior laminectomy and there is moderate to severe right and severe left foraminal narrowing  There is  enhancing granulation soft tissue seen at the ventral epidural region extending into the both foramina and around the thecal sac and in the lateral recesses  L5-S1:  L5-S1 levels demonstrates severe loss of disc height  There is broad-based is Central, left subarticular recess, foraminal disc protrusion with impingement of the traversing left S1 nerve root and also impingement of the right S1 nerve root  There is narrowing of the lateral recesses  There is severe left foraminal narrowing and there is moderate to severe right foraminal narrowing  This is progressed from the previous study of August 31, 2012 POSTCONTRAST IMAGING:  No enhancing intramedullary lesion seen     Impression: Status post posterior lumbar fusion extending from L2 through L4 There is development of a central left subarticular recess, foraminal protrusion which dissects cranially in relation to the lower body of the L1 vertebra at the level of the L1-2 with the resultant severe central canal narrowing, new from the previous study  This is located cranial to the posterior spinal fusion Broad-based is Central left paracentral, subarticular recess and left foraminal disc herniation with the impingement of the traversing nerve roots with severe left and moderate to severe right foraminal narrowing    This has also worsened from the previous study of 2012 Workstation performed: [id number]     Us Kidney And Bladder    Result Date: 1/11/2019  Narrative: Ultrasound kidney and bladder  Ultrasound retroperitoneal complete  INDICATION: Urinary tract infection, acute renal failure, chronic renal failure  COMPARISON: October 6, 2016 TECHNIQUE:   Ultrasound of the complete retroperitoneum was performed with a curvilinear transducer utilizing volumetric sweeps and still imaging techniques  FINDINGS: 6 KIDNEYS: Horseshoe kidney  Right kidney:  12 cm  Left kidney:  11 cm  Right kidney Normal echogenicity and contour  No suspicious masses detected  No hydronephrosis  No shadowing calculi  No perinephric fluid collections  Left kidney Normal echogenicity and contour  No suspicious masses detected  No hydronephrosis  No shadowing calculi  No perinephric fluid collections  URETERS: Nonvisualized  BLADDER: Normally distended  No focal thickening or mass lesions  Bilateral ureteral jets detected  Impression: Horseshoe kidney  No hydronephrosis  No evidence of perirenal fluid collection  Workstation performed: [id number]     Counseling / Coordination of Care  Total floor / unit time spent today 45 minutes  Greater than 50% of total time was spent with the patient and / or family counseling and / or coordination of care  A description of the counseling / coordination of care: Review of history, current assessment, development of a plan  Code Status: Level 1 - Full Code    ** Please Note: Dragon 360 Dictation voice to text software may have been used in the creation of this document   **

## 2019-01-15 NOTE — PROGRESS NOTES
Progress Note - Adriana Charter 1945, 72 y o  male MRN: [id number]    Unit/Bed#: -01 Encounter: [id number]    Primary Care Provider: Michelle Sloan MD   Date and time admitted to hospital: 2019  1:02 PM      Alpha Hemolytic Strep Bacteremia with Sepsis POA-   C/w rocephin  MEGHA, cards consult placed  PICC line, order placed this am   Also consent obtained  Ambulatory Dysfunction / Spinal Stenosis -   PT / OT evaluation recommending rehab  Severe spinal disease seen on MRI imaging  Diabetes Mellitus type 2   levemir at home dose  Insulin sliding scale  Atrial Fibrillation -   Rate / Rhythm control - coreg  Anticoagulation - Eliquis  Acute Kidney Injury -  Creatinine is 0 87  Essential Hypertension -   Lisinopril  BP is 140/72  VTE Pharmacologic Prophylaxis:   Pharmacologic: Heparin  Mechanical VTE Prophylaxis in Place: Yes    Patient Centered Rounds: I have performed bedside rounds with nursing staff today  Discussions with Specialists or Other Care Team Provider:   Discussed with ID  Education and Discussions with Family / Patient: Discussed with patient  Time Spent for Care: 30 minutes  More than 50% of total time spent on counseling and coordination of care as described above  Current Length of Stay: 6 day(s)    Current Patient Status: Inpatient   Certification Statement: The patient will continue to require additional inpatient hospital stay due to IV ABx, PICC line, MEGHA  Discharge Plan: not medically stable for DC  Code Status: Level 1 - Full Code      Subjective:   Patient seen and examined      " I feel okay"    Objective:     Vitals:   Temp (24hrs), Av 7 °F (36 5 °C), Min:97 5 °F (36 4 °C), Max:97 8 °F (36 6 °C)    Temp:  [97 5 °F (36 4 °C)-97 8 °F (36 6 °C)] 97 5 °F (36 4 °C)  HR:  [57-66] 66  Resp:  [18] 18  BP: (140-180)/(72-90) 140/72  SpO2:  [96 %-97 %] 97 %  Body mass index is 28 kg/m²       Input and Output Summary (last 24 hours): Intake/Output Summary (Last 24 hours) at 01/15/19 1513  Last data filed at 01/15/19 0901   Gross per 24 hour   Intake                0 ml   Output             1775 ml   Net            -1775 ml       Physical Exam:     Physical Exam   Constitutional: He is oriented to person, place, and time  He appears well-developed  No distress  HENT:   Head: Normocephalic and atraumatic  Mouth/Throat: No oropharyngeal exudate  Eyes: Right eye exhibits no discharge  Left eye exhibits no discharge  No scleral icterus  Neck: No JVD present  No tracheal deviation present  No thyromegaly present  Cardiovascular: Normal rate  Exam reveals no gallop and no friction rub  No murmur heard  Pulmonary/Chest: Effort normal  No respiratory distress  He has no wheezes  He has no rales  He exhibits no tenderness  Abdominal: Soft  He exhibits no distension and no mass  There is no tenderness  There is no rebound and no guarding  Musculoskeletal: He exhibits no edema, tenderness or deformity  Lymphadenopathy:     He has no cervical adenopathy  Neurological: He is alert and oriented to person, place, and time  He displays normal reflexes  No cranial nerve deficit  He exhibits normal muscle tone  Coordination normal    Skin: Skin is warm  No rash noted  He is not diaphoretic  No erythema  No pallor  Psychiatric: He has a normal mood and affect           Additional Data:     Labs:      Results from last 7 days  Lab Units 01/13/19  0448 01/11/19  0345   WBC Thousand/uL 7 21 5 95   HEMOGLOBIN g/dL 8 6* 8 2*   HEMATOCRIT % 28 1* 26 9*   PLATELETS Thousands/uL 263 194   BANDS PCT %  --  2   NEUTROS PCT % 64  --    LYMPHS PCT % 23  --    LYMPHO PCT %  --  8*   MONOS PCT % 11  --    MONO PCT %  --  12   EOS PCT % 1 4       Results from last 7 days  Lab Units 01/15/19  0440  01/13/19  0448   SODIUM mmol/L 143  < > 143   POTASSIUM mmol/L 3 7  < > 3 7   CHLORIDE mmol/L 107  < > 110*   CO2 mmol/L 26  < > 24   BUN mg/dL 15  < > 21 CREATININE mg/dL 0 87  < > 0 99   ANION GAP mmol/L 10  < > 9   CALCIUM mg/dL 8 7  < > 8 4   ALBUMIN g/dL  --   --  2 3*   TOTAL BILIRUBIN mg/dL  --   --  0 20   ALK PHOS U/L  --   --  105   ALT U/L  --   --  33   AST U/L  --   --  21   GLUCOSE RANDOM mg/dL 68  < > 76   < > = values in this interval not displayed  Results from last 7 days  Lab Units 01/15/19  1052 01/15/19  0805 01/15/19  0741 01/14/19  2100 01/14/19  1619 01/14/19  1106 01/14/19  0731 01/13/19  2108 01/13/19  1730 01/13/19  1145 01/13/19  0719 01/12/19 2059   POC GLUCOSE mg/dl 138 90 54* 94 106 101 84 158* 167* 167* 78 191*           Results from last 7 days  Lab Units 01/11/19  0345 01/09/19 2239 01/09/19 2111 01/09/19 2027   LACTIC ACID mmol/L  --  1 4  --  2 1*   PROCALCITONIN ng/ml 7 18*  --  2 71*  --            * I Have Reviewed All Lab Data Listed Above  * Additional Pertinent Lab Tests Reviewed: Qasim 66 Admission Reviewed    Imaging:    Imaging Reports Reviewed Today Include:   CT facial bones -   "  1   Possible tiny periapical abscess involving the roots of the right maxillary 2nd molar   No evidence of cellulitis or abscess  2   No significant paranasal sinus disease "  Imaging Personally Reviewed by Myself Includes:  As above  Recent Cultures (last 7 days):       Results from last 7 days  Lab Units 01/10/19  2109 01/09/19  2106 01/09/19 2027   BLOOD CULTURE  No Growth After 4 Days  No Growth After 4 Days    --  Gemella morbillorum*  Streptococcus constellatus*  Streptococcus anginosus*   GRAM STAIN RESULT   --   --  Gram positive cocci in pairs and chains  Gram positive cocci in chains   INFLUENZA B PCR   --  None Detected  --    RSV PCR   --  None Detected  --        Last 24 Hours Medication List:     Current Facility-Administered Medications:  acetaminophen 650 mg Oral BID Kaity Talya, DO    acetaminophen 650 mg Oral Q6H PRN Kaity Talya, DO    amLODIPine 5 mg Oral Daily Marty Donnie, DO    apixaban 5 mg Oral BID Breann Harden, DO    carvedilol 25 mg Oral BID With Meals Breann Harden, DO    cefTRIAXone 2,000 mg Intravenous Q24H SHAI Anthony Last Rate: Stopped (01/14/19 7190)   cholecalciferol 5,000 Units Oral Daily Breann Harden, DO    co-enzyme Q-10 100 mg Oral Daily Breann Harden, DO    cyanocobalamin 1,000 mcg Oral Daily Breann Harden, DO    gabapentin 300 mg Oral TID Breann Harden, DO    hydrALAZINE 10 mg Intravenous Q6H PRN Breann Harden, DO    ibuprofen 400 mg Oral Q6H PRN Cedric Navarro PA-C    insulin detemir 22 Units Subcutaneous HS Breann Harden, DO    insulin lispro 1-5 Units Subcutaneous HS Marty Fields, DO    insulin lispro 2-12 Units Subcutaneous TID AC Marty Palomino, DO    lisinopril 20 mg Oral BID Breann Harden, DO    magnesium oxide 400 mg Oral Daily Breann Harden, DO    ondansetron 4 mg Intravenous Q6H PRN Breann Harden, DO    pantoprazole 40 mg Oral Early Morning Breann Harden, DO    sertraline 100 mg Oral Daily Breann Harden, DO    traZODone 50 mg Oral HS Breann Harden, DO    zolpidem 10 mg Oral HS PRN Breann Harden, DO         Today, Patient Was Seen By: Nickie Hawkins MD    ** Please Note: Dictation voice to text software may have been used in the creation of this document   **

## 2019-01-15 NOTE — ASSESSMENT & PLAN NOTE
No results found for: HGBA1C    Recent Labs      01/14/19   2100  01/15/19   0741  01/15/19   0805  01/15/19   1052   POCGLU  94  54*  90  138       Blood Sugar Average: Last 72 hrs:  (P) 606 [id number]   C/w SSI

## 2019-01-15 NOTE — ASSESSMENT & PLAN NOTE
Plan for PICC  Consent obtained  Order placed  Also for MEGHA  ID are following  Cardiology consult placed  C/w rocephin

## 2019-01-15 NOTE — PROGRESS NOTES
Progress Note - Infectious Disease   Rishabh Whyte 72 y o  male MRN: [id number]  Unit/Bed#: -01 Encounter: [id number]      Impression/Plan:  1  Sepsis   POA  Fever and leukocytosis   Secondary to Streptococcus bacteremia  T-max was 104 4°  Flu/RSV PCR was negative   Urinalysis was bland  Patient's procalcitonin was elevated at 7  25   TTE was performed and was negative for valvular vegetation  Patient MRI of the lumbar and cervical spine which did not show any drainable abscess or signs of acute infection  Today the patient is clinically stable   He is afebrile and his WBC count remains normalized  Repeat blood cultures are negative after four days    -antibiotic as below  -monitor CBC and BMP  -follow up repeat blood cultures  -monitor vitals  -supportive care     2   Streptococcus bacteremia  Darrol Malady secondary to oral source   Patient with recent foreign body stuck in his left lower gum, also has several broken teeth and crowns  CT of the facial bones showed a periapical abscess of the right maxillary 2nd molar  This will require follow-up as an outpatient with his dentist  Patient had TTE which was negative for valvular vegetation  Recommend MEGHA to confirm duration of antibiotic treatment  His repeat blood cultures are negative after four days  Patient has been on IV ceftriaxone and is tolerating without difficulty  He will require 14 days of IV antibiotics if MEGHA is negative    -continue IV ceftriaxone through 1/22/19 for a total of 14 days of treatment if MEGHA is negative  -monitor CBC and BMP  -weekly CBCD and BMP while on IV antibiotics  -follow up repeat blood cultures  -he should repeat blood cultures 2 weeks after completion of antibiotics to ensure clearance   -follow-up MEGHA  -okay to place PICC line now that repeat cultures are clear 72 hours  -monitor vitals     3  Acute kidney injury  Creatinine continues to improve   -monitor BMP  -dose adjust antibiotics renal function as needed     4  Back pain  MRI results of the cervical and lumbar spine showed worsening degenerative disease, multilevel facet joint disease, moderate to severe foraminal narrowing, and worsening central canal narrowing  No evidence of infection or drainable abscess  Neurosurgery consult appreciated  No plans for immediate intervention at this time  He will continue to follow up with Neurosurgery as outpatient   -continue follow-up with Neurosurgery     5  Type 2 diabetes mellitus   Patient's blood cultures since admission have ranged between 76 and 278  No hemoglobin A1c in medical record to review   Recommend tight glycemic control for overall health as well as in the setting of acute infection   -blood glucose management per primary service  -recommend checking hemoglobin A1c       6   AFib   Patient is now back on his Eliquis  Discussed in detail with CHRISTOPHER attending, Dr Marinelli [facility]  Antibiotics:  Ceftriaxone 5  Antibiotics 7    Subjective:  Patient reports he's feeling a bit better today  Reports his mind is at ease since speaking to our case management staff and working out some of his home issues  Reports he knows he needs a test and PICC before going home  He has no acute complaints today  He denies fever, chills, sweats; no nausea, vomiting, diarrhea; no cough, shortness of breath; no pain  No new symptoms  Objective:  Vitals:  Temp:  [97 5 °F (36 4 °C)-97 8 °F (36 6 °C)] 97 5 °F (36 4 °C)  HR:  [57-66] 66  Resp:  [18] 18  BP: (168-180)/(88-90) 170/88  SpO2:  [96 %-97 %] 97 %  Temp (24hrs), Av 7 °F (36 5 °C), Min:97 5 °F (36 4 °C), Max:97 8 °F (36 6 °C)  Current: Temperature: 97 5 °F (36 4 °C)    Physical Exam:   General Appearance:  Alert, interactive, nontoxic, no acute distress  Throat: Oropharynx moist without lesions      Mouth: No inflammation or erythema of the gums, multiple caries and silver crowns   Lungs:   Clear to auscultation bilaterally; no wheezes, rhonchi or rales; respirations unlabored Heart:  RRR; no murmur, rub or gallop   Abdomen:   Soft, non-tender, non-distended, positive bowel sounds  Extremities: No clubbing or cyanosis, no edema   Skin: No new rashes or lesions  No draining wounds noted  Labs, Imaging, & Other studies:   All pertinent labs and imaging studies were personally reviewed    Results from last 7 days  Lab Units 01/13/19 0448 01/11/19 0345 01/10/19  0746   WBC Thousand/uL 7 21 5 95 13 55*   HEMOGLOBIN g/dL 8 6* 8 2* 8 5*   PLATELETS Thousands/uL 263 194 200       Results from last 7 days  Lab Units 01/15/19  0440  01/13/19  0448  01/11/19  0345 01/10/19  0713   POTASSIUM mmol/L 3 7  < > 3 7  < > 3 6 4 5   CHLORIDE mmol/L 107  < > 110*  < > 108 104   CO2 mmol/L 26  < > 24  < > 23 24   BUN mg/dL 15  < > 21  < > 34* 30*   CREATININE mg/dL 0 87  < > 0 99  < > 1 33* 1 43*   EGFR ml/min/1 73sq m 87  < > 76  < > 53 49   CALCIUM mg/dL 8 7  < > 8 4  < > 7 8* 7 5*   AST U/L  --   --  21  --  29 45   ALT U/L  --   --  33  --  34 41   ALK PHOS U/L  --   --  105  --  87 93   < > = values in this interval not displayed  Results from last 7 days  Lab Units 01/10/19  2109 01/09/19  2106 01/09/19  2027   BLOOD CULTURE  No Growth After 4 Days  No Growth After 4 Days    --  Gemella morbillorum*  Streptococcus constellatus*  Streptococcus anginosus*   GRAM STAIN RESULT   --   --  Gram positive cocci in pairs and chains  Gram positive cocci in chains   INFLUENZA B PCR   --  None Detected  --    RSV PCR   --  None Detected  --

## 2019-01-15 NOTE — SOCIAL WORK
LOS 6 DAYS  GLOS 3  PATIENT IS NOT A BUNDLE  PATIENT IS NOT A READMISSION  Patient [place] with [place]  Patient reported to CM that his home has 8 steps from basement to 2nd floor  Patient reported to CM that he ihnes in garage, uses electric wheelchair from basement to stick lift and uses a stair lift from basement to second floor  Patient reported that he has a electric wheelchair on second floor as well  Patient denies Hx of rehab and reported to CM that he is current with [facility]  CM reviewed with patient PT recommendation for rehab however, patient does not have interested in rehab and wants to go home with his spouse  Patient is currently refusing rehab and is interested in [facility]  CM reviewed ID's note and determined that patient is going to require PICC placement with IV abx at home 1x per day  CM reviewed with patient options for home infusion and was informed that he uses the [facility] for other medication but would be interested in having referral sent to [facility] infusion center to determine what the cost will be out of pocket  CM will wait for IV abx script from ID and will fax attach items in a referral to both [facility] and [facility]  CM will send updated clinical information to [facility] and will send referral to [facility] Infusion [facility]  [facility] that [facility] and requested the occupational therapist make contact with patient  Patient reported that his spouse is POA  Patient reported to CM that his spouse will pick him up at discharge  Patient does not work and will require IV Abx, [facility] for infusion, PT, and OT at discharge  CM will continue to follow through discharge       CM reviewed d/c planning process including the following: identifying help at home, patient preference for d/c planning needs, Discharge Lounge, [facility] Meds to Bed program, availability of treatment team to discuss questions or concerns patient and/or family may have regarding understanding medications and recognizing signs and symptoms once discharged  CM also encouraged patient to follow up with all recommended appointments after discharge  Patient advised of importance for patient and family to participate in managing patients medical well being

## 2019-01-16 ENCOUNTER — APPOINTMENT (INPATIENT)
Dept: NON INVASIVE DIAGNOSTICS | Facility: HOSPITAL | Age: 74
DRG: 872 | End: 2019-01-16
Payer: MEDICARE

## 2019-01-16 ENCOUNTER — APPOINTMENT (INPATIENT)
Dept: RADIOLOGY | Facility: HOSPITAL | Age: 74
DRG: 872 | End: 2019-01-16
Payer: MEDICARE

## 2019-01-16 DIAGNOSIS — R78.81 BACTEREMIA: Primary | ICD-10-CM

## 2019-01-16 LAB
ALBUMIN SERPL BCP-MCNC: 2.5 G/DL (ref 3.5–5)
ALP SERPL-CCNC: 99 U/L (ref 46–116)
ALT SERPL W P-5'-P-CCNC: 31 U/L (ref 12–78)
ANION GAP SERPL CALCULATED.3IONS-SCNC: 10 MMOL/L (ref 4–13)
AST SERPL W P-5'-P-CCNC: 18 U/L (ref 5–45)
BACTERIA BLD CULT: NORMAL
BACTERIA BLD CULT: NORMAL
BASOPHILS # BLD AUTO: 0.04 THOUSANDS/ΜL (ref 0–0.1)
BASOPHILS NFR BLD AUTO: 0 % (ref 0–1)
BILIRUB SERPL-MCNC: 0.2 MG/DL (ref 0.2–1)
BUN SERPL-MCNC: 17 MG/DL (ref 5–25)
CALCIUM SERPL-MCNC: 8.5 MG/DL (ref 8.3–10.1)
CHLORIDE SERPL-SCNC: 107 MMOL/L (ref 100–108)
CO2 SERPL-SCNC: 26 MMOL/L (ref 21–32)
CREAT SERPL-MCNC: 0.95 MG/DL (ref 0.6–1.3)
EOSINOPHIL # BLD AUTO: 0.16 THOUSAND/ΜL (ref 0–0.61)
EOSINOPHIL NFR BLD AUTO: 2 % (ref 0–6)
ERYTHROCYTE [DISTWIDTH] IN BLOOD BY AUTOMATED COUNT: 17 % (ref 11.6–15.1)
EST. AVERAGE GLUCOSE BLD GHB EST-MCNC: 183 MG/DL
GFR SERPL CREATININE-BSD FRML MDRD: 80 ML/MIN/1.73SQ M
GLUCOSE SERPL-MCNC: 125 MG/DL (ref 65–140)
GLUCOSE SERPL-MCNC: 130 MG/DL (ref 65–140)
GLUCOSE SERPL-MCNC: 198 MG/DL (ref 65–140)
GLUCOSE SERPL-MCNC: 66 MG/DL (ref 65–140)
GLUCOSE SERPL-MCNC: 70 MG/DL (ref 65–140)
GLUCOSE SERPL-MCNC: 90 MG/DL (ref 65–140)
GLUCOSE SERPL-MCNC: 94 MG/DL (ref 65–140)
HBA1C MFR BLD: 8 % (ref 4.2–6.3)
HCT VFR BLD AUTO: 31.5 % (ref 36.5–49.3)
HGB BLD-MCNC: 9.7 G/DL (ref 12–17)
IMM GRANULOCYTES # BLD AUTO: 0.09 THOUSAND/UL (ref 0–0.2)
IMM GRANULOCYTES NFR BLD AUTO: 1 % (ref 0–2)
LYMPHOCYTES # BLD AUTO: 1.72 THOUSANDS/ΜL (ref 0.6–4.47)
LYMPHOCYTES NFR BLD AUTO: 19 % (ref 14–44)
MCH RBC QN AUTO: 25.7 PG (ref 26.8–34.3)
MCHC RBC AUTO-ENTMCNC: 30.8 G/DL (ref 31.4–37.4)
MCV RBC AUTO: 84 FL (ref 82–98)
MONOCYTES # BLD AUTO: 0.99 THOUSAND/ΜL (ref 0.17–1.22)
MONOCYTES NFR BLD AUTO: 11 % (ref 4–12)
NEUTROPHILS # BLD AUTO: 5.96 THOUSANDS/ΜL (ref 1.85–7.62)
NEUTS SEG NFR BLD AUTO: 67 % (ref 43–75)
NRBC BLD AUTO-RTO: 0 /100 WBCS
PLATELET # BLD AUTO: 412 THOUSANDS/UL (ref 149–390)
PMV BLD AUTO: 10.1 FL (ref 8.9–12.7)
POTASSIUM SERPL-SCNC: 3.8 MMOL/L (ref 3.5–5.3)
PROT SERPL-MCNC: 6.6 G/DL (ref 6.4–8.2)
RBC # BLD AUTO: 3.77 MILLION/UL (ref 3.88–5.62)
SODIUM SERPL-SCNC: 143 MMOL/L (ref 136–145)
WBC # BLD AUTO: 8.96 THOUSAND/UL (ref 4.31–10.16)

## 2019-01-16 PROCEDURE — 71045 X-RAY EXAM CHEST 1 VIEW: CPT

## 2019-01-16 PROCEDURE — 93320 DOPPLER ECHO COMPLETE: CPT | Performed by: INTERNAL MEDICINE

## 2019-01-16 PROCEDURE — 83036 HEMOGLOBIN GLYCOSYLATED A1C: CPT | Performed by: INTERNAL MEDICINE

## 2019-01-16 PROCEDURE — 93312 ECHO TRANSESOPHAGEAL: CPT | Performed by: INTERNAL MEDICINE

## 2019-01-16 PROCEDURE — 99232 SBSQ HOSP IP/OBS MODERATE 35: CPT | Performed by: INTERNAL MEDICINE

## 2019-01-16 PROCEDURE — 71046 X-RAY EXAM CHEST 2 VIEWS: CPT

## 2019-01-16 PROCEDURE — 36569 INSJ PICC 5 YR+ W/O IMAGING: CPT

## 2019-01-16 PROCEDURE — 97530 THERAPEUTIC ACTIVITIES: CPT

## 2019-01-16 PROCEDURE — 93325 DOPPLER ECHO COLOR FLOW MAPG: CPT | Performed by: INTERNAL MEDICINE

## 2019-01-16 PROCEDURE — 93312 ECHO TRANSESOPHAGEAL: CPT

## 2019-01-16 PROCEDURE — C1751 CATH, INF, PER/CENT/MIDLINE: HCPCS

## 2019-01-16 PROCEDURE — 80053 COMPREHEN METABOLIC PANEL: CPT | Performed by: INTERNAL MEDICINE

## 2019-01-16 PROCEDURE — 82948 REAGENT STRIP/BLOOD GLUCOSE: CPT

## 2019-01-16 PROCEDURE — 85025 COMPLETE CBC W/AUTO DIFF WBC: CPT | Performed by: INTERNAL MEDICINE

## 2019-01-16 RX ORDER — PROPOFOL 10 MG/ML
INJECTION, EMULSION INTRAVENOUS AS NEEDED
Status: DISCONTINUED | OUTPATIENT
Start: 2019-01-16 | End: 2019-01-16 | Stop reason: SURG

## 2019-01-16 RX ORDER — LIDOCAINE HYDROCHLORIDE 10 MG/ML
INJECTION, SOLUTION INFILTRATION; PERINEURAL AS NEEDED
Status: DISCONTINUED | OUTPATIENT
Start: 2019-01-16 | End: 2019-01-16 | Stop reason: SURG

## 2019-01-16 RX ORDER — SODIUM CHLORIDE 9 MG/ML
INJECTION, SOLUTION INTRAVENOUS CONTINUOUS PRN
Status: DISCONTINUED | OUTPATIENT
Start: 2019-01-16 | End: 2019-01-16 | Stop reason: SURG

## 2019-01-16 RX ORDER — DEXTROSE MONOHYDRATE 25 G/50ML
25 INJECTION, SOLUTION INTRAVENOUS AS NEEDED
Status: DISCONTINUED | OUTPATIENT
Start: 2019-01-16 | End: 2019-01-17 | Stop reason: HOSPADM

## 2019-01-16 RX ADMIN — GABAPENTIN 300 MG: 300 CAPSULE ORAL at 21:31

## 2019-01-16 RX ADMIN — TRAZODONE HYDROCHLORIDE 50 MG: 50 TABLET ORAL at 21:31

## 2019-01-16 RX ADMIN — PROPOFOL 100 MG: 10 INJECTION, EMULSION INTRAVENOUS at 10:24

## 2019-01-16 RX ADMIN — LISINOPRIL 20 MG: 20 TABLET ORAL at 17:21

## 2019-01-16 RX ADMIN — ACETAMINOPHEN 650 MG: 325 TABLET, FILM COATED ORAL at 11:27

## 2019-01-16 RX ADMIN — CEFTRIAXONE SODIUM 2000 MG: 2 INJECTION, POWDER, FOR SOLUTION INTRAMUSCULAR; INTRAVENOUS at 17:56

## 2019-01-16 RX ADMIN — APIXABAN 5 MG: 5 TABLET, FILM COATED ORAL at 11:27

## 2019-01-16 RX ADMIN — PROPOFOL 25 MG: 10 INJECTION, EMULSION INTRAVENOUS at 10:28

## 2019-01-16 RX ADMIN — GABAPENTIN 300 MG: 300 CAPSULE ORAL at 16:25

## 2019-01-16 RX ADMIN — SODIUM CHLORIDE: 9 INJECTION, SOLUTION INTRAVENOUS at 09:11

## 2019-01-16 RX ADMIN — Medication 100 MG: at 11:27

## 2019-01-16 RX ADMIN — GABAPENTIN 300 MG: 300 CAPSULE ORAL at 11:28

## 2019-01-16 RX ADMIN — VITAMIN D, TAB 1000IU (100/BT) 5000 UNITS: 25 TAB at 11:28

## 2019-01-16 RX ADMIN — CYANOCOBALAMIN TAB 500 MCG 1000 MCG: 500 TAB at 11:27

## 2019-01-16 RX ADMIN — ACETAMINOPHEN 650 MG: 325 TABLET, FILM COATED ORAL at 17:21

## 2019-01-16 RX ADMIN — SERTRALINE HYDROCHLORIDE 100 MG: 100 TABLET ORAL at 11:27

## 2019-01-16 RX ADMIN — MAGNESIUM OXIDE TAB 400 MG (241.3 MG ELEMENTAL MG) 400 MG: 400 (241.3 MG) TAB at 11:27

## 2019-01-16 RX ADMIN — CARVEDILOL 25 MG: 12.5 TABLET, FILM COATED ORAL at 16:25

## 2019-01-16 RX ADMIN — PROPOFOL 25 MG: 10 INJECTION, EMULSION INTRAVENOUS at 10:26

## 2019-01-16 RX ADMIN — DEXTROSE MONOHYDRATE 25 ML: 25 INJECTION, SOLUTION INTRAVENOUS at 08:21

## 2019-01-16 RX ADMIN — APIXABAN 5 MG: 5 TABLET, FILM COATED ORAL at 17:21

## 2019-01-16 RX ADMIN — LISINOPRIL 20 MG: 20 TABLET ORAL at 11:27

## 2019-01-16 RX ADMIN — LIDOCAINE HYDROCHLORIDE ANHYDROUS 100 MG: 10 INJECTION, SOLUTION INFILTRATION at 10:24

## 2019-01-16 RX ADMIN — AMLODIPINE BESYLATE 5 MG: 5 TABLET ORAL at 11:28

## 2019-01-16 RX ADMIN — INSULIN DETEMIR 22 UNITS: 100 INJECTION, SOLUTION SUBCUTANEOUS at 21:32

## 2019-01-16 NOTE — PROGRESS NOTES
Progress Note - Infectious Disease   Sandhya Tinajero 72 y o  male MRN: [id number]  Unit/Bed#: -01 Encounter: [id number]      Impression/Plan:  1  Sepsis   POA  Fever and leukocytosis   Secondary to Streptococcus bacteremia  T-max was 104 4°  Flu/RSV PCR was negative   Urinalysis was bland  Patient's procalcitonin was elevated at 7  25   TTE and MEGHA were negative for valvular vegetation  Patient MRI of the lumbar and cervical spine did not show any drainable abscess or signs of acute infection   Today the patient is clinically stable   He is afebrile and his WBC count remains normalized   Repeat blood cultures are negative after 5 days    -antibiotic as below  -monitor CBC and BMP  -monitor vitals  -supportive care     2   Streptococcus bacteremia  Susan Gain secondary to oral source   Patient with recent foreign body stuck in his left lower gum, also has several broken teeth and crowns  CT of the facial bones showed a periapical abscess of the right maxillary 2nd molar   This will require follow-up as an outpatient with his dentist  Patient had TTE and MEGHA which were both negative for valvular vegetation  His repeat blood cultures are negative after 5 days  Patient has been on IV ceftriaxone and is tolerating without difficulty  He will have a PICC placed later this morning which will need to be removed after final dose of IV antibiotics on 1/22/19   -continue IV ceftriaxone through 1/22/19 for a total of 14 days of treatment  -monitor CBC and BMP  -weekly CBCD and BMP while on IV antibiotics  -he should repeat blood cultures 2 weeks after completion of antibiotics to ensure clearance   -okay to place PICC line  -remove PICC after final dose of IV antibiotics on 1/22/19  -monitor vitals     3  Acute kidney injury  Creatinine has improved  -monitor BMP  -dose adjust antibiotics renal function as needed     4  Back pain   MRI results of the cervical and lumbar spine showed worsening degenerative disease, multilevel facet joint disease, moderate to severe foraminal narrowing, and worsening central canal narrowing   No evidence of infection or drainable abscess  Neurosurgery consult appreciated  No plans for immediate intervention at this time  He will continue to follow up with Neurosurgery as outpatient   -continue follow-up with Neurosurgery     5  Type 2 diabetes mellitus   Patient's blood cultures since admission have ranged between 54 and 452  No hemoglobin A1c in medical record to review   Recommend tight glycemic control for overall health as well as in the setting of acute infection   -blood glucose management per primary service  -recommend checking hemoglobin A1c       6   AFib   Patient is now back on his Eliquis  Antibiotics:  Ceftriaxone 6  Antibiotics 8    Subjective:  Patient reports he had his MEGHA this morning and is feeling alright  He is still worried about insurance/[facility] issues that have been ongoing for sometime but states he's working closely with our case management team on getting his follow up care settled  Patient denies fever, chills, sweats; no nausea, vomiting, diarrhea; no cough, shortness of breath; no pain  No new symptoms  Objective:  Vitals:  Temp:  [97 8 °F (36 6 °C)-98 7 °F (37 1 °C)] 97 8 °F (36 6 °C)  HR:  [55-83] 55  Resp:  [18] 18  BP: (116-191)/(62-94) 116/64  SpO2:  [96 %-98 %] 97 %  Temp (24hrs), Av 3 °F (36 8 °C), Min:97 8 °F (36 6 °C), Max:98 7 °F (37 1 °C)  Current: Temperature: 97 8 °F (36 6 °C)    Physical Exam:   General Appearance:  Alert, interactive, nontoxic, no acute distress  Throat: Oropharynx moist without lesions  Mouth: Poor dentition with several missing teeth and multiple metal crowns, no inflammation or erythema of the gums   Lungs:   Clear to auscultation bilaterally; no wheezes, rhonchi or rales; respirations unlabored   Heart:  RRR; no murmur, rub or gallop   Abdomen:   Soft, non-tender, non-distended, positive bowel sounds       Extremities: No clubbing or cyanosis, no edema   Skin: No new rashes or lesions  No draining wounds noted  Labs, Imaging, & Other studies:   All pertinent labs and imaging studies were personally reviewed    Results from last 7 days  Lab Units 01/16/19 0441 01/13/19 0448 01/11/19  0345   WBC Thousand/uL 8 96 7 21 5 95   HEMOGLOBIN g/dL 9 7* 8 6* 8 2*   PLATELETS Thousands/uL 412* 263 194       Results from last 7 days  Lab Units 01/16/19 0441 01/13/19 0448 01/11/19  0345   POTASSIUM mmol/L 3 8  < > 3 7  < > 3 6   CHLORIDE mmol/L 107  < > 110*  < > 108   CO2 mmol/L 26  < > 24  < > 23   BUN mg/dL 17  < > 21  < > 34*   CREATININE mg/dL 0 95  < > 0 99  < > 1 33*   EGFR ml/min/1 73sq m 80  < > 76  < > 53   CALCIUM mg/dL 8 5  < > 8 4  < > 7 8*   AST U/L 18  --  21  --  29   ALT U/L 31  --  33  --  34   ALK PHOS U/L 99  --  105  --  87   < > = values in this interval not displayed  Results from last 7 days  Lab Units 01/10/19  2109 01/09/19  2106 01/09/19  2027   BLOOD CULTURE  No Growth After 5 Days  No Growth After 5 Days    --  Gemella morbillorum*  Streptococcus constellatus*  Streptococcus anginosus*   GRAM STAIN RESULT   --   --  Gram positive cocci in pairs and chains  Gram positive cocci in chains   INFLUENZA B PCR   --  None Detected  --    RSV PCR   --  None Detected  --

## 2019-01-16 NOTE — PHYSICAL THERAPY NOTE
PHYSICAL THERAPY NOTE  Patient Name: Iraida Sanon  [id number] Date: 1/16/2019 01/16/19 1720   Pain Assessment   Pain Assessment No/denies pain   Pain Score No Pain   Restrictions/Precautions   Other Precautions Bed Alarm; Fall Risk   General   Chart Reviewed Yes   Additional Pertinent History Pt  is a 73 yo M who presents with bactermia, and ambulatory dysfunction, severe spinal disease noted on MRI    Family/Caregiver Present No   Cognition   Overall Cognitive Status WFL   Arousal/Participation Alert; Cooperative   Attention Attends with cues to redirect   Orientation Level Oriented X4   Memory Decreased long term memory;Decreased short term memory;Decreased recall of recent events   Following Commands Follows one step commands with increased time or repetition   Comments Pt  was identified with full name and birthdate   Subjective   Subjective Pt  agreed to PT session for education on use of sliding board for bed to [facility] transfers   Bed Mobility   Supine to Sit 4  Minimal assistance   Additional items Assist x 1;HOB elevated; Increased time required;Verbal cues;LE management   Sit to Supine 4  Minimal assistance   Additional items Assist x 1;HOB elevated; Increased time required;Verbal cues;LE management   Additional Comments Pt  performed bed mobility with Sandeep and VCs for sequencing , use of bedrails, and with HOB elevated, increased time, and LE managment  Transfers   Other 4  Minimal assistance   Additional items Assist x 1; Increased time required;Verbal cues  (for sequencing and management/placement of transfer board)   Additional Comments Pt  transfered with MinAx1 with increased time and VCs for sequencing and hand placement  Pt  was educated in use of transfer board and demonstration was provided for carryover   Teachback was used to demonstrate carryover and time was provided for multiple attmempts and all questions to be answered  Pt  required Zayra with placement and use of transfer board during trials and increased assistance needed for transfer from chair to bed as compared from bed to chair  Balance   Static Sitting Fair   Dynamic Sitting Poor   Endurance Deficit   Endurance Deficit Yes   Endurance Deficit Description increased SOB and WOB with transfers   Activity Tolerance   Activity Tolerance Patient limited by fatigue;Treatment limited secondary to medical complications (Comment)   Nurse Made Aware Spoke to TEJAS Rose   Assessment   Prognosis Fair   Problem List Decreased strength;Decreased range of motion;Decreased endurance; Impaired balance;Decreased mobility; Decreased coordination;Decreased safety awareness; Impaired tone   Assessment Pt  is a 73 yo M who presents with bactermia, and ambulatory dysfunction, severe spinal disease noted on MRI  Pt  was identified with full name and birthdate  Pt  agreed to PT session for education on use of sliding board for bed to [facility] transfers  Pt  performed bed mobility with Zayra and VCs for sequencing , use of bedrails, and with HOB elevated, increased time, and LE managment  Pt  transfered with MinAx1 with increased time and VCs for sequencing and hand placement  Pt  was educated in use of transfer board and demonstration was provided for carryover  Teachback was used to demonstrate carryover and time was provided for multiple attmempts and all questions to be answered  Pt  required Zayra with placement and use of transfer board during trials and increased assistance needed for transfer from chair to bed as compared from bed to chair  increased SOB and WOB with transfers  Patient limited by fatigue;Treatment limited secondary to medical complications  Pt  Will benefit from continued skilled therapy to address deficits in functional mobility and return to PLOF      Goals   Patient Goals to transfer out of the bed on my own   STG Expiration Date 01/23/19   Treatment Day 2   Plan Treatment/Interventions Functional transfer training;LE strengthening/ROM; Therapeutic exercise; Endurance training;Equipment eval/education; Bed mobility; Patient/family training;Cognitive reorientation; Compensatory technique education;Spoke to nursing;Spoke to case management   Progress Progressing toward goals   PT Frequency 5x/wk   Recommendation   Recommendation Short-term skilled PT   Equipment Recommended Wheelchair;Walker  (transfer board)   Additional Comments Pt  can transfer bed to [facility] with assist x1 for placement and sequencign     Eros Neil, PT 1/16/2019

## 2019-01-16 NOTE — ANESTHESIA POSTPROCEDURE EVALUATION
Post-Op Assessment Note      CV Status:  Stable    Mental Status:  Alert and awake    Hydration Status:  Euvolemic    PONV Controlled:  Controlled    Airway Patency:  Patent    Post Op Vitals Reviewed: Yes          Staff: CRNA       Comments: vss sv nonobstructed uneventful           BP  116/64   Temp      Pulse 56   Resp 18   SpO2 95

## 2019-01-16 NOTE — PLAN OF CARE
Problem: DISCHARGE PLANNING - CARE MANAGEMENT  Goal: Discharge to post-acute care or home with appropriate resources  INTERVENTIONS:  - Conduct assessment to determine patient/family and health care team treatment goals, and need for post-acute services based on payer coverage, community resources, and patient preferences, and barriers to discharge  - Address psychosocial, clinical, and financial barriers to discharge as identified in assessment in conjunction with the patient/family and health care team  - Arrange appropriate level of post-acute services according to patients   needs and preference and payer coverage in collaboration with the physician and health care team  - Communicate with and update the patient/family, physician, and health care team regarding progress on the discharge plan  - Arrange appropriate transportation to post-acute venues   Outcome: Progressing  CM met with patient at bedside, patient alert and oriented  CM discussed IV abx quote from [facility] as $[id number] 76 for abx course and patient agreeable  Patient would like meds delivered to his home tomorrow rather than to bedside  CM also confirmed with [facility] that they can provide RN visit at 4 pm at home tomorrow for next dose and education  Patient requesting transportation tomorrow via [employer] as he is a subscriber to their service  [facility] arranged via [employer] [place] for 1:30 pm tomorrow  OOP cost will be $35 and patient is agreeable  CM also reviewed plan with patient's spouse on the phone at patient's request  Patient concerned about HHA services to help provide support at home  CM confirmed that [facility] can order RN, PT and OT through [employer] [facility] and CM will confirm if HHA also available  Patient stated he did have HHA through the [place] but he is unsure of current status  CM did receive VM earlier from Clarita Marte RN through the [place] in [place] re: discharge planning([phone])   CM left message for Mamta Handler requesting return call to discuss HHA services for patient  CM did discuss that patient can also privately hire HHA if these hours provided are not enough and patient stated he is aware  Patient's spouse stated she would be bringing in transfer board to work with PT at THE [facility] as this is new from the [place]  CM confirmed with PT Francescaon Manners that he will stop by patient's room this afternoon to work with patient and spouse and demonstrate how the board works  CM will continue to follow patient and assess for needs  CM will follow through discharge

## 2019-01-16 NOTE — ANESTHESIA PREPROCEDURE EVALUATION
Review of Systems/Medical History  Patient summary reviewed  Chart reviewed  No history of anesthetic complications     Cardiovascular  EKG reviewed, Exercise tolerance (METS): <4,  Hypertension , Dysrhythmias (s/p ablation) , atrial fibrillation,    Pulmonary  Negative pulmonary ROS Smoker ex-smoker  Cumulative Pack Years: 25,        GI/Hepatic  Negative GI/hepatic ROS          Negative  ROS        Endo/Other  Diabetes poorly controlled ,   Comment: Bacteremia, suspected oral source; for MEGHA eval for vegetations    GYN       Hematology  Anemia ,  Coagulation disorder currently taking oral anticoagulants,    Musculoskeletal  Negative musculoskeletal ROS Back pain , Sciatica,        Neurology  Negative neurology ROS      Psychology   Anxiety (PTSD),   Chronic pain,   Comment: Exposed to Agent Orange       Physical Exam    Airway    Mallampati score: II  TM Distance: >3 FB  Neck ROM: full     Dental       Cardiovascular  Rhythm: regular, Rate: normal, Cardiovascular exam normal    Pulmonary  Pulmonary exam normal     Other Findings  No loose or cracked teeth     Lab Results   Component Value Date    WBC 8 96 01/16/2019    HGB 9 7 (L) 01/16/2019     (H) 01/16/2019     Lab Results   Component Value Date    K 3 8 01/16/2019    BUN 17 01/16/2019    CREATININE 0 95 01/16/2019     TTE 1/11/19 SUMMARY     LEFT VENTRICLE:  Systolic function was normal  Ejection fraction was estimated to be 60 %  There were no regional wall motion abnormalities      RIGHT VENTRICLE:  The size was at the upper limits of normal   Systolic function was normal     Valves OK    Anesthesia Plan  ASA Score- 3     Anesthesia Type- IV sedation with anesthesia with ASA Monitors  Additional Monitors:   Airway Plan:         Plan Factors-    Induction- intravenous  Postoperative Plan-     Informed Consent- Anesthetic plan and risks discussed with patient  I personally reviewed this patient with the CRNA   Discussed and agreed on the Anesthesia Plan with the EUGENIA Dillon

## 2019-01-16 NOTE — PROCEDURES
Insert PICC line  Date/Time: 1/16/2019 1:12 PM  Performed by: Lopez Ramirez  Authorized by: Edna Garay     Patient location:  Bedside  Other Assisting Provider: No    Consent:     Consent obtained:  Written (by physician)    Consent given by:  Patient    Risks discussed:  Incorrect placement and infection  Universal protocol:     Procedure explained and questions answered to patient or proxy's satisfaction: yes      Relevant documents present and verified: yes      Test results available and properly labeled: yes      Radiology Images displayed and confirmed  If images not available, report reviewed: no      Required blood products, implants, devices, and special equipment available: yes      Site/side marked: yes      Immediately prior to procedure, a time out was called: yes      Patient identity confirmed:  Verbally with patient and arm band  Pre-procedure details:     Hand hygiene: Hand hygiene performed prior to insertion      Sterile barrier technique: All elements of maximal sterile technique followed      Skin preparation:  2% chlorhexidine    Skin preparation agent: Skin preparation agent completely dried prior to procedure    Indications:     PICC line indications: long term antibiotics    Anesthesia (see MAR for exact dosages):      Anesthesia method:  Local infiltration    Local anesthetic:  Lidocaine 1% w/o epi (3ml)  Procedure details:     Location:  Basilic    Vessel type: vein      Laterality:  Right    Approach: percutaneous technique used      Patient position:  Flat    Procedural supplies:  Single lumen    Catheter size:  4 Fr    Landmarks identified: yes      Ultrasound guidance: yes      Sterile ultrasound techniques: Sterile gel and sterile probe covers were used      Number of attempts:  1    Successful placement: yes      Vessel of catheter tip end:  Chest Xray needed to confirm placement (Will confirm with chest XR due to hx afib, difficulty reading baseline rhythm)    Total catheter length (cm):  51    Catheter out on skin (cm):  0    Max flow rate:  999    Arm circumference:  29 5  Post-procedure details:     Post-procedure:  Dressing applied and securement device placed    Assessment:  Blood return through all ports, placement verification pending x-ray result and free fluid flow    Patient tolerance of procedure:   Tolerated well, no immediate complications

## 2019-01-16 NOTE — PLAN OF CARE
DISCHARGE PLANNING     Discharge to home or other facility with appropriate resources Progressing        DISCHARGE PLANNING - CARE MANAGEMENT     Discharge to post-acute care or home with appropriate resources Progressing        INFECTION - ADULT     Absence or prevention of progression during hospitalization Progressing        Knowledge Deficit     Patient/family/caregiver demonstrates understanding of disease process, treatment plan, medications, and discharge instructions Progressing        PAIN - ADULT     Verbalizes/displays adequate comfort level or baseline comfort level Progressing        Potential for Falls     Patient will remain free of falls Progressing        Prexisting or High Potential for Compromised Skin Integrity     Skin integrity is maintained or improved Progressing        SAFETY ADULT     Maintain or return to baseline ADL function Progressing     Maintain or return mobility status to optimal level Progressing     Patient will remain free of falls Progressing

## 2019-01-16 NOTE — PLAN OF CARE
Problem: PHYSICAL THERAPY ADULT  Goal: Performs mobility at highest level of function for planned discharge setting  See evaluation for individualized goals  Treatment/Interventions: Functional transfer training, LE strengthening/ROM, Therapeutic exercise, Endurance training, Patient/family training, Equipment eval/education, Bed mobility          See flowsheet documentation for full assessment, interventions and recommendations  Outcome: Progressing  Prognosis: Fair  Problem List: Decreased strength, Decreased range of motion, Decreased endurance, Impaired balance, Decreased mobility, Decreased coordination, Decreased safety awareness, Impaired tone  Assessment: Pt  is a 71 yo M who presents with bactermia, and ambulatory dysfunction, severe spinal disease noted on MRI  Pt  was identified with full name and birthdate  Pt  agreed to PT session for education on use of sliding board for bed to [facility] transfers  Pt  performed bed mobility with Zayra and VCs for sequencing , use of bedrails, and with HOB elevated, increased time, and LE managment  Pt  transfered with MinAx1 with increased time and VCs for sequencing and hand placement  Pt  was educated in use of transfer board and demonstration was provided for carryover  Teachback was used to demonstrate carryover and time was provided for multiple attmempts and all questions to be answered  Pt  required Zayra with placement and use of transfer board during trials and increased assistance needed for transfer from chair to bed as compared from bed to chair  increased SOB and WOB with transfers  Patient limited by fatigue;Treatment limited secondary to medical complications  Pt  Will benefit from continued skilled therapy to address deficits in functional mobility and return to PLOF  Recommendation: Short-term skilled PT          See flowsheet documentation for full assessment

## 2019-01-16 NOTE — SOCIAL WORK
CM met with patient at bedside, patient alert and oriented  CM discussed IV abx quote from [facility] as $183 76 for abx course and patient agreeable  Patient would like meds delivered to his home tomorrow rather than to bedside  CM also confirmed with JUAN JOSÉ VERNON that they can provide RN visit at 4 pm at home tomorrow for next dose and education  Patient requesting transportation tomorrow via [facility] as he is a subscriber to their service  [facility] arranged via [employer] at [address] for 1:30 pm tomorrow  OOP cost will be $35 and patient is agreeable  CM also reviewed plan with patient's spouse on the phone at patient's request  Patient concerned about HHA services to help provide support at home  CM confirmed that [facility] can order RN, PT and OT through [facility] and CM will confirm if HHA also available  Patient stated he did have HHA through the [facility] but he is unsure of current status  CM did receive VM earlier from Mery Cobb RN through the [facility] in [facility] re: discharge planning([phone])  CM left message for Carolin Macdonald requesting return call to discuss HHA services for patient  CM did discuss that patient can also privately hire HHA if these hours provided are not enough and patient stated he is aware  Patient's spouse stated she would be bringing in transfer board to work with PT at THE [facility] as this is new from the [facility]  CM confirmed with PT Deja Estevez that he will stop by patient's room this afternoon to work with patient and spouse and demonstrate how the board works  CM will continue to follow patient and assess for needs  CM will follow through discharge

## 2019-01-16 NOTE — PROGRESS NOTES
Progress Note - Henrik Dry 1945, 72 y o  male MRN: [id number]    Unit/Bed#: -Tisha Encounter: [id number]    Primary Care Provider: Rayburn Favre, MD   Date and time admitted to hospital: 2019  1:02 PM      Alpha Hemolytic Strep Bacteremia with Sepsis POA-   C/w rocephin  MEGHA, cards consult placed  PICC line, order placed this am   Also consent obtained  Ambulatory Dysfunction / Spinal Stenosis -   PT / OT evaluation recommending rehab     Severe spinal disease seen on MRI imaging     Diabetes Mellitus type 2   levemir at home dose   Insulin sliding scale     Atrial Fibrillation -   Rate / Rhythm control - coreg  Anticoagulation - Eliquis  Acute Kidney Injury -  Creatinine is 0 87  Essential Hypertension -   Lisinopril was held today  Patient not keen on taking this medication  Will review chart and prescribe alternative med  BP is 190/88  VTE Pharmacologic Prophylaxis:   Pharmacologic: Heparin  Mechanical VTE Prophylaxis in Place: Yes    Patient Centered Rounds: I have performed bedside rounds with nursing staff today  Discussions with Specialists or Other Care Team Provider: Discussed with ID today, patient cleared after PICC line placed  Discussed with CM - changed abx till 6pm      Education and Discussions with Family / Patient: Discussed with patient and with family  Time Spent for Care: 30 minutes  More than 50% of total time spent on counseling and coordination of care as described above  Current Length of Stay: 7 day(s)    Current Patient Status: Inpatient   Certification Statement: The patient will continue to require additional inpatient hospital stay due to IV ABx  Discharge Plan: Not medically stable for DC  Likely tomorrow  Discussed with CM - Visiting nurses set up for tomorrow  Code Status: Level 1 - Full Code      Subjective:   Patient seen and examined       Feeling "great"    Objective:     Vitals:   Temp (24hrs), Av 2 °F (36 8 °C), Min:97 6 °F (36 4 °C), Max:98 7 °F (37 1 °C)    Temp:  [97 6 °F (36 4 °C)-98 7 °F (37 1 °C)] 97 6 °F (36 4 °C)  HR:  [55-83] 79  Resp:  [18-20] 20  BP: (116-191)/(62-94) 190/88  SpO2:  [96 %-98 %] 97 %  Body mass index is 28 38 kg/m²  Input and Output Summary (last 24 hours): Intake/Output Summary (Last 24 hours) at 01/16/19 1353  Last data filed at 01/16/19 1114   Gross per 24 hour   Intake              440 ml   Output              900 ml   Net             -460 ml       Physical Exam:     Physical Exam   Constitutional: He is oriented to person, place, and time  He appears well-developed  No distress  HENT:   Head: Normocephalic  Mouth/Throat: No oropharyngeal exudate  Eyes: Right eye exhibits no discharge  Left eye exhibits no discharge  No scleral icterus  Neck: Normal range of motion  Neck supple  No JVD present  No tracheal deviation present  No thyromegaly present  Cardiovascular: Normal rate  Exam reveals no gallop and no friction rub  No murmur heard  Pulmonary/Chest: Effort normal and breath sounds normal  No respiratory distress  He has no wheezes  He has no rales  He exhibits no tenderness  Abdominal: Soft  Bowel sounds are normal  He exhibits no distension and no mass  There is no tenderness  There is no rebound and no guarding  Musculoskeletal: Normal range of motion  He exhibits no edema, tenderness or deformity  Lymphadenopathy:     He has no cervical adenopathy  Neurological: He is alert and oriented to person, place, and time  He displays normal reflexes  No cranial nerve deficit  He exhibits normal muscle tone  Coordination normal    Skin: Skin is warm  No rash noted  He is not diaphoretic  No erythema  No pallor  Psychiatric: He has a normal mood and affect           Additional Data:     Labs:      Results from last 7 days  Lab Units 01/16/19  0441  01/11/19  0345   WBC Thousand/uL 8 96  < > 5 95   HEMOGLOBIN g/dL 9 7*  < > 8 2*   HEMATOCRIT % 31 5*  < > 26 9* PLATELETS Thousands/uL 412*  < > 194   BANDS PCT %  --   --  2   NEUTROS PCT % 67  < >  --    LYMPHS PCT % 19  < >  --    LYMPHO PCT %  --   --  8*   MONOS PCT % 11  < >  --    MONO PCT %  --   --  12   EOS PCT % 2  < > 4   < > = values in this interval not displayed  Results from last 7 days  Lab Units 01/16/19  0441   SODIUM mmol/L 143   POTASSIUM mmol/L 3 8   CHLORIDE mmol/L 107   CO2 mmol/L 26   BUN mg/dL 17   CREATININE mg/dL 0 95   ANION GAP mmol/L 10   CALCIUM mg/dL 8 5   ALBUMIN g/dL 2 5*   TOTAL BILIRUBIN mg/dL 0 20   ALK PHOS U/L 99   ALT U/L 31   AST U/L 18   GLUCOSE RANDOM mg/dL 94           Results from last 7 days  Lab Units 01/16/19  1315 01/16/19  1158 01/16/19  0851 01/16/19  0759 01/15/19  2053 01/15/19  1614 01/15/19  1052 01/15/19  0805 01/15/19  0741 01/14/19  2100 01/14/19  1619 01/14/19  1106   POC GLUCOSE mg/dl 125 66 90 70 151* 452* 138 90 54* 94 106 101           Results from last 7 days  Lab Units 01/11/19  0345 01/09/19  2239 01/09/19 2111 01/09/19 2027   LACTIC ACID mmol/L  --  1 4  --  2 1*   PROCALCITONIN ng/ml 7 18*  --  2 71*  --            * I Have Reviewed All Lab Data Listed Above  * Additional Pertinent Lab Tests Reviewed: Qasim 66 Admission Reviewed    Imaging:    Imaging Reports Reviewed Today Include:   CXR from PICC line placement -   PICC line not visible  Repeat X-ray requested  Imaging Personally Reviewed by Myself Includes:  As above     Recent Cultures (last 7 days):       Results from last 7 days  Lab Units 01/10/19  2109 01/09/19  2106 01/09/19 2027   BLOOD CULTURE  No Growth After 5 Days  No Growth After 5 Days    --  Gemella morbillorum*  Streptococcus constellatus*  Streptococcus anginosus*   GRAM STAIN RESULT   --   --  Gram positive cocci in pairs and chains  Gram positive cocci in chains   INFLUENZA B PCR   --  None Detected  --    RSV PCR   --  None Detected  --        Last 24 Hours Medication List:     Current Facility-Administered Medications:  acetaminophen 650 mg Oral BID Artelia Lolling, DO   acetaminophen 650 mg Oral Q6H PRN Artelia Lolling, DO   amLODIPine 5 mg Oral Daily Artelia Lolling, DO   apixaban 5 mg Oral BID Artelia Lolling, DO   carvedilol 25 mg Oral BID With Meals Artelia Lolling, DO   cefTRIAXone 2,000 mg Intravenous Q24H Diego Griggs MD   cholecalciferol 5,000 Units Oral Daily Artelia Lolling, DO   co-enzyme Q-10 100 mg Oral Daily Artelia Lolling, DO   cyanocobalamin 1,000 mcg Oral Daily Artelia Lolling, DO   dextrose 25 mL Intravenous PRN Diego Griggs MD   gabapentin 300 mg Oral TID Artelia Lolling, DO   hydrALAZINE 10 mg Intravenous Q6H PRN Artelia Lolling, DO   ibuprofen 400 mg Oral Q6H PRN Jorge Crisp, PA-C   insulin detemir 22 Units Subcutaneous HS Marty Zhu, DO   lisinopril 20 mg Oral BID Artelia Lolling, DO   magnesium oxide 400 mg Oral Daily Artelia Lolling, DO   ondansetron 4 mg Intravenous Q6H PRN Artelia Lolling, DO   pantoprazole 40 mg Oral Early Morning Artelia Lolling, DO   sertraline 100 mg Oral Daily Artelia Lolling, DO   traZODone 50 mg Oral HS Artelia Lolling, DO   zolpidem 10 mg Oral HS PRN Artelia Lolling, DO        Today, Patient Was Seen By: Diego Griggs MD    ** Please Note: Dictation voice to text software may have been used in the creation of this document   **

## 2019-01-16 NOTE — SEDATION DOCUMENTATION
Received pt in asc for MEGHA  Consents obtained by anesthesia and cardiology  Pt in GI lab for MEGHA  Tolerated procedure well  No Hurricaine spray utilized  Pt returned to [facility]  for recovery    Report given to Gwyn Naidu RN BSN

## 2019-01-17 VITALS
TEMPERATURE: 97.2 F | OXYGEN SATURATION: 97 % | DIASTOLIC BLOOD PRESSURE: 65 MMHG | BODY MASS INDEX: 28.43 KG/M2 | HEIGHT: 75 IN | WEIGHT: 228.62 LBS | SYSTOLIC BLOOD PRESSURE: 146 MMHG | RESPIRATION RATE: 18 BRPM | HEART RATE: 63 BPM

## 2019-01-17 LAB
ALBUMIN SERPL BCP-MCNC: 2.5 G/DL (ref 3.5–5)
ALP SERPL-CCNC: 113 U/L (ref 46–116)
ALT SERPL W P-5'-P-CCNC: 28 U/L (ref 12–78)
ANION GAP SERPL CALCULATED.3IONS-SCNC: 6 MMOL/L (ref 4–13)
AST SERPL W P-5'-P-CCNC: 14 U/L (ref 5–45)
BASOPHILS # BLD AUTO: 0.04 THOUSANDS/ΜL (ref 0–0.1)
BASOPHILS NFR BLD AUTO: 0 % (ref 0–1)
BILIRUB SERPL-MCNC: 0.2 MG/DL (ref 0.2–1)
BUN SERPL-MCNC: 17 MG/DL (ref 5–25)
CALCIUM SERPL-MCNC: 8.3 MG/DL (ref 8.3–10.1)
CHLORIDE SERPL-SCNC: 107 MMOL/L (ref 100–108)
CO2 SERPL-SCNC: 29 MMOL/L (ref 21–32)
CREAT SERPL-MCNC: 0.9 MG/DL (ref 0.6–1.3)
EOSINOPHIL # BLD AUTO: 0.18 THOUSAND/ΜL (ref 0–0.61)
EOSINOPHIL NFR BLD AUTO: 2 % (ref 0–6)
ERYTHROCYTE [DISTWIDTH] IN BLOOD BY AUTOMATED COUNT: 17.2 % (ref 11.6–15.1)
GFR SERPL CREATININE-BSD FRML MDRD: 86 ML/MIN/1.73SQ M
GLUCOSE SERPL-MCNC: 127 MG/DL (ref 65–140)
GLUCOSE SERPL-MCNC: 159 MG/DL (ref 65–140)
GLUCOSE SERPL-MCNC: 187 MG/DL (ref 65–140)
GLUCOSE SERPL-MCNC: 237 MG/DL (ref 65–140)
HCT VFR BLD AUTO: 31 % (ref 36.5–49.3)
HGB BLD-MCNC: 9.2 G/DL (ref 12–17)
IMM GRANULOCYTES # BLD AUTO: 0.1 THOUSAND/UL (ref 0–0.2)
IMM GRANULOCYTES NFR BLD AUTO: 1 % (ref 0–2)
LYMPHOCYTES # BLD AUTO: 1.38 THOUSANDS/ΜL (ref 0.6–4.47)
LYMPHOCYTES NFR BLD AUTO: 15 % (ref 14–44)
MCH RBC QN AUTO: 25.1 PG (ref 26.8–34.3)
MCHC RBC AUTO-ENTMCNC: 29.7 G/DL (ref 31.4–37.4)
MCV RBC AUTO: 85 FL (ref 82–98)
MONOCYTES # BLD AUTO: 1.07 THOUSAND/ΜL (ref 0.17–1.22)
MONOCYTES NFR BLD AUTO: 11 % (ref 4–12)
NEUTROPHILS # BLD AUTO: 6.75 THOUSANDS/ΜL (ref 1.85–7.62)
NEUTS SEG NFR BLD AUTO: 71 % (ref 43–75)
NRBC BLD AUTO-RTO: 0 /100 WBCS
PLATELET # BLD AUTO: 430 THOUSANDS/UL (ref 149–390)
PMV BLD AUTO: 9.7 FL (ref 8.9–12.7)
POTASSIUM SERPL-SCNC: 3.9 MMOL/L (ref 3.5–5.3)
PROT SERPL-MCNC: 6.5 G/DL (ref 6.4–8.2)
RBC # BLD AUTO: 3.66 MILLION/UL (ref 3.88–5.62)
SODIUM SERPL-SCNC: 142 MMOL/L (ref 136–145)
WBC # BLD AUTO: 9.52 THOUSAND/UL (ref 4.31–10.16)

## 2019-01-17 PROCEDURE — 99239 HOSP IP/OBS DSCHRG MGMT >30: CPT | Performed by: INTERNAL MEDICINE

## 2019-01-17 PROCEDURE — 85025 COMPLETE CBC W/AUTO DIFF WBC: CPT | Performed by: INTERNAL MEDICINE

## 2019-01-17 PROCEDURE — 82948 REAGENT STRIP/BLOOD GLUCOSE: CPT

## 2019-01-17 PROCEDURE — 80053 COMPREHEN METABOLIC PANEL: CPT | Performed by: INTERNAL MEDICINE

## 2019-01-17 PROCEDURE — 02HV33Z INSERTION OF INFUSION DEVICE INTO SUPERIOR VENA CAVA, PERCUTANEOUS APPROACH: ICD-10-PCS | Performed by: INTERNAL MEDICINE

## 2019-01-17 RX ORDER — AMLODIPINE BESYLATE 10 MG/1
10 TABLET ORAL DAILY
Qty: 30 TABLET | Refills: 0 | Status: SHIPPED | OUTPATIENT
Start: 2019-01-18 | End: 2021-10-19

## 2019-01-17 RX ORDER — AMLODIPINE BESYLATE 10 MG/1
10 TABLET ORAL DAILY
Status: DISCONTINUED | OUTPATIENT
Start: 2019-01-17 | End: 2019-01-17 | Stop reason: HOSPADM

## 2019-01-17 RX ADMIN — CYANOCOBALAMIN TAB 500 MCG 1000 MCG: 500 TAB at 09:07

## 2019-01-17 RX ADMIN — AMLODIPINE BESYLATE 10 MG: 10 TABLET ORAL at 09:06

## 2019-01-17 RX ADMIN — GABAPENTIN 300 MG: 300 CAPSULE ORAL at 09:06

## 2019-01-17 RX ADMIN — SERTRALINE HYDROCHLORIDE 100 MG: 100 TABLET ORAL at 09:06

## 2019-01-17 RX ADMIN — APIXABAN 5 MG: 5 TABLET, FILM COATED ORAL at 09:07

## 2019-01-17 RX ADMIN — PANTOPRAZOLE SODIUM 40 MG: 40 TABLET, DELAYED RELEASE ORAL at 05:58

## 2019-01-17 RX ADMIN — LISINOPRIL 20 MG: 20 TABLET ORAL at 09:06

## 2019-01-17 RX ADMIN — ZOLPIDEM TARTRATE 10 MG: 5 TABLET, COATED ORAL at 02:13

## 2019-01-17 RX ADMIN — CARVEDILOL 25 MG: 12.5 TABLET, FILM COATED ORAL at 09:07

## 2019-01-17 RX ADMIN — VITAMIN D, TAB 1000IU (100/BT) 5000 UNITS: 25 TAB at 09:06

## 2019-01-17 RX ADMIN — ACETAMINOPHEN 650 MG: 325 TABLET, FILM COATED ORAL at 09:06

## 2019-01-17 RX ADMIN — MAGNESIUM OXIDE TAB 400 MG (241.3 MG ELEMENTAL MG) 400 MG: 400 (241.3 MG) TAB at 09:07

## 2019-01-17 RX ADMIN — Medication 100 MG: at 09:07

## 2019-01-17 NOTE — ASSESSMENT & PLAN NOTE
BP is high this am - will DC when within normal limits  Will continue with home meds  Increased amlodipine to 10 mg once a day from 5 mg once a day

## 2019-01-17 NOTE — PROGRESS NOTES
Progress Note - Infectious Disease   Dayanna Collado 72 y o  male MRN: [id number]  Unit/Bed#: -01 Encounter: [id number]      Impression/Plan:  1  Sepsis   POA  Fever and leukocytosis   Secondary to Streptococcus bacteremia  T-max was 104 4°  Flu/RSV PCR was negative   Urinalysis was bland  Patient's procalcitonin was elevated at 7  25   TTE and MEGHA were negative for valvular vegetation  Patient MRI of the lumbar and cervical spine did not show any drainable abscess or signs of acute infection   Patient now remains clinically stable   He is afebrile and his WBC count has normalized   Repeat blood cultures were negative after 5 days    -antibiotic as below  -monitor CBC and BMP  -monitor vitals  -supportive care     2   Streptococcus bacteremia  [place] secondary to oral source   Patient with recent foreign body stuck in his left lower gum, also has several broken teeth and crowns  CT of the facial bones showed a periapical abscess of the right maxillary 2nd molar  This will require follow-up as an outpatient with his dentist  Patient had TTE and MEGHA which were both negative for valvular vegetation  His repeat blood cultures were negative after 5 days  Patient has been on IV ceftriaxone and is tolerating without difficulty  -continue IV ceftriaxone through 1/22/19 for a total of 14 days of treatment  -monitor CBC and BMP  -weekly CBCD and BMP while on IV antibiotics  -he should repeat blood cultures 2 weeks after completion of antibiotics to ensure clearance   -okay to place PICC line  -remove PICC after final dose of IV antibiotics on 1/22/19  -monitor vitals     3  Acute kidney injury   Creatinine has improved  -monitor BMP  -dose adjust antibiotics renal function as needed     4  Back pain   MRI results of the cervical and lumbar spine showed worsening degenerative disease, multilevel facet joint disease, moderate to severe foraminal narrowing, and worsening central canal narrowing   No evidence of infection or drainable abscess   Neurosurgery consult appreciated   No plans for immediate intervention at this time  [facility] will continue to follow up with Neurosurgery as outpatient   -continue follow-up with Neurosurgery     5  Type 2 diabetes mellitus   Patient's blood cultures since admission have ranged between 54 and 452  No hemoglobin A1c in medical record to review   Recommend tight glycemic control for overall health as well as in the setting of acute infection   -blood glucose management per primary service  -recommend checking hemoglobin A1c       6   AFib   Patient is now back on his Eliquis  Antibiotics:  Ceftriaxone 7  Antibiotics 9    Subjective:  Patient reports he is ready to go home  He denies fever, chills, sweats; no nausea, vomiting, diarrhea; no cough, shortness of breath; no pain  No new symptoms  Objective:  Vitals:  Temp:  [97 2 °F (36 2 °C)-98 1 °F (36 7 °C)] 97 2 °F (36 2 °C)  HR:  [63-69] 63  Resp:  [18] 18  BP: (146-190)/(65-90) 146/65  SpO2:  [96 %-97 %] 97 %  Temp (24hrs), Av 7 °F (36 5 °C), Min:97 2 °F (36 2 °C), Max:98 1 °F (36 7 °C)  Current: Temperature: (!) 97 2 °F (36 2 °C)    Physical Exam:   General Appearance:  Alert, interactive, nontoxic, no acute distress  Appears chronically debilitated   Throat: Oropharynx moist without lesions  Mouth: Poor dentition, several missing teeth, multiple metal crown; patient has no inflammation or erythema of his gums   Lungs:   Clear to auscultation bilaterally; no wheezes, rhonchi or rales; respirations unlabored   Heart:  RRR; no murmur, rub or gallop   Abdomen:   Soft, non-tender, non-distended, positive bowel sounds  Extremities: No clubbing or cyanosis, no edema   Skin: No new rashes or lesions  No draining wounds noted       Labs, Imaging, & Other studies:   All pertinent labs and imaging studies were personally reviewed    Results from last 7 days  Lab Units 19  0623 19  0441 19  0448   WBC Thousand/uL 9 52 8 96 7  21   HEMOGLOBIN g/dL 9 2* 9 7* 8 6*   PLATELETS Thousands/uL 430* 412* 263       Results from last 7 days  Lab Units 01/17/19  0623 01/16/19  0441  01/13/19  0448   POTASSIUM mmol/L 3 9 3 8  < > 3 7   CHLORIDE mmol/L 107 107  < > 110*   CO2 mmol/L 29 26  < > 24   BUN mg/dL 17 17  < > 21   CREATININE mg/dL 0 90 0 95  < > 0 99   EGFR ml/min/1 73sq m 86 80  < > 76   CALCIUM mg/dL 8 3 8 5  < > 8 4   AST U/L 14 18  --  21   ALT U/L 28 31  --  33   ALK PHOS U/L 113 99  --  105   < > = values in this interval not displayed  Results from last 7 days  Lab Units 01/10/19  2109   BLOOD CULTURE  No Growth After 5 Days  No Growth After 5 Days

## 2019-01-17 NOTE — DISCHARGE SUMMARY
Discharge- Dayanna Collado 1945, 72 y o  male MRN: [id number]    Unit/Bed#: -01 Encounter: [id number]    Primary Care Provider: Adela Gross MD   Date and time admitted to hospital: 1/9/2019  1:02 PM        Ambulatory dysfunction   Assessment & Plan    As above home wit PT/OT  Generalized weakness   Assessment & Plan    Home with PT/OT  Type 2 diabetes mellitus [facility])   Assessment & Plan    Lab Results   Component Value Date    HGBA1C 8 0 (H) 01/16/2019       Recent Labs      01/16/19   1620  01/16/19   2111  01/17/19   0206  01/17/19   0753   POCGLU  130  198*  237*  159*       Blood Sugar Average: Last 72 hrs:  (P) 137 [id number]   DC on levemir 22 units  Continue to monitor at home with visiting nurses  Atrial fibrillation ([facility] )   Assessment & Plan    Rate controlled  On eliquis  Hypertension   Assessment & Plan    BP is high this am - will DC when within normal limits  Will continue with home meds  Increased amlodipine to 10 mg once a day from 5 mg once a day  * Bacteremia   Assessment & Plan    PICC line in place  Continue with IV ABx - through 1/22    2g every 24 hours  Repeat blood cultures in 2 weeks  Discharging Physician / Practitioner: Alba Busby MD  PCP: Adela Gross MD  Admission Date:   Admission Orders     Ordered        01/09/19 1700  Inpatient Admission (expected length of stay for this patient is greater than two midnights)  Once             Discharge Date: 01/17/19    Resolved Problems  Date Reviewed: 1/17/2019          Resolved    Sepsis ([facility] ) 1/12/2019     Resolved by  Shakila Estrada DO    SANDRA (acute kidney injury) ([facility] ) 1/12/2019     Resolved by  Shakila Estrada DO          Consultations During Hospital Stay:  · Cardiology - Dr Chula Sawant  · Neurosurgery- Dr Jordyn Wolfe  · ID - Dr Amish Ariza  Procedures Performed:   · CXR - Right sided PICC line in place  Significant Findings / Test Results:   · As above     · BC from 01/16 at 8 am negative to date  Incidental Findings:   · As above  Test Results Pending at Discharge (will require follow up): · None  Outpatient Tests Requested:  · BC in 2 weeks - ID to follow up  Complications:  None     Reason for Admission:   fevers    Hospital Course:     Wilbur Nava is a 72 y o  male patient who originally presented to the hospital on 1/9/2019 due to lack fevers  Blood cultures flag up positive from the 9th of January for Gamella morbillorum and Streptococcus  Id were consulted and the patient was started on who is every 24 hours  The patient was also seen by Neurosurgery given chronic back pain  Their feeling was that the bacteremia was likely secondary for 2 oral source with possible dental abscess noted on CT scan no osteomyelitis seen on MRI C-spine  The patient did have a MEGHA as well no vegetations were seen  PICC line was placed today prior to discharge the patient was discharged home with a plan for a total of 2 weeks IV antibiotics to complete course on the 22nd of January  Patient will also need repeat blood cultures following completion of antibiotics and ID will follow up on these  The patient was discharged home with visiting nurses as well as home PT and OT  At the time of discharge the patient had been afebrile and hemodynamically stable  for several days  Please see above list of diagnoses and related plan for additional information       Condition at Discharge: stable     Discharge Day Visit / Exam:     Subjective:    Patient seen and examined      " I feel great"  Vitals: Blood Pressure: 146/65 (01/17/19 1100)  Pulse: 63 (01/17/19 0700)  Temperature: (!) 97 2 °F (36 2 °C) (01/17/19 0700)  Temp Source: Oral (01/17/19 0700)  Respirations: 18 (01/17/19 0700)  Height: 6' 3" (190 5 cm) (01/09/19 2055)  Weight - Scale: 104 kg (228 lb 9 9 oz) (01/17/19 0600)  SpO2: 97 % (01/17/19 0700)  Exam:   Physical Exam   Constitutional: He is oriented to person, place, and time  No distress  HENT:   Head: Normocephalic and atraumatic  Mouth/Throat: No oropharyngeal exudate  Eyes: Right eye exhibits no discharge  Left eye exhibits no discharge  No scleral icterus  Neck: Normal range of motion  Neck supple  No JVD present  No tracheal deviation present  No thyromegaly present  Cardiovascular: Normal rate  Exam reveals no gallop and no friction rub  No murmur heard  Pulmonary/Chest: Effort normal  No respiratory distress  He has no wheezes  He has no rales  He exhibits no tenderness  Abdominal: Soft  He exhibits no distension and no mass  There is no tenderness  There is no rebound and no guarding  Musculoskeletal: He exhibits no edema, tenderness or deformity  Lymphadenopathy:     He has no cervical adenopathy  Neurological: He is alert and oriented to person, place, and time  He displays normal reflexes  No cranial nerve deficit  He exhibits normal muscle tone  Coordination normal    Skin: Skin is warm  No rash noted  He is not diaphoretic  No erythema  No pallor  Psychiatric: He has a normal mood and affect  Discussion with Family: Discussed with patient and with family     Discharge instructions/Information to patient and family:   See after visit summary for information provided to patient and family  Provisions for Follow-Up Care:  See after visit summary for information related to follow-up care and any pertinent home health orders  Disposition:     Home with VNA Services (Reminder: Complete face to face encounter)    For Discharges to [facility] SNF:   · Not Applicable to this Patient - Not Applicable to this Patient    Planned Readmission: none  Discharge Statement:  I spent 45 minutes discharging the patient  This time was spent on the day of discharge  I had direct contact with the patient on the day of discharge   Greater than 50% of the total time was spent examining patient, answering all patient questions, arranging and discussing plan of care with patient as well as directly providing post-discharge instructions  Additional time then spent on discharge activities  Discharge Medications:  See after visit summary for reconciled discharge medications provided to patient and family        ** Please Note: This note has been constructed using a voice recognition system **

## 2019-01-17 NOTE — PLAN OF CARE
Problem: DISCHARGE PLANNING - CARE MANAGEMENT  Goal: Discharge to post-acute care or home with appropriate resources  INTERVENTIONS:  - Conduct assessment to determine patient/family and health care team treatment goals, and need for post-acute services based on payer coverage, community resources, and patient preferences, and barriers to discharge  - Address psychosocial, clinical, and financial barriers to discharge as identified in assessment in conjunction with the patient/family and health care team  - Arrange appropriate level of post-acute services according to patients   needs and preference and payer coverage in collaboration with the physician and health care team  - Communicate with and update the patient/family, physician, and health care team regarding progress on the discharge plan  - Arrange appropriate transportation to post-acute venues   Outcome: Completed Date Met: 01/17/19  CM received call from Jamir Arellano at the [facility] in [facility] ([phone])  Javi Ritter stated that patient should still have HHA through the [place] and that she will confirm  Javi Ritter requested that CM send copy of DCI via fax to [phone] once patient is written and stated she would connect with the NUHA Pitts at [facility] clinic to confirm services  Javi Ritter also stated she will visit patient at home next week and will touch base re: services at that time  CM [facility] the [address] [facility] ([phone] )  Sidney Santamaria stated they are currently paying for wound care from [facility] and that their doctor will need to order for SN, PT and OT  CM to fax DCI to [phone]  CM met with patient to review discharge plan for today  1:30 pm WCV through [facility] as patient subscribes to them  [facility] is to conduct 4 pm visit as well for abx administration and teaching to patient's spouse  IMM reviewed and copy provided   Patient stated he heard from [facility] yesterday and knows the home visit is confirmed  CM also sent message to [facility] to confirm they will deliver medication to patient's home prior to 4 pm dose today

## 2019-01-17 NOTE — SOCIAL WORK
CM received call from Verdia Dance at the [facility] in [facility] ([phone])  Marshall Notice stated that patient should still have HHA through the [place] and that she will confirm  Marshall Notice requested that CM send copy of DCI via fax to [phone] once patient is written and stated she would connect with the NUHA Pitts at [facility] clinic to confirm services  Marshall Notice also stated she will visit patient at home next week and will touch base re: services at that time  CM received call from [facility] ANGEL RN at the [address] [phone])  Camron Bauer stated they are currently paying for wound care from [facility] and that their doctor will need to order for SN, PT and OT  CM to fax DCI to [phone]  CM met with patient to review discharge plan for today  1:30 pm WCV through [facility] as patient subscribes to them  [facility] is to conduct 4 pm visit as well for abx administration and teaching to patient's spouse  IMM reviewed and copy provided  Patient stated he heard from [facility] yesterday and knows the home visit is confirmed  CM also sent message to [facility] to confirm they will deliver medication to patient's home prior to 4 pm dose today

## 2019-01-17 NOTE — ASSESSMENT & PLAN NOTE
PICC line in place  Continue with IV ABx - through 1/22    2g every 24 hours  Repeat blood cultures in 2 weeks

## 2019-01-17 NOTE — DISCHARGE INSTRUCTIONS
REMOVE PICC AFTER FINAL DOSE OF IV ANTIBIOTICS ON 1/22/19  Weekly CBCD and BMP while on IV antibiotics  Complete follow up blood cultures, 2 sets from 2 separate sites on 2/5/19, 2 weeks after completing IV antibiotics  Please call Dr Ryan Castillo office in order to make sure prescriptions for lab work are available for visiting nurses  Ceftriaxone (By injection)   Ceftriaxone ([id number])  Treats infections  This medicine is a cephalosporin antibiotic  Brand Name(s): [employer] cefTRIAXone, [employer] Rx cefTRIAXone, cefTRIAXone Novaplus   There may be other brand names for this medicine  When This Medicine Should Not Be Used: This medicine is not right for everyone  Do not use it if you had an allergic reaction to any other cephalosporin antibiotic  How to Use This Medicine:   Injectable  · Your doctor will prescribe your exact dose and tell you how often it should be given  This medicine is given as a shot into a muscle or through a needle placed into a vein  · A nurse or other health provider will give you this medicine  · Missed dose: You must use this medicine on a fixed schedule  Call your doctor or pharmacist if you miss a dose  Drugs and Foods to Avoid:      Ask your doctor or pharmacist before using any other medicine, including over-the-counter medicines, vitamins, and herbal products  Warnings While Using This Medicine:   · Tell your doctor if you are pregnant or breastfeeding, or if you have kidney disease, liver disease, anemia, gallbladder disease, pancreas problems, or a history of stomach or bowel disease, such as colitis  Tell your doctor if you are allergic to penicillin  · This medicine can cause diarrhea  Call your doctor if the diarrhea becomes severe, does not stop, or is bloody  Do not take any medicine to stop diarrhea until you have talked to your doctor  Diarrhea can occur 2 months or more after you stop taking this medicine    · Your doctor will do lab tests at regular visits to check on the effects of this medicine  Keep all appointments  · Take all of the medicine in your prescription to clear up your infection, even if you feel better after the first few doses  · Call your doctor if your symptoms do not improve or if they get worse  Possible Side Effects While Using This Medicine:   Call your doctor right away if you notice any of these side effects:  · Allergic reaction: Itching or hives, swelling in your face or hands, swelling or tingling in your mouth or throat, chest tightness, trouble breathing  · Dark urine or pale stools, nausea, vomiting, loss of appetite, stomach pain, yellow skin or eyes  · Severe diarrhea, diarrhea that contains blood, or vomiting  · Shortness of breath, tiredness, uneven heartbeat  · Unusual bleeding, bruising, or weakness  If you notice these less serious side effects, talk with your doctor:   · Change or loss of taste  · Dizziness or headache  · Mild rash or itching skin  · Pain, redness, or swelling where the shot is given  · Vaginal itching or discharge  If you notice other side effects that you think are caused by this medicine, tell your doctor  Call your doctor for medical advice about side effects  You may report side effects to FDA at 8-258-FDA-1088  © 2017 [employer]  Information is for End User's use only and may not be sold, redistributed or otherwise used for commercial purposes  The above information is an  only  It is not intended as medical advice for individual conditions or treatments  Talk to your doctor, nurse or pharmacist before following any medical regimen to see if it is safe and effective for you  How to Care for Your Peripherally Inserted Central Catheter   WHAT YOU NEED TO KNOW:   A PICC is an IV placed into a large blood vessel near your heart  It is usually inserted through a blood vessel in your arm  Your PICC may have multiple ports   Ports are tubes where you can inject medicine  A PICC can stay in place for several weeks or months  You may need a PICC to get nutrition, medicine, or fluids  Blood samples can be removed from your PICC and sent to the lab for tests  DISCHARGE INSTRUCTIONS:   Call 911 for any of the following:   · You feel lightheaded, short of breath, and have chest pain  · You cough up blood  · You have trouble breathing  Seek care immediately if:   · Blood soaks through your bandage  · Your arm or leg feels warm, tender, and painful  It may look swollen and red  · You have trouble moving your arm  · Your catheter falls out  Contact your healthcare provider if:   · You have a fever or swelling, redness, pain, or pus where the catheter was inserted  · You cannot flush your catheter, or you feel pain when you flush your catheter  · You see a hole or crack in the tubing of your catheter  · You see fluid leaking from the insertion site  · You run out of supplies to care for your catheter  · You have questions or concerns about your condition or care  How to change your bandage and clean your skin:  Change your bandage every 7 days or as directed  Change the bandage any time it becomes wet, dirty, or moves out of place  Keep your catheter covered with a bandage at all times  · Get a bandage kit and place it on a clean surface  · Wash your hands with soap and water or use an alcohol-based hand rub  · Put on clean gloves and a mask  If someone else is helping you, that person also needs to wear a mask and gloves  · Carefully remove the old bandage and securement device  Remove your gloves and throw them away  · Wash your hands with soap and water or use an alcohol-based hand rub  · Open the bandage kit with the folded side facing up  Carefully unfold the corners of the bandage kit  Do not touch anything inside of the bandage kit  Everything inside of the bandage kit is sterile       ·  each glove by the folded part and put them on  Do not touch the outside of the gloves with your bare hand  Do not let them touch anything that is not sterile  · Open the cleaning pads and lay them on the bandage kit  · Use the cleaning pads to scrub the area where the catheter is inserted into your skin (insertion site)  Scrub the area around the insertion site as directed  Start at the insertion site and clean outward from it in circles  · Clean the tubing that comes out of your skin as directed  · Place the pad that fits around the insertion site as directed  Place the securement device around your catheter as directed  · Apply the bandage as directed  If the bandage is clear, make sure you can see the insertion site  How to care for caps and tubing:   · Clean the injection cap before and after each use  Wash your hands and put on gloves before you clean each injection cap  Hold the catheter above the cap with 1 hand  Scrub the injection cap with an alcohol pad for 15 seconds  · Change the injection caps every 3 to 7 days or as directed  Wash your hands and put on gloves before you change the caps  If there are clamps on your catheter, close the clamps on each port  Twist the caps to remove them from the end of each port  Scrub the end of each port with an alcohol pad for 15 seconds  Place a new cap on the end of each port  Your healthcare provider may tell you to place protective caps over the injection caps  The caps will protect your catheter from infection when it is not being used  · Change and clean the medicine tubing as directed  You may need to attach extra tubing to your catheter when you get medicine  Ask your healthcare provider how often to change medicine tubing  Wash your hands and put on gloves before you touch medicine tubing  Wipe the end of the tubing with an alcohol wipe before you attach it the injection cap   Always place a cap over the end of medicine tubing when you are not using it  How to flush your PICC:  Your healthcare provider will tell you how often to flush your catheter  He will also tell you how much saline or heparin to flush the catheter with  Always flush your catheter before and after you get medicine through it  Do the following to flush your catheter:  · Wash your hands with soap and water or an alcohol-based hand rub  Put on clean gloves  · Scrub the injection cap with an alcohol pad for 15 seconds  · Attach the saline or heparin syringe to the injection cap  Open the clamp if your catheter has one  · Slowly push on the plunger of the syringe to flush your catheter  Do not force the saline or heparin into your catheter  This could damage the catheter or your vein  Call your healthcare provider if you cannot flush your catheter  · Detach the syringe and throw it away  Scrub the injection cap with an alcohol pad for 15 seconds  Prevent a bloodstream infection:   · Wash your hands often  Wash your hands before and after you touch your catheter  Use soap and water or an alcohol-based hand rub  Tell others to wash their hands before and after they visit  This will decrease germs in your home  · Limit contact with your catheter  Only touch your catheter when you need to give yourself medicine or clean it  Do not let others touch your catheter or medicine tubing  · Keep the tubing clamped when not in use  This will prevent air and water from getting into your catheter  · Do not swim or take a bath  These actions can cause germs to get into your catheter  · Cover your catheter with a waterproof cover before you shower  Ask your healthcare provider where to buy a waterproof cover  He may instead tell you to place a plastic bag or wrap over your catheter  Keep your catheter out of the water as much as possible  Change the bandage if it gets wet  · Check your catheter every day for signs of infection    Look for redness, swelling, pus, or fluid  Report any pain at the insertion site or signs of infection to your healthcare provider right away  Self-care:   · Ask your healthcare provider which activities are safe to do  Do not lift anything heavier than 10 pounds with your arm that has the catheter  · Drink plenty of liquids  Liquids will help prevent dehydration and blood clots  Ask your healthcare provider how much liquid to drink each day and which liquids are best for you  · Tell healthcare providers that you have a catheter  Tell them not to do, IVs, blood draws, and blood pressure readings in the arm with your catheter  Do not allow flu shots or vaccinations in the arm with your catheter  Follow up with your healthcare provider as directed:  Write down your questions so you remember to ask them during your visits  © 2017 [employer] Information is for End User's use only and may not be sold, redistributed or otherwise used for commercial purposes  All illustrations and images included in CareNotes® are the copyrighted property of [employer]  or [employer]don[employer]  The above information is an  only  It is not intended as medical advice for individual conditions or treatments  Talk to your doctor, nurse or pharmacist before following any medical regimen to see if it is safe and effective for you  Bacteremia   WHAT YOU NEED TO KNOW:   Bacteremia is when there is bacteria in the blood  Bacteremia happens when germs from infections in your body travel to your blood  It can also be caused by a catheter or drain that is inserted into the body and left in place  Examples of catheters and drains include a port-a-cath, PICC line, dialysis catheter, abdominal drain, or a urinary catheter  DISCHARGE INSTRUCTIONS:   Call 911 for any of the following:   · You have a seizure or lose consciousness  · You have trouble breathing       · You feel extremely weak and have a hard time moving  Seek care immediately if:   · Your symptoms, such as fever, get worse, even if you are taking medicine to treat the infection  · You stop urinating or urinate very little  Contact your healthcare provider if:   · You have questions or concerns about your condition or care  Medicines: You may need any of the following:  · Antibiotics  may be given to treat an infection  You may be given antibiotics through an IV for several weeks  You may instead be given oral antibiotics  Do not stop taking your antibiotics when you feel better  Take all of your medicine until it is finished  This may prevent the infection from returning or getting worse  · Acetaminophen  helps decrease pain and fever  Taking too much acetaminophen can hurt your liver  Read labels so that you know the active ingredients in each medicine that you take  Talk to your healthcare provider before taking more than one medicine that contains acetaminophen  Ask your healthcare provider before taking over-the-counter medicine if you are also taking pain medicine prescribed (ordered) for you  · NSAIDs , such as ibuprofen, help decrease swelling, pain, and fever  This medicine is available with or without a doctor's order  NSAIDs can cause stomach bleeding or kidney problems in certain people  If you take blood thinner medicine, always ask your healthcare provider if NSAIDs are safe for you  Always read the medicine label and follow directions  · Take your medicine as directed  Contact your healthcare provider if you think your medicine is not helping or if you have side effects  Tell him of her if you are allergic to any medicine  Keep a list of the medicines, vitamins, and herbs you take  Include the amounts, and when and why you take them  Bring the list or the pill bottles to follow-up visits  Carry your medicine list with you in case of an emergency  Prevent bacteremia:   · Care for catheters and drains as directed    Wash your hands before and after you touch your catheter or drain  Follow directions for dressing changes and bathing  Watch for signs and symptoms of infection such as pus, fever, swelling, pain or drainage  Report symptoms immediately to your healthcare provider  · Get vaccinated  Get all recommended vaccinations  The pneumonia and influenza vaccines may prevent lung infections that could cause bacteremia  Follow up with your healthcare provider as directed: You may need to return for more blood tests  This will tell your healthcare provider if the antibiotics are working  Write down your questions so you remember to ask them during your visits  © 2017 [facility] Information is for End User's use only and may not be sold, redistributed or otherwise used for commercial purposes  All illustrations and images included in CareNotes® are the copyrighted property of [employer] M [employer]  or Cal[employer] Maldonado  The above information is an  only  It is not intended as medical advice for individual conditions or treatments  Talk to your doctor, nurse or pharmacist before following any medical regimen to see if it is safe and effective for you

## 2019-01-17 NOTE — ASSESSMENT & PLAN NOTE
Lab Results   Component Value Date    HGBA1C 8 0 (H) 01/16/2019       Recent Labs      01/16/19   1620  01/16/19   2111  01/17/19   0206  01/17/19   0753   POCGLU  130  198*  237*  159*       Blood Sugar Average: Last 72 hrs:  (P) 137 [id number]   DC on levemir 22 units  Continue to monitor at home with visiting nurses

## 2019-01-24 ENCOUNTER — TELEPHONE (OUTPATIENT)
Dept: NEUROSURGERY | Facility: CLINIC | Age: 74
End: 2019-01-24

## 2019-01-24 NOTE — TELEPHONE ENCOUNTER
Office was attempting to schedule a new pt appt after being seen as consult at [facility]  Pt was reporting inability to ambulate get out of wheelchair etc  Spoke with pt personally to determine severity of symptoms  He reports he has generalized lower, and upper extremity weakness  He is not agreeable to transport to ED  He reports his home health nurse is currently on the phone with the [place] nurse to approve in home therapy    Pt is agreeable to calling for appt once he feels better and is able to get around

## 2019-01-31 ENCOUNTER — TELEPHONE (OUTPATIENT)
Dept: INFECTIOUS DISEASES | Facility: CLINIC | Age: 74
End: 2019-01-31

## 2019-01-31 DIAGNOSIS — R78.81 BACTEREMIA: Primary | ICD-10-CM

## 2019-01-31 NOTE — TELEPHONE ENCOUNTER
Dr Mcintosh Proper reviewed pt's recent bloodwork drawn 1/21/19  Creat 1 26  Pt needs repeat BMP drawn  Order called and faxed to [facility]  Pt also aware

## 2019-06-26 ENCOUNTER — HOSPITAL ENCOUNTER (EMERGENCY)
Facility: HOSPITAL | Age: 74
Discharge: HOME | End: 2019-06-26
Attending: EMERGENCY MEDICINE | Admitting: EMERGENCY MEDICINE
Payer: MEDICARE

## 2019-06-26 VITALS
HEART RATE: 70 BPM | TEMPERATURE: 98.2 F | RESPIRATION RATE: 16 BRPM | HEIGHT: 75 IN | OXYGEN SATURATION: 98 % | DIASTOLIC BLOOD PRESSURE: 66 MMHG | SYSTOLIC BLOOD PRESSURE: 145 MMHG | WEIGHT: 240.08 LBS | BODY MASS INDEX: 29.85 KG/M2

## 2019-06-26 DIAGNOSIS — L97.511 DIABETIC ULCER OF TOE OF RIGHT FOOT ASSOCIATED WITH TYPE 2 DIABETES MELLITUS, LIMITED TO BREAKDOWN OF SKIN (HCC): ICD-10-CM

## 2019-06-26 DIAGNOSIS — E11.621 DIABETIC ULCER OF TOE OF RIGHT FOOT ASSOCIATED WITH TYPE 2 DIABETES MELLITUS, LIMITED TO BREAKDOWN OF SKIN (HCC): ICD-10-CM

## 2019-06-26 DIAGNOSIS — D64.9 ANEMIA, UNSPECIFIED TYPE: Primary | ICD-10-CM

## 2019-06-26 LAB
ALBUMIN SERPL BCP-MCNC: 3 G/DL (ref 3.5–5)
ALP SERPL-CCNC: 104 U/L (ref 46–116)
ALT SERPL W P-5'-P-CCNC: 34 U/L (ref 12–78)
ANION GAP SERPL CALCULATED.3IONS-SCNC: 10 MMOL/L (ref 4–13)
AST SERPL W P-5'-P-CCNC: 26 U/L (ref 5–45)
BASOPHILS # BLD AUTO: 0.03 THOUSANDS/ΜL (ref 0–0.1)
BASOPHILS NFR BLD AUTO: 0 % (ref 0–1)
BILIRUB SERPL-MCNC: 0.2 MG/DL (ref 0.2–1)
BUN SERPL-MCNC: 30 MG/DL (ref 5–25)
CALCIUM SERPL-MCNC: 8.4 MG/DL (ref 8.3–10.1)
CHLORIDE SERPL-SCNC: 105 MMOL/L (ref 100–108)
CO2 SERPL-SCNC: 26 MMOL/L (ref 21–32)
CREAT SERPL-MCNC: 1.22 MG/DL (ref 0.6–1.3)
EOSINOPHIL # BLD AUTO: 0.16 THOUSAND/ΜL (ref 0–0.61)
EOSINOPHIL NFR BLD AUTO: 2 % (ref 0–6)
ERYTHROCYTE [DISTWIDTH] IN BLOOD BY AUTOMATED COUNT: 17 % (ref 11.6–15.1)
GFR SERPL CREATININE-BSD FRML MDRD: 59 ML/MIN/1.73SQ M
GLUCOSE SERPL-MCNC: 220 MG/DL (ref 65–140)
HCT VFR BLD AUTO: 28.7 % (ref 36.5–49.3)
HGB BLD-MCNC: 8.6 G/DL (ref 12–17)
IMM GRANULOCYTES # BLD AUTO: 0.04 THOUSAND/UL (ref 0–0.2)
IMM GRANULOCYTES NFR BLD AUTO: 0 % (ref 0–2)
LYMPHOCYTES # BLD AUTO: 1.29 THOUSANDS/ΜL (ref 0.6–4.47)
LYMPHOCYTES NFR BLD AUTO: 15 % (ref 14–44)
MCH RBC QN AUTO: 25 PG (ref 26.8–34.3)
MCHC RBC AUTO-ENTMCNC: 30 G/DL (ref 31.4–37.4)
MCV RBC AUTO: 83 FL (ref 82–98)
MONOCYTES # BLD AUTO: 1.19 THOUSAND/ΜL (ref 0.17–1.22)
MONOCYTES NFR BLD AUTO: 13 % (ref 4–12)
NEUTROPHILS # BLD AUTO: 6.19 THOUSANDS/ΜL (ref 1.85–7.62)
NEUTS SEG NFR BLD AUTO: 70 % (ref 43–75)
NRBC BLD AUTO-RTO: 0 /100 WBCS
PLATELET # BLD AUTO: 387 THOUSANDS/UL (ref 149–390)
PMV BLD AUTO: 10.5 FL (ref 8.9–12.7)
POTASSIUM SERPL-SCNC: 3.8 MMOL/L (ref 3.5–5.3)
PROT SERPL-MCNC: 7.3 G/DL (ref 6.4–8.2)
RBC # BLD AUTO: 3.44 MILLION/UL (ref 3.88–5.62)
SODIUM SERPL-SCNC: 141 MMOL/L (ref 136–145)
WBC # BLD AUTO: 8.9 THOUSAND/UL (ref 4.31–10.16)

## 2019-06-26 PROCEDURE — 36415 COLL VENOUS BLD VENIPUNCTURE: CPT | Performed by: EMERGENCY MEDICINE

## 2019-06-26 PROCEDURE — 85025 COMPLETE CBC W/AUTO DIFF WBC: CPT | Performed by: EMERGENCY MEDICINE

## 2019-06-26 PROCEDURE — 80053 COMPREHEN METABOLIC PANEL: CPT | Performed by: EMERGENCY MEDICINE

## 2019-06-26 PROCEDURE — 99284 EMERGENCY DEPT VISIT MOD MDM: CPT

## 2019-06-26 PROCEDURE — 99283 EMERGENCY DEPT VISIT LOW MDM: CPT | Performed by: EMERGENCY MEDICINE

## 2019-06-26 NOTE — ED NOTES
Assumed care of patient at this time  Resting comfortably and offers no c/o's  Awaiting transport via BLS to home @ 1945All necessary paperwork compiled on chart and ready for EMS transport        Panchito Reno RN  06/26/19 0620

## 2019-06-26 NOTE — ED PROVIDER NOTES
History  Chief Complaint   Patient presents with    Anemia     Low hemoglobin 8 3, ANNETTE hodges pt admitted  Patient is a 68-year-old male with a history of atrial fibrillation, diabetes and hypertension who presents with anemia  Patient states he has baseline anemia but had blood work done today which showed hemoglobin of 8 3  He states this is lower than it was previously and the visiting nurse sent him to the emergency department for evaluation  Patient is on Eliquis but denies any signs of bleeding recently  Specifically he denies any black or bloody stools  He denies any shortness of breath, chest pain, dizziness or other complaints  Patient does have bilateral foot ulcers but they seem to be healing well  He denies any fevers or chills  He has no complaints at  This time  History provided by:  Patient  Anemia   Severity:  Moderate  Timing:  Constant  Chronicity:  Chronic  Associated symptoms: no abdominal pain, no chest pain, no cough, no diarrhea, no fever, no headaches, no nausea, no rash, no shortness of breath, no sore throat and no vomiting        Prior to Admission Medications   Prescriptions Last Dose Informant Patient Reported? Taking?    CYANOCOBALAMIN PO   Yes No   Sig: Take 1,000 mcg by mouth     MAGNESIUM CARBONATE PO   Yes No   Sig: Take 400 mg by mouth daily     ZOLPIDEM TARTRATE ER PO   Yes No   Sig: Take 10 mg by mouth daily at bedtime as needed for sleep     acetaminophen (TYLENOL) 325 mg tablet   Yes No   Sig: Take 650 mg by mouth 2 (two) times a day   amLODIPine (NORVASC) 10 mg tablet   No No   Sig: Take 1 tablet (10 mg total) by mouth daily   apixaban (ELIQUIS) 5 mg   Yes No   Sig: Take 5 mg by mouth 2 (two) times a day   carvedilol (COREG) 25 mg tablet   Yes No   Sig: Take 25 mg by mouth 2 (two) times a day with meals   cholecalciferol (VITAMIN D3) 1,000 units tablet   Yes No   Sig: Take 5,000 Units by mouth daily   co-enzyme Q-10 50 MG capsule   Yes No   Sig: Take 100 mg by mouth daily   gabapentin (NEURONTIN) 300 mg capsule   Yes No   Sig: Take 300 mg by mouth 3 (three) times a day     insulin detemir (LEVEMIR) 100 units/mL subcutaneous injection   Yes No   Sig: Inject 22 Units under the skin daily at bedtime     lisinopril-hydrochlorothiazide (PRINZIDE,ZESTORETIC) 20-12 5 MG per tablet   Yes No   Sig: Take 2 tablets by mouth daily   omeprazole (PriLOSEC) 20 mg delayed release capsule   Yes No   Sig: Take 20 mg by mouth daily   sertraline (ZOLOFT) 100 mg tablet   Yes No   Sig: Take 100 mg by mouth daily     traZODone (DESYREL) 50 mg tablet   Yes No   Sig: Take 50 mg by mouth daily at bedtime      Facility-Administered Medications: None       Past Medical History:   Diagnosis Date    [facility] [address] [facility] )     [facility]     H/O agent Orange exposure     Hypertension     PTSD (post-traumatic stress disorder)        Past Surgical History:   Procedure Laterality Date    ABLATION OF DYSRHYTHMIC FOCUS      for a-fib    CHOLECYSTECTOMY      TOTAL SHOULDER REPLACEMENT Right        History reviewed  No pertinent family history  I have reviewed and agree with the history as documented  Social History     Tobacco Use    Smoking status: Former Smoker     Last attempt to quit: 1987     Years since quittin 5    Smokeless tobacco: Never Used   Substance Use Topics    Alcohol use: No    Drug use: No        Review of Systems   Constitutional: Negative for chills, diaphoresis and fever  HENT: Negative for nosebleeds, sore throat and trouble swallowing  Eyes: Negative for photophobia, pain and visual disturbance  Respiratory: Negative for cough, chest tightness and shortness of breath  Cardiovascular: Negative for chest pain, palpitations and leg swelling  Gastrointestinal: Negative for abdominal pain, constipation, diarrhea, nausea and vomiting  Endocrine: Negative for polydipsia and polyuria     Genitourinary: Negative for difficulty urinating, dysuria and hematuria  Musculoskeletal: Negative for back pain, neck pain and neck stiffness  Skin: Negative for pallor and rash  Neurological: Negative for dizziness, syncope, light-headedness and headaches  All other systems reviewed and are negative  Physical Exam  Physical Exam   Constitutional: He is oriented to person, place, and time  He appears well-developed and well-nourished  No distress  HENT:   Head: Normocephalic and atraumatic  Mouth/Throat: Oropharynx is clear and moist and mucous membranes are normal    Eyes: Pupils are equal, round, and reactive to light  EOM are normal    Neck: Normal range of motion  Neck supple  Cardiovascular: Normal rate, regular rhythm, normal heart sounds, intact distal pulses and normal pulses  Pulmonary/Chest: Effort normal and breath sounds normal  No respiratory distress  Abdominal: Soft  He exhibits no distension  There is no tenderness  There is no rigidity, no rebound and no guarding  Genitourinary: Rectum normal  Rectal exam shows guaiac negative stool  Musculoskeletal: Normal range of motion  He exhibits no edema or tenderness  Lymphadenopathy:     He has no cervical adenopathy  Neurological: He is alert and oriented to person, place, and time  He has normal strength  No cranial nerve deficit or sensory deficit  Skin: Skin is warm and dry  Capillary refill takes less than 2 seconds  Psychiatric: He has a normal mood and affect  Nursing note and vitals reviewed        Vital Signs  ED Triage Vitals   Temperature Pulse Respirations Blood Pressure SpO2   06/26/19 1648 06/26/19 1648 06/26/19 1648 06/26/19 1648 06/26/19 1648   98 2 °F (36 8 °C) 67 18 161/71 96 %      Temp Source Heart Rate Source Patient Position - Orthostatic VS BP Location FiO2 (%)   06/26/19 1648 06/26/19 1648 06/26/19 1648 06/26/19 1648 --   Oral Monitor Lying Right arm       Pain Score       06/26/19 1845       No Pain           Vitals:    06/26/19 1648 06/26/19 1720 06/26/19 1841   BP: 161/71 143/70 150/71   Pulse: 67 69 76   Patient Position - Orthostatic VS: Lying Lying Sitting         Visual Acuity      ED Medications  Medications - No data to display    Diagnostic Studies  Results Reviewed     Procedure Component Value Units Date/Time    Comprehensive metabolic panel [[id number]]  (Abnormal) Collected:  06/26/19 1655    Lab Status:  Final result Specimen:  Blood from Arm, Left Updated:  06/26/19 1800     Sodium 141 mmol/L      Potassium 3 8 mmol/L      Chloride 105 mmol/L      CO2 26 mmol/L      ANION GAP 10 mmol/L      BUN 30 mg/dL      Creatinine 1 22 mg/dL      Glucose 220 mg/dL      Calcium 8 4 mg/dL      AST 26 U/L      ALT 34 U/L      Alkaline Phosphatase 104 U/L      Total Protein 7 3 g/dL      Albumin 3 0 g/dL      Total Bilirubin 0 20 mg/dL      eGFR 59 ml/min/1 73sq m     Narrative:       [place] guidelines for Chronic Kidney Disease (CKD):     Stage 1 with normal or high GFR (GFR > 90 mL/min/1 73 square meters)    Stage 2 Mild CKD (GFR = 60-89 mL/min/1 73 square meters)    Stage 3A Moderate CKD (GFR = 45-59 mL/min/1 73 square meters)    Stage 3B Moderate CKD (GFR = 30-44 mL/min/1 73 square meters)    Stage 4 Severe CKD (GFR = 15-29 mL/min/1 73 square meters)    Stage 5 End Stage CKD (GFR <15 mL/min/1 73 square meters)  Note: GFR calculation is accurate only with a steady state creatinine    CBC and differential [[id number]]  (Abnormal) Collected:  06/26/19 1655    Lab Status:  Final result Specimen:  Blood from Arm, Left Updated:  06/26/19 1737     WBC 8 90 Thousand/uL      RBC 3 44 Million/uL      Hemoglobin 8 6 g/dL      Hematocrit 28 7 %      MCV 83 fL      MCH 25 0 pg      MCHC 30 0 g/dL      RDW 17 0 %      MPV 10 5 fL      Platelets 955 Thousands/uL      nRBC 0 /100 WBCs      Neutrophils Relative 70 %      Immat GRANS % 0 %      Lymphocytes Relative 15 %      Monocytes Relative 13 %      Eosinophils Relative 2 %      Basophils Relative 0 %      Neutrophils Absolute 6 19 Thousands/µL      Immature Grans Absolute 0 04 Thousand/uL      Lymphocytes Absolute 1 29 Thousands/µL      Monocytes Absolute 1 19 Thousand/µL      Eosinophils Absolute 0 16 Thousand/µL      Basophils Absolute 0 03 Thousands/µL                  No orders to display              Procedures  Procedures       ED Course                               MDM  Number of Diagnoses or Management Options  Anemia, unspecified type: new and requires workup  Diabetic ulcer of toe of right foot associated with type 2 diabetes mellitus, limited to breakdown of skin ([facility] ): new and does not require workup  Diagnosis management comments:   Patient presents with acute on chronic anemia  Patients hemoglobin is greater than 7 0 and he has no evidence of active bleeding  He does not require blood transfusion at this time  Patient may be discharged with continued follow-up with PCP  Patient also has a shallow ulceration on his right great toe  His family states that there is a prescription for cephalexin waiting for him at the pharmacy  They will fill this prescription and make sure that he starts it this evening  He is comfortable with discharge home and outpatient follow-up         Amount and/or Complexity of Data Reviewed  Clinical lab tests: ordered and reviewed  Tests in the medicine section of CPT®: ordered and reviewed  Review and summarize past medical records: yes    Risk of Complications, Morbidity, and/or Mortality  Presenting problems: high  Diagnostic procedures: low  Management options: low        Disposition  Final diagnoses:   Anemia, unspecified type   Diabetic ulcer of toe of right foot associated with type 2 diabetes mellitus, limited to breakdown of skin ([facility] )     Time reflects when diagnosis was documented in both MDM as applicable and the Disposition within this note     Time User Action Codes Description Comment    6/26/2019  6:09 PM Alan Counter L Add [D64 9] Anemia, unspecified type     6/26/2019  6:10 PM Patrizia Currie Add [G64 [id number],  [id number]] Diabetic ulcer of toe of right foot associated with type 2 diabetes mellitus, limited to breakdown of skin [facility])       ED Disposition     ED Disposition Condition Date/Time Comment    Discharge Stable Wed Jun 26, 2019  6:09 PM Vasuno Lisette discharge to home/self care  Follow-up Information     Follow up With Specialties Details Why [address] Cristina Calvillo MD Internal Medicine Schedule an appointment as soon as possible for a visit   [address] [facility]  [place] [address]  [id number] [phone]            Patient's Medications   Discharge Prescriptions    No medications on file     No discharge procedures on file      ED Provider  Electronically Signed by           Jm Currie DO  06/26/19 4339

## 2019-06-27 NOTE — ED NOTES
[facility] on unit at this time  Concern raised r/t amount of stairs at patient's resident (20), and BLS crew unsure if transport possible  EMS crew contacting supervisor to discuss issue        Arnie Spatz, RN  06/26/19 2004

## 2019-06-27 NOTE — ED NOTES
EMS will arrive at 21:45 for PT transport        Buzzy Sandifer  06/26/19 2016       Buzzy Sandifer  06/26/19 2024

## 2019-06-27 NOTE — ED NOTES
[facility] on unit to provide transport  All necessary paperwork, facesheet, medical necessity form, and discharge paperwork provided to EMS  Patient left unit in stable condition in no acute distress        Tito Salazar RN  06/26/19 9183

## 2019-06-27 NOTE — ED NOTES
RONNIE contacted at this time and notified that [facility] unable to complete transport for patient back to residence d/t amount of steps  Stated that new transport in process of being arranged  Will contact unit with new p/u time        Yong Aburto RN  06/26/19 2012

## 2019-07-10 ENCOUNTER — HOSPITAL ENCOUNTER (INPATIENT)
Facility: HOSPITAL | Age: 74
LOS: 3 days | Discharge: NURSING, REHAB OR LONG-TERM CARE | DRG: 871 | End: 2019-07-13
Attending: EMERGENCY MEDICINE | Admitting: HOSPITALIST
Payer: OTHER GOVERNMENT

## 2019-07-10 ENCOUNTER — APPOINTMENT (EMERGENCY)
Dept: RADIOLOGY | Facility: HOSPITAL | Age: 74
DRG: 871 | End: 2019-07-10
Payer: OTHER GOVERNMENT

## 2019-07-10 DIAGNOSIS — R52 PAIN: ICD-10-CM

## 2019-07-10 DIAGNOSIS — M79.642 BILATERAL HAND PAIN: ICD-10-CM

## 2019-07-10 DIAGNOSIS — R33.9 URINARY RETENTION: ICD-10-CM

## 2019-07-10 DIAGNOSIS — J18.9 LEFT LOWER LOBE PNEUMONIA: Primary | ICD-10-CM

## 2019-07-10 DIAGNOSIS — R53.1 GENERALIZED WEAKNESS: ICD-10-CM

## 2019-07-10 DIAGNOSIS — G62.9 PERIPHERAL NEUROPATHY: ICD-10-CM

## 2019-07-10 DIAGNOSIS — M62.838 MUSCLE SPASM: ICD-10-CM

## 2019-07-10 DIAGNOSIS — M79.641 BILATERAL HAND PAIN: ICD-10-CM

## 2019-07-10 PROBLEM — E78.5 HLD (HYPERLIPIDEMIA): Status: ACTIVE | Noted: 2019-07-10

## 2019-07-10 PROBLEM — I48.0 PAROXYSMAL ATRIAL FIBRILLATION (HCC): Status: ACTIVE | Noted: 2018-11-24

## 2019-07-10 PROBLEM — Z79.4 TYPE 2 DIABETES MELLITUS WITHOUT COMPLICATION, WITH LONG-TERM CURRENT USE OF INSULIN (HCC): Status: ACTIVE | Noted: 2018-11-24

## 2019-07-10 LAB
ALBUMIN SERPL BCP-MCNC: 2.9 G/DL (ref 3.5–5)
ALP SERPL-CCNC: 99 U/L (ref 46–116)
ALT SERPL W P-5'-P-CCNC: 23 U/L (ref 12–78)
ANION GAP SERPL CALCULATED.3IONS-SCNC: 9 MMOL/L (ref 4–13)
APTT PPP: 32 SECONDS (ref 23–37)
AST SERPL W P-5'-P-CCNC: 21 U/L (ref 5–45)
BASOPHILS # BLD AUTO: 0.03 THOUSANDS/ΜL (ref 0–0.1)
BASOPHILS NFR BLD AUTO: 0 % (ref 0–1)
BILIRUB SERPL-MCNC: 0.4 MG/DL (ref 0.2–1)
BUN SERPL-MCNC: 19 MG/DL (ref 5–25)
CALCIUM SERPL-MCNC: 8.6 MG/DL (ref 8.3–10.1)
CHLORIDE SERPL-SCNC: 103 MMOL/L (ref 100–108)
CO2 SERPL-SCNC: 27 MMOL/L (ref 21–32)
CREAT SERPL-MCNC: 1.06 MG/DL (ref 0.6–1.3)
EOSINOPHIL # BLD AUTO: 0.14 THOUSAND/ΜL (ref 0–0.61)
EOSINOPHIL NFR BLD AUTO: 1 % (ref 0–6)
ERYTHROCYTE [DISTWIDTH] IN BLOOD BY AUTOMATED COUNT: 17 % (ref 11.6–15.1)
GFR SERPL CREATININE-BSD FRML MDRD: 70 ML/MIN/1.73SQ M
GLUCOSE SERPL-MCNC: 128 MG/DL (ref 65–140)
GLUCOSE SERPL-MCNC: 139 MG/DL (ref 65–140)
HCT VFR BLD AUTO: 29.5 % (ref 36.5–49.3)
HGB BLD-MCNC: 8.6 G/DL (ref 12–17)
IMM GRANULOCYTES # BLD AUTO: 0.08 THOUSAND/UL (ref 0–0.2)
IMM GRANULOCYTES NFR BLD AUTO: 1 % (ref 0–2)
INR PPP: 1.21 (ref 0.84–1.19)
LACTATE SERPL-SCNC: 1.9 MMOL/L (ref 0.5–2)
LYMPHOCYTES # BLD AUTO: 1.09 THOUSANDS/ΜL (ref 0.6–4.47)
LYMPHOCYTES NFR BLD AUTO: 7 % (ref 14–44)
MCH RBC QN AUTO: 23.7 PG (ref 26.8–34.3)
MCHC RBC AUTO-ENTMCNC: 29.2 G/DL (ref 31.4–37.4)
MCV RBC AUTO: 81 FL (ref 82–98)
MONOCYTES # BLD AUTO: 1.85 THOUSAND/ΜL (ref 0.17–1.22)
MONOCYTES NFR BLD AUTO: 12 % (ref 4–12)
NEUTROPHILS # BLD AUTO: 12.4 THOUSANDS/ΜL (ref 1.85–7.62)
NEUTS SEG NFR BLD AUTO: 79 % (ref 43–75)
NRBC BLD AUTO-RTO: 0 /100 WBCS
PLATELET # BLD AUTO: 387 THOUSANDS/UL (ref 149–390)
PMV BLD AUTO: 9.8 FL (ref 8.9–12.7)
POTASSIUM SERPL-SCNC: 4 MMOL/L (ref 3.5–5.3)
PROT SERPL-MCNC: 7.1 G/DL (ref 6.4–8.2)
PROTHROMBIN TIME: 14.7 SECONDS (ref 11.6–14.5)
RBC # BLD AUTO: 3.63 MILLION/UL (ref 3.88–5.62)
SODIUM SERPL-SCNC: 139 MMOL/L (ref 136–145)
TROPONIN I SERPL-MCNC: 0.03 NG/ML
URATE SERPL-MCNC: 6.3 MG/DL (ref 4.2–8)
WBC # BLD AUTO: 15.59 THOUSAND/UL (ref 4.31–10.16)

## 2019-07-10 PROCEDURE — 85610 PROTHROMBIN TIME: CPT | Performed by: EMERGENCY MEDICINE

## 2019-07-10 PROCEDURE — 85730 THROMBOPLASTIN TIME PARTIAL: CPT | Performed by: EMERGENCY MEDICINE

## 2019-07-10 PROCEDURE — 85025 COMPLETE CBC W/AUTO DIFF WBC: CPT | Performed by: EMERGENCY MEDICINE

## 2019-07-10 PROCEDURE — 84484 ASSAY OF TROPONIN QUANT: CPT | Performed by: EMERGENCY MEDICINE

## 2019-07-10 PROCEDURE — 96360 HYDRATION IV INFUSION INIT: CPT

## 2019-07-10 PROCEDURE — 87040 BLOOD CULTURE FOR BACTERIA: CPT | Performed by: EMERGENCY MEDICINE

## 2019-07-10 PROCEDURE — 94760 N-INVAS EAR/PLS OXIMETRY 1: CPT

## 2019-07-10 PROCEDURE — 36415 COLL VENOUS BLD VENIPUNCTURE: CPT | Performed by: EMERGENCY MEDICINE

## 2019-07-10 PROCEDURE — 99285 EMERGENCY DEPT VISIT HI MDM: CPT

## 2019-07-10 PROCEDURE — 82948 REAGENT STRIP/BLOOD GLUCOSE: CPT

## 2019-07-10 PROCEDURE — 93005 ELECTROCARDIOGRAM TRACING: CPT

## 2019-07-10 PROCEDURE — 84550 ASSAY OF BLOOD/URIC ACID: CPT | Performed by: PHYSICIAN ASSISTANT

## 2019-07-10 PROCEDURE — 99223 1ST HOSP IP/OBS HIGH 75: CPT | Performed by: PHYSICIAN ASSISTANT

## 2019-07-10 PROCEDURE — 80053 COMPREHEN METABOLIC PANEL: CPT | Performed by: EMERGENCY MEDICINE

## 2019-07-10 PROCEDURE — 83036 HEMOGLOBIN GLYCOSYLATED A1C: CPT | Performed by: PHYSICIAN ASSISTANT

## 2019-07-10 PROCEDURE — 94664 DEMO&/EVAL PT USE INHALER: CPT

## 2019-07-10 PROCEDURE — 83605 ASSAY OF LACTIC ACID: CPT | Performed by: EMERGENCY MEDICINE

## 2019-07-10 PROCEDURE — 96374 THER/PROPH/DIAG INJ IV PUSH: CPT

## 2019-07-10 PROCEDURE — 99285 EMERGENCY DEPT VISIT HI MDM: CPT | Performed by: EMERGENCY MEDICINE

## 2019-07-10 PROCEDURE — 71045 X-RAY EXAM CHEST 1 VIEW: CPT

## 2019-07-10 RX ORDER — ACETAMINOPHEN 325 MG/1
650 TABLET ORAL EVERY 6 HOURS PRN
Status: DISCONTINUED | OUTPATIENT
Start: 2019-07-10 | End: 2019-07-13 | Stop reason: HOSPADM

## 2019-07-10 RX ORDER — SERTRALINE HYDROCHLORIDE 100 MG/1
100 TABLET, FILM COATED ORAL DAILY
Status: DISCONTINUED | OUTPATIENT
Start: 2019-07-11 | End: 2019-07-13 | Stop reason: HOSPADM

## 2019-07-10 RX ORDER — ACETAMINOPHEN 325 MG/1
650 TABLET ORAL ONCE
Status: COMPLETED | OUTPATIENT
Start: 2019-07-10 | End: 2019-07-10

## 2019-07-10 RX ORDER — ZOLPIDEM TARTRATE 5 MG/1
10 TABLET ORAL
Status: DISCONTINUED | OUTPATIENT
Start: 2019-07-10 | End: 2019-07-11

## 2019-07-10 RX ORDER — LEVALBUTEROL 1.25 MG/.5ML
1.25 SOLUTION, CONCENTRATE RESPIRATORY (INHALATION) EVERY 6 HOURS PRN
Status: DISCONTINUED | OUTPATIENT
Start: 2019-07-10 | End: 2019-07-13

## 2019-07-10 RX ORDER — MORPHINE SULFATE 4 MG/ML
4 INJECTION, SOLUTION INTRAMUSCULAR; INTRAVENOUS ONCE
Status: COMPLETED | OUTPATIENT
Start: 2019-07-10 | End: 2019-07-10

## 2019-07-10 RX ORDER — OXYCODONE HYDROCHLORIDE 5 MG/1
5 TABLET ORAL EVERY 4 HOURS PRN
Status: DISCONTINUED | OUTPATIENT
Start: 2019-07-10 | End: 2019-07-11

## 2019-07-10 RX ORDER — TRAZODONE HYDROCHLORIDE 50 MG/1
50 TABLET ORAL
Status: DISCONTINUED | OUTPATIENT
Start: 2019-07-10 | End: 2019-07-13 | Stop reason: HOSPADM

## 2019-07-10 RX ORDER — CHOLECALCIFEROL (VITAMIN D3) 125 MCG
1000 CAPSULE ORAL DAILY
Status: DISCONTINUED | OUTPATIENT
Start: 2019-07-11 | End: 2019-07-13 | Stop reason: HOSPADM

## 2019-07-10 RX ORDER — ONDANSETRON 2 MG/ML
4 INJECTION INTRAMUSCULAR; INTRAVENOUS EVERY 6 HOURS PRN
Status: DISCONTINUED | OUTPATIENT
Start: 2019-07-10 | End: 2019-07-13 | Stop reason: HOSPADM

## 2019-07-10 RX ORDER — MELATONIN
5000 DAILY
Status: DISCONTINUED | OUTPATIENT
Start: 2019-07-11 | End: 2019-07-13 | Stop reason: HOSPADM

## 2019-07-10 RX ORDER — ROSUVASTATIN CALCIUM 5 MG/1
5 TABLET, COATED ORAL DAILY
COMMUNITY
End: 2021-10-19

## 2019-07-10 RX ORDER — KETOROLAC TROMETHAMINE 30 MG/ML
15 INJECTION, SOLUTION INTRAMUSCULAR; INTRAVENOUS EVERY 6 HOURS PRN
Status: DISCONTINUED | OUTPATIENT
Start: 2019-07-10 | End: 2019-07-11

## 2019-07-10 RX ORDER — GABAPENTIN 100 MG/1
100 CAPSULE ORAL ONCE
Status: COMPLETED | OUTPATIENT
Start: 2019-07-10 | End: 2019-07-10

## 2019-07-10 RX ORDER — CHOLECALCIFEROL (VITAMIN D3) 125 MCG
100 CAPSULE ORAL DAILY
Status: DISCONTINUED | OUTPATIENT
Start: 2019-07-11 | End: 2019-07-13 | Stop reason: HOSPADM

## 2019-07-10 RX ORDER — SODIUM CHLORIDE FOR INHALATION 0.9 %
3 VIAL, NEBULIZER (ML) INHALATION EVERY 6 HOURS PRN
Status: DISCONTINUED | OUTPATIENT
Start: 2019-07-10 | End: 2019-07-13

## 2019-07-10 RX ORDER — GABAPENTIN 300 MG/1
300 CAPSULE ORAL 3 TIMES DAILY
Status: DISCONTINUED | OUTPATIENT
Start: 2019-07-10 | End: 2019-07-11

## 2019-07-10 RX ORDER — PANTOPRAZOLE SODIUM 20 MG/1
20 TABLET, DELAYED RELEASE ORAL
Status: DISCONTINUED | OUTPATIENT
Start: 2019-07-11 | End: 2019-07-13 | Stop reason: HOSPADM

## 2019-07-10 RX ORDER — PRAVASTATIN SODIUM 40 MG
40 TABLET ORAL
Status: DISCONTINUED | OUTPATIENT
Start: 2019-07-11 | End: 2019-07-13 | Stop reason: HOSPADM

## 2019-07-10 RX ORDER — AMLODIPINE BESYLATE 10 MG/1
10 TABLET ORAL DAILY
Status: DISCONTINUED | OUTPATIENT
Start: 2019-07-11 | End: 2019-07-13 | Stop reason: HOSPADM

## 2019-07-10 RX ORDER — CARVEDILOL 12.5 MG/1
25 TABLET ORAL 2 TIMES DAILY WITH MEALS
Status: DISCONTINUED | OUTPATIENT
Start: 2019-07-11 | End: 2019-07-13 | Stop reason: HOSPADM

## 2019-07-10 RX ADMIN — INSULIN DETEMIR 38 UNITS: 100 INJECTION, SOLUTION SUBCUTANEOUS at 22:15

## 2019-07-10 RX ADMIN — GABAPENTIN 300 MG: 100 CAPSULE ORAL at 22:04

## 2019-07-10 RX ADMIN — MORPHINE SULFATE 4 MG: 4 INJECTION INTRAVENOUS at 18:51

## 2019-07-10 RX ADMIN — CEFTRIAXONE SODIUM 1000 MG: 10 INJECTION, POWDER, FOR SOLUTION INTRAVENOUS at 18:33

## 2019-07-10 RX ADMIN — AZITHROMYCIN MONOHYDRATE 500 MG: 500 INJECTION, POWDER, LYOPHILIZED, FOR SOLUTION INTRAVENOUS at 22:00

## 2019-07-10 RX ADMIN — ACETAMINOPHEN 650 MG: 325 TABLET, FILM COATED ORAL at 18:51

## 2019-07-10 RX ADMIN — KETOROLAC TROMETHAMINE 15 MG: 30 INJECTION, SOLUTION INTRAMUSCULAR at 22:03

## 2019-07-10 RX ADMIN — APIXABAN 5 MG: 5 TABLET, FILM COATED ORAL at 22:04

## 2019-07-10 RX ADMIN — TRAZODONE HYDROCHLORIDE 50 MG: 50 TABLET ORAL at 22:04

## 2019-07-10 RX ADMIN — DICLOFENAC 2 G: 10 GEL TOPICAL at 22:03

## 2019-07-10 RX ADMIN — SODIUM CHLORIDE 1000 ML: 0.9 INJECTION, SOLUTION INTRAVENOUS at 17:40

## 2019-07-10 RX ADMIN — GABAPENTIN 100 MG: 100 CAPSULE ORAL at 22:05

## 2019-07-10 NOTE — ED PROVIDER NOTES
History  Chief Complaint   Patient presents with    Weakness - Generalized     pt from home via ems with c/o weakness, fatigue, chills and decreased appetitie along with fever at home for past few days  pt was 89% on RA at home for ems  91% RA during triage   History provided by:  Patient and relative   used: No     61-year-old male with history of diabetes, atrial fibrillation presents from home with generalized weakness, cough and fever over the past few days  Symptoms have been gradually worsening  Reports some mild dyspnea  No pain  No chest discomfort, palpitations  States he is becoming weaker  Daughter notes he normally transfers from bed to wheelchair but he has not been able to bear any weight the last few days  He is also having difficulty sitting on the side of the bed to urinate  EMS reported hypoxia of 89% on room air at his home  He was 91% on arrival here  Febrile to nearly 103  Somewhat coarse breath sounds in the left lower lobe  Otherwise normal   He has a few chronic wounds on the feet without any evidence of new infection  Plan urine, chest x-ray, IV fluids, EKG and labs and will re-evaluate  Patient will need for admission for treatment of sepsis, possible pneumonia  Prior to Admission Medications   Prescriptions Last Dose Informant Patient Reported? Taking?    CYANOCOBALAMIN PO   Yes No   Sig: Take 1,000 mcg by mouth     Insulin Aspart (NOVOLOG FLEXPEN SC)   Yes Yes   Sig: Inject 5 Units under the skin   MAGNESIUM CARBONATE PO   Yes No   Sig: Take 400 mg by mouth daily     ZOLPIDEM TARTRATE ER PO   Yes No   Sig: Take 10 mg by mouth daily at bedtime as needed for sleep     acetaminophen (TYLENOL) 325 mg tablet   Yes No   Sig: Take 650 mg by mouth 2 (two) times a day   amLODIPine (NORVASC) 10 mg tablet   No No   Sig: Take 1 tablet (10 mg total) by mouth daily   apixaban (ELIQUIS) 5 mg   Yes Yes   Sig: Take 5 mg by mouth 2 (two) times a day   carvedilol (COREG) 25 mg tablet   Yes Yes   Sig: Take 25 mg by mouth 2 (two) times a day with meals   cholecalciferol (VITAMIN D3) 1,000 units tablet   Yes No   Sig: Take 5,000 Units by mouth daily   co-enzyme Q-10 50 MG capsule   Yes No   Sig: Take 100 mg by mouth daily   gabapentin (NEURONTIN) 300 mg capsule   Yes Yes   Sig: Take 300 mg by mouth 3 (three) times a day     insulin detemir (LEVEMIR) 100 units/mL subcutaneous injection   Yes No   Sig: Inject 38 Units under the skin daily at bedtime    lisinopril-hydrochlorothiazide (PRINZIDE,ZESTORETIC) 20-12 5 MG per tablet   Yes Yes   Sig: Take 2 tablets by mouth daily   omeprazole (PriLOSEC) 20 mg delayed release capsule   Yes No   Sig: Take 20 mg by mouth daily   rosuvastatin (CRESTOR) 5 mg tablet   Yes Yes   Sig: Take 5 mg by mouth daily   sertraline (ZOLOFT) 100 mg tablet   Yes No   Sig: Take 100 mg by mouth daily     traZODone (DESYREL) 50 mg tablet   Yes Yes   Sig: Take 50 mg by mouth daily at bedtime      Facility-Administered Medications: None       Past Medical History:   Diagnosis Date    Atrial fibrillation (HCC)     Diabetes ([address] )     H/O agent Orange exposure     Hypertension     Neuropathy     PTSD (post-traumatic stress disorder)        Past Surgical History:   Procedure Laterality Date    ABLATION OF DYSRHYTHMIC FOCUS      for a-fib    CHOLECYSTECTOMY      TOTAL SHOULDER REPLACEMENT Right        History reviewed  No pertinent family history  I have reviewed and agree with the history as documented  Social History     Tobacco Use    Smoking status: Former Smoker     Last attempt to quit: 1987     Years since quittin 5    Smokeless tobacco: Never Used   Substance Use Topics    Alcohol use: No    Drug use: No        Review of Systems   Constitutional: Positive for activity change, appetite change, fatigue and fever  Respiratory: Positive for cough and shortness of breath  Negative for chest tightness      Gastrointestinal: Negative for abdominal pain, nausea and vomiting  Genitourinary: Negative for dysuria and flank pain  Musculoskeletal: Negative for back pain and neck pain  Skin: Negative for color change  Neurological: Positive for weakness  Negative for dizziness and headaches  All other systems reviewed and are negative  Physical Exam  Physical Exam   Constitutional: He is oriented to person, place, and time  He appears well-developed and well-nourished  Seems uncomfortable  HENT:   Head: Normocephalic  Somewhat dry mucous membranes  Cardiovascular: Normal rate, regular rhythm and intact distal pulses  Pulmonary/Chest: Effort normal    Coarse breath sounds in the left lower lobe  Abdominal: Soft  He exhibits no distension  There is no tenderness  Musculoskeletal: Normal range of motion  Trace edema  Neurological: He is alert and oriented to person, place, and time  Skin: Skin is warm and dry  Few chronic scattered wounds on the feet with some slight erythema on the left shin reported as chronic  Psychiatric: He has a normal mood and affect  His behavior is normal    Nursing note and vitals reviewed        Vital Signs  ED Triage Vitals   Temperature Pulse Respirations Blood Pressure SpO2   07/10/19 1719 07/10/19 1719 07/10/19 1719 07/10/19 1720 07/10/19 1719   (!) 102 9 °F (39 4 °C) 101 20 133/65 92 %      Temp Source Heart Rate Source Patient Position - Orthostatic VS BP Location FiO2 (%)   07/10/19 1719 07/10/19 1719 07/10/19 1719 07/10/19 1719 --   Oral Monitor Sitting Left arm       Pain Score       --                  Vitals:    07/10/19 1719 07/10/19 1720 07/10/19 1730 07/10/19 1800   BP:  133/65 133/65    Pulse: 101  98 94   Patient Position - Orthostatic VS: Sitting            Visual Acuity      ED Medications  Medications   ceftriaxone (ROCEPHIN) 1 g/50 mL in dextrose IVPB (1,000 mg Intravenous New Bag 7/10/19 1833)   azithromycin (ZITHROMAX) 500 mg in sodium chloride 0 9% 250mL IVPB 500 mg (has no administration in time range)   morphine (PF) 4 mg/mL injection 4 mg (has no administration in time range)   acetaminophen (TYLENOL) tablet 650 mg (has no administration in time range)   sodium chloride 0 9 % bolus 1,000 mL (0 mL Intravenous Stopped 7/10/19 1850)       Diagnostic Studies  Results Reviewed     Procedure Component Value Units Date/Time    Comprehensive metabolic panel [[id number]]  (Abnormal) Collected:  07/10/19 1738    Lab Status:  Final result Specimen:  Blood from Arm, Right Updated:  07/10/19 1812     Sodium 139 mmol/L      Potassium 4 0 mmol/L      Chloride 103 mmol/L      CO2 27 mmol/L      ANION GAP 9 mmol/L      BUN 19 mg/dL      Creatinine 1 06 mg/dL      Glucose 139 mg/dL      Calcium 8 6 mg/dL      AST 21 U/L      ALT 23 U/L      Alkaline Phosphatase 99 U/L      Total Protein 7 1 g/dL      Albumin 2 9 g/dL      Total Bilirubin 0 40 mg/dL      eGFR 70 ml/min/1 73sq m     Narrative:       [place] guidelines for Chronic Kidney Disease (CKD):     Stage 1 with normal or high GFR (GFR > 90 mL/min/1 73 square meters)    Stage 2 Mild CKD (GFR = 60-89 mL/min/1 73 square meters)    Stage 3A Moderate CKD (GFR = 45-59 mL/min/1 73 square meters)    Stage 3B Moderate CKD (GFR = 30-44 mL/min/1 73 square meters)    Stage 4 Severe CKD (GFR = 15-29 mL/min/1 73 square meters)    Stage 5 End Stage CKD (GFR <15 mL/min/1 73 square meters)  Note: GFR calculation is accurate only with a steady state creatinine    Lactic acid, plasma x2 [[id number]]  (Normal) Collected:  07/10/19 1738    Lab Status:  Final result Specimen:  Blood from Arm, Right Updated:  07/10/19 1811     LACTIC ACID 1 9 mmol/L     Narrative:       Result may be elevated if tourniquet was used during collection      Troponin I [[id number]]  (Normal) Collected:  07/10/19 1738    Lab Status:  Final result Specimen:  Blood from Arm, Right Updated:  07/10/19 1811     Troponin I 0 03 ng/mL     Protime-INR [[id number]] (Abnormal) Collected:  07/10/19 1739    Lab Status:  Final result Specimen:  Blood from Arm, Right Updated:  07/10/19 1805     Protime 14 7 seconds      INR 1 21    APTT [[id number]]  (Normal) Collected:  07/10/19 1739    Lab Status:  Final result Specimen:  Blood from Arm, Right Updated:  07/10/19 1805     PTT 32 seconds     CBC and differential [[id number]]  (Abnormal) Collected:  07/10/19 1738    Lab Status:  Final result Specimen:  Blood from Arm, Right Updated:  07/10/19 1752     WBC 15 59 Thousand/uL      RBC 3 63 Million/uL      Hemoglobin 8 6 g/dL      Hematocrit 29 5 %      MCV 81 fL      MCH 23 7 pg      MCHC 29 2 g/dL      RDW 17 0 %      MPV 9 8 fL      Platelets 341 Thousands/uL      nRBC 0 /100 WBCs      Neutrophils Relative 79 %      Immat GRANS % 1 %      Lymphocytes Relative 7 %      Monocytes Relative 12 %      Eosinophils Relative 1 %      Basophils Relative 0 %      Neutrophils Absolute 12 40 Thousands/µL      Immature Grans Absolute 0 08 Thousand/uL      Lymphocytes Absolute 1 09 Thousands/µL      Monocytes Absolute 1 85 Thousand/µL      Eosinophils Absolute 0 14 Thousand/µL      Basophils Absolute 0 03 Thousands/µL     Blood culture #2 [[id number]] Collected:  07/10/19 1739    Lab Status: In process Specimen:  Blood from Arm, Right Updated:  07/10/19 1746    Blood culture #1 [[id number]] Collected:  07/10/19 1739    Lab Status:   In process Specimen:  Blood from Arm, Left Updated:  07/10/19 1746                 XR chest 1 view portable   ED Interpretation by Kimberly Griffith MD (07/10 1813)   Left sided infiltrate                 Procedures  ECG 12 Lead Documentation Only  Date/Time: 7/10/2019 5:38 PM  Performed by: Kimberly Griffith MD  Authorized by: Kimberly Griffith MD     Indications / Diagnosis:  Weakness  ECG reviewed by me, the ED Provider: yes    Patient location:  ED  Previous ECG:     Previous ECG:  Compared to current    Comparison ECG info:  1/9/19    Similarity:  No change  Rate: ECG rate:  99  Rhythm:     Rhythm: sinus rhythm    Ectopy:     Ectopy: none    QRS:     QRS axis:  Left  Conduction:     Conduction: abnormal      Abnormal conduction: complete RBBB    ST segments:     ST segments:  Normal  T waves:     T waves: normal             ED Course                   Initial Sepsis Screening     Row Name 07/10/19 4255                Is the patient's history suggestive of a new or worsening infection? (!) Yes (Proceed)  -BT        Suspected source of infection  pneumonia  -BT        Are two or more of the following signs & symptoms of infection both present and new to the patient? (!) Yes (Proceed)  -BT        Indicate SIRS criteria  Hyperthemia > 38 3C (100 9F); Tachycardia > 90 bpm;Leukocytosis (WBC > 47897 IJL)  -BT        If the answer is yes to both questions, suspicion of sepsis is present          If severe sepsis is present AND tissue hypoperfusion perists in the hour after fluid resuscitation or lactate > 4, the patient meets criteria for SEPTIC SHOCK          Are any of the following organ dysfunction criteria present within 6 hours of suspected infection and SIRS criteria that are NOT considered to be chronic conditions?         Organ dysfunction          Date of presentation of severe sepsis          Time of presentation of severe sepsis          Tissue hypoperfusion persists in the hour after crystalloid fluid administration, evidenced, by either:          Was hypotension present within one hour of the conclusion of crystalloid fluid administration?           Date of presentation of septic shock          Time of presentation of septic shock            User Key  (r) = Recorded By, (t) = Taken By, (c) = Cosigned By    [address] Name Provider Vanessa Moore MD Physician                  MDM  Number of Diagnoses or Management Options  Generalized weakness: new and requires workup  Left lower lobe pneumonia [facility]): new and requires workup  Diagnosis management comments: 37-year-old male presented with generalized weakness, fatigue for the last few days as well as fever and developing cough over the last 24 hours  Febrile, mildly hypoxic on arrival   Found to have a left lower lobe infiltrate  Associated leukocytosis  Admitted to medical service for IV antibiotics, supplemental oxygen  Amount and/or Complexity of Data Reviewed  Clinical lab tests: reviewed and ordered  Tests in the radiology section of CPT®: ordered and reviewed  Discuss the patient with other providers: yes    Patient Progress  Patient progress: stable      Disposition  Final diagnoses:   Left lower lobe pneumonia ([address] )   Generalized weakness     Time reflects when diagnosis was documented in both MDM as applicable and the Disposition within this note     Time User Action Codes Description Comment    7/10/2019  6:46 PM Dilia KRUEGER Add [J18 1] Left lower lobe pneumonia ([address] )     7/10/2019  6:46 PM Javier Akins Add [R53 1] Generalized weakness       ED Disposition     ED Disposition Condition Date/Time Comment    Admit Stable Wed Jul 10, 2019  6:46 PM Case was discussed with Dr Karson Landon and the patient's admission status was agreed to be Admission Status: inpatient status to the service of Dr Karson Landon  Follow-up Information    None         Patient's Medications   Discharge Prescriptions    No medications on file     No discharge procedures on file      ED Provider  Electronically Signed by           Deborah Ponce MD  07/10/19 9857

## 2019-07-11 ENCOUNTER — TELEPHONE (OUTPATIENT)
Dept: RHEUMATOLOGY | Facility: CLINIC | Age: 74
End: 2019-07-11

## 2019-07-11 LAB
ANION GAP SERPL CALCULATED.3IONS-SCNC: 7 MMOL/L (ref 4–13)
ATRIAL RATE: 99 BPM
BASOPHILS # BLD AUTO: 0.05 THOUSANDS/ΜL (ref 0–0.1)
BASOPHILS NFR BLD AUTO: 0 % (ref 0–1)
BUN SERPL-MCNC: 24 MG/DL (ref 5–25)
CALCIUM SERPL-MCNC: 7.9 MG/DL (ref 8.3–10.1)
CHLORIDE SERPL-SCNC: 104 MMOL/L (ref 100–108)
CO2 SERPL-SCNC: 26 MMOL/L (ref 21–32)
CREAT SERPL-MCNC: 1.32 MG/DL (ref 0.6–1.3)
EOSINOPHIL # BLD AUTO: 0.11 THOUSAND/ΜL (ref 0–0.61)
EOSINOPHIL NFR BLD AUTO: 1 % (ref 0–6)
ERYTHROCYTE [DISTWIDTH] IN BLOOD BY AUTOMATED COUNT: 17.2 % (ref 11.6–15.1)
EST. AVERAGE GLUCOSE BLD GHB EST-MCNC: 203 MG/DL
FERRITIN SERPL-MCNC: 44 NG/ML (ref 8–388)
GFR SERPL CREATININE-BSD FRML MDRD: 54 ML/MIN/1.73SQ M
GLUCOSE SERPL-MCNC: 115 MG/DL (ref 65–140)
GLUCOSE SERPL-MCNC: 122 MG/DL (ref 65–140)
GLUCOSE SERPL-MCNC: 129 MG/DL (ref 65–140)
GLUCOSE SERPL-MCNC: 136 MG/DL (ref 65–140)
GLUCOSE SERPL-MCNC: 186 MG/DL (ref 65–140)
HBA1C MFR BLD: 8.7 % (ref 4.2–6.3)
HCT VFR BLD AUTO: 26.6 % (ref 36.5–49.3)
HGB BLD-MCNC: 7.7 G/DL (ref 12–17)
IMM GRANULOCYTES # BLD AUTO: 0.09 THOUSAND/UL (ref 0–0.2)
IMM GRANULOCYTES NFR BLD AUTO: 1 % (ref 0–2)
IRON SATN MFR SERPL: 3 %
IRON SERPL-MCNC: 11 UG/DL (ref 65–175)
L PNEUMO1 AG UR QL IA.RAPID: NEGATIVE
LYMPHOCYTES # BLD AUTO: 1.5 THOUSANDS/ΜL (ref 0.6–4.47)
LYMPHOCYTES NFR BLD AUTO: 9 % (ref 14–44)
MCH RBC QN AUTO: 24.1 PG (ref 26.8–34.3)
MCHC RBC AUTO-ENTMCNC: 28.9 G/DL (ref 31.4–37.4)
MCV RBC AUTO: 83 FL (ref 82–98)
MONOCYTES # BLD AUTO: 2.55 THOUSAND/ΜL (ref 0.17–1.22)
MONOCYTES NFR BLD AUTO: 16 % (ref 4–12)
NEUTROPHILS # BLD AUTO: 11.67 THOUSANDS/ΜL (ref 1.85–7.62)
NEUTS SEG NFR BLD AUTO: 73 % (ref 43–75)
NRBC BLD AUTO-RTO: 0 /100 WBCS
P AXIS: 82 DEGREES
PLATELET # BLD AUTO: 337 THOUSANDS/UL (ref 149–390)
PMV BLD AUTO: 9.9 FL (ref 8.9–12.7)
POTASSIUM SERPL-SCNC: 4.2 MMOL/L (ref 3.5–5.3)
PR INTERVAL: 194 MS
PROCALCITONIN SERPL-MCNC: 0.3 NG/ML
PROCALCITONIN SERPL-MCNC: 0.36 NG/ML
QRS AXIS: 268 DEGREES
QRSD INTERVAL: 162 MS
QT INTERVAL: 378 MS
QTC INTERVAL: 485 MS
RBC # BLD AUTO: 3.2 MILLION/UL (ref 3.88–5.62)
S PNEUM AG UR QL: NEGATIVE
SODIUM SERPL-SCNC: 137 MMOL/L (ref 136–145)
T WAVE AXIS: 79 DEGREES
TIBC SERPL-MCNC: 343 UG/DL (ref 250–450)
VENTRICULAR RATE: 99 BPM
WBC # BLD AUTO: 15.97 THOUSAND/UL (ref 4.31–10.16)

## 2019-07-11 PROCEDURE — 85025 COMPLETE CBC W/AUTO DIFF WBC: CPT | Performed by: PHYSICIAN ASSISTANT

## 2019-07-11 PROCEDURE — 80048 BASIC METABOLIC PNL TOTAL CA: CPT | Performed by: PHYSICIAN ASSISTANT

## 2019-07-11 PROCEDURE — 84145 PROCALCITONIN (PCT): CPT | Performed by: PHYSICIAN ASSISTANT

## 2019-07-11 PROCEDURE — 83540 ASSAY OF IRON: CPT | Performed by: HOSPITALIST

## 2019-07-11 PROCEDURE — 87449 NOS EACH ORGANISM AG IA: CPT | Performed by: PHYSICIAN ASSISTANT

## 2019-07-11 PROCEDURE — 83550 IRON BINDING TEST: CPT | Performed by: HOSPITALIST

## 2019-07-11 PROCEDURE — 86430 RHEUMATOID FACTOR TEST QUAL: CPT | Performed by: HOSPITALIST

## 2019-07-11 PROCEDURE — 93010 ELECTROCARDIOGRAM REPORT: CPT | Performed by: INTERNAL MEDICINE

## 2019-07-11 PROCEDURE — 82948 REAGENT STRIP/BLOOD GLUCOSE: CPT

## 2019-07-11 PROCEDURE — 82728 ASSAY OF FERRITIN: CPT | Performed by: HOSPITALIST

## 2019-07-11 PROCEDURE — 86200 CCP ANTIBODY: CPT | Performed by: HOSPITALIST

## 2019-07-11 PROCEDURE — 99232 SBSQ HOSP IP/OBS MODERATE 35: CPT | Performed by: HOSPITALIST

## 2019-07-11 RX ORDER — TAMSULOSIN HYDROCHLORIDE 0.4 MG/1
0.4 CAPSULE ORAL
Status: DISCONTINUED | OUTPATIENT
Start: 2019-07-11 | End: 2019-07-13 | Stop reason: HOSPADM

## 2019-07-11 RX ORDER — OXYCODONE HYDROCHLORIDE 10 MG/1
10 TABLET ORAL EVERY 4 HOURS PRN
Status: DISCONTINUED | OUTPATIENT
Start: 2019-07-11 | End: 2019-07-13 | Stop reason: HOSPADM

## 2019-07-11 RX ORDER — AZITHROMYCIN 250 MG/1
500 TABLET, FILM COATED ORAL EVERY 24 HOURS
Status: DISCONTINUED | OUTPATIENT
Start: 2019-07-11 | End: 2019-07-13

## 2019-07-11 RX ORDER — GABAPENTIN 300 MG/1
600 CAPSULE ORAL
Status: DISCONTINUED | OUTPATIENT
Start: 2019-07-11 | End: 2019-07-13 | Stop reason: HOSPADM

## 2019-07-11 RX ORDER — LISINOPRIL 20 MG/1
20 TABLET ORAL DAILY
Status: DISCONTINUED | OUTPATIENT
Start: 2019-07-12 | End: 2019-07-13 | Stop reason: HOSPADM

## 2019-07-11 RX ORDER — FUROSEMIDE 20 MG/1
20 TABLET ORAL DAILY
Status: DISCONTINUED | OUTPATIENT
Start: 2019-07-12 | End: 2019-07-13 | Stop reason: HOSPADM

## 2019-07-11 RX ORDER — GABAPENTIN 300 MG/1
300 CAPSULE ORAL 2 TIMES DAILY
Status: DISCONTINUED | OUTPATIENT
Start: 2019-07-11 | End: 2019-07-13 | Stop reason: HOSPADM

## 2019-07-11 RX ADMIN — TRAZODONE HYDROCHLORIDE 50 MG: 50 TABLET ORAL at 22:18

## 2019-07-11 RX ADMIN — OXYCODONE HYDROCHLORIDE 10 MG: 10 TABLET ORAL at 15:34

## 2019-07-11 RX ADMIN — Medication 100 MG: at 08:56

## 2019-07-11 RX ADMIN — TAMSULOSIN HYDROCHLORIDE 0.4 MG: 0.4 CAPSULE ORAL at 18:47

## 2019-07-11 RX ADMIN — PRAVASTATIN SODIUM 40 MG: 40 TABLET ORAL at 18:47

## 2019-07-11 RX ADMIN — GABAPENTIN 600 MG: 300 CAPSULE ORAL at 22:19

## 2019-07-11 RX ADMIN — INSULIN DETEMIR 38 UNITS: 100 INJECTION, SOLUTION SUBCUTANEOUS at 23:00

## 2019-07-11 RX ADMIN — LISINOPRIL: 20 TABLET ORAL at 08:54

## 2019-07-11 RX ADMIN — VITAMIN D, TAB 1000IU (100/BT) 5000 UNITS: 25 TAB at 08:55

## 2019-07-11 RX ADMIN — CARVEDILOL 25 MG: 12.5 TABLET, FILM COATED ORAL at 08:54

## 2019-07-11 RX ADMIN — APIXABAN 5 MG: 5 TABLET, FILM COATED ORAL at 08:55

## 2019-07-11 RX ADMIN — INSULIN LISPRO 5 UNITS: 100 INJECTION, SOLUTION INTRAVENOUS; SUBCUTANEOUS at 18:48

## 2019-07-11 RX ADMIN — GABAPENTIN 300 MG: 300 CAPSULE ORAL at 18:47

## 2019-07-11 RX ADMIN — PANTOPRAZOLE SODIUM 20 MG: 20 TABLET, DELAYED RELEASE ORAL at 05:16

## 2019-07-11 RX ADMIN — SERTRALINE HYDROCHLORIDE 100 MG: 100 TABLET ORAL at 08:55

## 2019-07-11 RX ADMIN — CEFTRIAXONE 1000 MG: 1 INJECTION, POWDER, FOR SOLUTION INTRAMUSCULAR; INTRAVENOUS at 21:30

## 2019-07-11 RX ADMIN — APIXABAN 5 MG: 5 TABLET, FILM COATED ORAL at 18:47

## 2019-07-11 RX ADMIN — DICLOFENAC 2 G: 10 GEL TOPICAL at 08:51

## 2019-07-11 RX ADMIN — GABAPENTIN 300 MG: 100 CAPSULE ORAL at 08:55

## 2019-07-11 RX ADMIN — INSULIN LISPRO 5 UNITS: 100 INJECTION, SOLUTION INTRAVENOUS; SUBCUTANEOUS at 12:08

## 2019-07-11 RX ADMIN — CARVEDILOL 25 MG: 12.5 TABLET, FILM COATED ORAL at 18:47

## 2019-07-11 RX ADMIN — DICLOFENAC 2 G: 10 GEL TOPICAL at 12:08

## 2019-07-11 RX ADMIN — OXYCODONE HYDROCHLORIDE 5 MG: 5 TABLET ORAL at 10:09

## 2019-07-11 RX ADMIN — INSULIN LISPRO 1 UNITS: 100 INJECTION, SOLUTION INTRAVENOUS; SUBCUTANEOUS at 18:48

## 2019-07-11 RX ADMIN — INSULIN LISPRO 5 UNITS: 100 INJECTION, SOLUTION INTRAVENOUS; SUBCUTANEOUS at 08:49

## 2019-07-11 RX ADMIN — DICLOFENAC 2 G: 10 GEL TOPICAL at 22:20

## 2019-07-11 RX ADMIN — AZITHROMYCIN 500 MG: 250 TABLET, FILM COATED ORAL at 22:00

## 2019-07-11 RX ADMIN — DICLOFENAC 2 G: 10 GEL TOPICAL at 18:48

## 2019-07-11 NOTE — RESPIRATORY THERAPY NOTE
RT Protocol Note  Shaheen Burns 72 y o  male MRN: [id number]  Unit/Bed#: -01 Encounter: [id number]    Assessment    Principal Problem:    Sepsis ([facility] )  Active Problems:    Essential hypertension    Paroxysmal atrial fibrillation ([facility] )    Type 2 diabetes mellitus without complication, with long-term current use of insulin (HCC)    Ambulatory dysfunction    HLD (hyperlipidemia)    Bilateral hand pain      Home Pulmonary Medications:  NONE      Past Medical History:   Diagnosis Date    Atrial fibrillation ([facility] )     Bilateral hand pain 7/10/2019    Diabetes ([facility] )     H/O agent Orange exposure     HLD (hyperlipidemia) 7/10/2019    Hypertension     Neuropathy     PTSD (post-traumatic stress disorder)            Objective    Physical Exam:   Assessment Type: Assess only  General Appearance: Sleeping, Eyes open/responds to voice  Respiratory Pattern: Normal  Chest Assessment: Chest expansion symmetrical  Bilateral Breath Sounds: Diminished, Clear  Cough: None    Vitals:  Blood pressure 99/52, pulse 71, temperature 99 8 °F (37 7 °C), temperature source Oral, resp  rate 16, height 6' 2" (1 88 m), weight 104 kg (229 lb 15 oz), SpO2 97 %  Imaging and other studies: I have personally reviewed pertinent reports  Plan    Respiratory Plan: No distress/Pulmonary history        Resp Comments: Pt assessed for Respiratory Protocol  BS diminshed, clear, SPO2 97% on 2L  Pt sleeping at this time without signs of SOB or distress    Will add 1 25mg Xopenex Q6 PRN

## 2019-07-11 NOTE — ASSESSMENT & PLAN NOTE
· Presented with cough, fever, generalized weakness  O2 sat noted to be 89% on RA on EMS arrival   · CXR- LLL infiltrate  Final read pending  · Sepsis, POA, as evidenced by: fever (TMax 102 9), tachycardia (), leukocytosis (WBC 15 59)  Suspect secondary to PNA

## 2019-07-11 NOTE — TELEPHONE ENCOUNTER
Please schedule this patient as new patient, with whoever has available opening, ok to schedule with kike for sometime in august, for evaluation of hand swelling  He is currently inpatient, so please just schedule and we can confirm with him

## 2019-07-11 NOTE — PHYSICIAN ADVISOR
Current patient class: Inpatient  The patient is currently on Hospital Day: 2 at [facility]      The patient was admitted to the hospital at [place] [address] on 7/10/19 for the following diagnosis:  Weakness [R53 1]  Left lower lobe pneumonia ([address] ) [J18 1]  Generalized weakness [R53 1]       There is documentation in the medical record of an expected length of stay of at least 2 midnights  The patient is therefore expected to satisfy the 2 midnight benchmark and given the 2 midnight presumption is appropriate for INPATIENT ADMISSION  Given this expectation of a satisfying stay, CMS instructs us that the patient is most often appropriate for inpatient admission under part A provided medical necessity is documented in the chart  After review of the relevant documentation, labs, vital signs and test results, the patient is appropriate for INPATIENT ADMISSION  Admission to the hospital as an inpatient is a complex decision making process which requires the practitioner to consider the patients presenting complaint, history and physical examination and all relevant testing  With this in mind, in this case, the patient was deemed appropriate for INPATIENT ADMISSION  After review of the documentation and testing available at the time of the admission I concur with this clinical determination of medical necessity  Rationale is as follows: The patient is a 72 yrs old Male who presented to the ED at 7/10/2019  5:17 PM with a chief complaint of Weakness - Generalized (pt from home via ems with c/o weakness, fatigue, chills and decreased appetitie along with fever at home for past few days  pt was 89% on RA at home for ems  91% RA during triage  )     Patient admitted with a report of generalized weakness for several days and one day of fever  It is also reported that he is having increased difficulty moving from his bed to a wheelchair  He also complains of burning to his hands    He did meet sepsis criteria and was started on IV antibiotics along with supplemental oxygen  Abnormal labs include a WBC which has reached 15 97 and a procalcitonin of 0 36  A sputum C&S as well as blood culture results are pending  A CXR revealed a likely pneumonia to the left base  Ongoing care is noted for this patient and IV antibiotics continue  A two night admission class to the hospital would be considered appropriate for him          The patients vitals on arrival were ED Triage Vitals   Temperature Pulse Respirations Blood Pressure SpO2   07/10/19 1719 07/10/19 1719 07/10/19 1719 07/10/19 1720 07/10/19 1719   (!) 102 9 °F (39 4 °C) 101 20 133/65 92 %      Temp Source Heart Rate Source Patient Position - Orthostatic VS BP Location FiO2 (%)   07/10/19 1719 07/10/19 1719 07/10/19 1719 07/10/19 1719 --   Oral Monitor Sitting Left arm       Pain Score       07/10/19 1851       Worst Possible Pain           Past Medical History:   Diagnosis Date    Atrial fibrillation ([address] )     Bilateral hand pain 7/10/2019    Diabetes ([address] )     H/O agent Orange exposure     HLD (hyperlipidemia) 7/10/2019    Hypertension     Neuropathy     PTSD (post-traumatic stress disorder)      Past Surgical History:   Procedure Laterality Date    ABLATION OF DYSRHYTHMIC FOCUS      for a-fib    CHOLECYSTECTOMY      TOTAL SHOULDER REPLACEMENT Right            Consults have been placed to:   IP CONSULT TO CASE MANAGEMENT    Vitals:    07/10/19 2230 07/11/19 0700 07/11/19 1127 07/11/19 1530   BP:  128/63  128/62   BP Location:  Left arm  Left arm   Pulse: 71 67  69   Resp: 16 18  18   Temp:  98 °F (36 7 °C)  98 2 °F (36 8 °C)   TempSrc:  Oral  Oral   SpO2: 97% 98%  96%   Weight:       Height:   6' 2" (1 88 m)        Most recent labs:    Recent Labs     07/10/19  1738 07/10/19  1739 07/11/19  0535   WBC 15 59*  --  15 97*   HGB 8 6*  --  7 7*   HCT 29 5*  --  26 6*     --  337   K 4 0  --  4 2   CALCIUM 8 6  --  7 9*   BUN 19  --  24 CREATININE 1 06  --  1 32*   INR  --  1 21*  --    TROPONINI 0 03  --   --    AST 21  --   --    ALT 23  --   --    ALKPHOS 99  --   --        Scheduled Meds:  Current Facility-Administered Medications:  acetaminophen 650 mg Oral Q6H PRN Shannan Phy, PA-C   amLODIPine 10 mg Oral Daily Shannan Phy, PA-C   apixaban 5 mg Oral BID Shannan Phkoki, PA-C   azithromycin 500 mg Oral Q24H Win Campo MD   carvedilol 25 mg Oral BID With Meals Shannan Phkoki, PA-C   cefTRIAXone 1,000 mg Intravenous Q24H Shannan Phy, PA-C   cholecalciferol 5,000 Units Oral Daily Shannan Phy, PA-C   co-enzyme Q-10 100 mg Oral Daily Shannan Phkoki, PA-C   cyanocobalamin 1,000 mcg Oral Daily Shannan Phy, PA-C   diclofenac sodium 2 g Topical 4x Daily Shannan DELMI MoraC   [START ON 7/12/2019] furosemide 20 mg Oral Daily Win Campo MD   gabapentin 300 mg Oral BID Win Campo MD   gabapentin 600 mg Oral HS Win Campo MD   insulin detemir 38 Units Subcutaneous HS Shannan Phkoki PA-C   insulin lispro 1-6 Units Subcutaneous TID AC Shannan ADAMS Mora-C   insulin lispro 1-6 Units Subcutaneous HS Shannan Phkoki, PA-C   insulin lispro 5 Units Subcutaneous TID With Meals Shannan Abby PA-C   levalbuterol 1 25 mg Nebulization Q6H PRN Sierra Hardy MD   [START ON 7/12/2019] lisinopril 20 mg Oral Daily Win Campo MD   ondansetron 4 mg Intravenous Q6H PRN Shannan Phkoki, PA-C   oxyCODONE 10 mg Oral Q4H PRN Win Campo MD   pantoprazole 20 mg Oral Early Morning Shannan PhTIFFANI telles   pravastatin 40 mg Oral Daily With Dinner Shannan Phkoki, PA-C   sertraline 100 mg Oral Daily Shannan Phkoki, PA-C   sodium chloride 3 mL Nebulization Q6H PRN Sierra Hardy MD   tamsulosin 0 4 mg Oral Daily With Phyllistine MD Cornel   traZODone 50 mg Oral HS Shannan Phy, PA-C     Continuous Infusions:   PRN Meds:   acetaminophen    levalbuterol    ondansetron    oxyCODONE    sodium chloride    Surgical procedures (if appropriate):

## 2019-07-11 NOTE — ASSESSMENT & PLAN NOTE
Lab Results   Component Value Date    HGBA1C 8 0 (H) 01/16/2019       No results for input(s): POCGLU in the last 72 hours  Blood Sugar Average: Last 72 hrs:  · Glucose 139 on arrival   · Check A1c   · Continue home insulin regimen with SSI coverage

## 2019-07-11 NOTE — PROGRESS NOTES
Progress Note - Jerson Do 1945, 72 y o  male MRN: [id number]    Unit/Bed#: -01 Encounter: [id number]    Primary Care Provider: No primary care provider on file  Date and time admitted to hospital: 7/10/2019  5:17 PM        Pneumonia  Assessment & Plan  · O2 sat 93% on 2LNC  · CXR- LLL infiltrate  Final read pending  · IV Rocephin, azithromycin      Bilateral hand pain  Assessment & Plan  For some time now  Associated with edema (R>L)  Possibly raynaud's  Does not have joint pain  Rheumatology for further evaluation     Paroxysmal atrial fibrillation ([facility] [address] )  Assessment & Plan  · Rate controlled; Anticoagulated on Eliquis     Ambulatory dysfunction  Assessment & Plan  ? Functional decline in terms of mobility  ? Diffuse body aches  · PT/OT  · Fall precautions  · CM consult- will likely need STR vs placement      Essential hypertension  Assessment & Plan  · Continue amlodipine, Coreg, lisinopril-HCTZ      Type 2 diabetes mellitus without complication, with long-term current use of insulin (HCC)  Assessment & Plan  HbA1c 8 7  · Continue home insulin regimen with SSI coverage      HLD (hyperlipidemia)  Assessment & Plan  · Continue statin  VTE Pharmacologic Prophylaxis:   Pharmacologic: Apixaban (Eliquis)  Mechanical VTE Prophylaxis in Place: Yes  Patient Centered Rounds: I have performed bedside rounds with nursing staff today  Time Spent for Care: 30 minutes  More than 50% of total time spent on counseling and coordination of care as described above    Current Length of Stay: 1 day(s)  Current Patient Status: Inpatient   Certification Statement: The patient will continue to require additional inpatient hospital stay due to IV Abx  Discharge Plan: home  Code Status: Level 1 - Full Code      Subjective:   Bilateral hand pain/swelling    Objective:     Vitals:   Temp (24hrs), Av 8 °F (37 7 °C), Min:98 °F (36 7 °C), Max:102 9 °F (39 4 °C)    Temp:  [98 °F (36 7 °C)-102 9 °F (39 4 °C)] 98 2 °F (36 8 °C)  HR:  [] 69  Resp:  [16-20] 18  BP: ()/(52-65) 128/62  SpO2:  [92 %-98 %] 96 %  Body mass index is 29 52 kg/m²  Input and Output Summary (last 24 hours): Intake/Output Summary (Last 24 hours) at 7/11/2019 1546  Last data filed at 7/10/2019 1850  Gross per 24 hour   Intake 1000 ml   Output    Net 1000 ml     Physical Exam  Gen -Patient in no acute distress  Neck- Supple  No thyromegaly or lymphadenopathy  Lungs-rhonchi at the left base  Heart S1-S2, regular rate and rhythm, no murmurs  Abdomen-soft nontender, no organomegaly  Bowel sounds present  Extremities-bilateral hand edema  (+) tenderness of the right hand on palpation  Lower extremity edema bilaterally 1+  Skin- no rash  Neuro-nonfocal     Additional Data:   Labs:    Results from last 7 days   Lab Units 07/11/19  0535   WBC Thousand/uL 15 97*   HEMOGLOBIN g/dL 7 7*   HEMATOCRIT % 26 6*   PLATELETS Thousands/uL 337   NEUTROS PCT % 73   LYMPHS PCT % 9*   MONOS PCT % 16*   EOS PCT % 1     Results from last 7 days   Lab Units 07/11/19  0535 07/10/19  1738   SODIUM mmol/L 137 139   POTASSIUM mmol/L 4 2 4 0   CHLORIDE mmol/L 104 103   CO2 mmol/L 26 27   BUN mg/dL 24 19   CREATININE mg/dL 1 32* 1 06   ANION GAP mmol/L 7 9   CALCIUM mg/dL 7 9* 8 6   ALBUMIN g/dL  --  2 9*   TOTAL BILIRUBIN mg/dL  --  0 40   ALK PHOS U/L  --  99   ALT U/L  --  23   AST U/L  --  21   GLUCOSE RANDOM mg/dL 115 139     Results from last 7 days   Lab Units 07/10/19  1739   INR  1 21*     Results from last 7 days   Lab Units 07/11/19  1533 07/11/19  1058 07/11/19  0709 07/10/19  2111   POC GLUCOSE mg/dl 186* 129 122 128     Results from last 7 days   Lab Units 07/10/19  2334   HEMOGLOBIN A1C % 8 7*     Results from last 7 days   Lab Units 07/11/19  0535 07/11/19  0037 07/10/19  1738   LACTIC ACID mmol/L  --   --  1 9   PROCALCITONIN ng/ml 0 36* 0 30*  --      * I Have Reviewed All Lab Data Listed Above  * Additional Pertinent Lab Tests Reviewed:  All Labs Within Last 24 Hours Reviewed      Last 24 Hours Medication List:     Current Facility-Administered Medications:  acetaminophen 650 mg Oral Q6H PRN Dorothy Stallings PA-C   amLODIPine 10 mg Oral Daily Dorothy Stallings PA-C   apixaban 5 mg Oral BID Dorothy Stallings PA-C   azithromycin 500 mg Oral Q24H Julius Oden MD   carvedilol 25 mg Oral BID With Meals Dorothy Stallings PA-C   cefTRIAXone 1,000 mg Intravenous Q24H Dorothy Stallings PA-C   cholecalciferol 5,000 Units Oral Daily Dorothy Stallings PA-C   co-enzyme Q-10 100 mg Oral Daily Dorothy Stallings PA-C   cyanocobalamin 1,000 mcg Oral Daily Dorothy Stallings PA-C   diclofenac sodium 2 g Topical 4x Daily Dorothy Stallings PA-C   gabapentin 300 mg Oral BID Julius Oden MD   gabapentin 600 mg Oral HS Julius Oden MD   insulin detemir 38 Units Subcutaneous HS Dorothy Stallings PA-C   insulin lispro 1-6 Units Subcutaneous TID AC Dorothy Stallings PA-C   insulin lispro 1-6 Units Subcutaneous HS Dorothy Stallings PA-C   insulin lispro 5 Units Subcutaneous TID With Meals Dorothy Stallings PA-C   levalbuterol 1 25 mg Nebulization Q6H PRN Sierra Hardy MD   lisinopril-hydrochlorothiazide (PRINZIDE 20/12  5) combo dose  Oral Daily Dorothy Stallings PA-C   ondansetron 4 mg Intravenous Q6H PRN Dorothy Stallings PA-C   oxyCODONE 10 mg Oral Q4H PRN Julius Oden MD   pantoprazole 20 mg Oral Early Morning Dorothy Stallings PA-C   pravastatin 40 mg Oral Daily With Dinner Dorothy Stallings PA-C   sertraline 100 mg Oral Daily Dorothy Stallings PA-C   sodium chloride 3 mL Nebulization Q6H PRN Sierra Hardy MD   tamsulosin 0 4 mg Oral Daily With Kristie Black MD   traZODone 50 mg Oral HS Dorothy Stallings PA-C      Today, Patient Was Seen By: Julius Oden MD    ** Please Note: Dictation voice to text software may have been used in the creation of this document   **

## 2019-07-11 NOTE — ASSESSMENT & PLAN NOTE
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

## 2019-07-11 NOTE — PROGRESS NOTES
The azithromycin has  been converted to Oral per [facility] [place][note 2] IV-to-PO Auto-Conversion Protocol for Adults as approved by the Pharmacy and Therapeutics Committee  The patient met all eligible criteria:  3 Age = 25years old   2) Received at least one dose of the IV form   3) Receiving at least one other scheduled oral/enteral medication   4) Tolerating an oral/enteral diet   and did not have any exclusions:   1) Critical care patient   2) Active GI bleed (IF assessing H2RAs or PPIs)   3) Continuous tube feeding (IF assessing cipro, doxycycline, levofloxacin, minocycline, rifampin, or voriconazole)   4) Receiving PO vancomycin (IF assessing metronidazole)   5) Persistent nausea and/or vomiting   6) Ileus or gastrointestinal obstruction   7) Owen/nasogastric tube set for continuous suction   8) Specific order not to automatically convert to PO (in the order's comments or if discussed in the most recent Infectious Disease or primary team's progress notes)

## 2019-07-11 NOTE — UTILIZATION REVIEW
Initial Clinical Review    Admission: Date/Time/Statement: Inpatient 7/10/19 @ 1846     Orders Placed This Encounter   Procedures    Inpatient Admission (expected length of stay for this patient Order details is greater than two midnights)     Standing Status:   Standing     Number of Occurrences:   1     Order Specific Question:   Admitting Physician     Answer:   Jaime Breeze     Order Specific Question:   Level of Care     Answer:   Med Surg [16]     Order Specific Question:   Estimated length of stay     Answer:   More than 2 Midnights     Order Specific Question:   Certification     Answer:   I certify that inpatient services are medically necessary for this patient for a duration of greater than two midnights  See H&P and MD Progress Notes for additional information about the patient's course of treatment  ED Arrival Information     Expected Arrival Acuity Means of Arrival Escorted By Service Admission Type    - 7/10/2019 17:16 Emergent Ambulance [facility]    Arrival Complaint    -        Chief Complaint   Patient presents with    Weakness - Generalized     pt from home via ems with c/o weakness, fatigue, chills and decreased appetitie along with fever at home for past few days  pt was 89% on RA at home for ems  91% RA during triage   Assessment/Plan: 72 y o  male presents to ED by EMS with fever x1 day and generalized weakness x4 days  History is provided by the patient and his grandson, who is at bedside  Patient states that he started with a fever this morning at home  Raquel Farah states that the house was warmer than usual but the patient had chills, and could not get warm despite several layers  When he checked his temperature at home, it was over 100 but he can't remember the exact degree  Patient denies having SOB, and he is unsure if he had a cough    Grandson at bedside states that the patient appeared short of breath today, which he did not notice previously  Skip Sevilla is noticed that he has had increased difficulty transferring from bed to wheelchair with his transferred board over the past approximately 4 days  Diana's that the patient has had progressive decline in his mobility since the fall of 2018  He states that in the fall he was able to ambulate with a walker  Sepsis ([facility] [address] )  Assessment & Plan  · Presented with cough, fever, generalized weakness  O2 sat noted to be 89% on RA on EMS arrival   · Sepsis, POA, as evidenced by: fever (TMax 102 9), tachycardia (), leukocytosis (WBC 15 59)  Suspect secondary to PNA  · O2 sat 93% on 2LNC  · CXR- LLL infiltrate  Final read pending  · IV Rocephin, azithromycin given in ED- continue  · Urine strep/legionella  · Sputum culture  · Procalcitonin  · Blood cultures pending  Bilateral hand pain  Assessment & Plan  · C/o "burning" bilateral hand pain intermittently x3 weeks with weakness and decreased ROM  ? No injury/trauma  ? 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 Check A1c  · Currently on gabapentin 300mg TID  ? Will given an extra 100mg tonight for 400mg total   ? Consider further up-titration  Paroxysmal atrial fibrillation (HCC)  Assessment & Plan  · Rate controlled at 85  · Continue Coreg  · Anticoagulated on Eliquis- continue  Ambulatory dysfunction  Assessment & Plan  · Patient has had progressive decline in is ability to ambulate and transfer  ? Diana at bedside states that he used to be able to ambulate with a walker in the fall of 2018  He then had sepsis and was admitted at the beginning of 2019   He never fully recovered then, and was only able to transfer from bed to wheelchair with a transfer board  Now over the past 4 days, he has had increased difficulty transferring with the transfer board  · PT/OT  · Fall precautions  · CM consult- will likely need STR vs placement  Essential hypertension  Assessment & Plan  · /53 on arrival   · Continue amlodipine, Coreg, lisinopril-HCTZ  Type 2 diabetes mellitus without complication, with long-term current use of insulin (HCC)  Assessment & Plan        Lab Results   Component Value Date     HGBA1C 8 0 (H) 01/16/2019         No results for input(s): POCGLU in the last 72 hours  Blood Sugar Average: Last 72 hrs:  · Glucose 139 on arrival   · Check A1c   · Continue home insulin regimen with SSI coverage    HLD (hyperlipidemia)  Assessment & Plan  · Continue statin        ED Triage Vitals   Temperature Pulse Respirations Blood Pressure SpO2   07/10/19 1719 07/10/19 1719 07/10/19 1719 07/10/19 1720 07/10/19 1719   (!) 102 9 °F (39 4 °C) 101 20 133/65 92 %      Temp Source Heart Rate Source Patient Position - Orthostatic VS BP Location FiO2 (%)   07/10/19 1719 07/10/19 1719 07/10/19 1719 07/10/19 1719 --   Oral Monitor Sitting Left arm       Pain Score       07/10/19 1851       Worst Possible Pain        Wt Readings from Last 1 Encounters:   07/10/19 104 kg (229 lb 15 oz)     Additional Vital Signs:   07/11/19 0700  98 °F (36 7 °C)  67  18  128/63    98 %  Nasal cannula  Lying   07/10/19 2230    71  16      97 %  Nasal cannula     07/10/19 2200  99 8 °F (37 7 °C)  72  18  99/52  71  98 %  None (Room air)  Lying   07/10/19 1945  100 3 °F (37 9 °C)  85  18  102/53  73  93 %  Nasal cannula  Lying   07/10/19 1900    86    107/56    95 %       07/10/19 1830    94        96 %       07/10/19 1800    94        96 %       07/10/19 1730    98    133/65    96 %  Nasal cannula     07/10/19 1720        133/65      None (Room air)     07/10/19 1719  102 9 °F (39 4 °C)Abnormal   101  20      92 %  None (Room air)  Sitting      Weights (last 14 days)     Date/Time  Weight  Height   07/11/19 1127    6' 2" (1 88 m)   07/10/19 1945  104 kg (229 lb 15 oz)  6' 2" (1 88 m)   07/10/19 1719  104 kg (229 lb 15 oz)       Pertinent Labs/Diagnostic Test Results:   XR chest 1 view portable   Left sided infiltrate      EKG, Pathology, and Other Studies Reviewed on Admission:   · EKG: NSR, rate 99  Complete RBBB      Results from last 7 days   Lab Units 07/11/19  0535 07/10/19  1738   WBC Thousand/uL 15 97* 15 59*   HEMOGLOBIN g/dL 7 7* 8 6*   HEMATOCRIT % 26 6* 29 5*   PLATELETS Thousands/uL 337 387   NEUTROS ABS Thousands/µL 11 67* 12 40*         Results from last 7 days   Lab Units 07/11/19  0535 07/10/19  1738   SODIUM mmol/L 137 139   POTASSIUM mmol/L 4 2 4 0   CHLORIDE mmol/L 104 103   CO2 mmol/L 26 27   ANION GAP mmol/L 7 9   BUN mg/dL 24 19   CREATININE mg/dL 1 32* 1 06   EGFR ml/min/1 73sq m 54 70   CALCIUM mg/dL 7 9* 8 6     Results from last 7 days   Lab Units 07/10/19  1738   AST U/L 21   ALT U/L 23   ALK PHOS U/L 99   TOTAL PROTEIN g/dL 7 1   ALBUMIN g/dL 2 9*   TOTAL BILIRUBIN mg/dL 0 40     Results from last 7 days   Lab Units 07/11/19  1058 07/11/19  0709 07/10/19  2111   POC GLUCOSE mg/dl 129 122 128     Results from last 7 days   Lab Units 07/11/19  0535 07/10/19  1738   GLUCOSE RANDOM mg/dL 115 139         Results from last 7 days   Lab Units 07/10/19  2334   HEMOGLOBIN A1C % 8 7*   EAG mg/dl 203       Results from last 7 days   Lab Units 07/10/19  1738   TROPONIN I ng/mL 0 03         Results from last 7 days   Lab Units 07/10/19  1739   PROTIME seconds 14 7*   INR  1 21*   PTT seconds 32     Results from last 7 days   Lab Units 07/11/19  0535 07/11/19  0037   PROCALCITONIN ng/ml 0 36* 0 30*     Results from last 7 days   Lab Units 07/10/19  1738   LACTIC ACID mmol/L 1 9     ED Treatment:   Medication Administration from 07/10/2019 1716 to 07/10/2019 1952 Date/Time Order Dose Route Action Action by Comments     07/10/2019 1850 sodium chloride 0 9 % bolus 1,000 mL 0 mL Intravenous Stopped Thony Puckett RN      07/10/2019 1740 sodium chloride 0 9 % bolus 1,000 mL 1,000 mL Intravenous [address] [facility]      07/10/2019 1833 ceftriaxone (ROCEPHIN) 1 g/50 mL in dextrose IVPB 1,000 mg Intravenous [address] [facility]      07/10/2019 1851 morphine (PF) 4 mg/mL injection 4 mg 4 mg Intravenous Given Thony Puckett RN      07/10/2019 1851 acetaminophen (TYLENOL) tablet 650 mg 650 mg Oral Given Thony Puckett RN         Past Medical History:   Diagnosis Date    Atrial fibrillation ([facility] [address] )     Bilateral hand pain 7/10/2019    Diabetes ([facility] [address]     H/O agent Orange exposure     HLD (hyperlipidemia) 7/10/2019    Hypertension     Neuropathy     PTSD (post-traumatic stress disorder)      Present on Admission:   Paroxysmal atrial fibrillation (HCC)   Essential hypertension   HLD (hyperlipidemia)   Sepsis ([facility] [address]   Ambulatory dysfunction   Bilateral hand pain    Admitting Diagnosis: Weakness [R53 1]  Left lower lobe pneumonia (HCC) [J18 1]  Generalized weakness [R53 1]  Age/Sex: 72 y o  male  Admission Orders:  Up OOB  scd  IP CONSULT TO CASE MANAGEMENT      Current Facility-Administered Medications:  acetaminophen 650 mg Oral Q6H PRN   amLODIPine 10 mg Oral Daily   apixaban 5 mg Oral BID   azithromycin 500 mg Oral Q24H   carvedilol 25 mg Oral BID With Meals   cefTRIAXone 1,000 mg Intravenous Q24H   cholecalciferol 5,000 Units Oral Daily   co-enzyme Q-10 100 mg Oral Daily   cyanocobalamin 1,000 mcg Oral Daily   diclofenac sodium 2 g Topical 4x Daily   gabapentin 300 mg Oral TID   insulin detemir 38 Units Subcutaneous HS   insulin lispro 1-6 Units Subcutaneous TID AC   insulin lispro 1-6 Units Subcutaneous HS   insulin lispro 5 Units Subcutaneous TID With Meals   levalbuterol 1 25 mg Nebulization Q6H PRN   lisinopril-hydrochlorothiazide (PRINZIDE 20/12  5) combo dose Oral Daily   ondansetron 4 mg Intravenous Q6H PRN   oxyCODONE 5 mg Oral Q4H PRN   pantoprazole 20 mg Oral Early Morning   pravastatin 40 mg Oral Daily With Dinner   sertraline 100 mg Oral Daily   sodium chloride 3 mL Nebulization Q6H PRN   tamsulosin 0 4 mg Oral Daily With Dinner   traZODone 50 mg Oral HS   zolpidem 10 mg Oral HS PRN     Network Utilization Review Department  Phone: [phone]; Fax [phone]  [email or web address]  ATTENTION: Please call with any questions or concerns to [phone]  and carefully listen to the prompts so that you are directed to the right person  Send all requests for admission clinical reviews, approved or denied determinations and any other requests to fax [phone]   All voicemails are confidential

## 2019-07-11 NOTE — ASSESSMENT & PLAN NOTE
· Patient has had progressive decline in is ability to ambulate and transfer  · Grandson at bedside states that he used to be able to ambulate with a walker in the fall of 2018  He then had sepsis and was admitted at the beginning of 2019  He never fully recovered then, and was only able to transfer from bed to wheelchair with a transfer board  Now over the past 4 days, he has had increased difficulty transferring with the transfer board  · PT/OT  · Fall precautions  · CM consult- will likely need STR vs placement

## 2019-07-12 ENCOUNTER — APPOINTMENT (INPATIENT)
Dept: NON INVASIVE DIAGNOSTICS | Facility: HOSPITAL | Age: 74
DRG: 871 | End: 2019-07-12
Payer: OTHER GOVERNMENT

## 2019-07-12 LAB
ANION GAP SERPL CALCULATED.3IONS-SCNC: 9 MMOL/L (ref 4–13)
BUN SERPL-MCNC: 32 MG/DL (ref 5–25)
CALCIUM SERPL-MCNC: 8.5 MG/DL (ref 8.3–10.1)
CHLORIDE SERPL-SCNC: 104 MMOL/L (ref 100–108)
CO2 SERPL-SCNC: 25 MMOL/L (ref 21–32)
CREAT SERPL-MCNC: 1.3 MG/DL (ref 0.6–1.3)
ERYTHROCYTE [DISTWIDTH] IN BLOOD BY AUTOMATED COUNT: 17.1 % (ref 11.6–15.1)
GFR SERPL CREATININE-BSD FRML MDRD: 55 ML/MIN/1.73SQ M
GLUCOSE SERPL-MCNC: 109 MG/DL (ref 65–140)
GLUCOSE SERPL-MCNC: 113 MG/DL (ref 65–140)
GLUCOSE SERPL-MCNC: 117 MG/DL (ref 65–140)
GLUCOSE SERPL-MCNC: 153 MG/DL (ref 65–140)
GLUCOSE SERPL-MCNC: 65 MG/DL (ref 65–140)
HCT VFR BLD AUTO: 25.6 % (ref 36.5–49.3)
HGB BLD-MCNC: 7.3 G/DL (ref 12–17)
MCH RBC QN AUTO: 23.4 PG (ref 26.8–34.3)
MCHC RBC AUTO-ENTMCNC: 28.5 G/DL (ref 31.4–37.4)
MCV RBC AUTO: 82 FL (ref 82–98)
PLATELET # BLD AUTO: 323 THOUSANDS/UL (ref 149–390)
PMV BLD AUTO: 10 FL (ref 8.9–12.7)
POTASSIUM SERPL-SCNC: 4.4 MMOL/L (ref 3.5–5.3)
RBC # BLD AUTO: 3.12 MILLION/UL (ref 3.88–5.62)
RHEUMATOID FACT SER QL LA: NEGATIVE
SODIUM SERPL-SCNC: 138 MMOL/L (ref 136–145)
WBC # BLD AUTO: 10.66 THOUSAND/UL (ref 4.31–10.16)

## 2019-07-12 PROCEDURE — 80048 BASIC METABOLIC PNL TOTAL CA: CPT | Performed by: HOSPITALIST

## 2019-07-12 PROCEDURE — 85027 COMPLETE CBC AUTOMATED: CPT | Performed by: HOSPITALIST

## 2019-07-12 PROCEDURE — 82948 REAGENT STRIP/BLOOD GLUCOSE: CPT

## 2019-07-12 PROCEDURE — 99232 SBSQ HOSP IP/OBS MODERATE 35: CPT | Performed by: HOSPITALIST

## 2019-07-12 PROCEDURE — G8978 MOBILITY CURRENT STATUS: HCPCS

## 2019-07-12 PROCEDURE — 93306 TTE W/DOPPLER COMPLETE: CPT | Performed by: INTERNAL MEDICINE

## 2019-07-12 PROCEDURE — 97163 PT EVAL HIGH COMPLEX 45 MIN: CPT

## 2019-07-12 PROCEDURE — 97167 OT EVAL HIGH COMPLEX 60 MIN: CPT

## 2019-07-12 PROCEDURE — G8979 MOBILITY GOAL STATUS: HCPCS

## 2019-07-12 PROCEDURE — 93306 TTE W/DOPPLER COMPLETE: CPT

## 2019-07-12 PROCEDURE — G8988 SELF CARE GOAL STATUS: HCPCS

## 2019-07-12 PROCEDURE — G8987 SELF CARE CURRENT STATUS: HCPCS

## 2019-07-12 RX ADMIN — INSULIN DETEMIR 38 UNITS: 100 INJECTION, SOLUTION SUBCUTANEOUS at 21:19

## 2019-07-12 RX ADMIN — CEFTRIAXONE 1000 MG: 1 INJECTION, POWDER, FOR SOLUTION INTRAMUSCULAR; INTRAVENOUS at 21:11

## 2019-07-12 RX ADMIN — TRAZODONE HYDROCHLORIDE 50 MG: 50 TABLET ORAL at 21:12

## 2019-07-12 RX ADMIN — BENZOCAINE AND MENTHOL: 20; .5 SPRAY TOPICAL at 16:18

## 2019-07-12 RX ADMIN — Medication 100 MG: at 08:33

## 2019-07-12 RX ADMIN — FUROSEMIDE 20 MG: 20 TABLET ORAL at 08:37

## 2019-07-12 RX ADMIN — GABAPENTIN 300 MG: 300 CAPSULE ORAL at 08:37

## 2019-07-12 RX ADMIN — CYANOCOBALAMIN TAB 500 MCG 1000 MCG: 500 TAB at 08:33

## 2019-07-12 RX ADMIN — CARVEDILOL 25 MG: 12.5 TABLET, FILM COATED ORAL at 08:34

## 2019-07-12 RX ADMIN — APIXABAN 5 MG: 5 TABLET, FILM COATED ORAL at 17:42

## 2019-07-12 RX ADMIN — INSULIN LISPRO 1 UNITS: 100 INJECTION, SOLUTION INTRAVENOUS; SUBCUTANEOUS at 21:19

## 2019-07-12 RX ADMIN — VITAMIN D, TAB 1000IU (100/BT) 5000 UNITS: 25 TAB at 08:33

## 2019-07-12 RX ADMIN — PRAVASTATIN SODIUM 40 MG: 40 TABLET ORAL at 17:42

## 2019-07-12 RX ADMIN — ACETAMINOPHEN 650 MG: 325 TABLET, FILM COATED ORAL at 16:16

## 2019-07-12 RX ADMIN — TAMSULOSIN HYDROCHLORIDE 0.4 MG: 0.4 CAPSULE ORAL at 17:42

## 2019-07-12 RX ADMIN — CARVEDILOL 25 MG: 12.5 TABLET, FILM COATED ORAL at 17:42

## 2019-07-12 RX ADMIN — OXYCODONE HYDROCHLORIDE 10 MG: 10 TABLET ORAL at 11:55

## 2019-07-12 RX ADMIN — INSULIN LISPRO 5 UNITS: 100 INJECTION, SOLUTION INTRAVENOUS; SUBCUTANEOUS at 09:21

## 2019-07-12 RX ADMIN — LISINOPRIL 20 MG: 20 TABLET ORAL at 08:33

## 2019-07-12 RX ADMIN — IRON SUCROSE 100 MG: 20 INJECTION, SOLUTION INTRAVENOUS at 15:13

## 2019-07-12 RX ADMIN — GABAPENTIN 600 MG: 300 CAPSULE ORAL at 21:11

## 2019-07-12 RX ADMIN — AMLODIPINE BESYLATE 10 MG: 10 TABLET ORAL at 08:33

## 2019-07-12 RX ADMIN — AZITHROMYCIN 500 MG: 250 TABLET, FILM COATED ORAL at 21:11

## 2019-07-12 RX ADMIN — SERTRALINE HYDROCHLORIDE 100 MG: 100 TABLET ORAL at 08:33

## 2019-07-12 RX ADMIN — PANTOPRAZOLE SODIUM 20 MG: 20 TABLET, DELAYED RELEASE ORAL at 06:21

## 2019-07-12 RX ADMIN — APIXABAN 5 MG: 5 TABLET, FILM COATED ORAL at 08:34

## 2019-07-12 RX ADMIN — GABAPENTIN 300 MG: 300 CAPSULE ORAL at 17:42

## 2019-07-12 NOTE — PROGRESS NOTES
Pt asleep in bed  No visible signs of distress noted at this time  Bed low and locked  Side rails up; call bell within reach; bed alarm on  Will continue to monitor

## 2019-07-12 NOTE — PLAN OF CARE
Problem: Prexisting or High Potential for Compromised Skin Integrity  Goal: Skin integrity is maintained or improved  Description  INTERVENTIONS:  - Identify patients at risk for skin breakdown  - Assess and monitor skin integrity  - Assess and monitor nutrition and hydration status  - Monitor labs (i e  albumin)  - Assess for incontinence   - Turn and reposition patient  - Assist with mobility/ambulation  - Relieve pressure over bony prominences  - Avoid friction and shearing  - Provide appropriate hygiene as needed including keeping skin clean and dry  - Evaluate need for skin moisturizer/barrier cream  - Collaborate with interdisciplinary team (i e  Nutrition, Rehabilitation, etc )   - Patient/family teaching  Outcome: Progressing     Problem: Potential for Falls  Goal: Patient will remain free of falls  Description  INTERVENTIONS:  - Assess patient frequently for physical needs  -  Identify cognitive and physical deficits and behaviors that affect risk of falls  -  [place] fall precautions as indicated by assessment   - Educate patient/family on patient safety including physical limitations  - Instruct patient to call for assistance with activity based on assessment  - Modify environment to reduce risk of injury  - Consider OT/PT consult to assist with strengthening/mobility  Outcome: Progressing     Problem: Nutrition/Hydration-ADULT  Goal: Nutrient/Hydration intake appropriate for improving, restoring or maintaining nutritional needs  Description  Monitor and assess patient's nutrition/hydration status for malnutrition (ex- brittle hair, bruises, dry skin, pale skin and conjunctiva, muscle wasting, smooth red tongue, and disorientation)  Collaborate with interdisciplinary team and initiate plan and interventions as ordered  Monitor patient's weight and dietary intake as ordered or per policy  Utilize nutrition screening tool and intervene per policy   Determine patient's food preferences and provide high-protein, high-caloric foods as appropriate       INTERVENTIONS:  - Monitor oral intake, urinary output, labs, and treatment plans  - Assess nutrition and hydration status and recommend course of action  - Evaluate amount of meals eaten  - Assist patient with eating if necessary   - Allow adequate time for meals  - Recommend/ encourage appropriate diets, oral nutritional supplements, and vitamin/mineral supplements  - Order, calculate, and assess calorie counts as needed  - Recommend, monitor, and adjust tube feedings and TPN/PPN based on assessed needs  - Assess need for intravenous fluids  - Provide specific nutrition/hydration education as appropriate  - Include patient/family/caregiver in decisions related to nutrition  Outcome: Progressing     Problem: GENITOURINARY - ADULT  Goal: Maintains or returns to baseline urinary function  Description  INTERVENTIONS:  - Assess urinary function  - Encourage oral fluids to ensure adequate hydration  - Administer IV fluids as ordered to ensure adequate hydration  - Administer ordered medications as needed  - Offer frequent toileting  - Follow urinary retention protocol if ordered  Outcome: Progressing  Goal: Absence of urinary retention  Description  INTERVENTIONS:  - Assess patients ability to void and empty bladder  - Monitor I/O  - Bladder scan as needed  - Discuss with physician/AP medications to alleviate retention as needed  - Discuss catheterization for long term situations as appropriate  Outcome: Progressing

## 2019-07-12 NOTE — PHYSICAL THERAPY NOTE
PHYSICAL THERAPY EVALUATION NOTE    Patient Name: Lex Bailon  Today's Date: 7/12/2019  AGE:   72 y o  Mrn:   [id number]  ADMIT DX:  Weakness [R53 1]  Left lower lobe pneumonia (HCC) [J18 1]  Generalized weakness [R53 1]    Past Medical History:   Diagnosis Date    Atrial fibrillation ([facility] [address] )     Bilateral hand pain 7/10/2019    Diabetes ([facility] [address] )     H/O agent Orange exposure     HLD (hyperlipidemia) 7/10/2019    Hypertension     Neuropathy     PTSD (post-traumatic stress disorder)      Length Of Stay: 2  PHYSICAL THERAPY EVALUATION :    07/12/19 1054   Pain Assessment   Pain Assessment FLACC   Pain Location   (bilateral hands, left shoulder)   Pain Rating: FLACC (Rest) - Face 1   Pain Rating: FLACC (Rest) - Legs 1   Pain Rating: FLACC (Rest) - Activity 1   Pain Rating: FLACC (Rest) - Cry 1   Pain Rating: FLACC (Rest) - Consolability 1   Score: FLACC (Rest) 5   Pain Rating: FLACC (Activity) - Face 2   Pain Rating: FLACC (Activity) - Legs 1   Pain Rating: FLACC (Activity) - Activity 1   Pain Rating: FLACC (Activity) - Cry 2   Pain Rating: FLACC (Activity) - Consolability 1   Score: FLACC (Activity) 7   Home Living   Type of Home House   Home Layout Two level;Bed/bath upstairs; Ramped entrance; Other (Comment)  (stairglide to 2nd floor )   Additional Comments lives w/ spouse  mobilizes in power chair  uses slideboard for transfers  needs some assist w/ ADLs  has aide 16 hours/week  no falls in last 6 months  Prior Function   Comments pt seen supine in bed  agreed to participate in PT eval  c/o pain in bilateral hands and left shoulder  Restrictions/Precautions   Other Precautions Bed Alarm; Fall Risk;Pain;Multiple lines   General   Additional Pertinent History 7/12/19 at 4:57, hemoglobin was 7 3   Family/Caregiver Present No   Cognition   Arousal/Participation Cooperative   Orientation Level Oriented to person; Other (Comment)  (pt was identified w/ full name, birth date)   Following Commands Follows one step commands with increased time or repetition   RUE Assessment   RUE Assessment X  (limited ROM all joints)   LUE Assessment   LUE Assessment X  (limited ROM all joints)   RLE Assessment   RLE Assessment X  (2+/5, ankle 0/5)   LLE Assessment   LLE Assessment X  (2/5, ankle 0/5)   Coordination   Movements are Fluid and Coordinated 0   Coordination and Movement Description impaired coordination UEs   Sensation X  (light touch impaired B LEs, absent feet)   Bed Mobility   Rolling R 1  Dependent   Additional items Assist x 1; Increased time required;Verbal cues;LE management  (pt was unable to utilize bed rail)   Rolling L 2  Maximal assistance   Additional items Assist x 1;Bedrails; Increased time required;Verbal cues;LE management   Supine to Sit Unable to assess  (pt was unable to attain seated position despite total assist)   Balance   Static Sitting Zero   Activity Tolerance   Activity Tolerance Patient limited by fatigue;Patient limited by pain   Nurse Made Aware spoke to [facility] OT, Tirzi CM   Assessment   Prognosis Guarded   Problem List Decreased strength;Decreased range of motion;Decreased endurance;Decreased mobility; Decreased coordination;Decreased safety awareness; Impaired sensation;Pain   Assessment Pt presents with fever x 1 day and generalized weakness x 4 days  Dx: pneumonia, bilateral hand pain, ambulatory dysfunction, essential HTN, DM, and sepsis  order placed for PT eval and tx, w/ activity order of up and OOB as tolerated and fall precautions   pt presents w/ comorbidities of a-fib, HTN, hyperlipidemia, neuropathy, right total shoulder replacement, and DM and personal factors of mobilizing w/ assistive device, inability to perform IADLs, inability to perform ADLs and PTSD  pt presents w/ pain, weakness, decreased ROM, decreased endurance, altered sensation, impaired coordination, decreased safety awareness and fall risk  these impairments are evident in findings from physical examination (weakness, decreased ROM, altered sensation and impaired coordination), mobility assessment (need for max to total assist for rolling and repositioning in bed, inability to sit up in bed, need for input for mobility technique/safety), and Barthel Index: 10/100  pt needed input for mobility technique  pt is at risk for falls due to physical and safety awareness deficits  pt's clinical presentation is unstable/unpredictable (evident in anemia, need for assist w/ bed mobility, pain impacting overall mobility status and need for input for mobility technique/safety)  pt needs inpatient PT tx to improve mobility deficits and progress mobility training as appropraite  discharge recommendation is for inpatient rehab to reduce fall risk and maximize level of functional independence  Pt needs dependent means for out of bed mobilization  Pt would benefit from pain management consult to address pain control  Goals   Patient Goals go home with less pain, be able to do things   STG Expiration Date 07/22/19   Short Term Goal #1 pt will: Increase bilateral LE strength 1/2 grade to facilitate independent mobility, Perform rolling and repositioning in bed w/ minx1 to decrease fall risk factors, Complete exercise program independently and Improve Barthel Index score to 35 or greater to facilitate independence  PT to see when sitting edge of bed is appropriate  Treatment Day 0   Plan   Treatment/Interventions Therapeutic exercise;LE strengthening/ROM; Endurance training;Patient/family training;Bed mobility  (PT to see when sitting edge of bed is appropriate )   PT Frequency Other (Comment)  (3 to 5x/week)   Recommendation   Recommendation Short-term skilled PT; Other (Comment)  (pain management consult)   Equipment Recommended Other (Comment)  (pt needs dependent means for out of bed mobilization )   Barthel Index   Feeding 0   Bathing 0 Grooming Score 0   Dressing Score 0   Bladder Score 0   Bowels Score 10   Toilet Use Score 0   Transfers (Bed/Chair) Score 0   Mobility (Level Surface) Score 0   Stairs Score 0   Barthel Index Score 10     Skilled PT recommended while in hospital and upon DC to progress pt toward treatment goals       Rajesh Uriarte, PT

## 2019-07-12 NOTE — PLAN OF CARE
Problem: PHYSICAL THERAPY ADULT  Goal: Performs mobility at highest level of function for planned discharge setting  See evaluation for individualized goals  Description  Treatment/Interventions: Therapeutic exercise, LE strengthening/ROM, Endurance training, Patient/family training, Bed mobility(PT to see when sitting edge of bed is appropriate )  Equipment Recommended: Other (Comment)(pt needs dependent means for out of bed mobilization )       See flowsheet documentation for full assessment, interventions and recommendations  Outcome: Progressing  Note:   Prognosis: Guarded  Problem List: Decreased strength, Decreased range of motion, Decreased endurance, Decreased mobility, Decreased coordination, Decreased safety awareness, Impaired sensation, Pain  Assessment: Pt presents with fever x 1 day and generalized weakness x 4 days  Dx: pneumonia, bilateral hand pain, ambulatory dysfunction, essential HTN, DM, and sepsis  order placed for PT eval and tx, w/ activity order of up and OOB as tolerated and fall precautions  pt presents w/ comorbidities of a-fib, HTN, hyperlipidemia, neuropathy, right total shoulder replacement, and DM and personal factors of mobilizing w/ assistive device, inability to perform IADLs, inability to perform ADLs and PTSD  pt presents w/ pain, weakness, decreased ROM, decreased endurance, altered sensation, impaired coordination, decreased safety awareness and fall risk  these impairments are evident in findings from physical examination (weakness, decreased ROM, altered sensation and impaired coordination), mobility assessment (need for max to total assist for rolling and repositioning in bed, inability to sit up in bed, need for input for mobility technique/safety), and Barthel Index: 10/100  pt needed input for mobility technique  pt is at risk for falls due to physical and safety awareness deficits   pt's clinical presentation is unstable/unpredictable (evident in anemia, need for assist w/ bed mobility, pain impacting overall mobility status and need for input for mobility technique/safety)  pt needs inpatient PT tx to improve mobility deficits and progress mobility training as appropraite  discharge recommendation is for inpatient rehab to reduce fall risk and maximize level of functional independence  Pt needs dependent means for out of bed mobilization  Pt would benefit from pain management consult to address pain control  Recommendation: Short-term skilled PT, Other (Comment)(pain management consult)          See flowsheet documentation for full assessment

## 2019-07-12 NOTE — PLAN OF CARE
Problem: Prexisting or High Potential for Compromised Skin Integrity  Goal: Skin integrity is maintained or improved  Description  INTERVENTIONS:  - Identify patients at risk for skin breakdown  - Assess and monitor skin integrity  - Assess and monitor nutrition and hydration status  - Monitor labs (i e  albumin)  - Assess for incontinence   - Turn and reposition patient  - Assist with mobility/ambulation  - Relieve pressure over bony prominences  - Avoid friction and shearing  - Provide appropriate hygiene as needed including keeping skin clean and dry  - Evaluate need for skin moisturizer/barrier cream  - Collaborate with interdisciplinary team (i e  Nutrition, Rehabilitation, etc )   - Patient/family teaching  Outcome: Progressing     Problem: Potential for Falls  Goal: Patient will remain free of falls  Description  INTERVENTIONS:  - Assess patient frequently for physical needs  -  Identify cognitive and physical deficits and behaviors that affect risk of falls  -  Ida fall precautions as indicated by assessment   - Educate patient/family on patient safety including physical limitations  - Instruct patient to call for assistance with activity based on assessment  - Modify environment to reduce risk of injury  - Consider OT/PT consult to assist with strengthening/mobility  Outcome: Progressing     Problem: Nutrition/Hydration-ADULT  Goal: Nutrient/Hydration intake appropriate for improving, restoring or maintaining nutritional needs  Description  Monitor and assess patient's nutrition/hydration status for malnutrition (ex- brittle hair, bruises, dry skin, pale skin and conjunctiva, muscle wasting, smooth red tongue, and disorientation)  Collaborate with interdisciplinary team and initiate plan and interventions as ordered  Monitor patient's weight and dietary intake as ordered or per policy  Utilize nutrition screening tool and intervene per policy   Determine patient's food preferences and provide high-protein, high-caloric foods as appropriate       INTERVENTIONS:  - Monitor oral intake, urinary output, labs, and treatment plans  - Assess nutrition and hydration status and recommend course of action  - Evaluate amount of meals eaten  - Assist patient with eating if necessary   - Allow adequate time for meals  - Recommend/ encourage appropriate diets, oral nutritional supplements, and vitamin/mineral supplements  - Order, calculate, and assess calorie counts as needed  - Recommend, monitor, and adjust tube feedings and TPN/PPN based on assessed needs  - Assess need for intravenous fluids  - Provide specific nutrition/hydration education as appropriate  - Include patient/family/caregiver in decisions related to nutrition  Outcome: Progressing     Problem: GENITOURINARY - ADULT  Goal: Maintains or returns to baseline urinary function  Description  INTERVENTIONS:  - Assess urinary function  - Encourage oral fluids to ensure adequate hydration  - Administer IV fluids as ordered to ensure adequate hydration  - Administer ordered medications as needed  - Offer frequent toileting  - Follow urinary retention protocol if ordered  Outcome: Progressing  Goal: Absence of urinary retention  Description  INTERVENTIONS:  - Assess patients ability to void and empty bladder  - Monitor I/O  - Bladder scan as needed  - Discuss with physician/AP medications to alleviate retention as needed  - Discuss catheterization for long term situations as appropriate  Outcome: Progressing

## 2019-07-12 NOTE — OCCUPATIONAL THERAPY NOTE
[address]  Evaluation     Patient Name: Eloy Culver  Today's Date: 7/12/2019  Problem List  Patient Active Problem List   Diagnosis    Olecranon bursitis of right elbow    Essential hypertension    Paroxysmal atrial fibrillation (HCC)    Type 2 diabetes mellitus without complication, with long-term current use of insulin (HCC)    Lower extremity edema    Generalized weakness    Sepsis ([facility] [address] [facility]    Ambulatory dysfunction    Bacteremia    Left lower lobe pneumonia ([facility] [address] [facility]    HLD (hyperlipidemia)    Bilateral hand pain     Past Medical History  Past Medical History:   Diagnosis Date    Atrial fibrillation ([facility] [address] [facility]     Bilateral hand pain 7/10/2019    Diabetes ([facility] [address] [facility]     H/O agent Orange exposure     HLD (hyperlipidemia) 7/10/2019    Hypertension     Neuropathy     PTSD (post-traumatic stress disorder)      Past Surgical History  Past Surgical History:   Procedure Laterality Date    ABLATION OF DYSRHYTHMIC FOCUS      for a-fib    CHOLECYSTECTOMY      TOTAL SHOULDER REPLACEMENT Right          07/12/19 1055   Note Type   Note type Eval/Treat   Restrictions/Precautions   Other Precautions Bed Alarm; Fall Risk;Pain   Pain Assessment   Pain Assessment FLACC   Pain Location Hand   Pain Orientation Bilateral   Pain Rating: FLACC (Rest) - Face 1   Pain Rating: FLACC (Rest) - Legs 1   Pain Rating: FLACC (Rest) - Activity 1   Pain Rating: FLACC (Rest) - Cry 1   Pain Rating: FLACC (Rest) - Consolability 1   Score: FLACC (Rest) 5   Pain Rating: FLACC (Activity) - Face 2   Pain Rating: FLACC (Activity) - Legs 1   Pain Rating: FLACC (Activity) - Activity 1   Pain Rating: FLACC (Activity) - Cry 2   Pain Rating: FLACC (Activity) - Consolability 1   Score: FLACC (Activity) 7   Home Living   Type of Home House  (bi-level)   Home Layout Two level   Bathroom Shower/Tub Tub/shower unit  (cut out tub)   Bathroom Toilet Standard   Bathroom Equipment Shower chair;Grab bars in shower   Bathroom Accessibility Accessible   Home Equipment Wheelchair-electric;Stair glide; Hospital bed;Grab bars; Other (Comment)  (reports getting hospital bed; has built up utensils)   Additional Comments Pt reports living w/ wife in bi-level house w/ stairglide   Prior Function   Level of [place] Needs assistance with IADLs   Lives With Spouse   Receives Help From Family;Home health; Other (Comment)  (aide 16 hourw /week)   ADL Assistance Needs assistance   IADLs Needs assistance   Falls in the last 6 months 0   Vocational Retired   Comments Pt reports aide 16 hours /week and family assist w/ ADL/ IADL  Pt reports using sliding board to complete functional transfers w/ A x1, but reports decline, requiring increased assistance  Lifestyle   Autonomy Pt reports w/c for functional mobility and has been requiring increased assistnce w/ ADL/IADL  Reciprocal Relationships Pt reports living w/ wife (who has Parkinson's)  Pt reports that his wife has been falling, and is unable to assist him  Pt reports he is upset that he can't help her   Service to Others Pt reports retired   Intrinsic Gratification Will continue to assess  Pt reports limited activity engagement   Psychosocial   Psychosocial (WDL) X   Patient Behaviors/Mood Crying  (emotional)   ADL   Eating Assistance Unable to assess   Eating Deficit Setup;Bringing food to mouth assist;Increased time to complete;Supervision/safety; Adaptive utensils (Comment); Other (Comment)  (would benefit from built up utensil vs universal cuff (?))   Grooming Assistance 2  Maximal Assistance   Grooming Deficit Setup; Increased time to complete   UB Bathing Assistance Unable to assess   LB Bathing Assistance Unable to assess   Additional Comments limited ADL assess this AM due to pain B hands limited ROM, grasp   Bed Mobility   Rolling R 2  Maximal assistance   Additional items Assist x 1;Bedrails; Increased time required;Verbal cues;LE management   Rolling L 2  Maximal assistance   Additional items Assist x 1;Increased time required; Bedrails;Verbal cues;LE management   Supine to Sit Unable to assess   Additional Comments Pt unable to completed supine to sit at EOB this AM due to pain and limited functional use of UE   Transfers   Sit to Stand Unable to assess   Balance   Static Sitting Zero  (unable to assess)   Activity Tolerance   Activity Tolerance Patient limited by fatigue;Patient limited by pain   Medical Staff Made Aware spoke to PT, [place] and RN, [address] spoke to Manan LAZARO and Vania BRYAN    RUE Assessment   RUE Assessment X  (limited AROM grasp, end ROM shoulder)   RUE Strength   RUE Overall Strength Deficits;Due to pain; Other (Comment)  (limited assessment due to pain)   Edema   RUE Edema Non-pitting   LUE Assessment   LUE Assessment X  (limited shoulder ROM (premorbid))   LUE Strength   LUE Overall Strength Deficits;Due to pain; Other (Comment)  (limited grasp due to pain)   Edema   LUE Edema Non-pitting   Hand Function   Gross Motor Coordination Impaired   Fine Motor Coordination Impaired  (R hand dominance)   Sensation   Light Touch   (pt reports burning B hands; "on fire")   Sharp/Dull Not tested   Cognition   Overall Cognitive Status Impaired   Arousal/Participation Alert   Attention Attends with cues to redirect   Orientation Level Oriented to person;Oriented to place   Memory Decreased recall of recent events   Following Commands Follows one step commands with increased time or repetition   Comments Identified pt by full name and birthdate  Pt able to participate in conversation to provide social history  Emotional, crying during eval (worried about wife at home, and getting additional help at home)  Cues to sustain attention at time and limited recall details, timeframe   Assessment   Limitation Decreased ADL status; Decreased UE ROM; Decreased UE strength;Decreased endurance;Decreased sensation;Decreased fine motor control;Decreased self-care trans;Decreased high-level ADLs   Assessment Pt is a 79yo male [facility] on [facility]10/[facility]  Pt presents w/ sepsis and significant PMH impacting his occupational performance including a-fib, HTN, DM, R total shoulder replacement, neuropathy, PTSD  Pt reports living w/ wife and has stairglide and electric w/c  Pt reports using sliding board to complete functional transfers at baseline w/ A x1 and needs assistance w/ ADL  Upon eval, pt w/ limited active participation in ADL due to B hand pain and non pitting edema  Pt reports right hand dominance  Pt completed grooming w/ max A, and reports assistance w/ feeding  Pt required max A x 1 to roll R<> L using bedrails  Pt able to participate in conversation to provide social history  Pt presents w/ increased pain, decreased activity tolerance, decreased UE ROM / strength, decreased LE strength, decreased endurance impacting his I w/ dressing, bathing, oral hygiene, functional transfers, functional mobility, activity engagement, clothing mgmt  Pt would benefit from OT while in acute care to address deficits, and post acute rehab when medically stable for discharge from acute care  Will continue to follow   Goals   Patient Goals Pt stated that he would like to have less pain and be able to go home and function   Plan   Treatment Interventions ADL retraining;Functional transfer training;UE strengthening/ROM; Patient/family training;Continued evaluation; Energy conservation; Activityengagement;Equipment evaluation/education; Compensatory technique education   Goal Expiration Date 07/22/19   OT Frequency 3-5x/wk   Recommendation   OT Discharge Recommendation Other (Comment)  (post acute rehab)   Equipment Recommended Bedside commode;Tub seat with back  (will continue to assess)   OT - OK to Discharge   (post acute rehab)   Barthel Index   Feeding 0   Bathing 0   Grooming Score 0   Dressing Score 0   Bladder Score 0   Bowels Score 10   Toilet Use Score 0   Transfers (Bed/Chair) Score 0   Mobility (Level Surface) Score 0   Stairs Score 0   Barthel Index Score 10   Modified Isra Scale   Modified Perry Scale 5   Pt goals to be met by 7/22/2019  1  Pt will complete bed mobility to roll R<>L w/ min A x 1 to max I w/ ADL performance  2  Pt will demonstrate good attention and participation in continued evaluation to assess bed mobility supine <> sit to improve activity engagement and max I w/ ADL performance  3  Pt will complete grooming / feeding w/ S after set- up using AE as needed  4  Pt will demonstrate good attention and participation in continued evaluation to assess functional transfers to bed, chair, and w/c using AD, DME as needed w/ A x 1   5  Pt will demonstrate good attention and participation in continued evaluation to assess for DME / AE needs to max I w/ ADL performance  6   Pt will demonstrate increased UE ROM / strength and decreased pain to actively participate in UB ADL w/ min A x1    Whitney Pan, OTR/L

## 2019-07-12 NOTE — PLAN OF CARE
Problem: OCCUPATIONAL THERAPY ADULT  Goal: Performs self-care activities at highest level of function for planned discharge setting  See evaluation for individualized goals  Description  Treatment Interventions: ADL retraining, Functional transfer training, UE strengthening/ROM, Patient/family training, Continued evaluation, Energy conservation, Activityengagement, Equipment evaluation/education, Compensatory technique education  Equipment Recommended: Bedside commode, Tub seat with back(will continue to assess)       See flowsheet documentation for full assessment, interventions and recommendations  Note:   Limitation: Decreased ADL status, Decreased UE ROM, Decreased UE strength, Decreased endurance, Decreased sensation, Decreased fine motor control, Decreased self-care trans, Decreased high-level ADLs     Assessment: Pt is a 81yo male admitted [facility] on 7/10/2019  Pt presents w/ sepsis and significant PMH impacting his occupational performance including a-fib, HTN, DM, R total shoulder replacement, neuropathy, PTSD  Pt reports living w/ wife and has stairglide and electric w/c  Pt reports using sliding board to complete functional transfers at baseline w/ A x1 and needs assistance w/ ADL  Upon eval, pt w/ limited active participation in ADL due to B hand pain and non pitting edema  Pt reports right hand dominance  Pt completed grooming w/ max A, and reports assistance w/ feeding  Pt required max A x 1 to roll R<> L using bedrails  Pt able to participate in conversation to provide social history  Pt presents w/ increased pain, decreased activity tolerance, decreased UE ROM / strength, decreased LE strength, decreased endurance impacting his I w/ dressing, bathing, oral hygiene, functional transfers, functional mobility, activity engagement, clothing mgmt  Pt would benefit from OT while in acute care to address deficits, and post acute rehab when medically stable for discharge from acute care   Will continue to follow     OT Discharge Recommendation: Other (Comment)(post acute rehab)  OT - OK to Discharge: (post acute rehab)

## 2019-07-12 NOTE — SOCIAL WORK
LOS 2 DAYS  PATIENT IS NOT A BUNDLE  PATIENT IS NOT A READMISSION  CM met with patient at bedside, patient alert and oriented  Patient not accompanied by family at this time  Patient currently lives in a bi level home in [place] with his spouse  Patient reported to CM that his home has 8 steps from basement to 2nd floor  Patient reported to CM that he hines in garage, uses electric wheelchair from basement and uses a stair lift from basement to second floor  Patient reported that he has a electric wheelchair on second floor as well  Patient denies reports history of rehab many years ago but can't recall the facility name and reported to CM that he is only current with VA provided [facility] for 10-16 hrs/week  Patient does not have current VNA though he has history with [facility]  Patient utilizes [employer] on [employer], has Rx plan and is able to afford all copays  Patient reports history of PTSD and IP [address] admission to [facility] in 2014  Patient denies any other IP [address] admissions and follows with VA provided Psychiatrist  Patient denies history of substance use  Patient identifies his wife Antwan Birch as his POA and has AD as well  Patient receives SSI and SSD  Patient is primarily homebound but the [place] will provide WCV transport when scheduled for VA facility doctor's appointments  CM reviewed PT/OT evaluations and recommendations for SNF and patient is agreeable  A post acute care recommendation was made by your care team for STR  Discussed Freedom of Choice with patient  List of facilities given to patient via in person  patient aware the list is custom filtered for them by zip code location and that [facility] post acute providers are designated  CM [facility] [address] [facility], licensed clinical  with the [place]  Patient is 100% service connected and can be admitted to [place] contracted SNF under VA benefit   Atul Archer stated she is part of patient's home based team and can be reached directly at [phone]  If still inpatient on Monday, CM can coordinate patient care with home based primary care nurse Regina farris at [phone]  CM also received call from Silverio Klinefelter, Contract nursing home coordinator at the [facility] ([phone])  Sara Corley stated she received consult and patient has been authorized for an initial 30 days of VA approved 100% coverage at one of their facilities  At this time, [facility] is only ed with [facility] and [facility], not [facility]  Sara Corley asked CM to provide her contact information to family and stated if patient decides to discharge to SNF under Medicare benefit and they don't like facility or want to transfer to a [place] contracted facility in the future, they can contact her  CM met with patient and family at bedside to obtain SNF choices, including daughter Cody Stapleton and wife Connor Tobin  Multiple referrals were sent via [facility] and CM explained difference between Medicare coverage and VA coverage  Family understands and would prefer facilities outside of what the [place] will contract with  1st choice: [facility], 2nd: [address]  As per [facility], patient's secondary coverage should cover day  copays  Patient is tentative for discharge this weekend as per [facility] and will require BLS transport at discharge  CM Department to follow up on SNF choices and coordinate discharge when medically stable for discharge to SNF  CM reviewed discharge planning process including the following: identifying caregivers at home, preference for d/c planning needs, availability of treatment team to discuss questions or concerns patient and/or family may have regarding diagnosis, plan of care, old or new medications and discharge planning   CM will continue to follow for care coordination and update assessment as appropriate

## 2019-07-12 NOTE — PROGRESS NOTES
Progress Note - Gamal Garner 1945, 72 y o  male MRN: [id number]    Unit/Bed#: -01 Encounter: [id number]    Primary Care Provider: No primary care provider on file  Date and time admitted to hospital: 7/10/2019  5:17 PM       Pneumonia  Assessment & Plan  · O2 sat 93% on 2LNC  · CXR- LLL infiltrate  · IV Rocephin, azithromycin      Bilateral hand pain  Assessment & Plan  For some time now  Associated with edema (R>L)  Possibly raynaud's  Does not have joint pain  Rheumatology for further evaluation - OUTPT F/U     Paroxysmal atrial fibrillation ([facility] [address] )  Assessment & Plan  · Rate controlled; Anticoagulated on Eliquis     Ambulatory dysfunction  Assessment & Plan  ? Functional decline in terms of mobility  ? Diffuse body aches  · PT/OT- REHAB  SW/CM FOR PLACEMENT  · Fall precautions      Essential hypertension  Assessment & Plan  · Continue amlodipine, Coreg, lisinopril-HCTZ      Type 2 diabetes mellitus without complication, with long-term current use of insulin (HCC)  Assessment & Plan  HbA1c 8 7  · Continue home insulin regimen with SSI coverage      HLD (hyperlipidemia)  Assessment & Plan  · Continue statin        ANEMIA - STARTED ON VENOFER  MONITOR H&H      VTE Pharmacologic Prophylaxis:   Pharmacologic: Apixaban (Eliquis)  Mechanical VTE Prophylaxis in Place: Yes  Patient Centered Rounds: I have performed bedside rounds with nursing staff today  Time Spent for Care: 30 minutes  More than 50% of total time spent on counseling and coordination of care as described above  Current Length of Stay: 2 day(s)  Current Patient Status: Inpatient   Certification Statement: The patient will continue to require additional inpatient hospital stay due to [place]  Discharge Plan: REHAB   Code Status: Level 1 - Full Code      Subjective:   Reporting neuropathy to the the R hand  No active sob/chest pain/palpitations      Objective:   Vitals:   Temp (24hrs), Av 9 °F (37 2 °C), Min:98 5 °F (36 9 °C), Max:99 6 °F (37 6 °C)    Temp:  [98 5 °F (36 9 °C)-99 6 °F (37 6 °C)] 98 5 °F (36 9 °C)  HR:  [65-68] 68  Resp:  [18] 18  BP: (122-137)/(57-68) 137/68  SpO2:  [95 %-98 %] 95 %  Body mass index is 29 52 kg/m²  Input and Output Summary (last 24 hours): Intake/Output Summary (Last 24 hours) at 7/12/2019 1541  Last data filed at 7/12/2019 1155  Gross per 24 hour   Intake 120 ml   Output 2000 ml   Net -1880 ml     Physical Exam  Gen -Patient comfortable   Neck- Supple  No thyromegaly or lymphadenopathy  Lungs-Clear bilaterally without any wheeze or rales   Heart S1-S2, regular rate and rhythm, no murmurs  Abdomen-soft nontender, no organomegaly  Bowel sounds present  Extremities-no cyanosis, clubbing or edema  Skin- no rash  Neuro-nonfocal     Additional Data:   Labs:    Results from last 7 days   Lab Units 07/12/19  0457 07/11/19  0535   WBC Thousand/uL 10 66* 15 97*   HEMOGLOBIN g/dL 7 3* 7 7*   HEMATOCRIT % 25 6* 26 6*   PLATELETS Thousands/uL 323 337   NEUTROS PCT %  --  73   LYMPHS PCT %  --  9*   MONOS PCT %  --  16*   EOS PCT %  --  1     Results from last 7 days   Lab Units 07/12/19  0457  07/10/19  1738   SODIUM mmol/L 138   < > 139   POTASSIUM mmol/L 4 4   < > 4 0   CHLORIDE mmol/L 104   < > 103   CO2 mmol/L 25   < > 27   BUN mg/dL 32*   < > 19   CREATININE mg/dL 1 30   < > 1 06   ANION GAP mmol/L 9   < > 9   CALCIUM mg/dL 8 5   < > 8 6   ALBUMIN g/dL  --   --  2 9*   TOTAL BILIRUBIN mg/dL  --   --  0 40   ALK PHOS U/L  --   --  99   ALT U/L  --   --  23   AST U/L  --   --  21   GLUCOSE RANDOM mg/dL 117   < > 139    < > = values in this interval not displayed       Results from last 7 days   Lab Units 07/10/19  1739   INR  1 21*     Results from last 7 days   Lab Units 07/12/19  1132 07/12/19  0747 07/11/19  2103 07/11/19  1533 07/11/19  1058 07/11/19  0709 07/10/19  2111   POC GLUCOSE mg/dl 109 113 136 186* 129 122 128     Results from last 7 days   Lab Units 07/10/19  0094   HEMOGLOBIN A1C % 8 7*     Results from last 7 days   Lab Units 07/11/19  0535 07/11/19  0037 07/10/19  1738   LACTIC ACID mmol/L  --   --  1 9   PROCALCITONIN ng/ml 0 36* 0 30*  --      * I Have Reviewed All Lab Data Listed Above  * Additional Pertinent Lab Tests Reviewed: All Labs Within Last 24 Hours Reviewed    Recent Cultures (last 7 days):     Results from last 7 days   Lab Units 07/11/19  1637 07/10/19  1739   BLOOD CULTURE   --  No Growth at 24 hrs  No Growth at 24 hrs     LEGIONELLA URINARY ANTIGEN  Negative  --        Last 24 Hours Medication List:     Current Facility-Administered Medications:  acetaminophen 650 mg Oral Q6H PRN [facility] Camps, PA-C    amLODIPine 10 mg Oral Daily [facility], PA-C    apixaban 5 mg Oral BID [facility] Cam[facility], PA-C    azithromycin 500 mg Oral Q24H Budeyal Woodall MD    benzocaine-menthol-lanolin-aloe  Topical 4x Daily PRN Greg Woodall MD    carvedilol 25 mg Oral BID With Meals [facility], PA-C    cefTRIAXone 1,000 mg Intravenous Q24H [facility], PA-C Last Rate: 1,000 mg (07/11/19 2130)   cholecalciferol 5,000 Units Oral Daily [facility], [facility]-C    co-enzyme Q-10 100 mg Oral Daily Charlanne Camps, PA-C    cyanocobalamin 1,000 mcg Oral Daily [facility], PA-C    diclofenac sodium 2 g Topical 4x Daily [facility], PA-C    furosemide 20 mg Oral Daily Greg Woodall MD    gabapentin 300 mg Oral BID Greg Woodall MD    gabapentin 600 mg Oral HS Greg Woodall MD    insulin detemir 38 Units Subcutaneous HS [facility] Camps, PA-C    insulin lispro 1-6 Units Subcutaneous TID AC [facility], PA-C    insulin lispro 1-6 Units Subcutaneous HS Charlanne Camps, PA-C    insulin lispro 5 Units Subcutaneous TID With Meals [facility], PA-C    iron sucrose 100 mg Intravenous Daily Greg Woodall MD Last Rate: 100 mg (07/12/19 1513)   levalbuterol 1 25 mg Nebulization Q6H PRN Sierra Hardy, MD    lisinopril 20 mg Oral Daily Fannie Hernadez MD    ondansetron 4 mg Intravenous Q6H PRN Shala Pollard PA-C    oxyCODONE 10 mg Oral Q4H PRN Fannie Hernadez MD    pantoprazole 20 mg Oral Early Morning Shala Pollard PA-C    pravastatin 40 mg Oral Daily With Dinner Shala Pollard PA-C    sertraline 100 mg Oral Daily Shala Pollard PA-C    sodium chloride 3 mL Nebulization Q6H PRN Sierra Hardy MD    tamsulosin 0 4 mg Oral Daily With Shanta Whitley MD    traZODone 50 mg Oral HS Shala Pollard PA-C         Today, Patient Was Seen By: Fannie Hernadez MD    ** Please Note: Dictation voice to text software may have been used in the creation of this document   **

## 2019-07-13 ENCOUNTER — TELEPHONE (OUTPATIENT)
Dept: OTHER | Facility: OTHER | Age: 74
End: 2019-07-13

## 2019-07-13 VITALS
SYSTOLIC BLOOD PRESSURE: 130 MMHG | WEIGHT: 229.94 LBS | HEART RATE: 71 BPM | BODY MASS INDEX: 29.51 KG/M2 | DIASTOLIC BLOOD PRESSURE: 60 MMHG | HEIGHT: 74 IN | TEMPERATURE: 98 F | OXYGEN SATURATION: 94 % | RESPIRATION RATE: 19 BRPM

## 2019-07-13 LAB
CCP IGA+IGG SERPL IA-ACNC: 125 UNITS (ref 0–19)
ERYTHROCYTE [DISTWIDTH] IN BLOOD BY AUTOMATED COUNT: 16.9 % (ref 11.6–15.1)
GLUCOSE SERPL-MCNC: 114 MG/DL (ref 65–140)
GLUCOSE SERPL-MCNC: 165 MG/DL (ref 65–140)
GLUCOSE SERPL-MCNC: 88 MG/DL (ref 65–140)
HCT VFR BLD AUTO: 30.3 % (ref 36.5–49.3)
HEMOCCULT STL QL: NEGATIVE
HGB BLD-MCNC: 9 G/DL (ref 12–17)
MCH RBC QN AUTO: 24.4 PG (ref 26.8–34.3)
MCHC RBC AUTO-ENTMCNC: 29.7 G/DL (ref 31.4–37.4)
MCV RBC AUTO: 82 FL (ref 82–98)
PLATELET # BLD AUTO: 359 THOUSANDS/UL (ref 149–390)
PMV BLD AUTO: 9.7 FL (ref 8.9–12.7)
RBC # BLD AUTO: 3.69 MILLION/UL (ref 3.88–5.62)
WBC # BLD AUTO: 10.74 THOUSAND/UL (ref 4.31–10.16)

## 2019-07-13 PROCEDURE — 85027 COMPLETE CBC AUTOMATED: CPT | Performed by: HOSPITALIST

## 2019-07-13 PROCEDURE — 82948 REAGENT STRIP/BLOOD GLUCOSE: CPT

## 2019-07-13 PROCEDURE — 82272 OCCULT BLD FECES 1-3 TESTS: CPT | Performed by: HOSPITALIST

## 2019-07-13 PROCEDURE — 99239 HOSP IP/OBS DSCHRG MGMT >30: CPT | Performed by: HOSPITALIST

## 2019-07-13 RX ORDER — GABAPENTIN 300 MG/1
300 CAPSULE ORAL 2 TIMES DAILY
Qty: 60 CAPSULE | Refills: 0
Start: 2019-07-13 | End: 2021-10-19

## 2019-07-13 RX ORDER — CYCLOBENZAPRINE HCL 10 MG
10 TABLET ORAL 3 TIMES DAILY PRN
Qty: 12 TABLET | Refills: 0
Start: 2019-07-13 | End: 2019-07-13 | Stop reason: HOSPADM

## 2019-07-13 RX ORDER — CEFDINIR 300 MG/1
300 CAPSULE ORAL EVERY 12 HOURS SCHEDULED
Status: DISCONTINUED | OUTPATIENT
Start: 2019-07-13 | End: 2019-07-13 | Stop reason: HOSPADM

## 2019-07-13 RX ORDER — TAMSULOSIN HYDROCHLORIDE 0.4 MG/1
0.4 CAPSULE ORAL
Qty: 30 CAPSULE | Refills: 0
Start: 2019-07-13

## 2019-07-13 RX ORDER — CEFDINIR 300 MG/1
300 CAPSULE ORAL EVERY 12 HOURS SCHEDULED
Qty: 14 CAPSULE | Refills: 0
Start: 2019-07-13 | End: 2019-07-20

## 2019-07-13 RX ORDER — CYCLOBENZAPRINE HCL 10 MG
10 TABLET ORAL 3 TIMES DAILY PRN
Status: DISCONTINUED | OUTPATIENT
Start: 2019-07-13 | End: 2019-07-13

## 2019-07-13 RX ORDER — GABAPENTIN 300 MG/1
600 CAPSULE ORAL
Qty: 60 CAPSULE | Refills: 0
Start: 2019-07-13 | End: 2021-10-19

## 2019-07-13 RX ADMIN — CARVEDILOL 25 MG: 12.5 TABLET, FILM COATED ORAL at 16:16

## 2019-07-13 RX ADMIN — ACETAMINOPHEN 650 MG: 325 TABLET, FILM COATED ORAL at 08:51

## 2019-07-13 RX ADMIN — SERTRALINE HYDROCHLORIDE 100 MG: 100 TABLET ORAL at 08:45

## 2019-07-13 RX ADMIN — AMLODIPINE BESYLATE 10 MG: 10 TABLET ORAL at 08:46

## 2019-07-13 RX ADMIN — LISINOPRIL 20 MG: 20 TABLET ORAL at 08:51

## 2019-07-13 RX ADMIN — IRON SUCROSE 100 MG: 20 INJECTION, SOLUTION INTRAVENOUS at 09:06

## 2019-07-13 RX ADMIN — ACETAMINOPHEN 650 MG: 325 TABLET, FILM COATED ORAL at 14:12

## 2019-07-13 RX ADMIN — Medication 100 MG: at 08:46

## 2019-07-13 RX ADMIN — FUROSEMIDE 20 MG: 20 TABLET ORAL at 08:45

## 2019-07-13 RX ADMIN — BENZOCAINE AND MENTHOL: 20; .5 SPRAY TOPICAL at 14:12

## 2019-07-13 RX ADMIN — CARVEDILOL 25 MG: 12.5 TABLET, FILM COATED ORAL at 08:45

## 2019-07-13 RX ADMIN — PANTOPRAZOLE SODIUM 20 MG: 20 TABLET, DELAYED RELEASE ORAL at 05:40

## 2019-07-13 RX ADMIN — GABAPENTIN 300 MG: 300 CAPSULE ORAL at 08:45

## 2019-07-13 RX ADMIN — CYANOCOBALAMIN TAB 500 MCG 1000 MCG: 500 TAB at 08:45

## 2019-07-13 RX ADMIN — VITAMIN D, TAB 1000IU (100/BT) 5000 UNITS: 25 TAB at 08:46

## 2019-07-13 RX ADMIN — BENZOCAINE AND MENTHOL 1 APPLICATION: 20; .5 SPRAY TOPICAL at 08:54

## 2019-07-13 RX ADMIN — APIXABAN 5 MG: 5 TABLET, FILM COATED ORAL at 08:45

## 2019-07-13 NOTE — TRANSPORTATION MEDICAL NECESSITY
Section I - General Information    Name of Patient: Sissy Demarco                 : 1945    Medicare #: [id number]  Transport Date: 19 (PCS is valid for round trips on this date and for all repetitive trips in the 60-day range as noted below )  [facility]: [facility]  Is the pt's stay covered under Medicare Part A (PPS/DRG)   [x]     Closest appropriate facility? If no, why is transport to more distant facility required? Yes  If hospice pt, is this transport related to pt's terminal illness? NA       Section II - Medical Necessity Questionnaire  Ambulance transportation is medically necessary only if other means of transport are contraindicated or would be potentially harmful to the patient  To meet this requirement, the patient must either be "bed confined" or suffer from a condition such that transport by means other than ambulance is contraindicated by the patient's condition  The following questions must be answered by the medical professional signing below for this form to be valid:    1)  Describe the MEDICAL CONDITION (physical and/or mental) of this patient AT [address] [place] [address] that requires the patient to be transported in an ambulance and why transport by other means is contraindicated by the patient's condition: pain; not able to tolerate seated position; dependent means for transfer    2) Is the patient "bed confined" as defined below? No  To be "be confined" the patient must satisfy all three of the following conditions: (1) unable to get up from bed without Assistance; AND (2) unable to ambulate; AND (3) unable to sit in a chair or wheelchair  3) Can this patient safely be transported by car or wheelchair van (i e , seated during transport without a medical attendant or monitoring)?    No    4) In addition to completing questions 1-3 above, please check any of the following conditions that apply*:   *Note: supporting documentation for any boxes checked must be maintained in the patient's medical records  If hosp-hosp transfer, describe services needed at 2nd facility not available at 1st facility? Moderate/severe pain on movement   Unable to tolerate seated position for time needed to transport   Other(specify) dependent means for transfer      Section III - Signature of Physician or Healthcare Professional  I certify that the above information is true and correct based on my evaluation of this patient, and represent that the patient requires transport by ambulance and that other forms of transport are contraindicated  I understand that this information will be used by the Centers for Medicare and Medicaid Services (CMS) to support the determination of medical necessity for ambulance services, and I represent that I have personal knowledge of the patient's condition at time of transport  []  If this box is checked, I also certify that the patient is physically or mentally incapable of signing the ambulance service's claim and that the institution with which I am affiliated has furnished care, services, or assistance to the patient  My signature below is made on behalf of the patient pursuant to 42 CFR §424 36(b)(4)   In accordance with 42 CFR §424 37, the specific reason(s) that the patient is physically or mentally incapable of signing the claim form is as follows: NA       Signature of Physician* or Healthcare Professional______________________________________________________________  Signature Date 07/13/19 @ [phone] (For scheduled repetitive transports, this form is not valid for transports performed more than 60 days after this date)    Printed Name & Credentials of Physician or Healthcare Professional (MD, DO, RN, etc )_[place]_______________________  *Form must be signed by patient's attending physician for scheduled, repetitive transports   For non-repetitive, unscheduled ambulance transports, if unable to obtain the signature of the attending physician, any of the following may sign (choose appropriate option below)  [] Physician Assistant []  Clinical Nurse Specialist []  Registered Nurse  []  Nurse Practitioner  [x] Discharge Planner

## 2019-07-13 NOTE — PLAN OF CARE
Problem: Prexisting or High Potential for Compromised Skin Integrity  Goal: Skin integrity is maintained or improved  Description  INTERVENTIONS:  - Identify patients at risk for skin breakdown  - Assess and monitor skin integrity  - Assess and monitor nutrition and hydration status  - Monitor labs (i e  albumin)  - Assess for incontinence   - Turn and reposition patient  - Assist with mobility/ambulation  - Relieve pressure over bony prominences  - Avoid friction and shearing  - Provide appropriate hygiene as needed including keeping skin clean and dry  - Evaluate need for skin moisturizer/barrier cream  - Collaborate with interdisciplinary team (i e  Nutrition, Rehabilitation, etc )   - Patient/family teaching  Outcome: Progressing     Problem: Potential for Falls  Goal: Patient will remain free of falls  Description  INTERVENTIONS:  - Assess patient frequently for physical needs  -  Identify cognitive and physical deficits and behaviors that affect risk of falls  -  [place] fall precautions as indicated by assessment   - Educate patient/family on patient safety including physical limitations  - Instruct patient to call for assistance with activity based on assessment  - Modify environment to reduce risk of injury  - Consider OT/PT consult to assist with strengthening/mobility  Outcome: Progressing     Problem: Nutrition/Hydration-ADULT  Goal: Nutrient/Hydration intake appropriate for improving, restoring or maintaining nutritional needs  Description  Monitor and assess patient's nutrition/hydration status for malnutrition (ex- brittle hair, bruises, dry skin, pale skin and conjunctiva, muscle wasting, smooth red tongue, and disorientation)  Collaborate with interdisciplinary team and initiate plan and interventions as ordered  Monitor patient's weight and dietary intake as ordered or per policy  Utilize nutrition screening tool and intervene per policy   Determine patient's food preferences and provide high-protein, high-caloric foods as appropriate       INTERVENTIONS:  - Monitor oral intake, urinary output, labs, and treatment plans  - Assess nutrition and hydration status and recommend course of action  - Evaluate amount of meals eaten  - Assist patient with eating if necessary   - Allow adequate time for meals  - Recommend/ encourage appropriate diets, oral nutritional supplements, and vitamin/mineral supplements  - Order, calculate, and assess calorie counts as needed  - Recommend, monitor, and adjust tube feedings and TPN/PPN based on assessed needs  - Assess need for intravenous fluids  - Provide specific nutrition/hydration education as appropriate  - Include patient/family/caregiver in decisions related to nutrition  Outcome: Progressing     Problem: GENITOURINARY - ADULT  Goal: Maintains or returns to baseline urinary function  Description  INTERVENTIONS:  - Assess urinary function  - Encourage oral fluids to ensure adequate hydration  - Administer IV fluids as ordered to ensure adequate hydration  - Administer ordered medications as needed  - Offer frequent toileting  - Follow urinary retention protocol if ordered  Outcome: Progressing  Goal: Absence of urinary retention  Description  INTERVENTIONS:  - Assess patients ability to void and empty bladder  - Monitor I/O  - Bladder scan as needed  - Discuss with physician/AP medications to alleviate retention as needed  - Discuss catheterization for long term situations as appropriate  Outcome: Progressing

## 2019-07-13 NOTE — SOCIAL WORK
Pt is stable for DC and has been accepted at [facility] which was pt and family's first choice for rehab  Transportation was arranged with Nicolás from [facility] with [address] at [address]  Pt has pain, dependent for transfers and is not able to tolerate seated position  Nursing (Dorina Garcia), [facility] Rashmi Beltran), facility, daughter and pt aware  IMM reviewed with daughter  LMN completed, faxed to [facility] and placed in chart binder  No further cm interventions needed at this time

## 2019-07-13 NOTE — DISCHARGE SUMMARY
Discharge- Jos Beltran 1945, 72 y o  male MRN: [id number]    Unit/Bed#: -01 Encounter: [id number]    Primary Care Provider: No primary care provider on file  Date and time admitted to hospital: 7/10/2019  5:17 PM      Discharge Summary - [facility]    Patient Information: Jos Beltran 72 y o  male MRN: [id number]  Unit/Bed#: -01 Encounter: [id number]    Discharging Physician / Practitioner: Starr Arana MD  PCP: No primary care provider on file  Admission Date: 7/10/2019  Discharge Date: 07/13/19    Disposition:   Rehab    Discharge Diagnoses:     Principal Problem:    Sepsis ([address] )  Active Problems:    Essential hypertension    Paroxysmal atrial fibrillation (HCC)    Type 2 diabetes mellitus without complication, with long-term current use of insulin (HCC)    Ambulatory dysfunction    HLD (hyperlipidemia)    Bilateral hand pain  Resolved Problems:    * No resolved hospital problems  *    Hospital Course:     Jos Beltran is a 72 y o  male patient who originally presented to the hospital on 7/10/2019 due to sepsis attributed to pneumonia  Patient was started on Abx with Rocephin and Azithromycin  Clinically much improved  Blood cx negative and Abx transitioned to BARROW HAIR on discharge  Patient with significant deconditioning and will require rehab on discharge  Patient is hemodynamically stable for discharge  Pneumonia  Assessment & Plan  · O2 sat 93% on 2LNC  · CXR- LLL infiltrate  · PO Omnicef x7 days     Bilateral hand pain  Assessment & Plan  For some time now  Associated with edema (R>L)  Possibly raynaud's  Does not have joint pain  Rheumatology for further evaluation - OUTPT F/U with Dr Darliss Fleischer      Paroxysmal atrial fibrillation [facility])  Assessment & Plan  · Rate controlled; Anticoagulated on Eliquis     Ambulatory dysfunction  Assessment & Plan  ? Functional decline in terms of mobility  ? Diffuse body aches  · PT/OT- REHAB   SW/CM FOR PLACEMENT  · Fall precautions      Essential hypertension  Assessment & Plan  · Continue amlodipine, Coreg, lisinopril-HCTZ      Type 2 diabetes mellitus without complication, with long-term current use of insulin (HCC)  Assessment & Plan  HbA1c 8 7  · Continue home insulin regimen with SSI coverage      HLD (hyperlipidemia)  Assessment & Plan  · Continue statin      Condition at Discharge: stable     Discharge Day Visit / Exam:     Subjective:  No sob/chest pain/palpitations  Vitals: Blood Pressure: 159/72 (07/13/19 0800)  Pulse: 73 (07/13/19 0800)  Temperature: 98 4 °F (36 9 °C) (07/13/19 0800)  Temp Source: Oral (07/13/19 0800)  Respirations: 18 (07/13/19 0800)  Height: 6' 2" (188 cm) (07/11/19 1127)  Weight - Scale: 104 kg (229 lb 15 oz) (07/10/19 1945)  SpO2: 95 % (07/13/19 0800)  Exam:   Physical Exam  Gen -Patient comfortable   Neck- Supple  No thyromegaly or lymphadenopathy  Lungs-Clear bilaterally without any wheeze or rales   Heart S1-S2, regular rate and rhythm, no murmurs  Abdomen-soft nontender, no organomegaly  Bowel sounds present  Extremities-no cyanosis, clubbing or edema  Skin- no rash  Neuro-nonfocal       Discharge instructions/Information to patient and family:   See after visit summary for information provided to patient and family  Provisions for Follow-Up Care:  See after visit summary for information related to follow-up care and any pertinent home health orders  Discharge Statement:  I spent 35 minutes discharging the patient  This time was spent on the day of discharge  I had direct contact with the patient on the day of discharge  Greater than 50% of the total time was spent examining patient, answering all patient questions, arranging and discussing plan of care with patient as well as directly providing post-discharge instructions  Additional time then spent on discharge activities      Discharge Medications:  See after visit summary for reconciled discharge medications provided to patient and family        ** Please Note: This note has been constructed using a voice recognition system **

## 2019-07-13 NOTE — TELEPHONE ENCOUNTER
Tiger Text Dr Kirkpatrick @ [phone]    Actual Text:  [phone]/ [facility] Kary/ Mari Zhang/ PT  Constance Phillips [phone], PT  Fabien Nobles 12 30 1956/ New Admissions     Informed caller to call service back if no response within 20 minutes

## 2019-07-13 NOTE — PLAN OF CARE
Problem: Prexisting or High Potential for Compromised Skin Integrity  Goal: Skin integrity is maintained or improved  Description  INTERVENTIONS:  - Identify patients at risk for skin breakdown  - Assess and monitor skin integrity  - Assess and monitor nutrition and hydration status  - Monitor labs (i e  albumin)  - Assess for incontinence   - Turn and reposition patient  - Assist with mobility/ambulation  - Relieve pressure over bony prominences  - Avoid friction and shearing  - Provide appropriate hygiene as needed including keeping skin clean and dry  - Evaluate need for skin moisturizer/barrier cream  - Collaborate with interdisciplinary team (i e  Nutrition, Rehabilitation, etc )   - Patient/family teaching  7/13/2019 1645 by Miley Cervantes RN  Outcome: Completed  7/13/2019 0949 by Miley Cervantes RN  Outcome: Progressing     Problem: Potential for Falls  Goal: Patient will remain free of falls  Description  INTERVENTIONS:  - Assess patient frequently for physical needs  -  Identify cognitive and physical deficits and behaviors that affect risk of falls  -  [place] fall precautions as indicated by assessment   - Educate patient/family on patient safety including physical limitations  - Instruct patient to call for assistance with activity based on assessment  - Modify environment to reduce risk of injury  - Consider OT/PT consult to assist with strengthening/mobility  7/13/2019 1645 by Miley Cervantes RN  Outcome: Completed  7/13/2019 0949 by Miley Cervantes RN  Outcome: Progressing     Problem: Nutrition/Hydration-ADULT  Goal: Nutrient/Hydration intake appropriate for improving, restoring or maintaining nutritional needs  Description  Monitor and assess patient's nutrition/hydration status for malnutrition (ex- brittle hair, bruises, dry skin, pale skin and conjunctiva, muscle wasting, smooth red tongue, and disorientation)   Collaborate with interdisciplinary team and initiate plan and interventions as ordered  Monitor patient's weight and dietary intake as ordered or per policy  Utilize nutrition screening tool and intervene per policy  Determine patient's food preferences and provide high-protein, high-caloric foods as appropriate       INTERVENTIONS:  - Monitor oral intake, urinary output, labs, and treatment plans  - Assess nutrition and hydration status and recommend course of action  - Evaluate amount of meals eaten  - Assist patient with eating if necessary   - Allow adequate time for meals  - Recommend/ encourage appropriate diets, oral nutritional supplements, and vitamin/mineral supplements  - Order, calculate, and assess calorie counts as needed  - Recommend, monitor, and adjust tube feedings and TPN/PPN based on assessed needs  - Assess need for intravenous fluids  - Provide specific nutrition/hydration education as appropriate  - Include patient/family/caregiver in decisions related to nutrition  7/13/2019 1645 by Rimma Abbasi RN  Outcome: Completed  7/13/2019 0949 by Rimma Abbasi RN  Outcome: Progressing     Problem: GENITOURINARY - ADULT  Goal: Maintains or returns to baseline urinary function  Description  INTERVENTIONS:  - Assess urinary function  - Encourage oral fluids to ensure adequate hydration  - Administer IV fluids as ordered to ensure adequate hydration  - Administer ordered medications as needed  - Offer frequent toileting  - Follow urinary retention protocol if ordered  7/13/2019 1645 by Rimma Abbasi RN  Outcome: Completed  7/13/2019 0949 by Rimma Abbasi RN  Outcome: Progressing  Goal: Absence of urinary retention  Description  INTERVENTIONS:  - Assess patients ability to void and empty bladder  - Monitor I/O  - Bladder scan as needed  - Discuss with physician/AP medications to alleviate retention as needed  - Discuss catheterization for long term situations as appropriate  7/13/2019 1645 by Rimma Abbasi RN  Outcome: Completed  7/13/2019 0949 by Rimma Abbasi RN  Outcome: Progressing

## 2019-07-14 ENCOUNTER — TELEPHONE (OUTPATIENT)
Dept: OTHER | Facility: OTHER | Age: 74
End: 2019-07-14

## 2019-07-14 NOTE — TELEPHONE ENCOUNTER
Please Call [facility] for orders on pt  Jarrod Abel  [birth year]  [phone] paged [address]

## 2019-07-15 ENCOUNTER — TELEPHONE (OUTPATIENT)
Dept: RHEUMATOLOGY | Facility: CLINIC | Age: 74
End: 2019-07-15

## 2019-07-15 ENCOUNTER — NURSING HOME VISIT (OUTPATIENT)
Dept: GERIATRICS | Facility: OTHER | Age: 74
End: 2019-07-15
Payer: MEDICARE

## 2019-07-15 DIAGNOSIS — J18.9 PNEUMONIA OF LEFT LOWER LOBE DUE TO INFECTIOUS ORGANISM: ICD-10-CM

## 2019-07-15 DIAGNOSIS — R53.1 GENERALIZED WEAKNESS: ICD-10-CM

## 2019-07-15 DIAGNOSIS — R26.2 AMBULATORY DYSFUNCTION: Primary | ICD-10-CM

## 2019-07-15 DIAGNOSIS — E11.9 TYPE 2 DIABETES MELLITUS WITHOUT COMPLICATION, WITH LONG-TERM CURRENT USE OF INSULIN (HCC): ICD-10-CM

## 2019-07-15 DIAGNOSIS — Z79.4 TYPE 2 DIABETES MELLITUS WITHOUT COMPLICATION, WITH LONG-TERM CURRENT USE OF INSULIN (HCC): ICD-10-CM

## 2019-07-15 DIAGNOSIS — I48.0 PAROXYSMAL ATRIAL FIBRILLATION (HCC): ICD-10-CM

## 2019-07-15 LAB
BACTERIA BLD CULT: NORMAL
BACTERIA BLD CULT: NORMAL

## 2019-07-15 PROCEDURE — 99306 1ST NF CARE HIGH MDM 50: CPT | Performed by: FAMILY MEDICINE

## 2019-07-15 NOTE — PROGRESS NOTES
[address]  [address] [place] [address]  History and Physical    NAME: Demetrio Knox  AGE: 72 y o  SEX: male [id number]    DATE OF ENCOUNTER: 7/15/2019    Code status:  CPR    Assessment and Plan     1  Gen weakness/Amb dysfunction: PT/OT     - wheelchair bound at baseline  2  Pneumonia: improving     - cont Cefdinir x 7  more days  3  Arthritis: cont pain meds    - f/u with Rheum    - Rh factor negative  4  Dm2 with neuropathy :   -  cont basaglar    - cont Gabapentin    - cont Insulin Lispro with meals    - cont accuchecks with coverage  5  HTN/A fib: stable    - cont Amlodipine    - cont lisinopril-HCTZ    - cont Carvedilol    - cont Eliquis  6  U  Retention: s/p Bravo    - cont Tamsulosin    - need f/u with urology  7  Depression: stable    - cont Sertraline and Trazodone  8  Memory impairment:     - may need cognitive evaluation    All medications and routine orders were reviewed and updated as needed  Plan discussed with: Patient and daughter    Chief Complaint     Seen for admission at [place] [address], a 72 y o  male presented to the hospital on 7/10/2019 due to sepsis sec to pneumonia  He was started on Rocephin and Azithromycin  Blood cx negative and abx transitioned to BARROW HAIR on discharge  He has significant deconditioning and got discharged to [facility] for subacute rehab  He has been wheelchair bound over an year secondary to multiple back surgeries  He has ongoing neuropathy due to DM2 and agent orange exposure  He also has chronic arthritis, has appointment scheduled with Rheumatology for 8/13/19   Sukhdev Frausto was seen and examined at bedside, daughter is sitting next to him  He lives with his wife, has stair lift, need assistance with ADLs  He gets home visits from [id number] [place] [address] practitioner    He was seen and examined at bedside, stable, daughter has concerns over his arthritis, bravo catheter and functional status  HISTORY:  Past Medical History:   Diagnosis Date    Atrial fibrillation ([facility] [address] )     Bilateral hand pain 7/10/2019    Diabetes ([facility] [address] )     H/O agent Orange exposure     HLD (hyperlipidemia) 7/10/2019    Hypertension     Neuropathy     PTSD (post-traumatic stress disorder)      History reviewed  No pertinent family history  Social History     Socioeconomic History    Marital status: /Civil Union     Spouse name: None    Number of children: None    Years of education: None    Highest education level: None   Occupational History    None   Social Needs    Financial resource strain: None    Food insecurity:     Worry: None     Inability: None    Transportation needs:     Medical: None     Non-medical: None   Tobacco Use    Smoking status: Former Smoker     Last attempt to quit: 1987     Years since quittin 5    Smokeless tobacco: Never Used   Substance and Sexual Activity    Alcohol use: No    Drug use: No    Sexual activity: None   Lifestyle    Physical activity:     Days per week: None     Minutes per session: None    Stress: None   Relationships    Social connections:     Talks on phone: None     Gets together: None     Attends [employer] service: None     Active member of club or organization: None     Attends meetings of clubs or organizations: None     Relationship status: None    Intimate partner violence:     Fear of current or ex partner: None     Emotionally abused: None     Physically abused: None     Forced sexual activity: None   Other Topics Concern    None   Social History Narrative    None       Allergies: Allergies   Allergen Reactions    Atorvastatin Other (See Comments)     Muscle cramps       Review of Systems     Review of Systems   Constitutional: Positive for fatigue  Genitourinary: Positive for difficulty urinating  Musculoskeletal: Positive for arthralgias, back pain, gait problem and joint swelling     Neurological: Positive for weakness  Psychiatric/Behavioral: Positive for confusion  All other systems reviewed and are negative  Medications and orders     All medications reviewed and updated in Nursing Home EMR  Objective     Vitals: T: 98 4, P: 65, R: 18, BP: 134/68, 95% on RA, Wt: 235 lbs    Physical Exam   Constitutional: He appears well-developed and well-nourished  No distress  HENT:   Head: Normocephalic and atraumatic  Eyes: Pupils are equal, round, and reactive to light  EOM are normal    Neck: Normal range of motion  Neck supple  Cardiovascular: Normal rate and normal heart sounds  No murmur heard  irregular   Pulmonary/Chest: Effort normal and breath sounds normal  No respiratory distress  He has no wheezes  Abdominal: Soft  Bowel sounds are normal  He exhibits no distension  There is no tenderness  Genitourinary:   Genitourinary Comments: Antoine catheter present   Musculoskeletal: He exhibits edema, tenderness and deformity  Fingers on both hands and left knee swollen, warm to touch, decreased ROM, can't make a fist    Neurological: He is alert  No cranial nerve deficit  Oriented to person and time  Has memory difficulties (not able to tell facility name and month)  His father  of Alzheimer's dementia  B/L LE weakness and ambulatory dysfunction   Skin: Skin is warm and dry  Scabs on b/l feet   Psychiatric: His behavior is normal    Confused at times   Nursing note and vitals reviewed  Pertinent Laboratory/Diagnostic Studies: The following labs/studies were reviewed please see chart or hospital paperwork for details  Ref Range & Units 19 0617    WBC 4 31 - 10 16 Thousand/uL 10  74High     RBC 3 88 - 5 62 Million/uL 3 69Low     Hemoglobin 12 0 - 17 0 g/dL 9 0Low     Hematocrit 36 5 - 49 3 % 30 3Low     MCV 82 - 98 fL 82    MCH 26 8 - 34 3 pg 24  4Low     MCHC 31 4 - 37 4 g/dL 29 7Low     RDW 11 6 - 15 1 % 16 9High     Platelets 546 - 279 Thousands/uL 359    MPV 8 9 - 12 7 fL 9 7      FOBT Neg x 3    Ref Range & Units 7/12/19 0457    Sodium 136 - 145 mmol/L 138    Potassium 3 5 - 5 3 mmol/L 4 4    Chloride 100 - 108 mmol/L 104    CO2 21 - 32 mmol/L 25    ANION GAP 4 - 13 mmol/L 9    BUN 5 - 25 mg/dL 32High     Creatinine 0 60 - 1 30 mg/dL 1 30    Comment: Standardized to IDMS reference method   Glucose 65 - 140 mg/dL 117       Calcium 8 3 - 10 1 mg/dL 8 5    eGFR ml/min/1 73sq m 55      - Medication Side Effects: Adverse side effects of medications were reviewed with the patient/guardian today  DOS: 7/15/2019  Facility: [facility] SNF List: [place]  BILLING CODE: [id number] [facility] [address] place of service: POS [facility]illed Care-[facility] A Coverage  Diagnoses:   Diagnosis ICD-10-CM Associated Orders   1  Ambulatory dysfunction R26 2    2  Generalized weakness R53 1    3  Pneumonia of left lower lobe due to infectious organism ([facility] [address] ) J18 1    4  Type 2 diabetes mellitus without complication, with long-term current use of insulin (HCC) E11 9     Z79 4    5   Paroxysmal atrial fibrillation (formerly [facility]) I48 0

## 2019-07-15 NOTE — TELEPHONE ENCOUNTER
Called and left appt information on machine for patient   Asked that he call back if he needs to reschedule

## 2019-07-15 NOTE — TELEPHONE ENCOUNTER
From: Suzanne Wayne   Sent: 7/12/2019   3:29 PM EDT   To: Alejandra Borden MA     Call patient when discharged to confirm appointment with [facility] on 8/13 at 2pm

## 2019-07-19 ENCOUNTER — TELEPHONE (OUTPATIENT)
Dept: OTHER | Facility: OTHER | Age: 74
End: 2019-07-19

## 2019-07-24 ENCOUNTER — TELEPHONE (OUTPATIENT)
Dept: OTHER | Facility: OTHER | Age: 74
End: 2019-07-24

## 2019-08-23 ENCOUNTER — HOSPITAL ENCOUNTER (EMERGENCY)
Facility: HOSPITAL | Age: 74
Discharge: HOME | End: 2019-08-23
Attending: EMERGENCY MEDICINE
Payer: MEDICARE

## 2019-08-23 VITALS
BODY MASS INDEX: 28.25 KG/M2 | OXYGEN SATURATION: 97 % | SYSTOLIC BLOOD PRESSURE: 123 MMHG | RESPIRATION RATE: 18 BRPM | DIASTOLIC BLOOD PRESSURE: 61 MMHG | HEART RATE: 85 BPM | WEIGHT: 220 LBS | TEMPERATURE: 98.7 F

## 2019-08-23 DIAGNOSIS — R31.9 HEMATURIA: Primary | ICD-10-CM

## 2019-08-23 LAB
ALBUMIN SERPL BCP-MCNC: 3.1 G/DL (ref 3.5–5)
ALP SERPL-CCNC: 107 U/L (ref 46–116)
ALT SERPL W P-5'-P-CCNC: 16 U/L (ref 12–78)
ANION GAP SERPL CALCULATED.3IONS-SCNC: 11 MMOL/L (ref 4–13)
AST SERPL W P-5'-P-CCNC: 23 U/L (ref 5–45)
BACTERIA UR QL AUTO: ABNORMAL /HPF
BASOPHILS # BLD AUTO: 0.03 THOUSANDS/ΜL (ref 0–0.1)
BASOPHILS NFR BLD AUTO: 0 % (ref 0–1)
BILIRUB SERPL-MCNC: 0.4 MG/DL (ref 0.2–1)
BILIRUB UR QL STRIP: ABNORMAL
BUN SERPL-MCNC: 33 MG/DL (ref 5–25)
CALCIUM SERPL-MCNC: 9.2 MG/DL (ref 8.3–10.1)
CHLORIDE SERPL-SCNC: 103 MMOL/L (ref 100–108)
CK MB SERPL-MCNC: 2.5 % (ref 0–2.5)
CK MB SERPL-MCNC: 5.5 NG/ML (ref 0–5)
CK SERPL-CCNC: 224 U/L (ref 39–308)
CLARITY UR: ABNORMAL
CO2 SERPL-SCNC: 24 MMOL/L (ref 21–32)
COLOR UR: ABNORMAL
CREAT SERPL-MCNC: 1.19 MG/DL (ref 0.6–1.3)
EOSINOPHIL # BLD AUTO: 0.27 THOUSAND/ΜL (ref 0–0.61)
EOSINOPHIL NFR BLD AUTO: 3 % (ref 0–6)
ERYTHROCYTE [DISTWIDTH] IN BLOOD BY AUTOMATED COUNT: 19.1 % (ref 11.6–15.1)
GFR SERPL CREATININE-BSD FRML MDRD: 61 ML/MIN/1.73SQ M
GLUCOSE SERPL-MCNC: 86 MG/DL (ref 65–140)
GLUCOSE UR STRIP-MCNC: ABNORMAL MG/DL
HCT VFR BLD AUTO: 36.2 % (ref 36.5–49.3)
HGB BLD-MCNC: 10.7 G/DL (ref 12–17)
HGB UR QL STRIP.AUTO: ABNORMAL
IMM GRANULOCYTES # BLD AUTO: 0.03 THOUSAND/UL (ref 0–0.2)
IMM GRANULOCYTES NFR BLD AUTO: 0 % (ref 0–2)
KETONES UR STRIP-MCNC: ABNORMAL MG/DL
LEUKOCYTE ESTERASE UR QL STRIP: ABNORMAL
LYMPHOCYTES # BLD AUTO: 1.66 THOUSANDS/ΜL (ref 0.6–4.47)
LYMPHOCYTES NFR BLD AUTO: 17 % (ref 14–44)
MCH RBC QN AUTO: 24.2 PG (ref 26.8–34.3)
MCHC RBC AUTO-ENTMCNC: 29.6 G/DL (ref 31.4–37.4)
MCV RBC AUTO: 82 FL (ref 82–98)
MONOCYTES # BLD AUTO: 1 THOUSAND/ΜL (ref 0.17–1.22)
MONOCYTES NFR BLD AUTO: 10 % (ref 4–12)
NEUTROPHILS # BLD AUTO: 6.74 THOUSANDS/ΜL (ref 1.85–7.62)
NEUTS SEG NFR BLD AUTO: 70 % (ref 43–75)
NITRITE UR QL STRIP: NEGATIVE
NON-SQ EPI CELLS URNS QL MICRO: ABNORMAL /HPF
NRBC BLD AUTO-RTO: 0 /100 WBCS
PH UR STRIP.AUTO: 6.5 [PH] (ref 4.5–8)
PLATELET # BLD AUTO: 339 THOUSANDS/UL (ref 149–390)
PMV BLD AUTO: 10.3 FL (ref 8.9–12.7)
POTASSIUM SERPL-SCNC: 4.3 MMOL/L (ref 3.5–5.3)
PROT SERPL-MCNC: 7.8 G/DL (ref 6.4–8.2)
PROT UR STRIP-MCNC: >=300 MG/DL
RBC # BLD AUTO: 4.42 MILLION/UL (ref 3.88–5.62)
RBC #/AREA URNS AUTO: ABNORMAL /HPF
SODIUM SERPL-SCNC: 138 MMOL/L (ref 136–145)
SP GR UR STRIP.AUTO: 1.02 (ref 1–1.03)
UROBILINOGEN UR QL STRIP.AUTO: 1 E.U./DL
WBC # BLD AUTO: 9.73 THOUSAND/UL (ref 4.31–10.16)
WBC #/AREA URNS AUTO: ABNORMAL /HPF

## 2019-08-23 PROCEDURE — 96360 HYDRATION IV INFUSION INIT: CPT

## 2019-08-23 PROCEDURE — 93005 ELECTROCARDIOGRAM TRACING: CPT

## 2019-08-23 PROCEDURE — 96361 HYDRATE IV INFUSION ADD-ON: CPT

## 2019-08-23 PROCEDURE — 81001 URINALYSIS AUTO W/SCOPE: CPT

## 2019-08-23 PROCEDURE — 99284 EMERGENCY DEPT VISIT MOD MDM: CPT

## 2019-08-23 PROCEDURE — 36415 COLL VENOUS BLD VENIPUNCTURE: CPT | Performed by: EMERGENCY MEDICINE

## 2019-08-23 PROCEDURE — 82553 CREATINE MB FRACTION: CPT | Performed by: EMERGENCY MEDICINE

## 2019-08-23 PROCEDURE — 99283 EMERGENCY DEPT VISIT LOW MDM: CPT | Performed by: EMERGENCY MEDICINE

## 2019-08-23 PROCEDURE — 85025 COMPLETE CBC W/AUTO DIFF WBC: CPT | Performed by: EMERGENCY MEDICINE

## 2019-08-23 PROCEDURE — 82550 ASSAY OF CK (CPK): CPT | Performed by: EMERGENCY MEDICINE

## 2019-08-23 PROCEDURE — 80053 COMPREHEN METABOLIC PANEL: CPT | Performed by: EMERGENCY MEDICINE

## 2019-08-23 RX ADMIN — SODIUM CHLORIDE 1000 ML: 0.9 INJECTION, SOLUTION INTRAVENOUS at 16:54

## 2019-08-23 NOTE — ED PROVIDER NOTES
History  Chief Complaint   Patient presents with    Blood in Urine       History provided by:  Patient   used: No      46-year-old male presents via EMS for evaluation of suspected hematuria  He has a chronic indwelling Antoine catheter  When EMS arrived they were caring him down the steps in a chair when he has had a syncopal episode  At this time, patient denies any physical complaints  Denies any recent falls or trauma  He describes rough several months including recent history of sepsis, pneumonia  He is tearful due to his prolonged recent medical history  He denies any headache, chest pain, abdominal pain  He denies any pain in his bladder or back  Prior to Admission Medications   Prescriptions Last Dose Informant Patient Reported? Taking?    CYANOCOBALAMIN PO   Yes No   Sig: Take 1,000 mcg by mouth     Insulin Aspart (NOVOLOG FLEXPEN SC)   Yes No   Sig: Inject 5 Units under the skin   MAGNESIUM CARBONATE PO   Yes No   Sig: Take 400 mg by mouth daily     ZOLPIDEM TARTRATE ER PO   Yes No   Sig: Take 10 mg by mouth daily at bedtime as needed for sleep     acetaminophen (TYLENOL) 325 mg tablet   Yes No   Sig: Take 650 mg by mouth 2 (two) times a day   amLODIPine (NORVASC) 10 mg tablet   No No   Sig: Take 1 tablet (10 mg total) by mouth daily   apixaban (ELIQUIS) 5 mg   Yes No   Sig: Take 5 mg by mouth 2 (two) times a day   benzocaine-menthol-lanolin-aloe (DERMOPLAST) 20-0 5 % topical spray   No No   Sig: Apply 1 application topically 4 (four) times a day as needed for mild pain or irritation   carvedilol (COREG) 25 mg tablet   Yes No   Sig: Take 25 mg by mouth 2 (two) times a day with meals   cholecalciferol (VITAMIN D3) 1,000 units tablet   Yes No   Sig: Take 5,000 Units by mouth daily   co-enzyme Q-10 50 MG capsule   Yes No   Sig: Take 100 mg by mouth daily   diclofenac sodium (VOLTAREN) 1 %   No No   Sig: Apply 2 g topically 4 (four) times a day   gabapentin (NEURONTIN) 300 mg capsule   No No   Sig: Take 1 capsule (300 mg total) by mouth 2 (two) times a day   gabapentin (NEURONTIN) 300 mg capsule   No No   Sig: Take 2 capsules (600 mg total) by mouth daily at bedtime   insulin detemir (LEVEMIR) 100 units/mL subcutaneous injection   Yes No   Sig: Inject 38 Units under the skin daily at bedtime    lisinopril-hydrochlorothiazide (PRINZIDE,ZESTORETIC) 20-12 5 MG per tablet   Yes No   Sig: Take 2 tablets by mouth daily   omeprazole (PriLOSEC) 20 mg delayed release capsule   Yes No   Sig: Take 20 mg by mouth daily   rosuvastatin (CRESTOR) 5 mg tablet   Yes No   Sig: Take 5 mg by mouth daily   sertraline (ZOLOFT) 100 mg tablet   Yes No   Sig: Take 100 mg by mouth daily     tamsulosin (FLOMAX) 0 4 mg   No No   Sig: Take 1 capsule (0 4 mg total) by mouth daily with dinner   traZODone (DESYREL) 50 mg tablet   Yes No   Sig: Take 50 mg by mouth daily at bedtime      Facility-Administered Medications: None       Past Medical History:   Diagnosis Date    Atrial fibrillation (HCC)     Bilateral hand pain 7/10/2019    Diabetes ([address] )     H/O agent Orange exposure     HLD (hyperlipidemia) 7/10/2019    Hypertension     Neuropathy     PTSD (post-traumatic stress disorder)        Past Surgical History:   Procedure Laterality Date    ABLATION OF DYSRHYTHMIC FOCUS      for a-fib    CHOLECYSTECTOMY      TOTAL SHOULDER REPLACEMENT Right        History reviewed  No pertinent family history  I have reviewed and agree with the history as documented  Social History     Tobacco Use    Smoking status: Former Smoker     Last attempt to quit: 1987     Years since quittin 6    Smokeless tobacco: Never Used   Substance Use Topics    Alcohol use: No    Drug use: No        Review of Systems   Constitutional: Negative for activity change, appetite change, chills, fatigue and fever  HENT: Negative for congestion, dental problem, facial swelling, sore throat, tinnitus and trouble swallowing  Eyes: Negative for pain, discharge and itching  Respiratory: Negative for apnea, chest tightness and wheezing  Cardiovascular: Negative for chest pain, palpitations and leg swelling  Gastrointestinal: Negative for abdominal pain and nausea  Genitourinary: Negative for difficulty urinating, dysuria and flank pain  Dark urine   Musculoskeletal: Negative for arthralgias, back pain, gait problem, joint swelling and neck pain  Skin: Negative for color change, rash and wound  Neurological: Positive for syncope  Negative for dizziness and facial asymmetry  Psychiatric/Behavioral: Negative for agitation and behavioral problems  The patient is not nervous/anxious  All other systems reviewed and are negative  Physical Exam  Physical Exam   Constitutional: He is oriented to person, place, and time  He appears well-developed and well-nourished  No distress  HENT:   Head: Normocephalic and atraumatic  Right Ear: External ear normal    Left Ear: External ear normal    Eyes: Pupils are equal, round, and reactive to light  EOM are normal  Right eye exhibits no discharge  Left eye exhibits no discharge  Neck: Normal range of motion  Neck supple  No tracheal deviation present  No thyromegaly present  Cardiovascular: Normal rate and regular rhythm  No murmur heard  Pulmonary/Chest: Effort normal and breath sounds normal    Abdominal: Soft  Bowel sounds are normal  He exhibits no distension  There is no tenderness  Soft, nontender abdominal exam   Antoine catheter in place, dark urine   Musculoskeletal: Normal range of motion  He exhibits no edema or deformity  Neurological: He is alert and oriented to person, place, and time  No cranial nerve deficit  He exhibits normal muscle tone  Skin: Skin is warm  Capillary refill takes less than 2 seconds  He is not diaphoretic  Psychiatric:   Depressed, tearful affect   Nursing note and vitals reviewed        Vital Signs  ED Triage Vitals [08/23/19 1609]   Temperature Pulse Respirations Blood Pressure SpO2   98 7 °F (37 1 °C) 85 18 123/61 97 %      Temp Source Heart Rate Source Patient Position - Orthostatic VS BP Location FiO2 (%)   Oral Monitor Lying Right arm --      Pain Score       No Pain           Vitals:    08/23/19 1609   BP: 123/61   Pulse: 85   Patient Position - Orthostatic VS: Lying         Visual Acuity      ED Medications  Medications   sodium chloride 0 9 % bolus 1,000 mL (0 mL Intravenous Stopped 8/23/19 2027)       Diagnostic Studies  Results Reviewed     Procedure Component Value Units Date/Time    CKMB [[id number]]  (Abnormal) Collected:  08/23/19 1651    Lab Status:  Final result Specimen:  Blood from Arm, Right Updated:  08/23/19 1750     CK-MB Index 2 5 %      CK-MB 5 5 ng/mL     CK (with reflex to MB) [[id number]]  (Normal) Collected:  08/23/19 1651    Lab Status:  Final result Specimen:  Blood from Arm, Right Updated:  08/23/19 1731     Total  U/L     Comprehensive metabolic panel [[id number]]  (Abnormal) Collected:  08/23/19 1651    Lab Status:  Final result Specimen:  Blood from Arm, Right Updated:  08/23/19 1713     Sodium 138 mmol/L      Potassium 4 3 mmol/L      Chloride 103 mmol/L      CO2 24 mmol/L      ANION GAP 11 mmol/L      BUN 33 mg/dL      Creatinine 1 19 mg/dL      Glucose 86 mg/dL      Calcium 9 2 mg/dL      AST 23 U/L      ALT 16 U/L      Alkaline Phosphatase 107 U/L      Total Protein 7 8 g/dL      Albumin 3 1 g/dL      Total Bilirubin 0 40 mg/dL      eGFR 61 ml/min/1 73sq m     Narrative:       [place] guidelines for Chronic Kidney Disease (CKD):     Stage 1 with normal or high GFR (GFR > 90 mL/min/1 73 square meters)    Stage 2 Mild CKD (GFR = 60-89 mL/min/1 73 square meters)    Stage 3A Moderate CKD (GFR = 45-59 mL/min/1 73 square meters)    Stage 3B Moderate CKD (GFR = 30-44 mL/min/1 73 square meters)    Stage 4 Severe CKD (GFR = 15-29 mL/min/1 73 square meters)   Stage 5 End Stage CKD (GFR <15 mL/min/1 73 square meters)  Note: GFR calculation is accurate only with a steady state creatinine    CBC and differential [[id number]]  (Abnormal) Collected:  08/23/19 1651    Lab Status:  Final result Specimen:  Blood from Arm, Right Updated:  08/23/19 1659     WBC 9 73 Thousand/uL      RBC 4 42 Million/uL      Hemoglobin 10 7 g/dL      Hematocrit 36 2 %      MCV 82 fL      MCH 24 2 pg      MCHC 29 6 g/dL      RDW 19 1 %      MPV 10 3 fL      Platelets 907 Thousands/uL      nRBC 0 /100 WBCs      Neutrophils Relative 70 %      Immat GRANS % 0 %      Lymphocytes Relative 17 %      Monocytes Relative 10 %      Eosinophils Relative 3 %      Basophils Relative 0 %      Neutrophils Absolute 6 74 Thousands/µL      Immature Grans Absolute 0 03 Thousand/uL      Lymphocytes Absolute 1 66 Thousands/µL      Monocytes Absolute 1 00 Thousand/µL      Eosinophils Absolute 0 27 Thousand/µL      Basophils Absolute 0 03 Thousands/µL     Urine Microscopic [[id number]]  (Abnormal) Collected:  08/23/19 1647    Lab Status:  Final result Specimen:  Urine, Indwelling Anotine Catheter Updated:  08/23/19 1659     RBC, UA Innumerable /hpf      WBC, UA 4-10 /hpf      Epithelial Cells None Seen /hpf      Bacteria, UA Occasional /hpf     ED Urine Macroscopic [[id number]]  (Abnormal) Collected:  08/23/19 1647    Lab Status:  Final result Specimen:  Urine Updated:  08/23/19 1635     Color, UA Bloody     Clarity, UA Cloudy     pH, UA 6 5     Leukocytes, UA Trace     Nitrite, UA Negative     Protein, UA >=300 mg/dl      Glucose,  (1/10%) mg/dl      Ketones, UA Trace mg/dl      Urobilinogen, UA 1 0 E U /dl      Bilirubin, UA Interference- unable to analyze     Blood, UA Large     Specific [place], UA 1 020    Narrative:       CLINITEK RESULT                 No orders to display              Procedures  ECG 12 Lead Documentation Only  Date/Time: 8/23/2019 5:34 PM  Performed by: Yissel Rangel MD  Authorized by: Austen Ac MD     Indications / Diagnosis:  Near syncope  ECG reviewed by me, the ED Provider: yes    Patient location:  ED  Previous ECG:     Previous ECG:  Compared to current  Interpretation:     Interpretation: non-specific    Rate:     ECG rate:  99  Rhythm:     Rhythm: sinus rhythm    Ectopy:     Ectopy: none    QRS:     QRS axis:  Left    QRS intervals: Wide  Conduction:     Conduction: normal    ST segments:     ST segments:  Normal  T waves:     T waves: normal             ED Course                               MDM  Number of Diagnoses or Management Options  Hematuria: new and requires workup  Diagnosis management comments: Patient with chronic indwelling Antoine catheter presents after his physical therapist thought it looked dark  Per family, patient's urine like this since yesterday  Denies any clots in the Antoine bag  He is bed bound at this point due to suspected neurologic disorder  He follows with the [place]  He takes Eliquis for history of AFib  Vital signs reveal no significant abnormalities  Hemoglobin up from prior  EKG similar to previous  CMP, CK unremarkable  Patient given IV fluids with improvement of his symptoms  I had an extensive discussion with patient and his family  Believe his hematuria is related to chronic blood thinning medications  His hemoglobin is improved  A recommended good hydration over the next several days as he is likely dehydrated as well  Recommended follow-up with a urologist   He states he will follow up with the [place] as this is where he receives most of his care  He is at his baseline per family  Syncopal episode likely secondary to orthostasis as patient is nearly always bed-bound and only syncopized when he was sat up by EMS    He feels well at this time and is stable for discharge home       Amount and/or Complexity of Data Reviewed  Clinical lab tests: ordered and reviewed  Tests in the radiology section of CPT®: ordered and reviewed  Tests in the medicine section of CPT®: ordered and reviewed    Risk of Complications, Morbidity, and/or Mortality  Presenting problems: high  Diagnostic procedures: moderate  Management options: moderate    Patient Progress  Patient progress: improved      Disposition  Final diagnoses:   Hematuria     Time reflects when diagnosis was documented in both MDM as applicable and the Disposition within this note     Time User Action Codes Description Comment    8/23/2019  6:27 PM Ani Reed Add [R31 9] Hematuria       ED Disposition     ED Disposition Condition Date/Time Comment    Discharge Stable Fri Aug 23, 2019  6:27 PM Demetrio Knox discharge to home/self care              Follow-up Information     Follow up With Specialties Details Why Contact Info Additional [address] [facility] Schedule an appointment as soon as possible for a visit  As needed [place] [id number]  [address] [place] [address] [facility], [address] [place] [address],  [facility]  Schedule an appointment as soon as possible for a visit  To discuss chronic bravo catheter and hematuria            Discharge Medication List as of 8/23/2019  6:28 PM      CONTINUE these medications which have NOT CHANGED    Details   acetaminophen (TYLENOL) 325 mg tablet Take 650 mg by mouth 2 (two) times a day, Historical Med      amLODIPine (NORVASC) 10 mg tablet Take 1 tablet (10 mg total) by mouth daily, Starting Fri 1/18/2019, Normal      apixaban (ELIQUIS) 5 mg Take 5 mg by mouth 2 (two) times a day, Until Discontinued, Historical Med      benzocaine-menthol-lanolin-aloe (DERMOPLAST) 20-0 5 % topical spray Apply 1 application topically 4 (four) times a day as needed for mild pain or irritation, Starting Sat 7/13/2019, No Print      carvedilol (COREG) 25 mg tablet Take 25 mg by mouth 2 (two) times a day with meals, Until Discontinued, Historical Med cholecalciferol (VITAMIN D3) 1,000 units tablet Take 5,000 Units by mouth daily, Historical Med      co-enzyme Q-10 50 MG capsule Take 100 mg by mouth daily, Until Discontinued, Historical Med      CYANOCOBALAMIN PO Take 1,000 mcg by mouth  , Historical Med      diclofenac sodium (VOLTAREN) 1 % Apply 2 g topically 4 (four) times a day, Starting Sat 7/13/2019, No Print      !! gabapentin (NEURONTIN) 300 mg capsule Take 1 capsule (300 mg total) by mouth 2 (two) times a day, Starting Sat 7/13/2019, No Print      !! gabapentin (NEURONTIN) 300 mg capsule Take 2 capsules (600 mg total) by mouth daily at bedtime, Starting Sat 7/13/2019, No Print      Insulin Aspart (NOVOLOG FLEXPEN SC) Inject 5 Units under the skin, Historical Med      insulin detemir (LEVEMIR) 100 units/mL subcutaneous injection Inject 38 Units under the skin daily at bedtime , Historical Med      lisinopril-hydrochlorothiazide (PRINZIDE,ZESTORETIC) 20-12 5 MG per tablet Take 2 tablets by mouth daily, Historical Med      MAGNESIUM CARBONATE PO Take 400 mg by mouth daily  , Historical Med      omeprazole (PriLOSEC) 20 mg delayed release capsule Take 20 mg by mouth daily, Until Discontinued, Historical Med      rosuvastatin (CRESTOR) 5 mg tablet Take 5 mg by mouth daily, Historical Med      sertraline (ZOLOFT) 100 mg tablet Take 100 mg by mouth daily  , Historical Med      tamsulosin (FLOMAX) 0 4 mg Take 1 capsule (0 4 mg total) by mouth daily with dinner, Starting Sat 7/13/2019, No Print      traZODone (DESYREL) 50 mg tablet Take 50 mg by mouth daily at bedtime, Historical Med      ZOLPIDEM TARTRATE ER PO Take 10 mg by mouth daily at bedtime as needed for sleep  , Historical Med       !! - Potential duplicate medications found  Please discuss with provider  No discharge procedures on file      ED Provider  Electronically Signed by           Gabrielle Almodovar MD  08/24/19 0503

## 2019-08-28 LAB
ATRIAL RATE: 63 BPM
P AXIS: 55 DEGREES
PR INTERVAL: 210 MS
QRS AXIS: -83 DEGREES
QRSD INTERVAL: 162 MS
QT INTERVAL: 460 MS
QTC INTERVAL: 470 MS
T WAVE AXIS: 60 DEGREES
VENTRICULAR RATE: 63 BPM

## 2019-08-28 PROCEDURE — 93010 ELECTROCARDIOGRAM REPORT: CPT | Performed by: INTERNAL MEDICINE

## 2019-09-09 ENCOUNTER — TELEPHONE (OUTPATIENT)
Dept: UROLOGY | Facility: AMBULATORY SURGERY CENTER | Age: 74
End: 2019-09-09

## 2019-09-09 NOTE — TELEPHONE ENCOUNTER
[facility] for [facility] for urinary retention with bravo in place / evaluation if bravo could be removed  Patient would like to be seen at [facility]  He said the problem is he can't ambulate to well and needs to see if he can get transportation from transport and not pay out of pocket  He would like to know how to go about getting the resources for maybe having someone come to the house to remove the bravo  Please advise patient  Records in hand/ sent to central fax   [phone]

## 2019-09-09 NOTE — TELEPHONE ENCOUNTER
Patient called to moni appointment at [facility]  Transportation has been apporived by the [place]  He needs a date to confirm the transportation  He can be reached at [phone]    Thank you

## 2019-09-09 NOTE — TELEPHONE ENCOUNTER
Call placed to patient, scheduled for 9/26 at [address] [place] [address] at [facility] office  Office information provided to patient

## 2019-09-12 ENCOUNTER — TELEPHONE (OUTPATIENT)
Dept: RHEUMATOLOGY | Facility: CLINIC | Age: 74
End: 2019-09-12

## 2019-09-12 NOTE — TELEPHONE ENCOUNTER
Please call patient to schedule appointment, [facility] authorization received,  is requesting appointment on 9/26/19 if possible due to transportation

## 2019-10-10 ENCOUNTER — TELEPHONE (OUTPATIENT)
Dept: UROLOGY | Facility: MEDICAL CENTER | Age: 74
End: 2019-10-10

## 2019-10-10 NOTE — TELEPHONE ENCOUNTER
[facility]    Leeanne Shelton from [facility] calling in regards to scheduling a hospital follow up for urinary Retention  Patient discharged with catheter       Leeanne Shelton can be reached at [phone]

## 2019-10-11 NOTE — TELEPHONE ENCOUNTER
Called and spoke with Leeanne Shelton from [facility]  Patient had been seen in the ER on 8/23/19 for hematuria  Patient currently has bravo catheter in place  Patient had a visit with us scheduled for 9/26/19 but he had cancelled it  New appointment set up for 10/14/19 at 1pm with Christal Shelton from [facility] confirmed appointment, address and phone number for our office provided

## 2021-04-15 ENCOUNTER — TELEMEDICINE (OUTPATIENT)
Dept: GASTROENTEROLOGY | Facility: CLINIC | Age: 76
End: 2021-04-15
Payer: MEDICARE

## 2021-04-15 DIAGNOSIS — R19.5 POSITIVE COLORECTAL CANCER SCREENING USING COLOGUARD TEST: Primary | ICD-10-CM

## 2021-04-15 DIAGNOSIS — K21.9 GASTROESOPHAGEAL REFLUX DISEASE WITHOUT ESOPHAGITIS: ICD-10-CM

## 2021-04-15 DIAGNOSIS — Z79.01 ANTICOAGULANT LONG-TERM USE: ICD-10-CM

## 2021-04-15 PROCEDURE — 99204 OFFICE O/P NEW MOD 45 MIN: CPT | Performed by: INTERNAL MEDICINE

## 2021-04-15 NOTE — ASSESSMENT & PLAN NOTE
Could be false-positive but should rule out colorectal lesions including polyps or malignancy       -Schedule for colonoscopy  Patient is bed ridden and he cannot take the bowel prep at home  He would like to be admitted to the hospital for the bowel prep and for colonoscopy  He is trying to talk to someone at the [facility] about the coverage     -Patient was explained about  the risks and benefits of the procedure  Risks including but not limited to bleeding, infection, perforation were explained in detail  Also explained about less than 100% sensitivity with the exam and other alternatives

## 2021-04-15 NOTE — PROGRESS NOTES
Virtual Brief Visit    Assessment/Plan:    Problem List Items Addressed This Visit        Digestive    Gastroesophageal reflux disease without esophagitis       Gastroesophageal reflux disease - Patient has the symptoms of chronic acid reflux for a long time  Possible hiatal hernia or LES weakness  Should rule out Bender's esophagus because of chronic symptoms         -  Continue Prilosec for now  Discussed about the long-term side effects     -  Schedule for upper endoscopy    - Patient was explained about the lifestyle and dietary modifications  Advised to avoid fatty foods, chocolates, caffeine, alcohol and any other triggering foods  Avoid eating for at least 3 hours before going to bed  Other    Positive colorectal cancer screening using Cologuard test - Primary      Could be false-positive but should rule out colorectal lesions including polyps or malignancy       -Schedule for colonoscopy  Patient is bed ridden and he cannot take the bowel prep at home  He would like to be admitted to the hospital for the bowel prep and for colonoscopy  He is trying to talk to someone at the [facility] about the coverage     -Patient was explained about  the risks and benefits of the procedure  Risks including but not limited to bleeding, infection, perforation were explained in detail  Also explained about less than 100% sensitivity with the exam and other alternatives  Anticoagulant long-term use      Patient is at increased risks because of anticoagulation therapy on Eliquis  Patient reports that he normally stops the blood thinners for couple of days prior to the procedures                       Reason for visit is   Chief Complaint   Patient presents with    Virtual Brief Visit        Encounter provider Herman Almodovar MD    Provider located at [address] [place] [id number]    Recent Visits  No visits were found meeting these conditions  Showing recent visits within past 7 days and meeting all other requirements     Today's Visits  Date Type Provider Dept   04/15/21 Telemedicine MD Poncho Elaine   Showing today's visits and meeting all other requirements     Future Appointments  No visits were found meeting these conditions  Showing future appointments within next 150 days and meeting all other requirements        After connecting through telephone, the patient was identified by name and date of birth  Kiah Ramsey was informed that this is a telemedicine visit and that the visit is being conducted through telephone  My office door was closed  No one else was in the room  He acknowledged consent and understanding of privacy and security of the platform  The patient has agreed to participate and understands he can discontinue the visit at any time  Patient is aware this is a billable service  Subjective    Kiah Ramsey is a 70 y o  male  With history of hypertension, depression, atrial fibrillation on anticoagulation therapy with Eliquis was referred for colonoscopy  Patient reports that cologuard testing came back positive  He is bedridden and gets help only for couple of hours a day  Reports having problems with bowel movements and he has to use the bedpan  Denies any abdominal pain, nausea or vomiting  Has problems with acid reflux for which he takes Prilosec regularly  Denies any difficulty swallowing  He had colonoscopy many years ago        Past Medical History:   Diagnosis Date    Atrial fibrillation ([address] )     Bilateral hand pain 7/10/2019    Diabetes ([facility] [address] [facility]     H/O agent Orange exposure     HLD (hyperlipidemia) 7/10/2019    Hypertension     Neuropathy     PTSD (post-traumatic stress disorder)        Past Surgical History:   Procedure Laterality Date    ABLATION OF DYSRHYTHMIC FOCUS      for a-fib    CHOLECYSTECTOMY      TOTAL SHOULDER REPLACEMENT Right        Current Outpatient Medications   Medication Sig Dispense Refill    acetaminophen (TYLENOL) 325 mg tablet Take 650 mg by mouth 2 (two) times a day      amLODIPine (NORVASC) 10 mg tablet Take 1 tablet (10 mg total) by mouth daily 30 tablet 0    apixaban (ELIQUIS) 5 mg Take 5 mg by mouth 2 (two) times a day      benzocaine-menthol-lanolin-aloe (DERMOPLAST) 20-0 5 % topical spray Apply 1 application topically 4 (four) times a day as needed for mild pain or irritation 1 each 0    carvedilol (COREG) 25 mg tablet Take 25 mg by mouth 2 (two) times a day with meals      cholecalciferol (VITAMIN D3) 1,000 units tablet Take 5,000 Units by mouth daily      co-enzyme Q-10 50 MG capsule Take 100 mg by mouth daily      CYANOCOBALAMIN PO Take 1,000 mcg by mouth        diclofenac sodium (VOLTAREN) 1 % Apply 2 g topically 4 (four) times a day 1 Tube 1    gabapentin (NEURONTIN) 300 mg capsule Take 1 capsule (300 mg total) by mouth 2 (two) times a day 60 capsule 0    gabapentin (NEURONTIN) 300 mg capsule Take 2 capsules (600 mg total) by mouth daily at bedtime 60 capsule 0    Insulin Aspart (NOVOLOG FLEXPEN SC) Inject 5 Units under the skin      insulin detemir (LEVEMIR) 100 units/mL subcutaneous injection Inject 38 Units under the skin daily at bedtime       lisinopril-hydrochlorothiazide (PRINZIDE,ZESTORETIC) 20-12 5 MG per tablet Take 2 tablets by mouth daily      MAGNESIUM CARBONATE PO Take 400 mg by mouth daily        omeprazole (PriLOSEC) 20 mg delayed release capsule Take 20 mg by mouth daily      rosuvastatin (CRESTOR) 5 mg tablet Take 5 mg by mouth daily      sertraline (ZOLOFT) 100 mg tablet Take 100 mg by mouth daily        tamsulosin (FLOMAX) 0 4 mg Take 1 capsule (0 4 mg total) by mouth daily with dinner 30 capsule 0    traZODone (DESYREL) 50 mg tablet Take 50 mg by mouth daily at bedtime      ZOLPIDEM TARTRATE ER PO Take 10 mg by mouth daily at bedtime as needed for sleep         No current facility-administered medications for this visit  Allergies   Allergen Reactions    Atorvastatin Other (See Comments)     Muscle cramps    Pravastatin Myalgia       Review of Systems    10 systems were reviewed  He is bed ridden    I spent 20 minutes directly with the patient during this visit    VIRTUAL VISIT DISCLAIMER    Sandhya Tinajero acknowledges that he has consented to an online visit or consultation  He understands that the online visit is based solely on information provided by him, and that, in the absence of a face-to-face physical evaluation by the physician, the diagnosis he receives is both limited and provisional in terms of accuracy and completeness  This is not intended to replace a full medical face-to-face evaluation by the physician  Sandhya Tinajero understands and accepts these terms

## 2021-04-15 NOTE — ASSESSMENT & PLAN NOTE
Patient is at increased risks because of anticoagulation therapy on Eliquis  Patient reports that he normally stops the blood thinners for couple of days prior to the procedures

## 2021-04-15 NOTE — ASSESSMENT & PLAN NOTE
Gastroesophageal reflux disease - Patient has the symptoms of chronic acid reflux for a long time  Possible hiatal hernia or LES weakness  Should rule out Bender's esophagus because of chronic symptoms         -  Continue Prilosec for now  Discussed about the long-term side effects     -  Schedule for upper endoscopy    - Patient was explained about the lifestyle and dietary modifications  Advised to avoid fatty foods, chocolates, caffeine, alcohol and any other triggering foods  Avoid eating for at least 3 hours before going to bed

## 2021-04-28 ENCOUNTER — TELEPHONE (OUTPATIENT)
Dept: GASTROENTEROLOGY | Facility: AMBULARY SURGERY CENTER | Age: 76
End: 2021-04-28

## 2021-07-09 ENCOUNTER — TELEPHONE (OUTPATIENT)
Dept: GASTROENTEROLOGY | Facility: AMBULARY SURGERY CENTER | Age: 76
End: 2021-07-09

## 2021-07-09 DIAGNOSIS — Z12.11 ENCOUNTER FOR SCREENING COLONOSCOPY: Primary | ICD-10-CM

## 2021-07-09 NOTE — TELEPHONE ENCOUNTER
Attempted home number as well-could not leave message  Will mail letter to pt  (4) completely dependent

## 2021-07-09 NOTE — TELEPHONE ENCOUNTER
LM for pt to return call re: scheduling GI procedures w/ dr Alyssa Humphries (pt will need admission prior due to assistance w/ bowel prep)

## 2021-07-09 NOTE — TELEPHONE ENCOUNTER
Patient is scheduled for colonoscopy and EGD on August 19 , 2021 at [facility] with Indira Ramirez MD  Patient is aware of pre-procedure prep of [facility] and they will be called the day prior between 2 and 6 pm for time to report for procedure   PT IS ON ELIQUIS/AWARE of 2 day hold per [facility]

## 2021-07-12 PROCEDURE — 1124F ACP DISCUSS-NO DSCNMKR DOCD: CPT | Performed by: PHYSICIAN ASSISTANT

## 2021-07-12 NOTE — TELEPHONE ENCOUNTER
Direct admit order placed  Spoke with Patient Access bed planning   No further action necessary at this time

## 2021-08-18 ENCOUNTER — TELEPHONE (OUTPATIENT)
Dept: GASTROENTEROLOGY | Facility: HOSPITAL | Age: 76
End: 2021-08-18

## 2021-08-18 ENCOUNTER — HOSPITAL ENCOUNTER (INPATIENT)
Facility: HOSPITAL | Age: 76
LOS: 2 days | Discharge: HOME HEALTH | DRG: 392 | End: 2021-08-20
Attending: INTERNAL MEDICINE | Admitting: INTERNAL MEDICINE
Payer: MEDICARE

## 2021-08-18 DIAGNOSIS — R19.5 POSITIVE COLORECTAL CANCER SCREENING USING COLOGUARD TEST: Primary | ICD-10-CM

## 2021-08-18 LAB — GLUCOSE SERPL-MCNC: 170 MG/DL (ref 65–140)

## 2021-08-18 PROCEDURE — 99223 1ST HOSP IP/OBS HIGH 75: CPT | Performed by: INTERNAL MEDICINE

## 2021-08-18 PROCEDURE — 82948 REAGENT STRIP/BLOOD GLUCOSE: CPT

## 2021-08-18 PROCEDURE — 83036 HEMOGLOBIN GLYCOSYLATED A1C: CPT | Performed by: PHYSICIAN ASSISTANT

## 2021-08-18 RX ORDER — PREGABALIN 75 MG/1
75 CAPSULE ORAL 2 TIMES DAILY
COMMUNITY
Start: 2021-07-23

## 2021-08-18 RX ORDER — ONDANSETRON 2 MG/ML
4 INJECTION INTRAMUSCULAR; INTRAVENOUS EVERY 6 HOURS PRN
Status: DISCONTINUED | OUTPATIENT
Start: 2021-08-18 | End: 2021-08-20 | Stop reason: HOSPADM

## 2021-08-18 RX ORDER — POLYETHYLENE GLYCOL 3350, SODIUM CHLORIDE, SODIUM BICARBONATE, POTASSIUM CHLORIDE 420; 11.2; 5.72; 1.48 G/4L; G/4L; G/4L; G/4L
4000 POWDER, FOR SOLUTION ORAL ONCE
Status: DISCONTINUED | OUTPATIENT
Start: 2021-08-18 | End: 2021-08-18 | Stop reason: SDUPTHER

## 2021-08-18 RX ORDER — CARVEDILOL 12.5 MG/1
25 TABLET ORAL 2 TIMES DAILY WITH MEALS
Status: DISCONTINUED | OUTPATIENT
Start: 2021-08-20 | End: 2021-08-20 | Stop reason: HOSPADM

## 2021-08-18 RX ORDER — TAMSULOSIN HYDROCHLORIDE 0.4 MG/1
0.4 CAPSULE ORAL
Status: DISCONTINUED | OUTPATIENT
Start: 2021-08-18 | End: 2021-08-20 | Stop reason: HOSPADM

## 2021-08-18 RX ORDER — TRAZODONE HYDROCHLORIDE 50 MG/1
50 TABLET ORAL
Status: DISCONTINUED | OUTPATIENT
Start: 2021-08-18 | End: 2021-08-20 | Stop reason: HOSPADM

## 2021-08-18 RX ORDER — PREGABALIN 75 MG/1
75 CAPSULE ORAL 2 TIMES DAILY
Status: DISCONTINUED | OUTPATIENT
Start: 2021-08-18 | End: 2021-08-20 | Stop reason: HOSPADM

## 2021-08-18 RX ORDER — ZOLPIDEM TARTRATE 5 MG/1
10 TABLET ORAL
Status: DISCONTINUED | OUTPATIENT
Start: 2021-08-18 | End: 2021-08-20 | Stop reason: HOSPADM

## 2021-08-18 RX ORDER — HYDROCHLOROTHIAZIDE 25 MG/1
25 TABLET ORAL DAILY
Status: DISCONTINUED | OUTPATIENT
Start: 2021-08-20 | End: 2021-08-20 | Stop reason: HOSPADM

## 2021-08-18 RX ORDER — GABAPENTIN 300 MG/1
300 CAPSULE ORAL 2 TIMES DAILY
Status: DISCONTINUED | OUTPATIENT
Start: 2021-08-18 | End: 2021-08-20 | Stop reason: HOSPADM

## 2021-08-18 RX ORDER — PANTOPRAZOLE SODIUM 40 MG/1
40 TABLET, DELAYED RELEASE ORAL
Status: DISCONTINUED | OUTPATIENT
Start: 2021-08-19 | End: 2021-08-20 | Stop reason: HOSPADM

## 2021-08-18 RX ORDER — GABAPENTIN 300 MG/1
600 CAPSULE ORAL
Status: DISCONTINUED | OUTPATIENT
Start: 2021-08-18 | End: 2021-08-20 | Stop reason: HOSPADM

## 2021-08-18 RX ORDER — LISINOPRIL 20 MG/1
40 TABLET ORAL DAILY
Status: DISCONTINUED | OUTPATIENT
Start: 2021-08-20 | End: 2021-08-20 | Stop reason: HOSPADM

## 2021-08-18 RX ORDER — AMLODIPINE BESYLATE 10 MG/1
10 TABLET ORAL DAILY
Status: DISCONTINUED | OUTPATIENT
Start: 2021-08-19 | End: 2021-08-20 | Stop reason: HOSPADM

## 2021-08-18 RX ORDER — ACETAMINOPHEN 325 MG/1
650 TABLET ORAL EVERY 6 HOURS PRN
Status: DISCONTINUED | OUTPATIENT
Start: 2021-08-18 | End: 2021-08-20 | Stop reason: HOSPADM

## 2021-08-18 RX ORDER — ACETAMINOPHEN 325 MG/1
650 TABLET ORAL EVERY 6 HOURS PRN
Status: DISCONTINUED | OUTPATIENT
Start: 2021-08-18 | End: 2021-08-18

## 2021-08-18 RX ORDER — MICONAZOLE NITRATE 20 MG/G
CREAM TOPICAL 2 TIMES DAILY
Status: DISCONTINUED | OUTPATIENT
Start: 2021-08-18 | End: 2021-08-20 | Stop reason: HOSPADM

## 2021-08-18 RX ORDER — SERTRALINE HYDROCHLORIDE 100 MG/1
100 TABLET, FILM COATED ORAL DAILY
Status: DISCONTINUED | OUTPATIENT
Start: 2021-08-19 | End: 2021-08-20 | Stop reason: HOSPADM

## 2021-08-18 RX ADMIN — PREGABALIN 75 MG: 75 CAPSULE ORAL at 22:51

## 2021-08-18 RX ADMIN — TAMSULOSIN HYDROCHLORIDE 0.4 MG: 0.4 CAPSULE ORAL at 21:00

## 2021-08-18 RX ADMIN — MICONAZOLE NITRATE 1 APPLICATION: 20 CREAM TOPICAL at 19:30

## 2021-08-18 RX ADMIN — TRAZODONE HYDROCHLORIDE 50 MG: 50 TABLET ORAL at 21:01

## 2021-08-18 RX ADMIN — INSULIN LISPRO 1 UNITS: 100 INJECTION, SOLUTION INTRAVENOUS; SUBCUTANEOUS at 21:00

## 2021-08-18 RX ADMIN — BISACODYL 10 MG: 5 TABLET, COATED ORAL at 16:43

## 2021-08-18 RX ADMIN — GABAPENTIN 600 MG: 300 CAPSULE ORAL at 21:00

## 2021-08-18 RX ADMIN — POLYETHYLENE GLYCOL 3350, SODIUM SULFATE ANHYDROUS, SODIUM BICARBONATE, SODIUM CHLORIDE, POTASSIUM CHLORIDE 4000 ML: 236; 22.74; 6.74; 5.86; 2.97 POWDER, FOR SOLUTION ORAL at 17:14

## 2021-08-18 RX ADMIN — INSULIN DETEMIR 19 UNITS: 100 INJECTION, SOLUTION SUBCUTANEOUS at 22:50

## 2021-08-18 NOTE — H&P
[facility]  H&P- Tiffanie Louie 1945, 70 y o  male MRN: [id number]  Unit/Bed#: S -01 Encounter: [id number]  Primary Care Provider: Sussy Gomez MD   Date and time admitted to hospital: 8/18/2021  3:27 PM    * Positive colorectal cancer screening using Cologuard test  Assessment & Plan  · Patient had 1/3 positive ColoGuards  Referred for admission by GI as patient non-ambulatory and cannot get to the restroom  · Admit patient to med/surg under inpatient status   · GI consult for prep   · Hold Eliquis   · NPO after midnight    Paroxysmal atrial fibrillation ([facility] [address] )  Assessment & Plan  · Rate controlled at this time   · Hold Coreg until after procedure  · Hold Eliquis    Type 2 diabetes mellitus without complication, with long-term current use of insulin ([facility] [address] )  Assessment & Plan  Lab Results   Component Value Date    HGBA1C 7 0 (H) 01/27/2021       No results for input(s): POCGLU in the last 72 hours  Blood Sugar Average: Last 72 hrs:  · Prior A1c controlled   · Check updated A1c   · Continue Levemir at 1/2 home dose due to NPO after midnight (19 U vs 38 U)  · Hold standing dose short acting insulin   · SSI algorithm every 6 hours   · Q6 accuchecks    Essential hypertension  Assessment & Plan  · BP acceptable on admission   · Hold Coreg, Prinzide       VTE Pharmacologic Prophylaxis: VTE Score: 5 High Risk (Score >/= 5) - Pharmacological DVT Prophylaxis Contraindicated  Sequential Compression Devices Ordered  Code Status: Level 1 - Full Code   Discussion with family: None at bedside   Anticipated Length of Stay: Patient will be admitted on an inpatient basis with an anticipated length of stay of greater than 2 midnights secondary to as per above assessment and plan   Total Time for Visit, including Counseling / Coordination of Care: 70 minutes Greater than 50% of this total time spent on direct patient counseling and coordination of care      Chief Complaint: Positive ColoGuard    History of Present Illness:  Jerson Do is a 70 y o  male with a PMH of T2DM, PAF, and HTN who presents with positive ColoGuard  He reports that he was sent in by Dr Courtney Funes because he is unable to ambulate to get to the bathroom  He reports that he had 1/3 positive cologuards so Dr Courtney Funes wants to rule out abnormality  He reports anemia, but reports that his hemoglobin is always low  He reports that he has been having sensation of stomach distention, but not pain  Denies fevers or chills  Denies nausea or vomiting  Denies diarrhea  He reports that he has not taken his Eliquis since the 15th  Review of Systems:  Review of Systems   Constitutional: Negative for appetite change, chills, diaphoresis, fatigue and fever  HENT: Negative for congestion, rhinorrhea and sore throat  Eyes: Negative for visual disturbance  Respiratory: Negative for cough, chest tightness, shortness of breath and wheezing  Cardiovascular: Negative for chest pain, palpitations and leg swelling  Gastrointestinal: Positive for abdominal distention  Negative for abdominal pain, constipation, diarrhea, nausea and vomiting  Genitourinary: Negative for dysuria  Musculoskeletal: Negative for arthralgias and myalgias  Neurological: Negative for dizziness, syncope, weakness, light-headedness, numbness and headaches  All other systems reviewed and are negative        Past Medical and Surgical History:   Past Medical History:   Diagnosis Date    Atrial fibrillation ([facility] [address] )     Bilateral hand pain 7/10/2019    Diabetes ([facility] [address] )     H/O agent Orange exposure     HLD (hyperlipidemia) 7/10/2019    Hypertension     Neuropathy     PTSD (post-traumatic stress disorder)        Past Surgical History:   Procedure Laterality Date    ABLATION OF DYSRHYTHMIC FOCUS      for a-fib    CHOLECYSTECTOMY      TOTAL SHOULDER REPLACEMENT Right        Meds/Allergies:  Prior to Admission medications    Medication Sig Start Date End Date Taking?  Authorizing Provider   insulin glargine (LANTUS SOLOSTAR) 100 units/mL injection pen Inject 67 Units under the skin every morning 18 units every night 6/11/21  Yes Historical Provider, MD   pregabalin (LYRICA) 75 mg capsule Take 75 mg by mouth 2 (two) times a day 7/23/21  Yes Historical Provider, MD   acetaminophen (TYLENOL) 325 mg tablet Take 650 mg by mouth 2 (two) times a day    Historical Provider, MD   amLODIPine (NORVASC) 10 mg tablet Take 1 tablet (10 mg total) by mouth daily 1/18/19   Blade Marquez MD   apixaban (ELIQUIS) 5 mg Take 5 mg by mouth 2 (two) times a day    Historical Provider, MD   benzocaine-menthol-lanolin-aloe (DERMOPLAST) 20-0 5 % topical spray Apply 1 application topically 4 (four) times a day as needed for mild pain or irritation 7/13/19   Baron Matteo MD   carvedilol (COREG) 25 mg tablet Take 25 mg by mouth 2 (two) times a day with meals    Historical Provider, MD   cholecalciferol (VITAMIN D3) 1,000 units tablet Take 5,000 Units by mouth daily    Historical Provider, MD   co-enzyme Q-10 50 MG capsule Take 100 mg by mouth daily    Historical Provider, MD   CYANOCOBALAMIN PO Take 1,000 mcg by mouth      Historical Provider, MD   diclofenac sodium (VOLTAREN) 1 % Apply 2 g topically 4 (four) times a day 7/13/19   Baron Matteo MD   gabapentin (NEURONTIN) 300 mg capsule Take 1 capsule (300 mg total) by mouth 2 (two) times a day 7/13/19   Baron Matteo MD   gabapentin (NEURONTIN) 300 mg capsule Take 2 capsules (600 mg total) by mouth daily at bedtime 7/13/19   Baron Matteo MD   Insulin Aspart (NOVOLOG FLEXPEN SC) Inject 5 Units under the skin    Historical Provider, MD   insulin detemir (LEVEMIR) 100 units/mL subcutaneous injection Inject 38 Units under the skin daily at bedtime     Historical Provider, MD   lisinopril-hydrochlorothiazide (PRINZIDE,ZESTORETIC) 20-12 5 MG per tablet Take 2 tablets by mouth daily    Historical Provider, MD   MAGNESIUM CARBONATE PO Take 400 mg by mouth daily      Historical Provider, MD   omeprazole (PriLOSEC) 20 mg delayed release capsule Take 20 mg by mouth daily    Historical Provider, MD   rosuvastatin (CRESTOR) 5 mg tablet Take 5 mg by mouth daily    Historical Provider, MD   sertraline (ZOLOFT) 100 mg tablet Take 100 mg by mouth daily      Historical Provider, MD   tamsulosin (FLOMAX) 0 4 mg Take 1 capsule (0 4 mg total) by mouth daily with dinner 19   Wally Mccabe MD   traZODone (DESYREL) 50 mg tablet Take 50 mg by mouth daily at bedtime    Historical Provider, MD   ZOLPIDEM TARTRATE ER PO Take 10 mg by mouth daily at bedtime as needed for sleep      Historical Provider, MD     I have reviewed home medications using recent Epic encounter  Allergies: Allergies   Allergen Reactions    Atorvastatin Other (See Comments)     Muscle cramps    Pravastatin Myalgia       Social History:  Marital Status: /Civil Union   Occupation: Noncontributory   Patient Pre-hospital Living Situation: Home  Patient Pre-hospital Level of Mobility: non-ambulatory/bed bound  Patient Pre-hospital Diet Restrictions: None  Substance Use History:   Social History     Substance and Sexual Activity   Alcohol Use No     Social History     Tobacco Use   Smoking Status Former Smoker    Quit date: 1987    Years since quittin 6   Smokeless Tobacco Never Used     Social History     Substance and Sexual Activity   Drug Use No       Family History:  No family history on file  Physical Exam:     Vitals:   Blood Pressure: 97/53 (21 1625)  Pulse: 92 (21 1625)  Temperature: 98 3 °F (36 8 °C) (21 1625)  Temp Source: Oral (21 1625)  Respirations: 18 (21 1625)  SpO2: 98 % (21 1625)    Physical Exam  Constitutional:       General: He is not in acute distress  Appearance: Normal appearance  He is normal weight  He is not ill-appearing or diaphoretic  HENT:      Head: Normocephalic and atraumatic        Mouth/Throat: Mouth: Mucous membranes are moist    Eyes:      General: No scleral icterus  Pupils: Pupils are equal, round, and reactive to light  Cardiovascular:      Rate and Rhythm: Normal rate and regular rhythm  Pulses: Normal pulses  Heart sounds: Normal heart sounds, S1 normal and S2 normal  No murmur heard  No systolic murmur is present  No diastolic murmur is present  No gallop  No S3 or S4 sounds  Pulmonary:      Effort: Pulmonary effort is normal  No accessory muscle usage or respiratory distress  Breath sounds: Normal breath sounds  No stridor  No decreased breath sounds, wheezing, rhonchi or rales  Chest:      Chest wall: No tenderness  Abdominal:      General: Bowel sounds are normal  There is distension  Palpations: Abdomen is soft  Tenderness: There is no abdominal tenderness  There is no guarding  Musculoskeletal:      Right lower leg: No edema  Left lower leg: No edema  Skin:     General: Skin is warm and dry  Coloration: Skin is not jaundiced  Neurological:      General: No focal deficit present  Mental Status: He is alert  Mental status is at baseline  Motor: No tremor or seizure activity  Psychiatric:         Behavior: Behavior is cooperative  Additional Data:     Lab Results:                            Imaging: No pertinent imaging reviewed  No orders to display       EKG and Other Studies Reviewed on Admission:   · EKG: No EKG obtained  ** Please Note: This note has been constructed using a voice recognition system   **

## 2021-08-18 NOTE — ASSESSMENT & PLAN NOTE
· Patient had 1/3 positive ColoGuards  Referred for admission by GI as patient non-ambulatory and cannot get to the restroom     · Admit patient to med/surg under inpatient status   · GI consult for prep   · Hold Eliquis   · NPO after midnight

## 2021-08-18 NOTE — ASSESSMENT & PLAN NOTE
Lab Results   Component Value Date    HGBA1C 7 0 (H) 01/27/2021       No results for input(s): POCGLU in the last 72 hours      Blood Sugar Average: Last 72 hrs:  · Prior A1c controlled   · Check updated A1c   · Continue Levemir at 1/2 home dose due to NPO after midnight (19 U vs 38 U)  · Hold standing dose short acting insulin   · SSI algorithm every 6 hours   · Q6 accuchecks

## 2021-08-19 ENCOUNTER — APPOINTMENT (INPATIENT)
Dept: CT IMAGING | Facility: HOSPITAL | Age: 76
DRG: 392 | End: 2021-08-19
Payer: MEDICARE

## 2021-08-19 ENCOUNTER — ANESTHESIA EVENT (INPATIENT)
Dept: GASTROENTEROLOGY | Facility: HOSPITAL | Age: 76
DRG: 392 | End: 2021-08-19
Payer: MEDICARE

## 2021-08-19 ENCOUNTER — HOSPITAL ENCOUNTER (OUTPATIENT)
Dept: GASTROENTEROLOGY | Facility: HOSPITAL | Age: 76
Setting detail: OUTPATIENT SURGERY
Discharge: HOME | End: 2021-08-19
Attending: INTERNAL MEDICINE
Payer: COMMERCIAL

## 2021-08-19 ENCOUNTER — ANESTHESIA (INPATIENT)
Dept: GASTROENTEROLOGY | Facility: HOSPITAL | Age: 76
DRG: 392 | End: 2021-08-19
Payer: MEDICARE

## 2021-08-19 VITALS
OXYGEN SATURATION: 95 % | DIASTOLIC BLOOD PRESSURE: 54 MMHG | SYSTOLIC BLOOD PRESSURE: 108 MMHG | RESPIRATION RATE: 18 BRPM | HEART RATE: 69 BPM | TEMPERATURE: 96.3 F

## 2021-08-19 DIAGNOSIS — K21.9 GASTROESOPHAGEAL REFLUX DISEASE WITHOUT ESOPHAGITIS: ICD-10-CM

## 2021-08-19 DIAGNOSIS — Z79.01 ANTICOAGULANT LONG-TERM USE: ICD-10-CM

## 2021-08-19 DIAGNOSIS — R19.5 POSITIVE COLORECTAL CANCER SCREENING USING COLOGUARD TEST: ICD-10-CM

## 2021-08-19 PROBLEM — E87.6 HYPOKALEMIA: Status: ACTIVE | Noted: 2021-08-19

## 2021-08-19 LAB
ALBUMIN SERPL BCP-MCNC: 2.9 G/DL (ref 3.5–5)
ALP SERPL-CCNC: 90 U/L (ref 46–116)
ALT SERPL W P-5'-P-CCNC: 25 U/L (ref 12–78)
ANION GAP SERPL CALCULATED.3IONS-SCNC: 9 MMOL/L (ref 4–13)
AST SERPL W P-5'-P-CCNC: 20 U/L (ref 5–45)
BILIRUB SERPL-MCNC: 0.24 MG/DL (ref 0.2–1)
BUN SERPL-MCNC: 55 MG/DL (ref 5–25)
CALCIUM ALBUM COR SERPL-MCNC: 9.4 MG/DL (ref 8.3–10.1)
CALCIUM SERPL-MCNC: 8.5 MG/DL (ref 8.3–10.1)
CHLORIDE SERPL-SCNC: 108 MMOL/L (ref 100–108)
CO2 SERPL-SCNC: 29 MMOL/L (ref 21–32)
CREAT SERPL-MCNC: 1.41 MG/DL (ref 0.6–1.3)
ERYTHROCYTE [DISTWIDTH] IN BLOOD BY AUTOMATED COUNT: 15 % (ref 11.6–15.1)
GFR SERPL CREATININE-BSD FRML MDRD: 49 ML/MIN/1.73SQ M
GLUCOSE SERPL-MCNC: 107 MG/DL (ref 65–140)
GLUCOSE SERPL-MCNC: 134 MG/DL (ref 65–140)
GLUCOSE SERPL-MCNC: 172 MG/DL (ref 65–140)
GLUCOSE SERPL-MCNC: 192 MG/DL (ref 65–140)
GLUCOSE SERPL-MCNC: 52 MG/DL (ref 65–140)
GLUCOSE SERPL-MCNC: 56 MG/DL (ref 65–140)
GLUCOSE SERPL-MCNC: 59 MG/DL (ref 65–140)
GLUCOSE SERPL-MCNC: 65 MG/DL (ref 65–140)
GLUCOSE SERPL-MCNC: 66 MG/DL (ref 65–140)
GLUCOSE SERPL-MCNC: 82 MG/DL (ref 65–140)
HCT VFR BLD AUTO: 36.6 % (ref 36.5–49.3)
HGB BLD-MCNC: 11.5 G/DL (ref 12–17)
MCH RBC QN AUTO: 29.5 PG (ref 26.8–34.3)
MCHC RBC AUTO-ENTMCNC: 31.4 G/DL (ref 31.4–37.4)
MCV RBC AUTO: 94 FL (ref 82–98)
PLATELET # BLD AUTO: 226 THOUSANDS/UL (ref 149–390)
PMV BLD AUTO: 10.7 FL (ref 8.9–12.7)
POTASSIUM SERPL-SCNC: 3 MMOL/L (ref 3.5–5.3)
PROT SERPL-MCNC: 6.3 G/DL (ref 6.4–8.2)
RBC # BLD AUTO: 3.9 MILLION/UL (ref 3.88–5.62)
SODIUM SERPL-SCNC: 146 MMOL/L (ref 136–145)
WBC # BLD AUTO: 9.83 THOUSAND/UL (ref 4.31–10.16)

## 2021-08-19 PROCEDURE — 99222 1ST HOSP IP/OBS MODERATE 55: CPT | Performed by: PHYSICIAN ASSISTANT

## 2021-08-19 PROCEDURE — 43239 EGD BIOPSY SINGLE/MULTIPLE: CPT | Performed by: INTERNAL MEDICINE

## 2021-08-19 PROCEDURE — 80053 COMPREHEN METABOLIC PANEL: CPT | Performed by: PHYSICIAN ASSISTANT

## 2021-08-19 PROCEDURE — 74176 CT ABD & PELVIS W/O CONTRAST: CPT

## 2021-08-19 PROCEDURE — 99232 SBSQ HOSP IP/OBS MODERATE 35: CPT | Performed by: PHYSICIAN ASSISTANT

## 2021-08-19 PROCEDURE — G1004 CDSM NDSC: HCPCS

## 2021-08-19 PROCEDURE — 88313 SPECIAL STAINS GROUP 2: CPT | Performed by: PATHOLOGY

## 2021-08-19 PROCEDURE — 45385 COLONOSCOPY W/LESION REMOVAL: CPT | Performed by: INTERNAL MEDICINE

## 2021-08-19 PROCEDURE — 88305 TISSUE EXAM BY PATHOLOGIST: CPT | Performed by: PATHOLOGY

## 2021-08-19 PROCEDURE — 82948 REAGENT STRIP/BLOOD GLUCOSE: CPT

## 2021-08-19 PROCEDURE — 85027 COMPLETE CBC AUTOMATED: CPT | Performed by: PHYSICIAN ASSISTANT

## 2021-08-19 RX ORDER — SODIUM CHLORIDE, SODIUM LACTATE, POTASSIUM CHLORIDE, CALCIUM CHLORIDE 600; 310; 30; 20 MG/100ML; MG/100ML; MG/100ML; MG/100ML
INJECTION, SOLUTION INTRAVENOUS CONTINUOUS PRN
Status: DISCONTINUED | OUTPATIENT
Start: 2021-08-19 | End: 2021-08-19

## 2021-08-19 RX ORDER — DEXTROSE MONOHYDRATE 25 G/50ML
INJECTION, SOLUTION INTRAVENOUS
Status: COMPLETED
Start: 2021-08-19 | End: 2021-08-19

## 2021-08-19 RX ORDER — PROPOFOL 10 MG/ML
INJECTION, EMULSION INTRAVENOUS AS NEEDED
Status: DISCONTINUED | OUTPATIENT
Start: 2021-08-19 | End: 2021-08-19

## 2021-08-19 RX ORDER — DEXTROSE, SODIUM CHLORIDE, AND POTASSIUM CHLORIDE 5; .45; .3 G/100ML; G/100ML; G/100ML
100 INJECTION INTRAVENOUS CONTINUOUS
Status: DISPENSED | OUTPATIENT
Start: 2021-08-19 | End: 2021-08-19

## 2021-08-19 RX ORDER — POTASSIUM CHLORIDE 20 MEQ/1
40 TABLET, EXTENDED RELEASE ORAL ONCE
Status: COMPLETED | OUTPATIENT
Start: 2021-08-19 | End: 2021-08-19

## 2021-08-19 RX ORDER — INSULIN GLARGINE 100 [IU]/ML
67 INJECTION, SOLUTION SUBCUTANEOUS EVERY MORNING
Status: DISCONTINUED | OUTPATIENT
Start: 2021-08-20 | End: 2021-08-20 | Stop reason: HOSPADM

## 2021-08-19 RX ORDER — INSULIN GLARGINE 100 [IU]/ML
18 INJECTION, SOLUTION SUBCUTANEOUS
Status: DISCONTINUED | OUTPATIENT
Start: 2021-08-19 | End: 2021-08-20 | Stop reason: HOSPADM

## 2021-08-19 RX ORDER — LIDOCAINE HYDROCHLORIDE 10 MG/ML
INJECTION, SOLUTION EPIDURAL; INFILTRATION; INTRACAUDAL; PERINEURAL AS NEEDED
Status: DISCONTINUED | OUTPATIENT
Start: 2021-08-19 | End: 2021-08-19

## 2021-08-19 RX ADMIN — MICONAZOLE NITRATE: 20 CREAM TOPICAL at 18:07

## 2021-08-19 RX ADMIN — DEXTROSE, SODIUM CHLORIDE, AND POTASSIUM CHLORIDE 100 ML/HR: 5; .45; .3 INJECTION INTRAVENOUS at 11:03

## 2021-08-19 RX ADMIN — TAMSULOSIN HYDROCHLORIDE 0.4 MG: 0.4 CAPSULE ORAL at 18:07

## 2021-08-19 RX ADMIN — PROPOFOL 20 MG: 10 INJECTION, EMULSION INTRAVENOUS at 09:34

## 2021-08-19 RX ADMIN — DEXTROSE MONOHYDRATE 50 ML: 25 INJECTION, SOLUTION INTRAVENOUS at 06:36

## 2021-08-19 RX ADMIN — PROPOFOL 40 MG: 10 INJECTION, EMULSION INTRAVENOUS at 09:49

## 2021-08-19 RX ADMIN — PROPOFOL 20 MG: 10 INJECTION, EMULSION INTRAVENOUS at 09:29

## 2021-08-19 RX ADMIN — POTASSIUM CHLORIDE 40 MEQ: 1500 TABLET, EXTENDED RELEASE ORAL at 11:02

## 2021-08-19 RX ADMIN — PROPOFOL 20 MG: 10 INJECTION, EMULSION INTRAVENOUS at 09:32

## 2021-08-19 RX ADMIN — TRAZODONE HYDROCHLORIDE 50 MG: 50 TABLET ORAL at 21:48

## 2021-08-19 RX ADMIN — GABAPENTIN 300 MG: 300 CAPSULE ORAL at 18:06

## 2021-08-19 RX ADMIN — PROPOFOL 20 MG: 10 INJECTION, EMULSION INTRAVENOUS at 09:44

## 2021-08-19 RX ADMIN — GABAPENTIN 600 MG: 300 CAPSULE ORAL at 21:48

## 2021-08-19 RX ADMIN — PROPOFOL 80 MG: 10 INJECTION, EMULSION INTRAVENOUS at 09:27

## 2021-08-19 RX ADMIN — DEXTROSE MONOHYDRATE 50 ML: 25 INJECTION, SOLUTION INTRAVENOUS at 00:54

## 2021-08-19 RX ADMIN — LIDOCAINE HYDROCHLORIDE 100 MG: 10 INJECTION, SOLUTION EPIDURAL; INFILTRATION; INTRACAUDAL at 09:27

## 2021-08-19 RX ADMIN — MICONAZOLE NITRATE: 20 CREAM TOPICAL at 11:02

## 2021-08-19 RX ADMIN — PROPOFOL 20 MG: 10 INJECTION, EMULSION INTRAVENOUS at 09:56

## 2021-08-19 RX ADMIN — PREGABALIN 75 MG: 75 CAPSULE ORAL at 21:48

## 2021-08-19 RX ADMIN — INSULIN GLARGINE 18 UNITS: 100 INJECTION, SOLUTION SUBCUTANEOUS at 22:30

## 2021-08-19 RX ADMIN — PANTOPRAZOLE SODIUM 40 MG: 40 TABLET, DELAYED RELEASE ORAL at 06:26

## 2021-08-19 RX ADMIN — SODIUM CHLORIDE, SODIUM LACTATE, POTASSIUM CHLORIDE, AND CALCIUM CHLORIDE: .6; .31; .03; .02 INJECTION, SOLUTION INTRAVENOUS at 09:17

## 2021-08-19 NOTE — ASSESSMENT & PLAN NOTE
· Rate controlled at this time   · Hold Coreg until after procedure - restart 8/20  · Hold Eliquis - restart 8/20

## 2021-08-19 NOTE — ASSESSMENT & PLAN NOTE
Lab Results   Component Value Date    HGBA1C 7 0 (H) 01/27/2021       Recent Labs     08/19/21  0031 08/19/21  0127 08/19/21  0626 08/19/21  0706   POCGLU 56* 134 52* 107       Blood Sugar Average: Last 72 hrs:  · (P) 97 5   · Prior A1c controlled, hypoglycemic this morning    · Check updated A1c   · Restart Lantus   · 67 U QAM, 18 U QHS  · SSI algorithm QID meals and bedtime    · QID accuchecks

## 2021-08-19 NOTE — CONSULTS
Consult Note - Wound   Nuris Levin 70 y o  male MRN: [id number]  Unit/Bed#: S -01 Encounter: [id number]      Assessment:   Wound care nurse virtual consult for skin erosion on bilateral buttocks and for cutaneous fungal infection in groin area  Chronic skin discoloration on buttocks  This is not indicative of deep tissue pressure injury  Images reviewed with Denver Colander, RN  Wound/Skin Care Plan:   1  Start Jessica Gavia to bilateral buttocks/ groin area  Jessica Gavia is a dual product that contains zinc as a moisture barrier  and miconazole as an antifungal  Apply twice a day  2  Continue assisting with turning and repositioning  3  Offload heels from bed  4  Moisturize skin each day  5  Pressure redistribution cushion to chair  6  Wound care nurse follow-up  Discussed with Denver Colander, RN

## 2021-08-19 NOTE — CONSULTS
Consultation - 126 [facility]  Baltazar Davalos 70 y o  male MRN: [id number]  Unit/Bed#: S -89 Encounter: [id number]        Inpatient consult to gastroenterology  Consult performed by: Denita Callejas PA-C  Consult ordered by: Carmita England PA-C          Reason for Consult / Principal Problem:  Direct admission to complete bowel prep for positive Cologuard    ASSESSMENT and PLAN:      1  Positive colorectal cancer screening using Cologuard  68-year-old male with AFib on Eliquis, hypertension who saw us in the office 04/2021 for positive Cologuard testing  He was admitted to the hospital in order to complete bowel prep as he is bed ridden and will need assistance completing this  Reports he has been off of Eliquis for a few days now  Doing well at home without any constipation or diarrhea      -plan for colonoscopy today  Nulytely prep given yesterday   -keep NPO  -further recommendations after colonoscopy    2  GERD  Patient taking Prilosec daily for many years  Reports symptoms are controlled at home      -plan for EGD  -further recommendations pending results of EGD +/- biopsies    -------------------------------------------------------------------------------------------------------------------    HPI:     This is a 68-year-old male with past medical history of AFib on Eliquis, hypertension who presents as a direct admission to the hospital for assistance of completing bowel prep for colonoscopy secondary to positive Cologuard  He is bed ridden and would need help using the bedpan therefore was admitted to the hospital yesterday to prep  Patient also has history of reflux and has been taking Prilosec for many years  Last colonoscopy was many years ago  Doing well at home without any constipation or diarrhea  Rflux symptoms controlled  No unintentional weight loss  REVIEW OF SYSTEMS:    CONSTITUTIONAL: Denies any fever, chills, or rigors   Good appetite, and no recent weight loss   HEENT: No earache or tinnitus  Denies hearing loss or visual disturbances  CARDIOVASCULAR: No chest pain or palpitations  RESPIRATORY: Denies any cough, hemoptysis, shortness of breath or dyspnea on exertion  GASTROINTESTINAL: As noted in the History of Present Illness  GENITOURINARY: No problems with urination  Denies any hematuria or dysuria  NEUROLOGIC: No dizziness or vertigo, denies headaches  MUSCULOSKELETAL: Denies any muscle or joint pain  SKIN: Denies skin rashes or itching  ENDOCRINE: Denies excessive thirst  Denies intolerance to heat or cold  PSYCHOSOCIAL: Denies depression or anxiety  Denies any recent memory loss  Historical Information   Past Medical History:   Diagnosis Date    Atrial fibrillation ([facility] )     Bilateral hand pain 7/10/2019    Diabetes ([facility] )     H/O agent Orange exposure     HLD (hyperlipidemia) 7/10/2019    Hypertension     Neuropathy     PTSD (post-traumatic stress disorder)      Past Surgical History:   Procedure Laterality Date    ABLATION OF DYSRHYTHMIC FOCUS      for a-fib    CHOLECYSTECTOMY      TOTAL SHOULDER REPLACEMENT Right      Social History   Social History     Substance and Sexual Activity   Alcohol Use No     Social History     Substance and Sexual Activity   Drug Use No     Social History     Tobacco Use   Smoking Status Former Smoker    Quit date: 1987    Years since quittin 6   Smokeless Tobacco Never Used     No family history on file      Meds/Allergies     Medications Prior to Admission   Medication    insulin glargine (LANTUS SOLOSTAR) 100 units/mL injection pen    pregabalin (LYRICA) 75 mg capsule    acetaminophen (TYLENOL) 325 mg tablet    amLODIPine (NORVASC) 10 mg tablet    apixaban (ELIQUIS) 5 mg    benzocaine-menthol-lanolin-aloe (DERMOPLAST) 20-0 5 % topical spray    carvedilol (COREG) 25 mg tablet    cholecalciferol (VITAMIN D3) 1,000 units tablet    co-enzyme Q-10 50 MG capsule    CYANOCOBALAMIN PO  diclofenac sodium (VOLTAREN) 1 %    gabapentin (NEURONTIN) 300 mg capsule    gabapentin (NEURONTIN) 300 mg capsule    Insulin Aspart (NOVOLOG FLEXPEN SC)    insulin detemir (LEVEMIR) 100 units/mL subcutaneous injection    lisinopril-hydrochlorothiazide (PRINZIDE,ZESTORETIC) 20-12 5 MG per tablet    MAGNESIUM CARBONATE PO    omeprazole (PriLOSEC) 20 mg delayed release capsule    rosuvastatin (CRESTOR) 5 mg tablet    sertraline (ZOLOFT) 100 mg tablet    tamsulosin (FLOMAX) 0 4 mg    traZODone (DESYREL) 50 mg tablet    ZOLPIDEM TARTRATE ER PO     Current Facility-Administered Medications   Medication Dose Route Frequency    acetaminophen (TYLENOL) tablet 650 mg  650 mg Oral Q6H PRN    amLODIPine (NORVASC) tablet 10 mg  10 mg Oral Daily    [START ON 8/20/2021] carvedilol (COREG) tablet 25 mg  25 mg Oral BID With Meals    gabapentin (NEURONTIN) capsule 300 mg  300 mg Oral BID    gabapentin (NEURONTIN) capsule 600 mg  600 mg Oral HS    [START ON 8/20/2021] lisinopril (ZESTRIL) tablet 40 mg  40 mg Oral Daily    And    [START ON 8/20/2021] hydrochlorothiazide (HYDRODIURIL) tablet 25 mg  25 mg Oral Daily    insulin detemir (LEVEMIR) subcutaneous injection 19 Units  19 Units Subcutaneous HS    insulin lispro (HumaLOG) 100 units/mL subcutaneous injection 1-6 Units  1-6 Units Subcutaneous Q6H [facility]    moisture barrier miconazole 2% cream (aka KEHINDE MOISTURE BARRIER ANTIFUNGAL CREAM)   Topical BID    ondansetron (ZOFRAN) injection 4 mg  4 mg Intravenous Q6H PRN    pantoprazole (PROTONIX) EC tablet 40 mg  40 mg Oral Early Morning    pregabalin (LYRICA) capsule 75 mg  75 mg Oral BID    sertraline (ZOLOFT) tablet 100 mg  100 mg Oral Daily    tamsulosin (FLOMAX) capsule 0 4 mg  0 4 mg Oral Daily With Dinner    traZODone (DESYREL) tablet 50 mg  50 mg Oral HS    zolpidem (AMBIEN) tablet 10 mg  10 mg Oral HS PRN       Allergies   Allergen Reactions    Atorvastatin Other (See Comments)     Muscle cramps  Pravastatin Myalgia           Objective     Blood pressure 131/73, pulse 65, temperature (!) 97 3 °F (36 3 °C), temperature source Oral, resp  rate 18, SpO2 95 %  Intake/Output Summary (Last 24 hours) at 8/19/2021 0810  Last data filed at 8/19/2021 0431  Gross per 24 hour   Intake --   Output 201 ml   Net -201 ml         PHYSICAL EXAM:      General Appearance:   Alert, cooperative, no distress, appears stated age    HEENT:   Normocephalic, atraumatic, anicteric, no oropharyngeal thrush present      Neck:  Supple, symmetrical, trachea midline, no adenopathy;    thyroid: no enlargement/tenderness/nodules; no carotid  bruit or JVD    Lungs:   Clear to auscultation bilaterally; no rales, rhonchi or wheezing; respirations unlabored    Heart[de-identified]   S1 and S2 normal; regular rate and rhythm; no murmur, rub, or gallop  Abdomen:   Soft, non-tender, non-distended; normal bowel sounds; no masses, no organomegaly    Genitalia:   Deferred    Rectal:   Deferred    Extremities:  No cyanosis, clubbing or edema    Pulses:  2+ and symmetric all extremities    Skin:  Skin color, texture, turgor normal, no rashes or lesions    Lymph nodes:  No palpable cervical, axillary or inguinal lymphadenopathy        Lab Results:   Results from last 7 days   Lab Units 08/19/21  0533   WBC Thousand/uL 9 83   HEMOGLOBIN g/dL 11 5*   HEMATOCRIT % 36 6   PLATELETS Thousands/uL 226     Results from last 7 days   Lab Units 08/19/21  0533   POTASSIUM mmol/L 3 0*   CHLORIDE mmol/L 108   CO2 mmol/L 29   BUN mg/dL 55*   CREATININE mg/dL 1 41*   CALCIUM mg/dL 8 5   ALK PHOS U/L 90   ALT U/L 25   AST U/L 20               Imaging Studies: I have personally reviewed pertinent imaging studies  No results found  Patient was seen and examined by Dr Ethel Meckel  All coyle medical decisions were made by Dr Ethel Meckel  Thank you for allowing us to participate in the care of this present patient  We will follow-up with you closely

## 2021-08-19 NOTE — PROGRESS NOTES
[facility]  Progress Note - Jerson Do 1945, 70 y o  male MRN: [id number]  Unit/Bed#: S -01 Encounter: [id number]  Primary Care Provider: Dayanna Nguyen MD   Date and time admitted to hospital: 8/18/2021  3:27 PM    * Positive colorectal cancer screening using Cologuard test  Assessment & Plan  · Patient had 1/3 positive ColoGuards  Referred for admission by GI as patient non-ambulatory and cannot get to the restroom  · Status post EGD/Colonoscopy, tissue samples taken   · GI consult appreciated   · Restart Eliquis tomorrow   · Restart diet   · Monitor hemoglobin  · Check CT abdomen pelvis to evaluate distention     Paroxysmal atrial fibrillation (HCC)  Assessment & Plan  · Rate controlled at this time   · Hold Coreg until after procedure - restart 8/20  · Hold Eliquis - restart 8/20    Type 2 diabetes mellitus without complication, with long-term current use of insulin [facility])  Assessment & Plan  Lab Results   Component Value Date    HGBA1C 7 0 (H) 01/27/2021       Recent Labs     08/19/21  0031 08/19/21  0127 08/19/21  0626 08/19/21  0706   POCGLU 56* 134 52* 107       Blood Sugar Average: Last 72 hrs:  · (P) 97 5   · Prior A1c controlled, hypoglycemic this morning    · Check updated A1c   · Restart Lantus   · 67 U QAM, 18 U QHS  · SSI algorithm QID meals and bedtime    · QID accuchecks    Essential hypertension  Assessment & Plan  · BP acceptable on admission   · Hold Coreg, Prinzide   · Restart tomorrow 8/20    Hypokalemia  Assessment & Plan  · Noted at 3 0   · Repletion ordered   · BMP in AM         VTE Pharmacologic Prophylaxis: VTE Score: 5 High Risk (Score >/= 5) - Pharmacological DVT Prophylaxis Ordered: apixaban (Eliquis)  Sequential Compression Devices Ordered  Patient Centered Rounds: I performed bedside rounds with nursing staff today    Discussions with Specialists or Other Care Team Provider: Discussed with RN, CM    Education and Discussions with Family / Patient: Patient declined call to   Time Spent for Care: 30 minutes  More than 50% of total time spent on counseling and coordination of care as described above  Current Length of Stay: 1 day(s)  Current Patient Status: Inpatient   Certification Statement: The patient will continue to require additional inpatient hospital stay due to monitoring glucose, repleting potassium, checking CT scan   Discharge Plan: Anticipate discharge tomorrow to home  Code Status: Level 1 - Full Code    Subjective:   Patient reports that he is feeling well  Still complaining of abdominal distention  Denies CP or SOB  Objective:     Vitals:   Temp (24hrs), Av 4 °F (36 3 °C), Min:96 3 °F (35 7 °C), Max:98 3 °F (36 8 °C)    Temp:  [96 3 °F (35 7 °C)-98 3 °F (36 8 °C)] 97 5 °F (36 4 °C)  HR:  [56-92] 60  Resp:  [16-18] 18  BP: ()/(53-78) 166/78  SpO2:  [95 %-98 %] 98 %  There is no height or weight on file to calculate BMI  Input and Output Summary (last 24 hours): Intake/Output Summary (Last 24 hours) at 2021 1150  Last data filed at 2021 0959  Gross per 24 hour   Intake 350 ml   Output 201 ml   Net 149 ml       Physical Exam:   Physical Exam  Constitutional:       General: He is not in acute distress  Appearance: Normal appearance  He is normal weight  He is not ill-appearing or diaphoretic  HENT:      Head: Normocephalic and atraumatic  Mouth/Throat:      Mouth: Mucous membranes are moist    Eyes:      General: No scleral icterus  Pupils: Pupils are equal, round, and reactive to light  Cardiovascular:      Rate and Rhythm: Normal rate and regular rhythm  Pulses: Normal pulses  Heart sounds: Normal heart sounds, S1 normal and S2 normal  No murmur heard  No systolic murmur is present  No diastolic murmur is present  No gallop  No S3 or S4 sounds  Pulmonary:      Effort: Pulmonary effort is normal  No accessory muscle usage or respiratory distress  Breath sounds: Normal breath sounds  No stridor  No decreased breath sounds, wheezing, rhonchi or rales  Chest:      Chest wall: No tenderness  Abdominal:      General: Bowel sounds are normal  There is distension  Palpations: Abdomen is soft  Tenderness: There is no abdominal tenderness  There is no guarding  Musculoskeletal:      Right lower leg: No edema  Left lower leg: No edema  Skin:     General: Skin is warm and dry  Coloration: Skin is not jaundiced  Neurological:      General: No focal deficit present  Mental Status: He is alert  Mental status is at baseline  Motor: Weakness (lower extremity, chronic) present  No tremor or seizure activity  Psychiatric:         Behavior: Behavior is cooperative            Additional Data:     Labs:  Results from last 7 days   Lab Units 08/19/21  0533   WBC Thousand/uL 9 83   HEMOGLOBIN g/dL 11 5*   HEMATOCRIT % 36 6   PLATELETS Thousands/uL 226     Results from last 7 days   Lab Units 08/19/21  0533   SODIUM mmol/L 146*   POTASSIUM mmol/L 3 0*   CHLORIDE mmol/L 108   CO2 mmol/L 29   BUN mg/dL 55*   CREATININE mg/dL 1 41*   ANION GAP mmol/L 9   CALCIUM mg/dL 8 5   ALBUMIN g/dL 2 9*   TOTAL BILIRUBIN mg/dL 0 24   ALK PHOS U/L 90   ALT U/L 25   AST U/L 20   GLUCOSE RANDOM mg/dL 59*         Results from last 7 days   Lab Units 08/19/21  0706 08/19/21  0626 08/19/21  0127 08/19/21  0031 08/19/21  0002 08/18/21  1948   POC GLUCOSE mg/dl 107 52* 134 56* 66 170*               Lines/Drains:  Invasive Devices     Peripheral Intravenous Line            Peripheral IV 08/18/21 Left;Proximal;Ventral (anterior) Forearm <1 day          Drain            Urethral Catheter Latex 16 Fr  769 days              Urinary Catheter:  Goal for removal: Plan to remove POD#1               Imaging: Reviewed radiology reports from this admission including: EGD, colonoscopy     Recent Cultures (last 7 days):         Last 24 Hours Medication List:   Current Facility-Administered Medications   Medication Dose Route Frequency Provider Last Rate    acetaminophen  650 mg Oral Q6H PRN Meli Up PA-C      amLODIPine  10 mg Oral Daily Homero Damon PA-C      [START ON 8/20/2021] carvedilol  25 mg Oral BID With Meals Homero Damon PA-C      dextrose 5 % and sodium chloride 0 45 % with KCl 40 mEq/L  100 mL/hr Intravenous Continuous Meli Up PA-C 100 mL/hr (08/19/21 1103)    gabapentin  300 mg Oral BID Homero Damon PA-C      gabapentin  600 mg Oral HS Homero Hilton PA-C      [START ON 8/20/2021] lisinopril  40 mg Oral Daily Homero Damon PA-C      And    [START ON 8/20/2021] hydrochlorothiazide  25 mg Oral Daily Homero Damon PA-C      insulin glargine  18 Units Subcutaneous HS Homero Hilton PA-C      [START ON 8/20/2021] insulin glargine  67 Units Subcutaneous QAM Homero Damon PA-C      insulin lispro  1-6 Units Subcutaneous Q6H [address] Homero Damon PA-C      KEHINDE ANTIFUNGAL   Topical BID Homero Hilton PA-C      ondansetron  4 mg Intravenous Q6H PRN Homero Hilton PA-C      pantoprazole  40 mg Oral Early Morning Meli Up PA-C      pregabalin  75 mg Oral BID Homero Hilton PA-C      sertraline  100 mg Oral Daily Homero Damon PA-C      tamsulosin  0 4 mg Oral Daily With Dinner [address] [facility]      traZODone  50 mg Oral HS Homero Damon PA-C      zolpidem  10 mg Oral HS PRN Meli Up PA-C          Today, Patient Was Seen By: Meli Up PA-C    **Please Note: This note may have been constructed using a voice recognition system  **

## 2021-08-19 NOTE — ASSESSMENT & PLAN NOTE
· Patient had 1/3 positive ColoGuards  Referred for admission by GI as patient non-ambulatory and cannot get to the restroom     · Status post EGD/Colonoscopy, tissue samples taken   · GI consult appreciated   · Restart Eliquis tomorrow   · Restart diet   · Monitor hemoglobin  · Check CT abdomen pelvis to evaluate distention

## 2021-08-19 NOTE — ANESTHESIA POSTPROCEDURE EVALUATION
Post-Op Assessment Note    CV Status:  Stable  Pain Score: 0    Pain management: adequate     Mental Status:  Alert and awake   Hydration Status:  Euvolemic   PONV Controlled:  Controlled   Airway Patency:  Patent      Post Op Vitals Reviewed: Yes      Staff: CRNA         No complications documented      BP   110/57   Temp     Pulse  60   Resp   17   SpO2   100

## 2021-08-19 NOTE — ANESTHESIA PREPROCEDURE EVALUATION
Procedure:  COLONOSCOPY  EGD    Relevant Problems   CARDIO   (+) Essential hypertension   (+) HLD (hyperlipidemia)   (+) Paroxysmal atrial fibrillation (HCC)      ENDO   (+) Type 2 diabetes mellitus without complication, with long-term current use of insulin (HCC)      GI/HEPATIC   (+) Gastroesophageal reflux disease without esophagitis      PULMONARY   (+) Left lower lobe pneumonia      Lab Results   Component Value Date    SODIUM 146 (H) 08/19/2021    K 3 0 (L) 08/19/2021     08/19/2021    CO2 29 08/19/2021    AGAP 9 08/19/2021    BUN 55 (H) 08/19/2021    CREATININE 1 41 (H) 08/19/2021    GLUC 59 (L) 08/19/2021    GLUF 144 (H) 11/25/2018    CALCIUM 8 5 08/19/2021    AST 20 08/19/2021    ALT 25 08/19/2021    ALKPHOS 90 08/19/2021    TP 6 3 (L) 08/19/2021    TBILI 0 24 08/19/2021    EGFR 49 08/19/2021     Lab Results   Component Value Date    WBC 9 83 08/19/2021    HGB 11 5 (L) 08/19/2021    HCT 36 6 08/19/2021    MCV 94 08/19/2021     08/19/2021       Physical Exam    Airway    Mallampati score: II  TM Distance: <3 FB  Neck ROM: full     Dental       Cardiovascular      Pulmonary      Other Findings        Anesthesia Plan  ASA Score- 3     Anesthesia Type- IV sedation with anesthesia with ASA Monitors  Additional Monitors:   Airway Plan:           Plan Factors-Exercise tolerance (METS): >4 METS  Chart reviewed  Existing labs reviewed  Patient is not a current smoker  Patient did not smoke on day of surgery  Induction- intravenous  Postoperative Plan-     Informed Consent- Anesthetic plan and risks discussed with patient  I personally reviewed this patient with the CRNA  Discussed and agreed on the Anesthesia Plan with the CRNA  Padmaja Perez

## 2021-08-20 VITALS
HEART RATE: 78 BPM | DIASTOLIC BLOOD PRESSURE: 79 MMHG | SYSTOLIC BLOOD PRESSURE: 163 MMHG | OXYGEN SATURATION: 98 % | TEMPERATURE: 98 F | RESPIRATION RATE: 18 BRPM

## 2021-08-20 LAB
ANION GAP SERPL CALCULATED.3IONS-SCNC: 10 MMOL/L (ref 4–13)
BUN SERPL-MCNC: 38 MG/DL (ref 5–25)
CALCIUM SERPL-MCNC: 8.3 MG/DL (ref 8.3–10.1)
CHLORIDE SERPL-SCNC: 107 MMOL/L (ref 100–108)
CO2 SERPL-SCNC: 25 MMOL/L (ref 21–32)
CREAT SERPL-MCNC: 1.31 MG/DL (ref 0.6–1.3)
GFR SERPL CREATININE-BSD FRML MDRD: 54 ML/MIN/1.73SQ M
GLUCOSE SERPL-MCNC: 126 MG/DL (ref 65–140)
GLUCOSE SERPL-MCNC: 145 MG/DL (ref 65–140)
GLUCOSE SERPL-MCNC: 151 MG/DL (ref 65–140)
GLUCOSE SERPL-MCNC: 201 MG/DL (ref 65–140)
POTASSIUM SERPL-SCNC: 4.5 MMOL/L (ref 3.5–5.3)
SODIUM SERPL-SCNC: 142 MMOL/L (ref 136–145)

## 2021-08-20 PROCEDURE — 82948 REAGENT STRIP/BLOOD GLUCOSE: CPT

## 2021-08-20 PROCEDURE — 80048 BASIC METABOLIC PNL TOTAL CA: CPT | Performed by: PHYSICIAN ASSISTANT

## 2021-08-20 PROCEDURE — 99239 HOSP IP/OBS DSCHRG MGMT >30: CPT | Performed by: INTERNAL MEDICINE

## 2021-08-20 RX ADMIN — SERTRALINE 100 MG: 100 TABLET, FILM COATED ORAL at 09:14

## 2021-08-20 RX ADMIN — AMLODIPINE BESYLATE 10 MG: 10 TABLET ORAL at 09:13

## 2021-08-20 RX ADMIN — INSULIN GLARGINE 67 UNITS: 100 INJECTION, SOLUTION SUBCUTANEOUS at 09:14

## 2021-08-20 RX ADMIN — CARVEDILOL 25 MG: 12.5 TABLET, FILM COATED ORAL at 09:15

## 2021-08-20 RX ADMIN — HYDROCHLOROTHIAZIDE 25 MG: 25 TABLET ORAL at 09:14

## 2021-08-20 RX ADMIN — INSULIN LISPRO 2 UNITS: 100 INJECTION, SOLUTION INTRAVENOUS; SUBCUTANEOUS at 12:51

## 2021-08-20 RX ADMIN — MICONAZOLE NITRATE: 20 CREAM TOPICAL at 09:14

## 2021-08-20 RX ADMIN — LISINOPRIL 40 MG: 20 TABLET ORAL at 09:14

## 2021-08-20 RX ADMIN — PREGABALIN 75 MG: 75 CAPSULE ORAL at 09:14

## 2021-08-20 RX ADMIN — PANTOPRAZOLE SODIUM 40 MG: 40 TABLET, DELAYED RELEASE ORAL at 05:29

## 2021-08-20 RX ADMIN — GABAPENTIN 300 MG: 300 CAPSULE ORAL at 09:14

## 2021-08-20 NOTE — CASE MANAGEMENT
CM met with Pt to confirm plans for discharge  Pt will return home to [facility] of 16h per week through the [place]  CM provided contact information for [facility] office on Aging and Office of [place] to seek further resources availabe to Pt and his wife for HHC/personal care and perhaps Aid and Attendance compensation  Pt is a 100% disabled  due to [employer]  cM also provided information about the [address] [place] [address] respite hours, 30 6h blocks per calendar year  Pt reports he will research these benefits  Pt has set up his own transportation through the [place]

## 2021-08-20 NOTE — ASSESSMENT & PLAN NOTE
- positive Cologuard screening test as an outpatient  - admitted to the hospital for colonoscopy and EGD as he would not tolerate prep at home due to ambulatory dysfunction    - underwent EGD/colonoscopy on [age] without complication:  EGD was unremarkable  Colonoscopy showed 4 transverse polyps which were removed, scattered diverticuli, and internal hemorrhoids    - pathology to be reviewed by Gastroenterology at outpatient follow-up  - discharge home

## 2021-08-20 NOTE — PLAN OF CARE
Problem: MOBILITY - ADULT  Goal: Maintain or return to baseline ADL function  Description: INTERVENTIONS:  -  Assess patient's ability to carry out ADLs; assess patient's baseline for ADL function and identify physical deficits which impact ability to perform ADLs (bathing, care of mouth/teeth, toileting, grooming, dressing, etc )  - Assess/evaluate cause of self-care deficits   - Assess range of motion  - Assess patient's mobility; develop plan if impaired  - Assess patient's need for assistive devices and provide as appropriate  - Encourage maximum independence but intervene and supervise when necessary  - Involve family in performance of ADLs  - Assess for home care needs following discharge   - Consider OT consult to assist with ADL evaluation and planning for discharge  - Provide patient education as appropriate  Outcome: Progressing     Problem: MOBILITY - ADULT  Goal: Maintains/Returns to pre admission functional level  Description: INTERVENTIONS:  - Perform BMAT or MOVE assessment daily    - Set and communicate daily mobility goal to care team and patient/family/caregiver  - Collaborate with rehabilitation services on mobility goals if consulted  - Perform Range of Motion three times a day  - Reposition patient every two hours    - Dangle patient three times a day  - Stand patient three times a day  - Out of bed to chair three times a day   - Out of bed for meals three times a day  - Out of bed for toileting  - Record patient progress and toleration of activity level   Outcome: Progressing     Problem: PAIN - ADULT  Goal: Verbalizes/displays adequate comfort level or baseline comfort level  Description: Interventions:  - Encourage patient to monitor pain and request assistance  - Assess pain using appropriate pain scale  - Administer analgesics based on type and severity of pain and evaluate response  - Implement non-pharmacological measures as appropriate and evaluate response  - Consider cultural and social influences on pain and pain management  - Notify physician/advanced practitioner if interventions unsuccessful or patient reports new pain  Outcome: Progressing     Problem: INFECTION - ADULT  Goal: Absence or prevention of progression during hospitalization  Description: INTERVENTIONS:  - Assess and monitor for signs and symptoms of infection  - Monitor lab/diagnostic results  - Monitor all insertion sites, i e  indwelling lines, tubes, and drains  - Monitor endotracheal if appropriate and nasal secretions for changes in amount and color  - [place] appropriate cooling/warming therapies per order  - Administer medications as ordered  - Instruct and encourage patient and family to use good hand hygiene technique  - Identify and instruct in appropriate isolation precautions for identified infection/condition  Outcome: Progressing     Problem: INFECTION - ADULT  Goal: Absence of fever/infection during neutropenic period  Description: INTERVENTIONS:  - Monitor WBC    Outcome: Progressing

## 2021-08-20 NOTE — DISCHARGE INSTR - AVS FIRST PAGE
Dear Jeovanny Valdez,     It was our pleasure to care for you here at [facility]  It is our hope that we were always able to exceed the expected standards for your care during your stay  You were hospitalized for a colonoscopy an EGD  You were cared for on the 4th floor floor under the service of Toney Cisneros,  with the [facility] who covers for your primary care physician (PCP), Marilu Rubio MD, while you were hospitalized  If you have any questions or concerns related to this hospitalization, you may contact us at [phone]  For follow up as well as medication refills, we recommend that you follow up with your primary care physician  A registered nurse will reach out to you by phone within a few days after your discharge to answer any additional questions that you may have after going home  However, at this time we provide for you here, the most important instructions / recommendations at discharge:     · Notable Medication Adjustments -   · Non  · Testing Required after Discharge -   · None  · Important follow up information -   · Please be sure to follow-up with your gastroenterologist as scheduled by per their office  At that time they will go over your biopsy results  · Please review this entire after visit summary as additional general instructions including medication list, appointments, activity, diet, any pertinent wound care, and other additional recommendations from your care team that may be provided for you        Sincerely,     Anna Mackey DO

## 2021-08-20 NOTE — DISCHARGE SUMMARY
[facility]  Discharge- Erik Horn 1945, 70 y o  male MRN: [id number]  Unit/Bed#: S -01 Encounter: [id number]  Primary Care Provider: Deion Lucas MD   Date and time admitted to hospital: 8/18/2021  3:27 PM    * Positive colorectal cancer screening using Cologuard test  Assessment & Plan  - positive Cologuard screening test as an outpatient  - admitted to the hospital for colonoscopy and EGD as he would not tolerate prep at home due to ambulatory dysfunction    - underwent EGD/colonoscopy on 36/39 without complication:  EGD was unremarkable  Colonoscopy showed 4 transverse polyps which were removed, scattered diverticuli, and internal hemorrhoids  - pathology to be reviewed by Gastroenterology at outpatient follow-up  - discharge home    Hypokalemia  Assessment & Plan  - secondary to colon prep; resolved with replacement    Type 2 diabetes mellitus without complication, with long-term current use of insulin ([address] )  Assessment & Plan  - resume home insulin regimen on discharge    Paroxysmal atrial fibrillation ([facility] [address] )  Assessment & Plan  - continue carvedilol for rate control  - continue Eliquis for stroke prophylaxis    Essential hypertension  Assessment & Plan  - continue home regimen on discharge      Medical Problems     Resolved Problems  Date Reviewed: 8/20/2021    None              Discharging Physician / Practitioner: Evans Riley DO  PCP: Deion Lucas MD  Admission Date:   Admission Orders (From admission, onward)     Ordered        08/18/21 1934  Inpatient Admission  Once                   Discharge Date: 08/20/21    Consultations During Hospital Stay:  · Gastroenterology    Procedures Performed:   · EGD/colonoscopy    Significant Findings / Test Results:   · For transverse polyp status post polypectomy    Incidental Findings:   · 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 calculus on CT      Test Results Pending at Discharge (will require follow up): · Pathology from polypectomy  Outpatient Tests Requested:  · None    Complications:  None    Reason for Admission:  Colonoscopy/EGD    Hospital Course:   Jos Beltran is a 70 y o  male patient who originally presented to the hospital on 8/18/2021 due to to have screening colonoscopy/EGD performed  Patient was unable to tolerate prep at home due to ambulatory dysfunction, thus necessitating hospitalizatio  Overall patient had an uneventful hospital stay  He underwent an EGD and colonoscopy on 08/19  No complications  EGD was unremarkable, colonoscopy showed internal hemorrhoids, scattered diverticuli, and four transverse polyps which were removed and sent for pathology  Results are pending at the time of discharge  Otherwise he will be discharged home in good condition  All questions and concerns were addressed  No changes to his medication regimen    Please see above list of diagnoses and related plan for additional information  Condition at Discharge: good    Discharge Day Visit / Exam:   Subjective:  Patient seen examined on the day of discharge  No acute clinical symptom concerns  Patient is asymptomatic  Discharge home today  Vitals: Blood Pressure: 163/79 (08/20/21 0700)  Pulse: 78 (08/20/21 0700)  Temperature: 98 °F (36 7 °C) (08/20/21 0700)  Temp Source: Oral (08/20/21 0700)  Respirations: 18 (08/20/21 0700)  SpO2: 98 % (08/20/21 0700)    PHYSICAL EXAM:    Vitals signs reviewed  Constitutional   Awake and cooperative  NAD  Head/Neck   Normocephalic  Atraumatic  HEENT   No scleral icterus  EOMI  Heart   Regular rate and rhythm  No murmers  Lungs   Clear to auscultation bilaterally  Respirations unlaboured  Abdomen   Soft  Nontender  Nondistended  Skin   Skin color normal  No rashes  Extremities   No deformities  No peripheral edema  Neuro   Alert and oriented  No new deficits  Psych   Mood stable   Affect normal          Discussion with Family: Patient declined call to   Discharge instructions/Information to patient and family:   See after visit summary for information provided to patient and family  Provisions for Follow-Up Care:  See after visit summary for information related to follow-up care and any pertinent home health orders  Disposition:   Home    Planned Readmission:  No     Discharge Statement:  I spent 35 minutes discharging the patient  This time was spent on the day of discharge  I had direct contact with the patient on the day of discharge  Greater than 50% of the total time was spent examining patient, answering all patient questions, arranging and discussing plan of care with patient as well as directly providing post-discharge instructions  Additional time then spent on discharge activities  Discharge Medications:  See after visit summary for reconciled discharge medications provided to patient and/or family        **Please Note: This note may have been constructed using a voice recognition system**

## 2021-08-21 LAB — EST. AVERAGE GLUCOSE BLD GHB EST-MCNC: 137 MG/DL

## 2021-10-18 ENCOUNTER — HOSPITAL ENCOUNTER (INPATIENT)
Facility: HOSPITAL | Age: 76
LOS: 3 days | Discharge: NURSING, REHAB OR LONG-TERM CARE | DRG: 074 | End: 2021-10-25
Attending: EMERGENCY MEDICINE | Admitting: INTERNAL MEDICINE
Payer: COMMERCIAL

## 2021-10-18 ENCOUNTER — APPOINTMENT (EMERGENCY)
Dept: CT IMAGING | Facility: HOSPITAL | Age: 76
DRG: 074 | End: 2021-10-18
Payer: COMMERCIAL

## 2021-10-18 ENCOUNTER — APPOINTMENT (EMERGENCY)
Dept: RADIOLOGY | Facility: HOSPITAL | Age: 76
DRG: 074 | End: 2021-10-18
Payer: COMMERCIAL

## 2021-10-18 DIAGNOSIS — K31.0 ACUTE DISTENTION OF STOMACH: Primary | ICD-10-CM

## 2021-10-18 DIAGNOSIS — R26.2 AMBULATORY DYSFUNCTION: ICD-10-CM

## 2021-10-18 DIAGNOSIS — R14.0 GASEOUS ABDOMINAL DISTENTION: ICD-10-CM

## 2021-10-18 DIAGNOSIS — R53.1 GENERALIZED WEAKNESS: ICD-10-CM

## 2021-10-18 LAB
ALBUMIN SERPL BCP-MCNC: 3 G/DL (ref 3.5–5)
ALP SERPL-CCNC: 82 U/L (ref 46–116)
ALT SERPL W P-5'-P-CCNC: 20 U/L (ref 12–78)
ANION GAP SERPL CALCULATED.3IONS-SCNC: 11 MMOL/L (ref 4–13)
AST SERPL W P-5'-P-CCNC: 19 U/L (ref 5–45)
BASOPHILS # BLD AUTO: 0.03 THOUSANDS/ΜL (ref 0–0.1)
BASOPHILS NFR BLD AUTO: 0 % (ref 0–1)
BILIRUB SERPL-MCNC: 0.31 MG/DL (ref 0.2–1)
BUN SERPL-MCNC: 18 MG/DL (ref 5–25)
CALCIUM ALBUM COR SERPL-MCNC: 9.6 MG/DL (ref 8.3–10.1)
CALCIUM SERPL-MCNC: 8.8 MG/DL (ref 8.3–10.1)
CHLORIDE SERPL-SCNC: 105 MMOL/L (ref 100–108)
CO2 SERPL-SCNC: 30 MMOL/L (ref 21–32)
CREAT SERPL-MCNC: 1.27 MG/DL (ref 0.6–1.3)
EOSINOPHIL # BLD AUTO: 0.1 THOUSAND/ΜL (ref 0–0.61)
EOSINOPHIL NFR BLD AUTO: 1 % (ref 0–6)
ERYTHROCYTE [DISTWIDTH] IN BLOOD BY AUTOMATED COUNT: 16.1 % (ref 11.6–15.1)
GFR SERPL CREATININE-BSD FRML MDRD: 56 ML/MIN/1.73SQ M
GLUCOSE SERPL-MCNC: 94 MG/DL (ref 65–140)
HCT VFR BLD AUTO: 37.7 % (ref 36.5–49.3)
HGB BLD-MCNC: 11.6 G/DL (ref 12–17)
IMM GRANULOCYTES # BLD AUTO: 0.11 THOUSAND/UL (ref 0–0.2)
IMM GRANULOCYTES NFR BLD AUTO: 1 % (ref 0–2)
LACTATE SERPL-SCNC: 0.9 MMOL/L (ref 0.5–2)
LIPASE SERPL-CCNC: 153 U/L (ref 73–393)
LYMPHOCYTES # BLD AUTO: 1.37 THOUSANDS/ΜL (ref 0.6–4.47)
LYMPHOCYTES NFR BLD AUTO: 12 % (ref 14–44)
MCH RBC QN AUTO: 28.9 PG (ref 26.8–34.3)
MCHC RBC AUTO-ENTMCNC: 30.8 G/DL (ref 31.4–37.4)
MCV RBC AUTO: 94 FL (ref 82–98)
MONOCYTES # BLD AUTO: 1.21 THOUSAND/ΜL (ref 0.17–1.22)
MONOCYTES NFR BLD AUTO: 10 % (ref 4–12)
NEUTROPHILS # BLD AUTO: 8.84 THOUSANDS/ΜL (ref 1.85–7.62)
NEUTS SEG NFR BLD AUTO: 76 % (ref 43–75)
NRBC BLD AUTO-RTO: 0 /100 WBCS
PLATELET # BLD AUTO: 326 THOUSANDS/UL (ref 149–390)
PMV BLD AUTO: 9.5 FL (ref 8.9–12.7)
POTASSIUM SERPL-SCNC: 4.4 MMOL/L (ref 3.5–5.3)
PROT SERPL-MCNC: 6.8 G/DL (ref 6.4–8.2)
RBC # BLD AUTO: 4.01 MILLION/UL (ref 3.88–5.62)
SODIUM SERPL-SCNC: 146 MMOL/L (ref 136–145)
WBC # BLD AUTO: 11.66 THOUSAND/UL (ref 4.31–10.16)

## 2021-10-18 PROCEDURE — 93005 ELECTROCARDIOGRAM TRACING: CPT

## 2021-10-18 PROCEDURE — 71045 X-RAY EXAM CHEST 1 VIEW: CPT

## 2021-10-18 PROCEDURE — 83690 ASSAY OF LIPASE: CPT | Performed by: EMERGENCY MEDICINE

## 2021-10-18 PROCEDURE — 83605 ASSAY OF LACTIC ACID: CPT | Performed by: PHYSICIAN ASSISTANT

## 2021-10-18 PROCEDURE — 80053 COMPREHEN METABOLIC PANEL: CPT | Performed by: EMERGENCY MEDICINE

## 2021-10-18 PROCEDURE — 83735 ASSAY OF MAGNESIUM: CPT | Performed by: INTERNAL MEDICINE

## 2021-10-18 PROCEDURE — 84443 ASSAY THYROID STIM HORMONE: CPT | Performed by: INTERNAL MEDICINE

## 2021-10-18 PROCEDURE — G1004 CDSM NDSC: HCPCS

## 2021-10-18 PROCEDURE — 74177 CT ABD & PELVIS W/CONTRAST: CPT

## 2021-10-18 PROCEDURE — 99285 EMERGENCY DEPT VISIT HI MDM: CPT

## 2021-10-18 PROCEDURE — 87040 BLOOD CULTURE FOR BACTERIA: CPT | Performed by: PHYSICIAN ASSISTANT

## 2021-10-18 PROCEDURE — 85025 COMPLETE CBC W/AUTO DIFF WBC: CPT | Performed by: EMERGENCY MEDICINE

## 2021-10-18 PROCEDURE — 36415 COLL VENOUS BLD VENIPUNCTURE: CPT | Performed by: EMERGENCY MEDICINE

## 2021-10-18 PROCEDURE — 83036 HEMOGLOBIN GLYCOSYLATED A1C: CPT | Performed by: INTERNAL MEDICINE

## 2021-10-18 PROCEDURE — 99285 EMERGENCY DEPT VISIT HI MDM: CPT | Performed by: PHYSICIAN ASSISTANT

## 2021-10-18 RX ADMIN — IOHEXOL 100 ML: 350 INJECTION, SOLUTION INTRAVENOUS at 22:48

## 2021-10-19 ENCOUNTER — APPOINTMENT (OUTPATIENT)
Dept: RADIOLOGY | Facility: HOSPITAL | Age: 76
DRG: 074 | End: 2021-10-19
Payer: COMMERCIAL

## 2021-10-19 PROBLEM — R14.0 GASEOUS ABDOMINAL DISTENTION: Status: ACTIVE | Noted: 2021-10-19

## 2021-10-19 PROBLEM — N18.9 CKD (CHRONIC KIDNEY DISEASE): Chronic | Status: RESOLVED | Noted: 2019-01-12 | Resolved: 2019-01-12

## 2021-10-19 PROBLEM — N18.9 CKD (CHRONIC KIDNEY DISEASE): Chronic | Status: ACTIVE | Noted: 2019-01-12

## 2021-10-19 LAB
ANION GAP SERPL CALCULATED.3IONS-SCNC: 11 MMOL/L (ref 4–13)
APTT PPP: 34 SECONDS (ref 23–37)
ATRIAL RATE: 72 BPM
ATRIAL RATE: 84 BPM
BASOPHILS # BLD AUTO: 0.03 THOUSANDS/ΜL (ref 0–0.1)
BASOPHILS NFR BLD AUTO: 0 % (ref 0–1)
BUN SERPL-MCNC: 18 MG/DL (ref 5–25)
CALCIUM SERPL-MCNC: 8.7 MG/DL (ref 8.3–10.1)
CHLORIDE SERPL-SCNC: 106 MMOL/L (ref 100–108)
CO2 SERPL-SCNC: 29 MMOL/L (ref 21–32)
CREAT SERPL-MCNC: 1.31 MG/DL (ref 0.6–1.3)
EOSINOPHIL # BLD AUTO: 0.08 THOUSAND/ΜL (ref 0–0.61)
EOSINOPHIL NFR BLD AUTO: 1 % (ref 0–6)
ERYTHROCYTE [DISTWIDTH] IN BLOOD BY AUTOMATED COUNT: 16.2 % (ref 11.6–15.1)
EST. AVERAGE GLUCOSE BLD GHB EST-MCNC: 128 MG/DL
GFR SERPL CREATININE-BSD FRML MDRD: 54 ML/MIN/1.73SQ M
GLUCOSE P FAST SERPL-MCNC: 109 MG/DL (ref 65–99)
GLUCOSE SERPL-MCNC: 109 MG/DL (ref 65–140)
GLUCOSE SERPL-MCNC: 67 MG/DL (ref 65–140)
GLUCOSE SERPL-MCNC: 92 MG/DL (ref 65–140)
GLUCOSE SERPL-MCNC: 98 MG/DL (ref 65–140)
HBA1C MFR BLD: 6.1 %
HCT VFR BLD AUTO: 34.5 % (ref 36.5–49.3)
HGB BLD-MCNC: 10.6 G/DL (ref 12–17)
IMM GRANULOCYTES # BLD AUTO: 0.09 THOUSAND/UL (ref 0–0.2)
IMM GRANULOCYTES NFR BLD AUTO: 1 % (ref 0–2)
INR PPP: 1.28 (ref 0.84–1.19)
LYMPHOCYTES # BLD AUTO: 1.37 THOUSANDS/ΜL (ref 0.6–4.47)
LYMPHOCYTES NFR BLD AUTO: 13 % (ref 14–44)
MAGNESIUM SERPL-MCNC: 2.3 MG/DL (ref 1.6–2.6)
MCH RBC QN AUTO: 29 PG (ref 26.8–34.3)
MCHC RBC AUTO-ENTMCNC: 30.7 G/DL (ref 31.4–37.4)
MCV RBC AUTO: 94 FL (ref 82–98)
MONOCYTES # BLD AUTO: 1.19 THOUSAND/ΜL (ref 0.17–1.22)
MONOCYTES NFR BLD AUTO: 11 % (ref 4–12)
NEUTROPHILS # BLD AUTO: 8.14 THOUSANDS/ΜL (ref 1.85–7.62)
NEUTS SEG NFR BLD AUTO: 74 % (ref 43–75)
NRBC BLD AUTO-RTO: 0 /100 WBCS
P AXIS: 87 DEGREES
PLATELET # BLD AUTO: 304 THOUSANDS/UL (ref 149–390)
PMV BLD AUTO: 9.6 FL (ref 8.9–12.7)
POTASSIUM SERPL-SCNC: 4 MMOL/L (ref 3.5–5.3)
PR INTERVAL: 194 MS
PROTHROMBIN TIME: 16 SECONDS (ref 11.6–14.5)
QRS AXIS: -88 DEGREES
QRS AXIS: 256 DEGREES
QRSD INTERVAL: 154 MS
QRSD INTERVAL: 164 MS
QT INTERVAL: 442 MS
QT INTERVAL: 452 MS
QTC INTERVAL: 483 MS
QTC INTERVAL: 494 MS
RBC # BLD AUTO: 3.66 MILLION/UL (ref 3.88–5.62)
SODIUM SERPL-SCNC: 146 MMOL/L (ref 136–145)
T WAVE AXIS: 64 DEGREES
T WAVE AXIS: 74 DEGREES
TROPONIN I SERPL-MCNC: 0.02 NG/ML
TSH SERPL DL<=0.05 MIU/L-ACNC: 1.43 UIU/ML (ref 0.36–3.74)
VENTRICULAR RATE: 72 BPM
VENTRICULAR RATE: 72 BPM
WBC # BLD AUTO: 10.9 THOUSAND/UL (ref 4.31–10.16)

## 2021-10-19 PROCEDURE — 85025 COMPLETE CBC W/AUTO DIFF WBC: CPT | Performed by: INTERNAL MEDICINE

## 2021-10-19 PROCEDURE — 82948 REAGENT STRIP/BLOOD GLUCOSE: CPT

## 2021-10-19 PROCEDURE — 36415 COLL VENOUS BLD VENIPUNCTURE: CPT | Performed by: PHYSICIAN ASSISTANT

## 2021-10-19 PROCEDURE — 85730 THROMBOPLASTIN TIME PARTIAL: CPT | Performed by: PHYSICIAN ASSISTANT

## 2021-10-19 PROCEDURE — 99220 PR INITIAL OBSERVATION CARE/DAY 70 MINUTES: CPT | Performed by: INTERNAL MEDICINE

## 2021-10-19 PROCEDURE — 80048 BASIC METABOLIC PNL TOTAL CA: CPT | Performed by: INTERNAL MEDICINE

## 2021-10-19 PROCEDURE — 84484 ASSAY OF TROPONIN QUANT: CPT | Performed by: PHYSICIAN ASSISTANT

## 2021-10-19 PROCEDURE — 93010 ELECTROCARDIOGRAM REPORT: CPT | Performed by: INTERNAL MEDICINE

## 2021-10-19 PROCEDURE — 71045 X-RAY EXAM CHEST 1 VIEW: CPT

## 2021-10-19 PROCEDURE — 85610 PROTHROMBIN TIME: CPT | Performed by: PHYSICIAN ASSISTANT

## 2021-10-19 PROCEDURE — 99204 OFFICE O/P NEW MOD 45 MIN: CPT | Performed by: INTERNAL MEDICINE

## 2021-10-19 PROCEDURE — 93005 ELECTROCARDIOGRAM TRACING: CPT

## 2021-10-19 RX ORDER — METOCLOPRAMIDE HYDROCHLORIDE 5 MG/ML
10 INJECTION INTRAMUSCULAR; INTRAVENOUS EVERY 6 HOURS PRN
Status: DISCONTINUED | OUTPATIENT
Start: 2021-10-19 | End: 2021-10-19

## 2021-10-19 RX ORDER — INSULIN GLARGINE 100 [IU]/ML
33 INJECTION, SOLUTION SUBCUTANEOUS EVERY MORNING
Status: DISCONTINUED | OUTPATIENT
Start: 2021-10-19 | End: 2021-10-20

## 2021-10-19 RX ORDER — DEXTROSE AND SODIUM CHLORIDE 5; .45 G/100ML; G/100ML
100 INJECTION, SOLUTION INTRAVENOUS CONTINUOUS
Status: DISCONTINUED | OUTPATIENT
Start: 2021-10-19 | End: 2021-10-19

## 2021-10-19 RX ORDER — METOCLOPRAMIDE HYDROCHLORIDE 5 MG/ML
10 INJECTION INTRAMUSCULAR; INTRAVENOUS EVERY 6 HOURS SCHEDULED
Status: DISCONTINUED | OUTPATIENT
Start: 2021-10-19 | End: 2021-10-19

## 2021-10-19 RX ORDER — INSULIN GLARGINE 100 [IU]/ML
9 INJECTION, SOLUTION SUBCUTANEOUS
Status: DISCONTINUED | OUTPATIENT
Start: 2021-10-19 | End: 2021-10-20

## 2021-10-19 RX ORDER — LISINOPRIL 10 MG/1
10 TABLET ORAL DAILY
COMMUNITY
Start: 2021-09-29

## 2021-10-19 RX ORDER — DEXTROSE MONOHYDRATE 25 G/50ML
25 INJECTION, SOLUTION INTRAVENOUS ONCE
Status: COMPLETED | OUTPATIENT
Start: 2021-10-19 | End: 2021-10-19

## 2021-10-19 RX ORDER — SODIUM CHLORIDE 9 MG/ML
75 INJECTION, SOLUTION INTRAVENOUS CONTINUOUS
Status: DISCONTINUED | OUTPATIENT
Start: 2021-10-19 | End: 2021-10-20

## 2021-10-19 RX ORDER — FUROSEMIDE 40 MG/1
60 TABLET ORAL DAILY
Status: CANCELLED | OUTPATIENT
Start: 2021-10-19

## 2021-10-19 RX ORDER — ONDANSETRON 2 MG/ML
4 INJECTION INTRAMUSCULAR; INTRAVENOUS 3 TIMES DAILY PRN
Status: DISCONTINUED | OUTPATIENT
Start: 2021-10-19 | End: 2021-10-25 | Stop reason: HOSPADM

## 2021-10-19 RX ORDER — CARVEDILOL 12.5 MG/1
25 TABLET ORAL 2 TIMES DAILY WITH MEALS
Status: DISCONTINUED | OUTPATIENT
Start: 2021-10-19 | End: 2021-10-25 | Stop reason: HOSPADM

## 2021-10-19 RX ORDER — SERTRALINE HYDROCHLORIDE 100 MG/1
100 TABLET, FILM COATED ORAL DAILY
Status: DISCONTINUED | OUTPATIENT
Start: 2021-10-19 | End: 2021-10-25 | Stop reason: HOSPADM

## 2021-10-19 RX ORDER — POTASSIUM CHLORIDE 20 MEQ/1
20 TABLET, EXTENDED RELEASE ORAL DAILY
Status: DISCONTINUED | OUTPATIENT
Start: 2021-10-19 | End: 2021-10-25 | Stop reason: HOSPADM

## 2021-10-19 RX ORDER — ACETAMINOPHEN 325 MG/1
650 TABLET ORAL EVERY 6 HOURS PRN
Status: DISCONTINUED | OUTPATIENT
Start: 2021-10-19 | End: 2021-10-25 | Stop reason: HOSPADM

## 2021-10-19 RX ORDER — SODIUM CHLORIDE 9 MG/ML
100 INJECTION, SOLUTION INTRAVENOUS CONTINUOUS
Status: CANCELLED | OUTPATIENT
Start: 2021-10-19

## 2021-10-19 RX ORDER — OMEPRAZOLE 20 MG/1
20 CAPSULE, DELAYED RELEASE ORAL DAILY
COMMUNITY
Start: 2021-09-29 | End: 2021-10-25 | Stop reason: HOSPADM

## 2021-10-19 RX ORDER — ALPRAZOLAM 0.25 MG/1
0.25 TABLET ORAL DAILY PRN
COMMUNITY
Start: 2021-09-21

## 2021-10-19 RX ORDER — TRAZODONE HYDROCHLORIDE 50 MG/1
50 TABLET ORAL
Status: DISCONTINUED | OUTPATIENT
Start: 2021-10-19 | End: 2021-10-25 | Stop reason: HOSPADM

## 2021-10-19 RX ORDER — FUROSEMIDE 40 MG/1
60 TABLET ORAL DAILY
COMMUNITY

## 2021-10-19 RX ORDER — ONDANSETRON 2 MG/ML
4 INJECTION INTRAMUSCULAR; INTRAVENOUS EVERY 6 HOURS PRN
Status: DISCONTINUED | OUTPATIENT
Start: 2021-10-19 | End: 2021-10-19

## 2021-10-19 RX ORDER — FINASTERIDE 5 MG/1
5 TABLET, FILM COATED ORAL DAILY
Status: DISCONTINUED | OUTPATIENT
Start: 2021-10-19 | End: 2021-10-25 | Stop reason: HOSPADM

## 2021-10-19 RX ORDER — PANTOPRAZOLE SODIUM 40 MG/1
40 TABLET, DELAYED RELEASE ORAL
Status: DISCONTINUED | OUTPATIENT
Start: 2021-10-19 | End: 2021-10-20

## 2021-10-19 RX ORDER — METOLAZONE 2.5 MG/1
2.5 TABLET ORAL 2 TIMES WEEKLY
Status: CANCELLED | OUTPATIENT
Start: 2021-10-19

## 2021-10-19 RX ORDER — HEPARIN SODIUM 5000 [USP'U]/ML
5000 INJECTION, SOLUTION INTRAVENOUS; SUBCUTANEOUS EVERY 8 HOURS SCHEDULED
Status: DISCONTINUED | OUTPATIENT
Start: 2021-10-19 | End: 2021-10-19

## 2021-10-19 RX ORDER — TAMSULOSIN HYDROCHLORIDE 0.4 MG/1
0.4 CAPSULE ORAL
Status: DISCONTINUED | OUTPATIENT
Start: 2021-10-19 | End: 2021-10-25 | Stop reason: HOSPADM

## 2021-10-19 RX ORDER — MAGNESIUM HYDROXIDE/ALUMINUM HYDROXICE/SIMETHICONE 120; 1200; 1200 MG/30ML; MG/30ML; MG/30ML
30 SUSPENSION ORAL EVERY 6 HOURS PRN
Status: DISCONTINUED | OUTPATIENT
Start: 2021-10-19 | End: 2021-10-25 | Stop reason: HOSPADM

## 2021-10-19 RX ORDER — METOCLOPRAMIDE HYDROCHLORIDE 5 MG/ML
5 INJECTION INTRAMUSCULAR; INTRAVENOUS 3 TIMES DAILY
Status: DISCONTINUED | OUTPATIENT
Start: 2021-10-19 | End: 2021-10-25 | Stop reason: HOSPADM

## 2021-10-19 RX ORDER — METOLAZONE 2.5 MG/1
2.5 TABLET ORAL 2 TIMES WEEKLY
COMMUNITY

## 2021-10-19 RX ORDER — PREGABALIN 75 MG/1
75 CAPSULE ORAL 2 TIMES DAILY
Status: DISCONTINUED | OUTPATIENT
Start: 2021-10-19 | End: 2021-10-25 | Stop reason: HOSPADM

## 2021-10-19 RX ORDER — POTASSIUM CHLORIDE 750 MG/1
20 TABLET, EXTENDED RELEASE ORAL DAILY
COMMUNITY

## 2021-10-19 RX ORDER — FINASTERIDE 5 MG/1
5 TABLET, FILM COATED ORAL DAILY
COMMUNITY

## 2021-10-19 RX ADMIN — PREGABALIN 75 MG: 75 CAPSULE ORAL at 10:06

## 2021-10-19 RX ADMIN — METOCLOPRAMIDE HYDROCHLORIDE 5 MG: 5 INJECTION INTRAMUSCULAR; INTRAVENOUS at 21:24

## 2021-10-19 RX ADMIN — DEXTROSE AND SODIUM CHLORIDE 100 ML/HR: 5; .45 INJECTION, SOLUTION INTRAVENOUS at 04:00

## 2021-10-19 RX ADMIN — PANTOPRAZOLE SODIUM 40 MG: 40 TABLET, DELAYED RELEASE ORAL at 05:36

## 2021-10-19 RX ADMIN — DEXTROSE MONOHYDRATE 25 ML: 25 INJECTION, SOLUTION INTRAVENOUS at 22:08

## 2021-10-19 RX ADMIN — POTASSIUM CHLORIDE 20 MEQ: 1500 TABLET, EXTENDED RELEASE ORAL at 10:06

## 2021-10-19 RX ADMIN — PREGABALIN 75 MG: 75 CAPSULE ORAL at 21:24

## 2021-10-19 RX ADMIN — METOCLOPRAMIDE HYDROCHLORIDE 5 MG: 5 INJECTION INTRAMUSCULAR; INTRAVENOUS at 16:46

## 2021-10-19 RX ADMIN — TRAZODONE HYDROCHLORIDE 50 MG: 50 TABLET ORAL at 21:24

## 2021-10-19 RX ADMIN — INSULIN GLARGINE 33 UNITS: 100 INJECTION, SOLUTION SUBCUTANEOUS at 10:07

## 2021-10-19 RX ADMIN — CARVEDILOL 25 MG: 12.5 TABLET, FILM COATED ORAL at 10:05

## 2021-10-19 RX ADMIN — FINASTERIDE 5 MG: 5 TABLET, FILM COATED ORAL at 10:06

## 2021-10-19 RX ADMIN — SODIUM CHLORIDE 75 ML/HR: 0.9 INJECTION, SOLUTION INTRAVENOUS at 16:45

## 2021-10-19 RX ADMIN — SERTRALINE HYDROCHLORIDE 100 MG: 100 TABLET ORAL at 10:07

## 2021-10-19 RX ADMIN — APIXABAN 5 MG: 5 TABLET, FILM COATED ORAL at 21:24

## 2021-10-19 RX ADMIN — APIXABAN 5 MG: 5 TABLET, FILM COATED ORAL at 10:06

## 2021-10-20 ENCOUNTER — APPOINTMENT (OUTPATIENT)
Dept: RADIOLOGY | Facility: HOSPITAL | Age: 76
DRG: 074 | End: 2021-10-20
Payer: COMMERCIAL

## 2021-10-20 LAB
ANION GAP SERPL CALCULATED.3IONS-SCNC: 10 MMOL/L (ref 4–13)
BASOPHILS # BLD AUTO: 0.06 THOUSANDS/ΜL (ref 0–0.1)
BASOPHILS NFR BLD AUTO: 1 % (ref 0–1)
BUN SERPL-MCNC: 16 MG/DL (ref 5–25)
CALCIUM SERPL-MCNC: 7.8 MG/DL (ref 8.3–10.1)
CHLORIDE SERPL-SCNC: 107 MMOL/L (ref 100–108)
CO2 SERPL-SCNC: 28 MMOL/L (ref 21–32)
CREAT SERPL-MCNC: 1.1 MG/DL (ref 0.6–1.3)
EOSINOPHIL # BLD AUTO: 0.1 THOUSAND/ΜL (ref 0–0.61)
EOSINOPHIL NFR BLD AUTO: 1 % (ref 0–6)
ERYTHROCYTE [DISTWIDTH] IN BLOOD BY AUTOMATED COUNT: 15.9 % (ref 11.6–15.1)
GFR SERPL CREATININE-BSD FRML MDRD: 66 ML/MIN/1.73SQ M
GLUCOSE P FAST SERPL-MCNC: 106 MG/DL (ref 65–99)
GLUCOSE SERPL-MCNC: 101 MG/DL (ref 65–140)
GLUCOSE SERPL-MCNC: 106 MG/DL (ref 65–140)
GLUCOSE SERPL-MCNC: 110 MG/DL (ref 65–140)
GLUCOSE SERPL-MCNC: 122 MG/DL (ref 65–140)
GLUCOSE SERPL-MCNC: 63 MG/DL (ref 65–140)
GLUCOSE SERPL-MCNC: 86 MG/DL (ref 65–140)
GLUCOSE SERPL-MCNC: 91 MG/DL (ref 65–140)
GLUCOSE SERPL-MCNC: 93 MG/DL (ref 65–140)
GLUCOSE SERPL-MCNC: 98 MG/DL (ref 65–140)
HCT VFR BLD AUTO: 35 % (ref 36.5–49.3)
HGB BLD-MCNC: 10.7 G/DL (ref 12–17)
IMM GRANULOCYTES # BLD AUTO: 0.08 THOUSAND/UL (ref 0–0.2)
IMM GRANULOCYTES NFR BLD AUTO: 1 % (ref 0–2)
LYMPHOCYTES # BLD AUTO: 1.27 THOUSANDS/ΜL (ref 0.6–4.47)
LYMPHOCYTES NFR BLD AUTO: 13 % (ref 14–44)
MCH RBC QN AUTO: 28.8 PG (ref 26.8–34.3)
MCHC RBC AUTO-ENTMCNC: 30.6 G/DL (ref 31.4–37.4)
MCV RBC AUTO: 94 FL (ref 82–98)
MONOCYTES # BLD AUTO: 1.05 THOUSAND/ΜL (ref 0.17–1.22)
MONOCYTES NFR BLD AUTO: 11 % (ref 4–12)
NEUTROPHILS # BLD AUTO: 7.36 THOUSANDS/ΜL (ref 1.85–7.62)
NEUTS SEG NFR BLD AUTO: 73 % (ref 43–75)
NRBC BLD AUTO-RTO: 0 /100 WBCS
PLATELET # BLD AUTO: 279 THOUSANDS/UL (ref 149–390)
PMV BLD AUTO: 9.3 FL (ref 8.9–12.7)
POTASSIUM SERPL-SCNC: 4.1 MMOL/L (ref 3.5–5.3)
RBC # BLD AUTO: 3.72 MILLION/UL (ref 3.88–5.62)
SODIUM SERPL-SCNC: 145 MMOL/L (ref 136–145)
WBC # BLD AUTO: 9.92 THOUSAND/UL (ref 4.31–10.16)

## 2021-10-20 PROCEDURE — 85025 COMPLETE CBC W/AUTO DIFF WBC: CPT | Performed by: INTERNAL MEDICINE

## 2021-10-20 PROCEDURE — 99214 OFFICE O/P EST MOD 30 MIN: CPT | Performed by: INTERNAL MEDICINE

## 2021-10-20 PROCEDURE — 80048 BASIC METABOLIC PNL TOTAL CA: CPT | Performed by: INTERNAL MEDICINE

## 2021-10-20 PROCEDURE — 74018 RADEX ABDOMEN 1 VIEW: CPT

## 2021-10-20 PROCEDURE — 99225 PR SBSQ OBSERVATION CARE/DAY 25 MINUTES: CPT | Performed by: INTERNAL MEDICINE

## 2021-10-20 PROCEDURE — 82948 REAGENT STRIP/BLOOD GLUCOSE: CPT

## 2021-10-20 PROCEDURE — C9113 INJ PANTOPRAZOLE SODIUM, VIA: HCPCS | Performed by: INTERNAL MEDICINE

## 2021-10-20 RX ORDER — PANTOPRAZOLE SODIUM 40 MG/1
40 INJECTION, POWDER, FOR SOLUTION INTRAVENOUS
Status: DISCONTINUED | OUTPATIENT
Start: 2021-10-20 | End: 2021-10-25 | Stop reason: HOSPADM

## 2021-10-20 RX ORDER — DEXTROSE MONOHYDRATE 50 MG/ML
75 INJECTION, SOLUTION INTRAVENOUS CONTINUOUS
Status: DISCONTINUED | OUTPATIENT
Start: 2021-10-20 | End: 2021-10-21

## 2021-10-20 RX ORDER — DEXTROSE MONOHYDRATE 25 G/50ML
INJECTION, SOLUTION INTRAVENOUS
Status: COMPLETED
Start: 2021-10-20 | End: 2021-10-20

## 2021-10-20 RX ADMIN — METOCLOPRAMIDE HYDROCHLORIDE 5 MG: 5 INJECTION INTRAMUSCULAR; INTRAVENOUS at 08:38

## 2021-10-20 RX ADMIN — DEXTROSE 75 ML/HR: 5 SOLUTION INTRAVENOUS at 05:32

## 2021-10-20 RX ADMIN — METOCLOPRAMIDE HYDROCHLORIDE 5 MG: 5 INJECTION INTRAMUSCULAR; INTRAVENOUS at 16:51

## 2021-10-20 RX ADMIN — DEXTROSE MONOHYDRATE 25 ML: 500 INJECTION PARENTERAL at 04:53

## 2021-10-20 RX ADMIN — PANTOPRAZOLE SODIUM 40 MG: 40 INJECTION, POWDER, FOR SOLUTION INTRAVENOUS at 08:38

## 2021-10-20 RX ADMIN — METOCLOPRAMIDE HYDROCHLORIDE 5 MG: 5 INJECTION INTRAMUSCULAR; INTRAVENOUS at 22:12

## 2021-10-20 RX ADMIN — DEXTROSE 75 ML/HR: 5 SOLUTION INTRAVENOUS at 12:27

## 2021-10-21 LAB
ANION GAP SERPL CALCULATED.3IONS-SCNC: 9 MMOL/L (ref 4–13)
BASOPHILS # BLD AUTO: 0.04 THOUSANDS/ΜL (ref 0–0.1)
BASOPHILS NFR BLD AUTO: 0 % (ref 0–1)
BUN SERPL-MCNC: 12 MG/DL (ref 5–25)
CALCIUM SERPL-MCNC: 8.2 MG/DL (ref 8.3–10.1)
CHLORIDE SERPL-SCNC: 106 MMOL/L (ref 100–108)
CO2 SERPL-SCNC: 28 MMOL/L (ref 21–32)
CREAT SERPL-MCNC: 1.09 MG/DL (ref 0.6–1.3)
EOSINOPHIL # BLD AUTO: 0.11 THOUSAND/ΜL (ref 0–0.61)
EOSINOPHIL NFR BLD AUTO: 1 % (ref 0–6)
ERYTHROCYTE [DISTWIDTH] IN BLOOD BY AUTOMATED COUNT: 15.9 % (ref 11.6–15.1)
GFR SERPL CREATININE-BSD FRML MDRD: 67 ML/MIN/1.73SQ M
GLUCOSE SERPL-MCNC: 103 MG/DL (ref 65–140)
GLUCOSE SERPL-MCNC: 104 MG/DL (ref 65–140)
GLUCOSE SERPL-MCNC: 117 MG/DL (ref 65–140)
GLUCOSE SERPL-MCNC: 117 MG/DL (ref 65–140)
GLUCOSE SERPL-MCNC: 147 MG/DL (ref 65–140)
GLUCOSE SERPL-MCNC: 183 MG/DL (ref 65–140)
HCT VFR BLD AUTO: 32.3 % (ref 36.5–49.3)
HGB BLD-MCNC: 9.9 G/DL (ref 12–17)
IMM GRANULOCYTES # BLD AUTO: 0.07 THOUSAND/UL (ref 0–0.2)
IMM GRANULOCYTES NFR BLD AUTO: 1 % (ref 0–2)
LYMPHOCYTES # BLD AUTO: 1.03 THOUSANDS/ΜL (ref 0.6–4.47)
LYMPHOCYTES NFR BLD AUTO: 9 % (ref 14–44)
MCH RBC QN AUTO: 28.4 PG (ref 26.8–34.3)
MCHC RBC AUTO-ENTMCNC: 30.7 G/DL (ref 31.4–37.4)
MCV RBC AUTO: 93 FL (ref 82–98)
MONOCYTES # BLD AUTO: 1.14 THOUSAND/ΜL (ref 0.17–1.22)
MONOCYTES NFR BLD AUTO: 10 % (ref 4–12)
NEUTROPHILS # BLD AUTO: 8.57 THOUSANDS/ΜL (ref 1.85–7.62)
NEUTS SEG NFR BLD AUTO: 79 % (ref 43–75)
NRBC BLD AUTO-RTO: 0 /100 WBCS
PLATELET # BLD AUTO: 288 THOUSANDS/UL (ref 149–390)
PMV BLD AUTO: 9.7 FL (ref 8.9–12.7)
POTASSIUM SERPL-SCNC: 3.6 MMOL/L (ref 3.5–5.3)
RBC # BLD AUTO: 3.49 MILLION/UL (ref 3.88–5.62)
SODIUM SERPL-SCNC: 143 MMOL/L (ref 136–145)
WBC # BLD AUTO: 10.96 THOUSAND/UL (ref 4.31–10.16)

## 2021-10-21 PROCEDURE — 99225 PR SBSQ OBSERVATION CARE/DAY 25 MINUTES: CPT | Performed by: INTERNAL MEDICINE

## 2021-10-21 PROCEDURE — 97167 OT EVAL HIGH COMPLEX 60 MIN: CPT

## 2021-10-21 PROCEDURE — C9113 INJ PANTOPRAZOLE SODIUM, VIA: HCPCS | Performed by: INTERNAL MEDICINE

## 2021-10-21 PROCEDURE — 80048 BASIC METABOLIC PNL TOTAL CA: CPT | Performed by: INTERNAL MEDICINE

## 2021-10-21 PROCEDURE — 97163 PT EVAL HIGH COMPLEX 45 MIN: CPT

## 2021-10-21 PROCEDURE — 85025 COMPLETE CBC W/AUTO DIFF WBC: CPT | Performed by: INTERNAL MEDICINE

## 2021-10-21 PROCEDURE — 99214 OFFICE O/P EST MOD 30 MIN: CPT | Performed by: INTERNAL MEDICINE

## 2021-10-21 PROCEDURE — 82948 REAGENT STRIP/BLOOD GLUCOSE: CPT

## 2021-10-21 RX ADMIN — POTASSIUM CHLORIDE 20 MEQ: 1500 TABLET, EXTENDED RELEASE ORAL at 10:01

## 2021-10-21 RX ADMIN — METOCLOPRAMIDE HYDROCHLORIDE 5 MG: 5 INJECTION INTRAMUSCULAR; INTRAVENOUS at 18:12

## 2021-10-21 RX ADMIN — SERTRALINE HYDROCHLORIDE 100 MG: 100 TABLET ORAL at 10:01

## 2021-10-21 RX ADMIN — METOCLOPRAMIDE HYDROCHLORIDE 5 MG: 5 INJECTION INTRAMUSCULAR; INTRAVENOUS at 09:01

## 2021-10-21 RX ADMIN — TAMSULOSIN HYDROCHLORIDE 0.4 MG: 0.4 CAPSULE ORAL at 18:12

## 2021-10-21 RX ADMIN — PREGABALIN 75 MG: 75 CAPSULE ORAL at 10:01

## 2021-10-21 RX ADMIN — PREGABALIN 75 MG: 75 CAPSULE ORAL at 22:05

## 2021-10-21 RX ADMIN — CARVEDILOL 25 MG: 12.5 TABLET, FILM COATED ORAL at 11:18

## 2021-10-21 RX ADMIN — METOCLOPRAMIDE HYDROCHLORIDE 5 MG: 5 INJECTION INTRAMUSCULAR; INTRAVENOUS at 22:05

## 2021-10-21 RX ADMIN — INSULIN LISPRO 1 UNITS: 100 INJECTION, SOLUTION INTRAVENOUS; SUBCUTANEOUS at 22:07

## 2021-10-21 RX ADMIN — FINASTERIDE 5 MG: 5 TABLET, FILM COATED ORAL at 10:00

## 2021-10-21 RX ADMIN — TRAZODONE HYDROCHLORIDE 50 MG: 50 TABLET ORAL at 22:05

## 2021-10-21 RX ADMIN — APIXABAN 5 MG: 5 TABLET, FILM COATED ORAL at 18:12

## 2021-10-21 RX ADMIN — CARVEDILOL 25 MG: 12.5 TABLET, FILM COATED ORAL at 18:11

## 2021-10-21 RX ADMIN — APIXABAN 5 MG: 5 TABLET, FILM COATED ORAL at 10:00

## 2021-10-21 RX ADMIN — PANTOPRAZOLE SODIUM 40 MG: 40 INJECTION, POWDER, FOR SOLUTION INTRAVENOUS at 09:01

## 2021-10-22 LAB
ANION GAP SERPL CALCULATED.3IONS-SCNC: 9 MMOL/L (ref 4–13)
BASOPHILS # BLD AUTO: 0.03 THOUSANDS/ΜL (ref 0–0.1)
BASOPHILS NFR BLD AUTO: 0 % (ref 0–1)
BUN SERPL-MCNC: 11 MG/DL (ref 5–25)
CALCIUM SERPL-MCNC: 8 MG/DL (ref 8.3–10.1)
CHLORIDE SERPL-SCNC: 109 MMOL/L (ref 100–108)
CO2 SERPL-SCNC: 27 MMOL/L (ref 21–32)
CREAT SERPL-MCNC: 1.08 MG/DL (ref 0.6–1.3)
EOSINOPHIL # BLD AUTO: 0.11 THOUSAND/ΜL (ref 0–0.61)
EOSINOPHIL NFR BLD AUTO: 1 % (ref 0–6)
ERYTHROCYTE [DISTWIDTH] IN BLOOD BY AUTOMATED COUNT: 16.1 % (ref 11.6–15.1)
GFR SERPL CREATININE-BSD FRML MDRD: 68 ML/MIN/1.73SQ M
GLUCOSE SERPL-MCNC: 129 MG/DL (ref 65–140)
GLUCOSE SERPL-MCNC: 129 MG/DL (ref 65–140)
GLUCOSE SERPL-MCNC: 134 MG/DL (ref 65–140)
GLUCOSE SERPL-MCNC: 194 MG/DL (ref 65–140)
GLUCOSE SERPL-MCNC: 261 MG/DL (ref 65–140)
HCT VFR BLD AUTO: 31.7 % (ref 36.5–49.3)
HGB BLD-MCNC: 9.6 G/DL (ref 12–17)
IMM GRANULOCYTES # BLD AUTO: 0.06 THOUSAND/UL (ref 0–0.2)
IMM GRANULOCYTES NFR BLD AUTO: 1 % (ref 0–2)
LYMPHOCYTES # BLD AUTO: 1.2 THOUSANDS/ΜL (ref 0.6–4.47)
LYMPHOCYTES NFR BLD AUTO: 14 % (ref 14–44)
MCH RBC QN AUTO: 28.5 PG (ref 26.8–34.3)
MCHC RBC AUTO-ENTMCNC: 30.3 G/DL (ref 31.4–37.4)
MCV RBC AUTO: 94 FL (ref 82–98)
MONOCYTES # BLD AUTO: 0.96 THOUSAND/ΜL (ref 0.17–1.22)
MONOCYTES NFR BLD AUTO: 11 % (ref 4–12)
NEUTROPHILS # BLD AUTO: 6.3 THOUSANDS/ΜL (ref 1.85–7.62)
NEUTS SEG NFR BLD AUTO: 73 % (ref 43–75)
NRBC BLD AUTO-RTO: 0 /100 WBCS
PLATELET # BLD AUTO: 261 THOUSANDS/UL (ref 149–390)
PMV BLD AUTO: 9.7 FL (ref 8.9–12.7)
POTASSIUM SERPL-SCNC: 3.8 MMOL/L (ref 3.5–5.3)
RBC # BLD AUTO: 3.37 MILLION/UL (ref 3.88–5.62)
SODIUM SERPL-SCNC: 145 MMOL/L (ref 136–145)
WBC # BLD AUTO: 8.66 THOUSAND/UL (ref 4.31–10.16)

## 2021-10-22 PROCEDURE — 82948 REAGENT STRIP/BLOOD GLUCOSE: CPT

## 2021-10-22 PROCEDURE — 99232 SBSQ HOSP IP/OBS MODERATE 35: CPT | Performed by: INTERNAL MEDICINE

## 2021-10-22 PROCEDURE — 85025 COMPLETE CBC W/AUTO DIFF WBC: CPT | Performed by: INTERNAL MEDICINE

## 2021-10-22 PROCEDURE — 80048 BASIC METABOLIC PNL TOTAL CA: CPT | Performed by: INTERNAL MEDICINE

## 2021-10-22 PROCEDURE — C9113 INJ PANTOPRAZOLE SODIUM, VIA: HCPCS | Performed by: INTERNAL MEDICINE

## 2021-10-22 RX ADMIN — SERTRALINE HYDROCHLORIDE 100 MG: 100 TABLET ORAL at 09:48

## 2021-10-22 RX ADMIN — TAMSULOSIN HYDROCHLORIDE 0.4 MG: 0.4 CAPSULE ORAL at 17:37

## 2021-10-22 RX ADMIN — POTASSIUM CHLORIDE 20 MEQ: 1500 TABLET, EXTENDED RELEASE ORAL at 09:48

## 2021-10-22 RX ADMIN — INSULIN LISPRO 2 UNITS: 100 INJECTION, SOLUTION INTRAVENOUS; SUBCUTANEOUS at 17:38

## 2021-10-22 RX ADMIN — TRAZODONE HYDROCHLORIDE 50 MG: 50 TABLET ORAL at 22:23

## 2021-10-22 RX ADMIN — PANTOPRAZOLE SODIUM 40 MG: 40 INJECTION, POWDER, FOR SOLUTION INTRAVENOUS at 09:49

## 2021-10-22 RX ADMIN — APIXABAN 5 MG: 5 TABLET, FILM COATED ORAL at 09:48

## 2021-10-22 RX ADMIN — APIXABAN 5 MG: 5 TABLET, FILM COATED ORAL at 17:37

## 2021-10-22 RX ADMIN — METOCLOPRAMIDE HYDROCHLORIDE 5 MG: 5 INJECTION INTRAMUSCULAR; INTRAVENOUS at 17:37

## 2021-10-22 RX ADMIN — CARVEDILOL 25 MG: 12.5 TABLET, FILM COATED ORAL at 17:37

## 2021-10-22 RX ADMIN — PREGABALIN 75 MG: 75 CAPSULE ORAL at 22:23

## 2021-10-22 RX ADMIN — METOCLOPRAMIDE HYDROCHLORIDE 5 MG: 5 INJECTION INTRAMUSCULAR; INTRAVENOUS at 22:23

## 2021-10-22 RX ADMIN — PREGABALIN 75 MG: 75 CAPSULE ORAL at 09:49

## 2021-10-22 RX ADMIN — METOCLOPRAMIDE HYDROCHLORIDE 5 MG: 5 INJECTION INTRAMUSCULAR; INTRAVENOUS at 09:49

## 2021-10-22 RX ADMIN — CARVEDILOL 25 MG: 12.5 TABLET, FILM COATED ORAL at 09:48

## 2021-10-22 RX ADMIN — INSULIN LISPRO 1 UNITS: 100 INJECTION, SOLUTION INTRAVENOUS; SUBCUTANEOUS at 22:23

## 2021-10-22 RX ADMIN — FINASTERIDE 5 MG: 5 TABLET, FILM COATED ORAL at 09:48

## 2021-10-23 PROBLEM — N18.2 STAGE 2 CHRONIC KIDNEY DISEASE: Status: ACTIVE | Noted: 2019-01-12

## 2021-10-23 LAB
ANION GAP SERPL CALCULATED.3IONS-SCNC: 10 MMOL/L (ref 4–13)
BASOPHILS # BLD AUTO: 0.04 THOUSANDS/ΜL (ref 0–0.1)
BASOPHILS NFR BLD AUTO: 0 % (ref 0–1)
BUN SERPL-MCNC: 18 MG/DL (ref 5–25)
CALCIUM SERPL-MCNC: 8.2 MG/DL (ref 8.3–10.1)
CHLORIDE SERPL-SCNC: 108 MMOL/L (ref 100–108)
CO2 SERPL-SCNC: 26 MMOL/L (ref 21–32)
CREAT SERPL-MCNC: 1.29 MG/DL (ref 0.6–1.3)
EOSINOPHIL # BLD AUTO: 0.1 THOUSAND/ΜL (ref 0–0.61)
EOSINOPHIL NFR BLD AUTO: 1 % (ref 0–6)
ERYTHROCYTE [DISTWIDTH] IN BLOOD BY AUTOMATED COUNT: 16 % (ref 11.6–15.1)
GFR SERPL CREATININE-BSD FRML MDRD: 55 ML/MIN/1.73SQ M
GLUCOSE SERPL-MCNC: 138 MG/DL (ref 65–140)
GLUCOSE SERPL-MCNC: 150 MG/DL (ref 65–140)
GLUCOSE SERPL-MCNC: 153 MG/DL (ref 65–140)
GLUCOSE SERPL-MCNC: 158 MG/DL (ref 65–140)
GLUCOSE SERPL-MCNC: 207 MG/DL (ref 65–140)
HCT VFR BLD AUTO: 32.2 % (ref 36.5–49.3)
HGB BLD-MCNC: 9.9 G/DL (ref 12–17)
IMM GRANULOCYTES # BLD AUTO: 0.08 THOUSAND/UL (ref 0–0.2)
IMM GRANULOCYTES NFR BLD AUTO: 1 % (ref 0–2)
LYMPHOCYTES # BLD AUTO: 1.39 THOUSANDS/ΜL (ref 0.6–4.47)
LYMPHOCYTES NFR BLD AUTO: 13 % (ref 14–44)
MCH RBC QN AUTO: 28.9 PG (ref 26.8–34.3)
MCHC RBC AUTO-ENTMCNC: 30.7 G/DL (ref 31.4–37.4)
MCV RBC AUTO: 94 FL (ref 82–98)
MONOCYTES # BLD AUTO: 1.23 THOUSAND/ΜL (ref 0.17–1.22)
MONOCYTES NFR BLD AUTO: 12 % (ref 4–12)
NEUTROPHILS # BLD AUTO: 7.64 THOUSANDS/ΜL (ref 1.85–7.62)
NEUTS SEG NFR BLD AUTO: 73 % (ref 43–75)
NRBC BLD AUTO-RTO: 0 /100 WBCS
PLATELET # BLD AUTO: 273 THOUSANDS/UL (ref 149–390)
PMV BLD AUTO: 10.1 FL (ref 8.9–12.7)
POTASSIUM SERPL-SCNC: 4.1 MMOL/L (ref 3.5–5.3)
RBC # BLD AUTO: 3.43 MILLION/UL (ref 3.88–5.62)
SODIUM SERPL-SCNC: 144 MMOL/L (ref 136–145)
WBC # BLD AUTO: 10.48 THOUSAND/UL (ref 4.31–10.16)

## 2021-10-23 PROCEDURE — 99232 SBSQ HOSP IP/OBS MODERATE 35: CPT | Performed by: INTERNAL MEDICINE

## 2021-10-23 PROCEDURE — 85025 COMPLETE CBC W/AUTO DIFF WBC: CPT | Performed by: INTERNAL MEDICINE

## 2021-10-23 PROCEDURE — 80048 BASIC METABOLIC PNL TOTAL CA: CPT | Performed by: INTERNAL MEDICINE

## 2021-10-23 PROCEDURE — C9113 INJ PANTOPRAZOLE SODIUM, VIA: HCPCS | Performed by: INTERNAL MEDICINE

## 2021-10-23 PROCEDURE — 82948 REAGENT STRIP/BLOOD GLUCOSE: CPT

## 2021-10-23 RX ORDER — POLYETHYLENE GLYCOL 3350 17 G/17G
17 POWDER, FOR SOLUTION ORAL DAILY
Status: DISCONTINUED | OUTPATIENT
Start: 2021-10-23 | End: 2021-10-25 | Stop reason: HOSPADM

## 2021-10-23 RX ORDER — SENNOSIDES 8.6 MG
2 TABLET ORAL
Status: DISCONTINUED | OUTPATIENT
Start: 2021-10-23 | End: 2021-10-24

## 2021-10-23 RX ADMIN — PREGABALIN 75 MG: 75 CAPSULE ORAL at 08:26

## 2021-10-23 RX ADMIN — METOCLOPRAMIDE HYDROCHLORIDE 5 MG: 5 INJECTION INTRAMUSCULAR; INTRAVENOUS at 08:26

## 2021-10-23 RX ADMIN — TAMSULOSIN HYDROCHLORIDE 0.4 MG: 0.4 CAPSULE ORAL at 15:39

## 2021-10-23 RX ADMIN — SERTRALINE HYDROCHLORIDE 100 MG: 100 TABLET ORAL at 08:26

## 2021-10-23 RX ADMIN — SENNOSIDES 17.2 MG: 8.6 TABLET, FILM COATED ORAL at 22:48

## 2021-10-23 RX ADMIN — METOCLOPRAMIDE HYDROCHLORIDE 5 MG: 5 INJECTION INTRAMUSCULAR; INTRAVENOUS at 22:47

## 2021-10-23 RX ADMIN — APIXABAN 5 MG: 5 TABLET, FILM COATED ORAL at 17:12

## 2021-10-23 RX ADMIN — POTASSIUM CHLORIDE 20 MEQ: 1500 TABLET, EXTENDED RELEASE ORAL at 08:26

## 2021-10-23 RX ADMIN — METOCLOPRAMIDE HYDROCHLORIDE 5 MG: 5 INJECTION INTRAMUSCULAR; INTRAVENOUS at 15:39

## 2021-10-23 RX ADMIN — CARVEDILOL 25 MG: 12.5 TABLET, FILM COATED ORAL at 15:39

## 2021-10-23 RX ADMIN — INSULIN LISPRO 1 UNITS: 100 INJECTION, SOLUTION INTRAVENOUS; SUBCUTANEOUS at 17:13

## 2021-10-23 RX ADMIN — POLYETHYLENE GLYCOL 3350 17 G: 17 POWDER, FOR SOLUTION ORAL at 15:39

## 2021-10-23 RX ADMIN — CARVEDILOL 25 MG: 12.5 TABLET, FILM COATED ORAL at 08:26

## 2021-10-23 RX ADMIN — INSULIN LISPRO 2 UNITS: 100 INJECTION, SOLUTION INTRAVENOUS; SUBCUTANEOUS at 13:01

## 2021-10-23 RX ADMIN — FINASTERIDE 5 MG: 5 TABLET, FILM COATED ORAL at 08:26

## 2021-10-23 RX ADMIN — PANTOPRAZOLE SODIUM 40 MG: 40 INJECTION, POWDER, FOR SOLUTION INTRAVENOUS at 08:26

## 2021-10-23 RX ADMIN — APIXABAN 5 MG: 5 TABLET, FILM COATED ORAL at 08:26

## 2021-10-23 RX ADMIN — PREGABALIN 75 MG: 75 CAPSULE ORAL at 22:48

## 2021-10-23 RX ADMIN — TRAZODONE HYDROCHLORIDE 50 MG: 50 TABLET ORAL at 22:48

## 2021-10-24 LAB
BACTERIA BLD CULT: NORMAL
BACTERIA BLD CULT: NORMAL
GLUCOSE SERPL-MCNC: 123 MG/DL (ref 65–140)
GLUCOSE SERPL-MCNC: 160 MG/DL (ref 65–140)
GLUCOSE SERPL-MCNC: 179 MG/DL (ref 65–140)
GLUCOSE SERPL-MCNC: 247 MG/DL (ref 65–140)

## 2021-10-24 PROCEDURE — 99232 SBSQ HOSP IP/OBS MODERATE 35: CPT | Performed by: INTERNAL MEDICINE

## 2021-10-24 PROCEDURE — C9113 INJ PANTOPRAZOLE SODIUM, VIA: HCPCS | Performed by: INTERNAL MEDICINE

## 2021-10-24 PROCEDURE — 82948 REAGENT STRIP/BLOOD GLUCOSE: CPT

## 2021-10-24 RX ORDER — METOLAZONE 5 MG/1
2.5 TABLET ORAL 2 TIMES WEEKLY
Status: DISCONTINUED | OUTPATIENT
Start: 2021-10-25 | End: 2021-10-25 | Stop reason: HOSPADM

## 2021-10-24 RX ORDER — ALPRAZOLAM 0.25 MG/1
0.25 TABLET ORAL DAILY PRN
Status: DISCONTINUED | OUTPATIENT
Start: 2021-10-24 | End: 2021-10-25 | Stop reason: HOSPADM

## 2021-10-24 RX ORDER — LISINOPRIL 10 MG/1
10 TABLET ORAL DAILY
Status: DISCONTINUED | OUTPATIENT
Start: 2021-10-24 | End: 2021-10-25 | Stop reason: HOSPADM

## 2021-10-24 RX ADMIN — TAMSULOSIN HYDROCHLORIDE 0.4 MG: 0.4 CAPSULE ORAL at 16:53

## 2021-10-24 RX ADMIN — SERTRALINE HYDROCHLORIDE 100 MG: 100 TABLET ORAL at 08:42

## 2021-10-24 RX ADMIN — TRAZODONE HYDROCHLORIDE 50 MG: 50 TABLET ORAL at 21:45

## 2021-10-24 RX ADMIN — METOCLOPRAMIDE HYDROCHLORIDE 5 MG: 5 INJECTION INTRAMUSCULAR; INTRAVENOUS at 08:43

## 2021-10-24 RX ADMIN — APIXABAN 5 MG: 5 TABLET, FILM COATED ORAL at 18:02

## 2021-10-24 RX ADMIN — PREGABALIN 75 MG: 75 CAPSULE ORAL at 08:40

## 2021-10-24 RX ADMIN — PANTOPRAZOLE SODIUM 40 MG: 40 INJECTION, POWDER, FOR SOLUTION INTRAVENOUS at 08:43

## 2021-10-24 RX ADMIN — INSULIN LISPRO 3 UNITS: 100 INJECTION, SOLUTION INTRAVENOUS; SUBCUTANEOUS at 11:41

## 2021-10-24 RX ADMIN — LISINOPRIL 10 MG: 10 TABLET ORAL at 14:18

## 2021-10-24 RX ADMIN — INSULIN LISPRO 1 UNITS: 100 INJECTION, SOLUTION INTRAVENOUS; SUBCUTANEOUS at 16:54

## 2021-10-24 RX ADMIN — METOCLOPRAMIDE HYDROCHLORIDE 5 MG: 5 INJECTION INTRAMUSCULAR; INTRAVENOUS at 21:45

## 2021-10-24 RX ADMIN — APIXABAN 5 MG: 5 TABLET, FILM COATED ORAL at 08:42

## 2021-10-24 RX ADMIN — FINASTERIDE 5 MG: 5 TABLET, FILM COATED ORAL at 08:42

## 2021-10-24 RX ADMIN — METOCLOPRAMIDE HYDROCHLORIDE 5 MG: 5 INJECTION INTRAMUSCULAR; INTRAVENOUS at 16:54

## 2021-10-24 RX ADMIN — FUROSEMIDE 60 MG: 40 TABLET ORAL at 14:18

## 2021-10-24 RX ADMIN — INSULIN LISPRO 1 UNITS: 100 INJECTION, SOLUTION INTRAVENOUS; SUBCUTANEOUS at 22:45

## 2021-10-24 RX ADMIN — PREGABALIN 75 MG: 75 CAPSULE ORAL at 21:45

## 2021-10-24 RX ADMIN — CARVEDILOL 25 MG: 12.5 TABLET, FILM COATED ORAL at 16:52

## 2021-10-24 RX ADMIN — POTASSIUM CHLORIDE 20 MEQ: 1500 TABLET, EXTENDED RELEASE ORAL at 08:43

## 2021-10-25 VITALS
RESPIRATION RATE: 18 BRPM | DIASTOLIC BLOOD PRESSURE: 86 MMHG | HEART RATE: 51 BPM | BODY MASS INDEX: 31.44 KG/M2 | OXYGEN SATURATION: 98 % | HEIGHT: 74 IN | SYSTOLIC BLOOD PRESSURE: 168 MMHG | TEMPERATURE: 97.5 F | WEIGHT: 245 LBS

## 2021-10-25 LAB
FLUAV RNA RESP QL NAA+PROBE: NEGATIVE
FLUBV RNA RESP QL NAA+PROBE: NEGATIVE
GLUCOSE SERPL-MCNC: 129 MG/DL (ref 65–140)
GLUCOSE SERPL-MCNC: 166 MG/DL (ref 65–140)
RSV RNA RESP QL NAA+PROBE: NEGATIVE
SARS-COV-2 RNA RESP QL NAA+PROBE: NEGATIVE

## 2021-10-25 PROCEDURE — 0241U HB NFCT DS VIR RESP RNA 4 TRGT: CPT | Performed by: INTERNAL MEDICINE

## 2021-10-25 PROCEDURE — C9113 INJ PANTOPRAZOLE SODIUM, VIA: HCPCS | Performed by: INTERNAL MEDICINE

## 2021-10-25 PROCEDURE — 82948 REAGENT STRIP/BLOOD GLUCOSE: CPT

## 2021-10-25 PROCEDURE — 99239 HOSP IP/OBS DSCHRG MGMT >30: CPT | Performed by: INTERNAL MEDICINE

## 2021-10-25 RX ORDER — METOCLOPRAMIDE 5 MG/1
5 TABLET ORAL 3 TIMES DAILY
Refills: 0
Start: 2021-10-25

## 2021-10-25 RX ORDER — POLYETHYLENE GLYCOL 3350 17 G/17G
17 POWDER, FOR SOLUTION ORAL DAILY
Refills: 0
Start: 2021-10-26

## 2021-10-25 RX ADMIN — POTASSIUM CHLORIDE 20 MEQ: 1500 TABLET, EXTENDED RELEASE ORAL at 08:29

## 2021-10-25 RX ADMIN — CARVEDILOL 25 MG: 12.5 TABLET, FILM COATED ORAL at 08:30

## 2021-10-25 RX ADMIN — PANTOPRAZOLE SODIUM 40 MG: 40 INJECTION, POWDER, FOR SOLUTION INTRAVENOUS at 08:29

## 2021-10-25 RX ADMIN — SERTRALINE HYDROCHLORIDE 100 MG: 100 TABLET ORAL at 08:29

## 2021-10-25 RX ADMIN — METOLAZONE 2.5 MG: 5 TABLET ORAL at 09:05

## 2021-10-25 RX ADMIN — FINASTERIDE 5 MG: 5 TABLET, FILM COATED ORAL at 08:30

## 2021-10-25 RX ADMIN — LISINOPRIL 10 MG: 10 TABLET ORAL at 08:30

## 2021-10-25 RX ADMIN — FUROSEMIDE 60 MG: 40 TABLET ORAL at 08:30

## 2021-10-25 RX ADMIN — APIXABAN 5 MG: 5 TABLET, FILM COATED ORAL at 08:29

## 2021-10-25 RX ADMIN — PREGABALIN 75 MG: 75 CAPSULE ORAL at 08:29

## 2021-10-25 RX ADMIN — INSULIN LISPRO 1 UNITS: 100 INJECTION, SOLUTION INTRAVENOUS; SUBCUTANEOUS at 12:30

## 2021-10-25 RX ADMIN — METOCLOPRAMIDE HYDROCHLORIDE 5 MG: 5 INJECTION INTRAMUSCULAR; INTRAVENOUS at 08:29

## 2024-02-18 ENCOUNTER — APPOINTMENT (EMERGENCY)
Dept: RADIOLOGY | Facility: HOSPITAL | Age: 79
DRG: 194 | End: 2024-02-18
Payer: COMMERCIAL

## 2024-02-18 ENCOUNTER — HOSPITAL ENCOUNTER (INPATIENT)
Facility: HOSPITAL | Age: 79
LOS: 3 days | Discharge: NURSING, REHAB OR LONG-TERM CARE | DRG: 194 | End: 2024-02-21
Attending: EMERGENCY MEDICINE | Admitting: INTERNAL MEDICINE
Payer: COMMERCIAL

## 2024-02-18 DIAGNOSIS — J18.9 PNEUMONIA: Primary | ICD-10-CM

## 2024-02-18 DIAGNOSIS — R09.02 HYPOXIA: ICD-10-CM

## 2024-02-18 PROBLEM — R06.89 ACUTE RESPIRATORY INSUFFICIENCY: Status: ACTIVE | Noted: 2024-02-18

## 2024-02-18 PROBLEM — I50.22 CHRONIC SYSTOLIC CHF (CONGESTIVE HEART FAILURE) (HCC): Status: ACTIVE | Noted: 2024-02-18

## 2024-02-18 LAB
ANION GAP SERPL CALCULATED.3IONS-SCNC: 8 MMOL/L
ATRIAL RATE: 108 BPM
BASE EXCESS BLDA CALC-SCNC: 4 MMOL/L (ref -2–3)
BASOPHILS # BLD AUTO: 0.01 THOUSANDS/ÂΜL (ref 0–0.1)
BASOPHILS NFR BLD AUTO: 0 % (ref 0–1)
BNP SERPL-MCNC: 79 PG/ML (ref 0–100)
BUN SERPL-MCNC: 33 MG/DL (ref 5–25)
CA-I BLD-SCNC: 1.16 MMOL/L (ref 1.12–1.32)
CALCIUM SERPL-MCNC: 8.3 MG/DL (ref 8.4–10.2)
CHLORIDE SERPL-SCNC: 101 MMOL/L (ref 96–108)
CO2 SERPL-SCNC: 32 MMOL/L (ref 21–32)
CREAT SERPL-MCNC: 1.09 MG/DL (ref 0.6–1.3)
EOSINOPHIL # BLD AUTO: 0.05 THOUSAND/ÂΜL (ref 0–0.61)
EOSINOPHIL NFR BLD AUTO: 1 % (ref 0–6)
ERYTHROCYTE [DISTWIDTH] IN BLOOD BY AUTOMATED COUNT: 17 % (ref 11.6–15.1)
FIO2 GAS DIL.REBREATH: 36 L
FLUAV RNA RESP QL NAA+PROBE: POSITIVE
FLUBV RNA RESP QL NAA+PROBE: NEGATIVE
GFR SERPL CREATININE-BSD FRML MDRD: 65 ML/MIN/1.73SQ M
GLUCOSE SERPL-MCNC: 135 MG/DL (ref 65–140)
GLUCOSE SERPL-MCNC: 152 MG/DL (ref 65–140)
GLUCOSE SERPL-MCNC: 159 MG/DL (ref 65–140)
GLUCOSE SERPL-MCNC: 192 MG/DL (ref 65–140)
HCO3 BLDA-SCNC: 28.8 MMOL/L (ref 22–28)
HCT VFR BLD AUTO: 37.5 % (ref 36.5–49.3)
HCT VFR BLD CALC: 33 % (ref 36.5–49.3)
HGB BLD-MCNC: 11.4 G/DL (ref 12–17)
HGB BLDA-MCNC: 11.2 G/DL (ref 12–17)
IMM GRANULOCYTES # BLD AUTO: 0.05 THOUSAND/UL (ref 0–0.2)
IMM GRANULOCYTES NFR BLD AUTO: 1 % (ref 0–2)
LACTATE SERPL-SCNC: 1.5 MMOL/L (ref 0.5–2)
LACTATE SERPL-SCNC: 2.3 MMOL/L (ref 0.5–2)
LYMPHOCYTES # BLD AUTO: 0.47 THOUSANDS/ÂΜL (ref 0.6–4.47)
LYMPHOCYTES NFR BLD AUTO: 5 % (ref 14–44)
MCH RBC QN AUTO: 26 PG (ref 26.8–34.3)
MCHC RBC AUTO-ENTMCNC: 30.4 G/DL (ref 31.4–37.4)
MCV RBC AUTO: 86 FL (ref 82–98)
MONOCYTES # BLD AUTO: 1.02 THOUSAND/ÂΜL (ref 0.17–1.22)
MONOCYTES NFR BLD AUTO: 11 % (ref 4–12)
NEUTROPHILS # BLD AUTO: 7.7 THOUSANDS/ÂΜL (ref 1.85–7.62)
NEUTS SEG NFR BLD AUTO: 82 % (ref 43–75)
NRBC BLD AUTO-RTO: 0 /100 WBCS
PCO2 BLD: 30 MMOL/L (ref 21–32)
PCO2 BLD: 42.3 MM HG (ref 36–44)
PCO2 BLD: 82 MM HG
PCO2 BLDA: 42.3 MM HG
PH BLD: 7.44 [PH]
PH BLD: 7.44 [PH] (ref 7.35–7.45)
PLATELET # BLD AUTO: 203 THOUSANDS/UL (ref 149–390)
PMV BLD AUTO: 10.2 FL (ref 8.9–12.7)
PO2 BLD: 82 MM HG (ref 75–129)
POTASSIUM BLD-SCNC: 3.1 MMOL/L (ref 3.5–5.3)
POTASSIUM SERPL-SCNC: 3.4 MMOL/L (ref 3.5–5.3)
PROCALCITONIN SERPL-MCNC: 0.42 NG/ML
QRS AXIS: 252 DEGREES
QRSD INTERVAL: 190 MS
QT INTERVAL: 438 MS
QTC INTERVAL: 586 MS
RBC # BLD AUTO: 4.38 MILLION/UL (ref 3.88–5.62)
RSV RNA RESP QL NAA+PROBE: NEGATIVE
SAO2 % BLD FROM PO2: 96 % (ref 60–85)
SARS-COV-2 RNA RESP QL NAA+PROBE: NEGATIVE
SODIUM BLD-SCNC: 139 MMOL/L (ref 136–145)
SODIUM SERPL-SCNC: 141 MMOL/L (ref 135–147)
SPECIMEN SOURCE: ABNORMAL
T WAVE AXIS: 52 DEGREES
VENTRICULAR RATE: 108 BPM
WBC # BLD AUTO: 9.3 THOUSAND/UL (ref 4.31–10.16)

## 2024-02-18 PROCEDURE — 84145 PROCALCITONIN (PCT): CPT | Performed by: INTERNAL MEDICINE

## 2024-02-18 PROCEDURE — 87449 NOS EACH ORGANISM AG IA: CPT | Performed by: INTERNAL MEDICINE

## 2024-02-18 PROCEDURE — 85014 HEMATOCRIT: CPT

## 2024-02-18 PROCEDURE — 87040 BLOOD CULTURE FOR BACTERIA: CPT | Performed by: EMERGENCY MEDICINE

## 2024-02-18 PROCEDURE — 0241U HB NFCT DS VIR RESP RNA 4 TRGT: CPT | Performed by: EMERGENCY MEDICINE

## 2024-02-18 PROCEDURE — 99222 1ST HOSP IP/OBS MODERATE 55: CPT | Performed by: INTERNAL MEDICINE

## 2024-02-18 PROCEDURE — 94760 N-INVAS EAR/PLS OXIMETRY 1: CPT

## 2024-02-18 PROCEDURE — 71045 X-RAY EXAM CHEST 1 VIEW: CPT

## 2024-02-18 PROCEDURE — 99285 EMERGENCY DEPT VISIT HI MDM: CPT

## 2024-02-18 PROCEDURE — 82948 REAGENT STRIP/BLOOD GLUCOSE: CPT

## 2024-02-18 PROCEDURE — 80048 BASIC METABOLIC PNL TOTAL CA: CPT | Performed by: EMERGENCY MEDICINE

## 2024-02-18 PROCEDURE — 82947 ASSAY GLUCOSE BLOOD QUANT: CPT

## 2024-02-18 PROCEDURE — 96365 THER/PROPH/DIAG IV INF INIT: CPT

## 2024-02-18 PROCEDURE — 84295 ASSAY OF SERUM SODIUM: CPT

## 2024-02-18 PROCEDURE — 84132 ASSAY OF SERUM POTASSIUM: CPT

## 2024-02-18 PROCEDURE — 36415 COLL VENOUS BLD VENIPUNCTURE: CPT | Performed by: EMERGENCY MEDICINE

## 2024-02-18 PROCEDURE — 99291 CRITICAL CARE FIRST HOUR: CPT | Performed by: EMERGENCY MEDICINE

## 2024-02-18 PROCEDURE — 94640 AIRWAY INHALATION TREATMENT: CPT

## 2024-02-18 PROCEDURE — 83880 ASSAY OF NATRIURETIC PEPTIDE: CPT | Performed by: INTERNAL MEDICINE

## 2024-02-18 PROCEDURE — 85025 COMPLETE CBC W/AUTO DIFF WBC: CPT | Performed by: EMERGENCY MEDICINE

## 2024-02-18 PROCEDURE — 82803 BLOOD GASES ANY COMBINATION: CPT

## 2024-02-18 PROCEDURE — 93005 ELECTROCARDIOGRAM TRACING: CPT

## 2024-02-18 PROCEDURE — 83605 ASSAY OF LACTIC ACID: CPT | Performed by: EMERGENCY MEDICINE

## 2024-02-18 PROCEDURE — 82330 ASSAY OF CALCIUM: CPT

## 2024-02-18 PROCEDURE — 93010 ELECTROCARDIOGRAM REPORT: CPT

## 2024-02-18 RX ORDER — TAMSULOSIN HYDROCHLORIDE 0.4 MG/1
0.4 CAPSULE ORAL
Status: DISCONTINUED | OUTPATIENT
Start: 2024-02-18 | End: 2024-02-21 | Stop reason: HOSPADM

## 2024-02-18 RX ORDER — IPRATROPIUM BROMIDE AND ALBUTEROL SULFATE 2.5; .5 MG/3ML; MG/3ML
3 SOLUTION RESPIRATORY (INHALATION)
Status: DISCONTINUED | OUTPATIENT
Start: 2024-02-18 | End: 2024-02-18

## 2024-02-18 RX ORDER — IPRATROPIUM BROMIDE AND ALBUTEROL SULFATE 2.5; .5 MG/3ML; MG/3ML
3 SOLUTION RESPIRATORY (INHALATION)
Status: DISCONTINUED | OUTPATIENT
Start: 2024-02-19 | End: 2024-02-21 | Stop reason: HOSPADM

## 2024-02-18 RX ORDER — AZITHROMYCIN 250 MG/1
500 TABLET, FILM COATED ORAL EVERY 24 HOURS
Status: DISCONTINUED | OUTPATIENT
Start: 2024-02-18 | End: 2024-02-21 | Stop reason: HOSPADM

## 2024-02-18 RX ORDER — BENZONATATE 100 MG/1
100 CAPSULE ORAL 3 TIMES DAILY PRN
COMMUNITY

## 2024-02-18 RX ORDER — OSELTAMIVIR PHOSPHATE 75 MG/1
75 CAPSULE ORAL EVERY 12 HOURS SCHEDULED
Status: DISCONTINUED | OUTPATIENT
Start: 2024-02-18 | End: 2024-02-21 | Stop reason: HOSPADM

## 2024-02-18 RX ORDER — ALPRAZOLAM 0.25 MG/1
0.25 TABLET ORAL DAILY PRN
Status: DISCONTINUED | OUTPATIENT
Start: 2024-02-18 | End: 2024-02-21 | Stop reason: HOSPADM

## 2024-02-18 RX ORDER — IPRATROPIUM BROMIDE AND ALBUTEROL SULFATE 2.5; .5 MG/3ML; MG/3ML
SOLUTION RESPIRATORY (INHALATION)
Status: COMPLETED
Start: 2024-02-18 | End: 2024-02-18

## 2024-02-18 RX ORDER — INSULIN GLARGINE 100 [IU]/ML
50 INJECTION, SOLUTION SUBCUTANEOUS EVERY MORNING
Status: DISCONTINUED | OUTPATIENT
Start: 2024-02-19 | End: 2024-02-21 | Stop reason: HOSPADM

## 2024-02-18 RX ORDER — IPRATROPIUM BROMIDE AND ALBUTEROL SULFATE .5; 3 MG/3ML; MG/3ML
1 SOLUTION RESPIRATORY (INHALATION) ONCE
Status: COMPLETED | OUTPATIENT
Start: 2024-02-18 | End: 2024-02-18

## 2024-02-18 RX ORDER — POTASSIUM CHLORIDE 20 MEQ/1
20 TABLET, EXTENDED RELEASE ORAL DAILY
Status: DISCONTINUED | OUTPATIENT
Start: 2024-02-19 | End: 2024-02-21 | Stop reason: HOSPADM

## 2024-02-18 RX ORDER — CEFTRIAXONE 1 G/50ML
1000 INJECTION, SOLUTION INTRAVENOUS EVERY 24 HOURS
Status: DISCONTINUED | OUTPATIENT
Start: 2024-02-19 | End: 2024-02-21 | Stop reason: HOSPADM

## 2024-02-18 RX ORDER — INSULIN LISPRO 100 [IU]/ML
1-5 INJECTION, SOLUTION INTRAVENOUS; SUBCUTANEOUS
Status: DISCONTINUED | OUTPATIENT
Start: 2024-02-18 | End: 2024-02-21 | Stop reason: HOSPADM

## 2024-02-18 RX ORDER — AMLODIPINE BESYLATE 5 MG/1
5 TABLET ORAL DAILY
Status: DISCONTINUED | OUTPATIENT
Start: 2024-02-19 | End: 2024-02-21 | Stop reason: HOSPADM

## 2024-02-18 RX ORDER — FAMOTIDINE 20 MG/1
20 TABLET, FILM COATED ORAL
Status: DISCONTINUED | OUTPATIENT
Start: 2024-02-18 | End: 2024-02-21 | Stop reason: HOSPADM

## 2024-02-18 RX ORDER — GUAIFENESIN 100 MG/5ML
200 SOLUTION ORAL EVERY 4 HOURS PRN
Status: DISCONTINUED | OUTPATIENT
Start: 2024-02-18 | End: 2024-02-21 | Stop reason: HOSPADM

## 2024-02-18 RX ORDER — MELATONIN
5000 DAILY
Status: DISCONTINUED | OUTPATIENT
Start: 2024-02-19 | End: 2024-02-21 | Stop reason: HOSPADM

## 2024-02-18 RX ORDER — ANTACID TABLETS 500 MG/1
TABLET, CHEWABLE ORAL
COMMUNITY

## 2024-02-18 RX ORDER — ENEMA 19; 7 G/133ML; G/133ML
1 ENEMA RECTAL DAILY PRN
COMMUNITY

## 2024-02-18 RX ORDER — PREGABALIN 75 MG/1
75 CAPSULE ORAL 2 TIMES DAILY
Status: DISCONTINUED | OUTPATIENT
Start: 2024-02-18 | End: 2024-02-21 | Stop reason: HOSPADM

## 2024-02-18 RX ORDER — GUAIFENESIN 200 MG/1
400 TABLET ORAL EVERY 4 HOURS PRN
COMMUNITY

## 2024-02-18 RX ORDER — FINASTERIDE 5 MG/1
5 TABLET, FILM COATED ORAL DAILY
Status: DISCONTINUED | OUTPATIENT
Start: 2024-02-19 | End: 2024-02-21 | Stop reason: HOSPADM

## 2024-02-18 RX ORDER — INSULIN GLARGINE 100 [IU]/ML
10 INJECTION, SOLUTION SUBCUTANEOUS
Status: DISCONTINUED | OUTPATIENT
Start: 2024-02-18 | End: 2024-02-21 | Stop reason: HOSPADM

## 2024-02-18 RX ORDER — METOCLOPRAMIDE 5 MG/1
5 TABLET ORAL 3 TIMES DAILY
Status: DISCONTINUED | OUTPATIENT
Start: 2024-02-18 | End: 2024-02-21 | Stop reason: HOSPADM

## 2024-02-18 RX ORDER — LISINOPRIL 10 MG/1
10 TABLET ORAL DAILY
Status: DISCONTINUED | OUTPATIENT
Start: 2024-02-19 | End: 2024-02-21 | Stop reason: HOSPADM

## 2024-02-18 RX ORDER — POTASSIUM CHLORIDE 20 MEQ/1
40 TABLET, EXTENDED RELEASE ORAL ONCE
Status: COMPLETED | OUTPATIENT
Start: 2024-02-18 | End: 2024-02-18

## 2024-02-18 RX ORDER — POLYETHYLENE GLYCOL 3350 17 G/17G
17 POWDER, FOR SOLUTION ORAL DAILY
Status: DISCONTINUED | OUTPATIENT
Start: 2024-02-19 | End: 2024-02-21 | Stop reason: HOSPADM

## 2024-02-18 RX ORDER — ACETAMINOPHEN 325 MG/1
650 TABLET ORAL EVERY 6 HOURS PRN
Status: DISCONTINUED | OUTPATIENT
Start: 2024-02-18 | End: 2024-02-21 | Stop reason: HOSPADM

## 2024-02-18 RX ORDER — IPRATROPIUM BROMIDE AND ALBUTEROL SULFATE 2.5; .5 MG/3ML; MG/3ML
3 SOLUTION RESPIRATORY (INHALATION) 4 TIMES DAILY
COMMUNITY

## 2024-02-18 RX ORDER — BENZONATATE 100 MG/1
100 CAPSULE ORAL 3 TIMES DAILY PRN
Status: DISCONTINUED | OUTPATIENT
Start: 2024-02-18 | End: 2024-02-21 | Stop reason: HOSPADM

## 2024-02-18 RX ORDER — AMLODIPINE BESYLATE 5 MG/1
1 TABLET ORAL DAILY
COMMUNITY
Start: 2023-12-28

## 2024-02-18 RX ORDER — PANTOPRAZOLE SODIUM 20 MG/1
20 TABLET, DELAYED RELEASE ORAL
Status: DISCONTINUED | OUTPATIENT
Start: 2024-02-19 | End: 2024-02-21 | Stop reason: HOSPADM

## 2024-02-18 RX ORDER — INSULIN GLARGINE-YFGN 100 [IU]/ML
40 INJECTION, SOLUTION SUBCUTANEOUS
COMMUNITY
End: 2024-02-21

## 2024-02-18 RX ORDER — ALOGLIPTIN 25 MG/1
TABLET, FILM COATED ORAL DAILY
COMMUNITY

## 2024-02-18 RX ORDER — OSELTAMIVIR PHOSPHATE 30 MG/1
30 CAPSULE ORAL EVERY 12 HOURS SCHEDULED
Status: DISCONTINUED | OUTPATIENT
Start: 2024-02-18 | End: 2024-02-18

## 2024-02-18 RX ORDER — FAMOTIDINE 20 MG/1
20 TABLET, FILM COATED ORAL 2 TIMES DAILY
COMMUNITY

## 2024-02-18 RX ORDER — SERTRALINE HYDROCHLORIDE 100 MG/1
100 TABLET, FILM COATED ORAL DAILY
Status: DISCONTINUED | OUTPATIENT
Start: 2024-02-19 | End: 2024-02-21 | Stop reason: HOSPADM

## 2024-02-18 RX ORDER — ALBUTEROL SULFATE 2.5 MG/3ML
2.5 SOLUTION RESPIRATORY (INHALATION) ONCE
Status: COMPLETED | OUTPATIENT
Start: 2024-02-18 | End: 2024-02-18

## 2024-02-18 RX ORDER — CEFTRIAXONE 1 G/50ML
1000 INJECTION, SOLUTION INTRAVENOUS ONCE
Status: COMPLETED | OUTPATIENT
Start: 2024-02-18 | End: 2024-02-18

## 2024-02-18 RX ORDER — OSELTAMIVIR PHOSPHATE 30 MG/1
30 CAPSULE ORAL EVERY 12 HOURS SCHEDULED
COMMUNITY
End: 2024-02-21

## 2024-02-18 RX ORDER — TRAZODONE HYDROCHLORIDE 50 MG/1
50 TABLET ORAL
Status: DISCONTINUED | OUTPATIENT
Start: 2024-02-18 | End: 2024-02-21 | Stop reason: HOSPADM

## 2024-02-18 RX ORDER — CARVEDILOL 25 MG/1
25 TABLET ORAL 2 TIMES DAILY WITH MEALS
Status: DISCONTINUED | OUTPATIENT
Start: 2024-02-18 | End: 2024-02-21 | Stop reason: HOSPADM

## 2024-02-18 RX ADMIN — CEFTRIAXONE 1000 MG: 1 INJECTION, SOLUTION INTRAVENOUS at 13:25

## 2024-02-18 RX ADMIN — TRAZODONE HYDROCHLORIDE 50 MG: 50 TABLET ORAL at 22:03

## 2024-02-18 RX ADMIN — PREGABALIN 75 MG: 75 CAPSULE ORAL at 22:03

## 2024-02-18 RX ADMIN — FAMOTIDINE 20 MG: 20 TABLET ORAL at 17:06

## 2024-02-18 RX ADMIN — OSELTAMIVIR PHOSPHATE 75 MG: 75 CAPSULE ORAL at 17:06

## 2024-02-18 RX ADMIN — IPRATROPIUM BROMIDE 0.5 MG: 0.5 SOLUTION RESPIRATORY (INHALATION) at 12:57

## 2024-02-18 RX ADMIN — POTASSIUM CHLORIDE 40 MEQ: 1500 TABLET, EXTENDED RELEASE ORAL at 15:27

## 2024-02-18 RX ADMIN — TAMSULOSIN HYDROCHLORIDE 0.4 MG: 0.4 CAPSULE ORAL at 17:06

## 2024-02-18 RX ADMIN — INSULIN GLARGINE 10 UNITS: 100 INJECTION, SOLUTION SUBCUTANEOUS at 22:03

## 2024-02-18 RX ADMIN — CARVEDILOL 25 MG: 25 TABLET, FILM COATED ORAL at 17:06

## 2024-02-18 RX ADMIN — IPRATROPIUM BROMIDE AND ALBUTEROL SULFATE 3 ML: 2.5; .5 SOLUTION RESPIRATORY (INHALATION) at 20:39

## 2024-02-18 RX ADMIN — AZITHROMYCIN DIHYDRATE 500 MG: 250 TABLET ORAL at 17:06

## 2024-02-18 RX ADMIN — INSULIN LISPRO 1 UNITS: 100 INJECTION, SOLUTION INTRAVENOUS; SUBCUTANEOUS at 22:03

## 2024-02-18 RX ADMIN — METOCLOPRAMIDE 5 MG: 5 TABLET ORAL at 22:03

## 2024-02-18 RX ADMIN — APIXABAN 5 MG: 5 TABLET, FILM COATED ORAL at 22:04

## 2024-02-18 RX ADMIN — METOCLOPRAMIDE 5 MG: 5 TABLET ORAL at 17:07

## 2024-02-18 RX ADMIN — ALBUTEROL SULFATE 2.5 MG: 2.5 SOLUTION RESPIRATORY (INHALATION) at 12:57

## 2024-02-18 NOTE — ASSESSMENT & PLAN NOTE
"Lab Results   Component Value Date    HGBA1C 6.6 (H) 11/01/2023       No results for input(s): \"POCGLU\" in the last 72 hours.    Blood Sugar Average: Last 72 hrs:  write for insulin sliding scale  On lantus 67 units in am and 18 units in pm  Will reduce lantus to 50 units in am and 10 units in pm  Monitor blood glucose and adjust insulin as needed   "

## 2024-02-18 NOTE — ED NOTES
Pt is booked and notified   Staff at [facility] notified of admission and diagnosis.      Patito Banda, TEJAS  02/18/24 [id number]

## 2024-02-18 NOTE — ASSESSMENT & PLAN NOTE
Lab Results   Component Value Date    EGFR 65 02/18/2024    EGFR 74 02/16/2024    EGFR 61 02/06/2024    CREATININE 1.09 02/18/2024    CREATININE 1.04 02/16/2024    CREATININE 1.22 02/06/2024     Creatinine stable at baseline  Will check bmp in am.

## 2024-02-18 NOTE — ASSESSMENT & PLAN NOTE
Wt Readings from Last 3 Encounters:   02/18/24 125 kg (274 lb 11.1 oz)   10/18/21 111 kg (245 lb)   08/23/19 99.8 kg (220 lb)       Ef mildly decreased at 45%  Has crackles on exam. Will obtain bnp  Lactic acid is mildly elevated but concern for possible acute chf in which fluid will be held  On lasix and zaroxolyn outpt. Will continue home dose for now and follow up on bnp  Will fluid restrict, low salt diet  I/o daily weights

## 2024-02-18 NOTE — ED PROVIDER NOTES
History  Chief Complaint   Patient presents with    Shortness of Breath     Pt coming from [facility] care [facility]. Confirmed flu and was given treatment at facility. Pt wanted to come to ER to be checked. Pt was oxygen 90% on RA was given 1 duo [facility].     78-year-old male, presents from skilled nursing facility with shortness of breath.  Patient reportedly diagnosed with influenza.  Patient reportedly had oxygen saturation of 90% on room air, was placed on supplemental oxygen.  Patient states he has felt sick for the past few days, nonproductive cough and subjective fevers.      History provided by:  Patient   used: No    Shortness of Breath  Associated symptoms: cough    Associated symptoms: no fever        Prior to Admission Medications   Prescriptions Last Dose Informant Patient Reported? Taking?   ALPRAZolam (XANAX) 0.25 mg tablet   Yes No   Sig: Take 0.25 mg by mouth daily as needed   Insulin Aspart (NOVOLOG FLEXPEN SC)   Yes No   Sig: Inject 5 Units under the skin   apixaban (ELIQUIS) 5 mg   Yes No   Sig: Take 5 mg by mouth 2 (two) times a day   carvedilol (COREG) 25 mg tablet   Yes No   Sig: Take 25 mg by mouth 2 (two) times a day with meals   cholecalciferol (VITAMIN D3) 1,000 units tablet   Yes No   Sig: Take 5,000 Units by mouth daily   finasteride (PROSCAR) 5 mg tablet   Yes No   Sig: Take 5 mg by mouth daily   furosemide (LASIX) 40 mg tablet   Yes No   Sig: Take 60 mg by mouth daily   insulin glargine (LANTUS SOLOSTAR) 100 units/mL injection pen   Yes No   Sig: Inject 67 Units under the skin every morning 18 units every night   lisinopril (ZESTRIL) 10 mg tablet   Yes No   Sig: Take 10 mg by mouth daily   metoclopramide (REGLAN) 5 mg tablet   No No   Sig: Take 1 tablet (5 mg total) by mouth 3 (three) times a day   metolazone (ZAROXOLYN) 2.5 mg tablet   Yes No   Sig: Take 2.5 mg by mouth 2 (two) times a week   omeprazole (PriLOSEC) 20 mg delayed release capsule   Yes No    Sig: Take 20 mg by mouth daily   polyethylene glycol (MIRALAX) 17 g packet   No No   Sig: Take 17 g by mouth daily   potassium chloride (K-DUR,KLOR-CON) 10 mEq tablet   Yes No   Sig: Take 20 mEq by mouth daily   pregabalin (LYRICA) 75 mg capsule   Yes No   Sig: Take 75 mg by mouth 2 (two) times a day   sertraline (ZOLOFT) 100 mg tablet   Yes No   Sig: Take 100 mg by mouth daily     tamsulosin (FLOMAX) 0.4 mg   No No   Sig: Take 1 capsule (0.4 mg total) by mouth daily with dinner   traZODone (DESYREL) 50 mg tablet   Yes No   Sig: Take 50 mg by mouth daily at bedtime      Facility-Administered Medications: None       Past Medical History:   Diagnosis Date    Atrial fibrillation (HCC)     Bilateral hand pain 07/10/2019    Diabetes (HCC)     H/O agent Orange exposure     HLD (hyperlipidemia) 07/10/2019    Hyperlipidemia     Hypertension     Neuropathy     Paroxysmal A-fib (HCC)     PTSD (post-traumatic stress disorder)        Past Surgical History:   Procedure Laterality Date    ABLATION OF DYSRHYTHMIC FOCUS      for a-fib    BACK SURGERY      CHOLECYSTECTOMY      CHOLECYSTECTOMY      TOTAL SHOULDER REPLACEMENT Right        No family history on file.  I have reviewed and agree with the history as documented.    E-Cigarette/Vaping    E-Cigarette Use Never User      E-Cigarette/Vaping Substances    Nicotine No     THC No     CBD No     Flavoring No     Other No     Unknown No      Social History     Tobacco Use    Smoking status: Former     Current packs/day: 0.00     Types: Cigarettes     Quit date: 1987     Years since quittin.1    Smokeless tobacco: Never   Vaping Use    Vaping status: Never Used   Substance Use Topics    Alcohol use: Not Currently     Alcohol/week: 0.0 standard drinks of alcohol    Drug use: No       Review of Systems   Constitutional: Negative.  Negative for fever.   HENT:  Positive for congestion.    Respiratory:  Positive for cough and shortness of breath.    Neurological: Negative.         Physical Exam  Physical Exam  Vitals and nursing note reviewed.   Constitutional:       General: He is not in acute distress.  HENT:      Head: Normocephalic and atraumatic.      Mouth/Throat:      Mouth: Mucous membranes are moist.      Pharynx: Oropharynx is clear.   Eyes:      Extraocular Movements: Extraocular movements intact.      Pupils: Pupils are equal, round, and reactive to light.   Cardiovascular:      Rate and Rhythm: Regular rhythm. Tachycardia present.   Pulmonary:      Effort: Pulmonary effort is normal.      Breath sounds: No wheezing, rhonchi or rales.      Comments: Mild tachypnea  Upper airway congestion, lungs with no wheezing, rales, or rhonchi  Musculoskeletal:         General: Normal range of motion.   Skin:     General: Skin is warm and dry.   Neurological:      General: No focal deficit present.      Mental Status: He is alert.         Vital Signs  ED Triage Vitals   Temperature Pulse Respirations Blood Pressure SpO2   02/18/24 1207 02/18/24 1206 02/18/24 1206 02/18/24 1206 02/18/24 1206   99.1 °F (37.3 °C) (!) 109 (!) 24 130/64 98 %      Temp Source Heart Rate Source Patient Position - Orthostatic VS BP Location FiO2 (%)   02/18/24 1207 02/18/24 1206 02/18/24 1206 02/18/24 1206 --   Oral Monitor Lying Right arm       Pain Score       --                  Vitals:    02/18/24 1206   BP: 130/64   Pulse: (!) 109   Patient Position - Orthostatic VS: Lying         Visual Acuity      ED Medications  Medications   cefTRIAXone (ROCEPHIN) IVPB (premix in dextrose) 1,000 mg 50 mL (1,000 mg Intravenous New Bag 2/18/24 1325)   ipratropium-albuterol (FOR EMS ONLY) (DUO-NEB) 0.5-2.5 mg/3 mL inhalation solution 3 mL ( Does not apply Given to EMS 2/18/24 1214)   albuterol inhalation solution 2.5 mg (2.5 mg Nebulization Given 2/18/24 1257)   ipratropium (ATROVENT) 0.02 % inhalation solution 0.5 mg (0.5 mg Nebulization Given 2/18/24 1257)       Diagnostic Studies  Results Reviewed       Procedure  Component Value Units Date/Time    POCT Blood Gas (CG8+) [[id number]]  (Abnormal) Collected: 02/18/24 1324    Lab Status: Final result Specimen: Arterial Updated: 02/18/24 1327     pH, Art i-STAT 7.441     pH, i-STAT Temp Corrected 7.441     pCO2, Art i-STAT 42.3 mm HG      pCO2, i-STAT TC 42.3 mm HG      pO2, ART i-STAT 82.0 mm HG      pO2, i-STAT TC 82 mm HG      BE, i-STAT 4 mmol/L      HCO3, Art i-STAT 28.8 mmol/L      CO2, i-STAT 30 mmol/L      O2 Sat, i-STAT 96 %      SODIUM, I-STAT 139 mmol/l      Potassium, i-STAT 3.1 mmol/L      Calcium, Ionized i-STAT 1.16 mmol/L      Hct, i-STAT 33 %      Hgb, i-STAT 11.2 g/dl      Glucose, i-STAT 152 mg/dl      POC FIO2 36 L      Specimen Type ARTERIAL    Lactic acid, plasma (w/reflex if result > 2.0) [[id number]]  (Abnormal) Collected: 02/18/24 1252    Lab Status: Final result Specimen: Blood from Arm, Right Updated: 02/18/24 1314     LACTIC ACID 2.3 mmol/L     Narrative:      Result may be elevated if tourniquet was used during collection.    Lactic acid 2 Hours [[id number]]     Lab Status: No result Specimen: Blood     Basic metabolic panel [[id number]]  (Abnormal) Collected: 02/18/24 1252    Lab Status: Final result Specimen: Blood from Arm, Right Updated: 02/18/24 1313     Sodium 141 mmol/L      Potassium 3.4 mmol/L      Chloride 101 mmol/L      CO2 32 mmol/L      ANION GAP 8 mmol/L      BUN 33 mg/dL      Creatinine 1.09 mg/dL      Glucose 159 mg/dL      Calcium 8.3 mg/dL      eGFR 65 ml/min/1.73sq m     Narrative:      National Kidney Disease Foundation guidelines for Chronic Kidney Disease (CKD):     Stage 1 with normal or high GFR (GFR > 90 mL/min/1.73 square meters)    Stage 2 Mild CKD (GFR = 60-89 mL/min/1.73 square meters)    Stage 3A Moderate CKD (GFR = 45-59 mL/min/1.73 square meters)    Stage 3B Moderate CKD (GFR = 30-44 mL/min/1.73 square meters)    Stage 4 Severe CKD (GFR = 15-29 mL/min/1.73 square meters)    Stage 5 End Stage CKD (GFR <15 mL/min/1.73 square  meters)  Note: GFR calculation is accurate only with a steady state creatinine    FLU/RSV/COVID - if FLU/RSV clinically relevant [[id number]] Collected: 02/18/24 1304    Lab Status: In process Specimen: Nares from Nose Updated: 02/18/24 1307    Blood culture #2 [[id number]] Collected: 02/18/24 1300    Lab Status: In process Specimen: Blood from Arm, Right Updated: 02/18/24 1305    CBC and differential [[id number]]  (Abnormal) Collected: 02/18/24 1252    Lab Status: Final result Specimen: Blood from Arm, Right Updated: 02/18/24 1259     WBC 9.30 Thousand/uL      RBC 4.38 Million/uL      Hemoglobin 11.4 g/dL      Hematocrit 37.5 %      MCV 86 fL      MCH 26.0 pg      MCHC 30.4 g/dL      RDW 17.0 %      MPV 10.2 fL      Platelets 203 Thousands/uL      nRBC 0 /100 WBCs      Neutrophils Relative 82 %      Immat GRANS % 1 %      Lymphocytes Relative 5 %      Monocytes Relative 11 %      Eosinophils Relative 1 %      Basophils Relative 0 %      Neutrophils Absolute 7.70 Thousands/µL      Immature Grans Absolute 0.05 Thousand/uL      Lymphocytes Absolute 0.47 Thousands/µL      Monocytes Absolute 1.02 Thousand/µL      Eosinophils Absolute 0.05 Thousand/µL      Basophils Absolute 0.01 Thousands/µL     Blood culture #1 [[id number]] Collected: 02/18/24 1252    Lab Status: In process Specimen: Blood from Arm, Right Updated: 02/18/24 1257    Blood gas, arterial [[id number]]     Lab Status: No result Specimen: Blood, Arterial                    XR chest 1 view portable    (Results Pending)              Procedures  ECG 12 Lead Documentation Only    Date/Time: 2/18/2024 12:32 PM    Performed by: Keon Gomes MD  Authorized by: Keon Gomes MD    ECG reviewed by me, the ED Provider: yes    Patient location:  ED  Previous ECG:     Previous ECG:  Compared to current  Rate:     ECG rate assessment: tachycardic    Rhythm:     Rhythm: sinus tachycardia    Ectopy:     Ectopy: none    QRS:     QRS intervals:  Wide  Conduction:      Conduction: abnormal      Abnormal conduction: complete RBBB    Comments:      Sinus tachycardia 108, similar to previous EKG, no acute changes  CriticalCare Time    Date/Time: 2/18/2024 1:46 PM    Performed by: Keon Gomes MD  Authorized by: Keon Gomes MD    Critical care provider statement:     Critical care time (minutes):  35    Critical care time was exclusive of:  Separately billable procedures and treating other patients    Critical care was necessary to treat or prevent imminent or life-threatening deterioration of the following conditions:  Respiratory failure    Critical care was time spent personally by me on the following activities:  Development of treatment plan with patient or surrogate, discussions with consultants, obtaining history from patient or surrogate, evaluation of patient's response to treatment, examination of patient, ordering and review of laboratory studies, ordering and review of radiographic studies, re-evaluation of patient's condition and review of old charts           ED Course  ED Course as of 02/18/24 1347   Sun Feb 18, 2024   1243 Chest x-ray independently reviewed by myself, compared to previous, right lung infiltrate noted.   1245 IV ceftriaxone ordered due to infiltrate noted on chest x-ray.   1337 Patient reported to have oxygen saturation dropped to 90% on room air, placed on nasal cannula, currently 97 to 98% on 2 L.  Given nebulizer treatment with albuterol, ipratropium.                                             Medical Decision Making  78-year-old male, presents from skilled nursing facility with shortness of breath.  Differential diagnosis includes URI, bronchitis, pneumonia among other diagnoses.  Patient given nebulizer treatment prior to arrival, EKG, chest x-ray, labs ordered.  Will continue to monitor in ED and reevaluate.    Amount and/or Complexity of Data Reviewed  Labs: ordered. Decision-making details documented in ED Course.  Radiology: ordered  and independent interpretation performed. Decision-making details documented in ED Course.  ECG/medicine tests: ordered and independent interpretation performed. Decision-making details documented in ED Course.    Risk  Prescription drug management.  Decision regarding hospitalization.             Disposition  Final diagnoses:   Pneumonia   Hypoxia     Time reflects when diagnosis was documented in both MDM as applicable and the Disposition within this note       Time User Action Codes Description Comment    2/18/2024  1:44 PM Keon Gomes [J18.9] Pneumonia     2/18/2024  1:44 PM Keon Gomes Add [R09.02] Hypoxia           ED Disposition       ED Disposition   Admit    Condition   Stable    Date/Time   Sun Feb 18, 2024  1:44 PM    Comment   Case was discussed with Dr. Alvarenga and the patient's admission status was agreed to be Admission Status: inpatient status to the service of Dr. Alvarenga .               Follow-up Information    None         Patient's Medications   Discharge Prescriptions    No medications on file       No discharge procedures on file.    PDMP Review       None            ED Provider  Electronically Signed by             Keon Gomes MD  02/18/24 2571

## 2024-02-18 NOTE — H&P
[facility]  H&P  Name: Jacobo Ascencio 78 y.o. male I MRN: [id number]  Unit/Bed#: ED-25 I Date of Admission: 2/18/2024   Date of Service: 2/18/2024 I Hospital Day: 0      Assessment/Plan   * Acute respiratory insufficiency  Assessment & Plan  Pt resides at [facility] [place]  He was recently dx with flu approximately 3 to 4 days ago  He continues to have increased cough that is nonproductive and shortness of breath  He presented to the ed sating 90% on room air. He is currently on 2 liters  Acute respiratory insuff is due to flu and possible pna  Cxr is pending but concern for rml pna  Started on ceftriaxone- will continue. Add azithromycin  Check procal   Will consult speech  Obtain sputum culture  Urine for legionella and strep  Follow up with blood cultures  Continue ceftriaxone, cough medications and supporive care  He was started on tamiflu outpt      Chronic systolic CHF (congestive heart failure) (HCC)  Assessment & Plan  Wt Readings from Last 3 Encounters:   02/18/24 125 kg (274 lb 11.1 oz)   10/18/21 111 kg (245 lb)   08/23/19 99.8 kg (220 lb)       Ef mildly decreased at 45%  Has crackles on exam. Will obtain bnp  Lactic acid is mildly elevated but concern for possible acute chf in which fluid will be held  On lasix and zaroxolyn outpt. Will continue home dose for now and follow up on bnp  Will fluid restrict, low salt diet  I/o daily weights        Stage 2 chronic kidney disease  Assessment & Plan  Lab Results   Component Value Date    EGFR 65 02/18/2024    EGFR 74 02/16/2024    EGFR 61 02/06/2024    CREATININE 1.09 02/18/2024    CREATININE 1.04 02/16/2024    CREATININE 1.22 02/06/2024     Creatinine stable at baseline  Will check bmp in am.     Ambulatory dysfunction  Assessment & Plan  Will obtain pt/ot eval     Type 2 diabetes mellitus without complication, with long-term current use of insulin ([facility])  Assessment & Plan  Lab Results   Component Value Date    HGBA1C 6.6  "(H) 11/01/2023       No results for input(s): \"POCGLU\" in the last 72 hours.    Blood Sugar Average: Last 72 hrs:  write for insulin sliding scale  On lantus 67 units in am and 18 units in pm  Will reduce lantus to 50 units in am and 10 units in pm  Monitor blood glucose and adjust insulin as needed     Paroxysmal atrial fibrillation (HCC)  Assessment & Plan  Rate controlled  Continue coreg and eliquis     Essential hypertension  Assessment & Plan  Bp currently stable   Continue norvasc, coreg        VTE Prophylaxis: Apixaban (Eliquis)  / sequential compression device   Code Status: full code      Anticipated Length of Stay:  Patient will be admitted on an Inpatient basis with an anticipated length of stay of  greater than 2 midnights.      Chief Complaint:   shortness of breath, cough     History of Present Illness:    Jacobo Ascencio is a 78 y.o. male with pmhx of paf, htn, ckd2, chronic chf, and recently dx with flu. He resides at [facility] in which he was recently dx with flu and started on tamiflu. Pt continues to have a dry cough and feel short of breath. He was sent to the ed for eval. He was found to be 90% on room air. He had a cxr that was concerning for pna in which he was started on ceftriaxone. No f/c no n/v/d no abd pain     Review of Systems:    Review of Systems   Constitutional:  Positive for activity change and fatigue. Negative for chills and fever.   HENT: Negative.     Eyes: Negative.    Respiratory:  Positive for cough and shortness of breath.    Cardiovascular: Negative.    Gastrointestinal: Negative.    Endocrine: Negative.    Genitourinary: Negative.    Musculoskeletal: Negative.    Skin: Negative.    Allergic/Immunologic: Negative.    Neurological: Negative.    Hematological: Negative.    Psychiatric/Behavioral: Negative.         Past Medical and Surgical History:     Past Medical History:   Diagnosis Date    Atrial fibrillation (HCC)     Bilateral hand pain 07/10/2019    " Diabetes (HCC)     H/O agent Orange exposure     HLD (hyperlipidemia) 07/10/2019    Hyperlipidemia     Hypertension     Neuropathy     Paroxysmal A-fib (HCC)     PTSD (post-traumatic stress disorder)        Past Surgical History:   Procedure Laterality Date    ABLATION OF DYSRHYTHMIC FOCUS      for a-fib    BACK SURGERY      CHOLECYSTECTOMY      CHOLECYSTECTOMY      TOTAL SHOULDER REPLACEMENT Right        Meds/Allergies:    Prior to Admission medications    Medication Sig Start Date End Date Taking? Authorizing Provider   Alogliptin Benzoate 25 MG TABS Take by mouth in the morning   Yes Historical Provider, MD   amLODIPine (NORVASC) 5 mg tablet Take 1 tablet by mouth daily 12/28/23  Yes Historical Provider, MD   apixaban (ELIQUIS) 5 mg Take 5 mg by mouth 2 (two) times a day   Yes Historical Provider, MD   benzonatate (TESSALON PERLES) 100 mg capsule Take 100 mg by mouth 3 (three) times a day as needed for cough   Yes Historical Provider, MD   Calcium Carbonate 500 MG CHEW Chew   Yes Historical Provider, MD   carvedilol (COREG) 25 mg tablet Take 25 mg by mouth 2 (two) times a day with meals   Yes Historical Provider, MD   famotidine (PEPCID) 20 mg tablet Take 20 mg by mouth 2 (two) times a day   Yes Historical Provider, MD   finasteride (PROSCAR) 5 mg tablet Take 5 mg by mouth daily   Yes Historical Provider, MD   furosemide (LASIX) 40 mg tablet Take 60 mg by mouth daily   Yes Historical Provider, MD   guaiFENesin 200 MG tablet Take 400 mg by mouth every 4 (four) hours as needed for cough   Yes Historical Provider, MD   Insulin Aspart (NOVOLOG FLEXPEN SC) Inject 5 Units under the skin   Yes Historical Provider, MD   insulin glargine (LANTUS SOLOSTAR) 100 units/mL injection pen Inject 67 Units under the skin every morning 18 units every night 6/11/21  Yes Historical Provider, MD   Insulin Glargine-yfgn 100 UNIT/ML SOLN Inject 40 Units under the skin daily at bedtime   Yes Historical Provider, MD    ipratropium-albuterol (DUO-NEB) 0.5-2.5 mg/3 mL nebulizer solution Take 3 mL by nebulization 4 (four) times a day   Yes Historical Provider, MD   metolazone (ZAROXOLYN) 2.5 mg tablet Take 2.5 mg by mouth 2 (two) times a week   Yes Historical Provider, MD   oseltamivir (Tamiflu) 30 MG capsule Take 30 mg by mouth every 12 (twelve) hours   Yes Historical Provider, MD   polyethylene glycol (MIRALAX) 17 g packet Take 17 g by mouth daily 10/26/21  Yes Jayden Banda MD   potassium chloride (K-DUR,KLOR-CON) 10 mEq tablet Take 20 mEq by mouth daily   Yes Historical Provider, MD   pregabalin (LYRICA) 75 mg capsule Take 75 mg by mouth 2 (two) times a day 7/23/21  Yes Historical Provider, MD   sertraline (ZOLOFT) 100 mg tablet Take 100 mg by mouth daily     Yes Historical Provider, MD   sodium phosphate-biphosphate (FLEET) 7-19 g 118 mL enema Insert 1 enema into the rectum daily as needed   Yes Historical Provider, MD   tamsulosin (FLOMAX) 0.4 mg Take 1 capsule (0.4 mg total) by mouth daily with dinner 7/13/19  Yes Tab Foafna MD   ALPRAZolam (XANAX) 0.25 mg tablet Take 0.25 mg by mouth daily as needed 9/21/21   Historical Provider, MD   cholecalciferol (VITAMIN D3) 1,000 units tablet Take 5,000 Units by mouth daily    Historical Provider, MD   lisinopril (ZESTRIL) 10 mg tablet Take 10 mg by mouth daily 9/29/21   Historical Provider, MD   metoclopramide (REGLAN) 5 mg tablet Take 1 tablet (5 mg total) by mouth 3 (three) times a day 10/25/21   Jayden Banda MD   omeprazole (PriLOSEC) 20 mg delayed release capsule Take 20 mg by mouth daily    Historical Provider, MD   traZODone (DESYREL) 50 mg tablet Take 50 mg by mouth daily at bedtime    Historical Provider, MD     I have reviewed home medications with patient personally.    Allergies:   Allergies   Allergen Reactions    Atorvastatin Other (See Comments)     Muscle cramps    Pravastatin Myalgia       Social History:     Marital Status: /Civil Union     Substance  Use History:   Social History     Substance and Sexual Activity   Alcohol Use Not Currently    Alcohol/week: 0.0 standard drinks of alcohol     Social History     Tobacco Use   Smoking Status Former    Current packs/day: 0.00    Types: Cigarettes    Quit date: 1987    Years since quittin.1   Smokeless Tobacco Never     Social History     Substance and Sexual Activity   Drug Use No       Family History:    non-contributory    Physical Exam:     Vitals:   Blood Pressure: 117/55 (24 1446)  Pulse: 76 (24 1446)  Temperature: 99.1 °F (37.3 °C) (24 1207)  Temp Source: Oral (24 1207)  Respirations: (!) 24 (24 1446)  Weight - Scale: 125 kg (274 lb 11.1 oz) (24 1206)  SpO2: 94 % (24 1446)    Physical Exam  Constitutional:       Appearance: Normal appearance.   HENT:      Head: Normocephalic and atraumatic.   Eyes:      Extraocular Movements: Extraocular movements intact.      Pupils: Pupils are equal, round, and reactive to light.   Cardiovascular:      Rate and Rhythm: Normal rate and regular rhythm.      Heart sounds: No murmur heard.     No friction rub. No gallop.   Pulmonary:      Effort: Pulmonary effort is normal. No respiratory distress.      Breath sounds: Rhonchi and rales present. No wheezing.   Abdominal:      General: Bowel sounds are normal. There is no distension.      Palpations: Abdomen is soft.      Tenderness: There is no abdominal tenderness. There is no guarding or rebound.   Musculoskeletal:      Right lower leg: No edema.      Left lower leg: No edema.   Neurological:      Mental Status: He is alert and oriented to person, place, and time.         Additional Data:     Lab Results: I have personally reviewed pertinent reports.      Results from last 7 days   Lab Units 24  1324 24  1252   WBC Thousand/uL  --  9.30   HEMOGLOBIN g/dL  --  11.4*   I STAT HEMOGLOBIN g/dl 11.2*  --    HEMATOCRIT %  --  37.5   HEMATOCRIT, ISTAT % 33*  --     PLATELETS Thousands/uL  --  203   NEUTROS PCT %  --  82*   LYMPHS PCT %  --  5*   MONOS PCT %  --  11   EOS PCT %  --  1     Results from last 7 days   Lab Units 02/18/24  1324 02/18/24  1252   POTASSIUM mmol/L  --  3.4*   CHLORIDE mmol/L  --  101   CO2 mmol/L  --  32   CO2, I-STAT mmol/L 30  --    BUN mg/dL  --  33*   CREATININE mg/dL  --  1.09   CALCIUM mg/dL  --  8.3*   GLUCOSE, ISTAT mg/dl 152*  --            Imaging:cxr pending- possible rml pna    EKG, Pathology, and Other Studies Reviewed on Admission:   EKG: sinus tach at 108    Epic / Care Everywhere Records Reviewed: Yes

## 2024-02-18 NOTE — ASSESSMENT & PLAN NOTE
Pt resides at [facility]  He was recently dx with flu approximately 3 to 4 days ago  He continues to have increased cough that is nonproductive and shortness of breath  He presented to the ed sating 90% on room air. He is currently on 2 liters  Acute respiratory insuff is due to flu and possible pna  Cxr is pending but concern for rml pna  Started on ceftriaxone- will continue. Add azithromycin  Check procal   Will consult speech  Obtain sputum culture  Urine for legionella and strep  Follow up with blood cultures  Continue ceftriaxone, cough medications and supporive care  He was started on tamiflu outpt

## 2024-02-19 LAB
GLUCOSE SERPL-MCNC: 133 MG/DL (ref 65–140)
GLUCOSE SERPL-MCNC: 150 MG/DL (ref 65–140)
GLUCOSE SERPL-MCNC: 151 MG/DL (ref 65–140)
GLUCOSE SERPL-MCNC: 214 MG/DL (ref 65–140)
L PNEUMO1 AG UR QL IA.RAPID: NEGATIVE
PROCALCITONIN SERPL-MCNC: 0.51 NG/ML
S PNEUM AG UR QL: NEGATIVE

## 2024-02-19 PROCEDURE — 99232 SBSQ HOSP IP/OBS MODERATE 35: CPT | Performed by: PHYSICIAN ASSISTANT

## 2024-02-19 PROCEDURE — 94760 N-INVAS EAR/PLS OXIMETRY 1: CPT

## 2024-02-19 PROCEDURE — 84145 PROCALCITONIN (PCT): CPT | Performed by: INTERNAL MEDICINE

## 2024-02-19 PROCEDURE — 92610 EVALUATE SWALLOWING FUNCTION: CPT

## 2024-02-19 PROCEDURE — 82948 REAGENT STRIP/BLOOD GLUCOSE: CPT

## 2024-02-19 PROCEDURE — 94664 DEMO&/EVAL PT USE INHALER: CPT

## 2024-02-19 PROCEDURE — 94640 AIRWAY INHALATION TREATMENT: CPT

## 2024-02-19 RX ORDER — GUAIFENESIN 600 MG/1
600 TABLET, EXTENDED RELEASE ORAL EVERY 12 HOURS SCHEDULED
Status: DISCONTINUED | OUTPATIENT
Start: 2024-02-19 | End: 2024-02-21 | Stop reason: HOSPADM

## 2024-02-19 RX ADMIN — INSULIN GLARGINE 50 UNITS: 100 INJECTION, SOLUTION SUBCUTANEOUS at 08:07

## 2024-02-19 RX ADMIN — PREGABALIN 75 MG: 75 CAPSULE ORAL at 08:06

## 2024-02-19 RX ADMIN — GUAIFENESIN 600 MG: 600 TABLET, EXTENDED RELEASE ORAL at 10:10

## 2024-02-19 RX ADMIN — TAMSULOSIN HYDROCHLORIDE 0.4 MG: 0.4 CAPSULE ORAL at 16:01

## 2024-02-19 RX ADMIN — OSELTAMIVIR PHOSPHATE 75 MG: 75 CAPSULE ORAL at 21:47

## 2024-02-19 RX ADMIN — IPRATROPIUM BROMIDE AND ALBUTEROL SULFATE 3 ML: 2.5; .5 SOLUTION RESPIRATORY (INHALATION) at 07:15

## 2024-02-19 RX ADMIN — SERTRALINE HYDROCHLORIDE 100 MG: 100 TABLET ORAL at 08:06

## 2024-02-19 RX ADMIN — CARVEDILOL 25 MG: 25 TABLET, FILM COATED ORAL at 08:06

## 2024-02-19 RX ADMIN — FUROSEMIDE 60 MG: 40 TABLET ORAL at 08:06

## 2024-02-19 RX ADMIN — METOCLOPRAMIDE 5 MG: 5 TABLET ORAL at 08:07

## 2024-02-19 RX ADMIN — FAMOTIDINE 20 MG: 20 TABLET ORAL at 06:00

## 2024-02-19 RX ADMIN — APIXABAN 5 MG: 5 TABLET, FILM COATED ORAL at 21:47

## 2024-02-19 RX ADMIN — APIXABAN 5 MG: 5 TABLET, FILM COATED ORAL at 08:06

## 2024-02-19 RX ADMIN — CARVEDILOL 25 MG: 25 TABLET, FILM COATED ORAL at 16:02

## 2024-02-19 RX ADMIN — POLYETHYLENE GLYCOL 3350 17 G: 17 POWDER, FOR SOLUTION ORAL at 08:05

## 2024-02-19 RX ADMIN — INSULIN LISPRO 1 UNITS: 100 INJECTION, SOLUTION INTRAVENOUS; SUBCUTANEOUS at 11:48

## 2024-02-19 RX ADMIN — IPRATROPIUM BROMIDE AND ALBUTEROL SULFATE 3 ML: 2.5; .5 SOLUTION RESPIRATORY (INHALATION) at 13:25

## 2024-02-19 RX ADMIN — Medication 5000 UNITS: at 08:06

## 2024-02-19 RX ADMIN — POTASSIUM CHLORIDE 20 MEQ: 1500 TABLET, EXTENDED RELEASE ORAL at 08:06

## 2024-02-19 RX ADMIN — CEFTRIAXONE 1000 MG: 1 INJECTION, SOLUTION INTRAVENOUS at 11:47

## 2024-02-19 RX ADMIN — INSULIN GLARGINE 10 UNITS: 100 INJECTION, SOLUTION SUBCUTANEOUS at 21:47

## 2024-02-19 RX ADMIN — AMLODIPINE BESYLATE 5 MG: 5 TABLET ORAL at 08:07

## 2024-02-19 RX ADMIN — AZITHROMYCIN DIHYDRATE 500 MG: 250 TABLET ORAL at 16:02

## 2024-02-19 RX ADMIN — LISINOPRIL 10 MG: 10 TABLET ORAL at 08:07

## 2024-02-19 RX ADMIN — PANTOPRAZOLE SODIUM 20 MG: 20 TABLET, DELAYED RELEASE ORAL at 06:00

## 2024-02-19 RX ADMIN — METOCLOPRAMIDE 5 MG: 5 TABLET ORAL at 16:03

## 2024-02-19 RX ADMIN — OSELTAMIVIR PHOSPHATE 75 MG: 75 CAPSULE ORAL at 08:06

## 2024-02-19 RX ADMIN — TRAZODONE HYDROCHLORIDE 50 MG: 50 TABLET ORAL at 21:47

## 2024-02-19 RX ADMIN — PREGABALIN 75 MG: 75 CAPSULE ORAL at 21:47

## 2024-02-19 RX ADMIN — METOCLOPRAMIDE 5 MG: 5 TABLET ORAL at 21:48

## 2024-02-19 RX ADMIN — GUAIFENESIN 600 MG: 600 TABLET, EXTENDED RELEASE ORAL at 21:47

## 2024-02-19 RX ADMIN — FAMOTIDINE 20 MG: 20 TABLET ORAL at 16:02

## 2024-02-19 RX ADMIN — INSULIN LISPRO 1 UNITS: 100 INJECTION, SOLUTION INTRAVENOUS; SUBCUTANEOUS at 16:05

## 2024-02-19 RX ADMIN — IPRATROPIUM BROMIDE AND ALBUTEROL SULFATE 3 ML: 2.5; .5 SOLUTION RESPIRATORY (INHALATION) at 19:33

## 2024-02-19 RX ADMIN — INSULIN LISPRO 1 UNITS: 100 INJECTION, SOLUTION INTRAVENOUS; SUBCUTANEOUS at 08:07

## 2024-02-19 RX ADMIN — FINASTERIDE 5 MG: 5 TABLET, FILM COATED ORAL at 08:06

## 2024-02-19 NOTE — PLAN OF CARE
Problem: Potential for Falls  Goal: Patient will remain free of falls  Description: INTERVENTIONS:  - Educate patient/family on patient safety including physical limitations  - Instruct patient to call for assistance with activity   - Consult OT/PT to assist with strengthening/mobility   - Keep Call bell within reach  - Keep bed low and locked with side rails adjusted as appropriate  - Keep care items and personal belongings within reach  - Initiate and maintain comfort rounds  - Make Fall Risk Sign visible to staff  - Offer Toileting every 2 Hours, in advance of need  - Initiate/Maintain bed alarm  - Obtain necessary fall risk management equipment: bed alarm  - Apply yellow socks and bracelet for high fall risk patients  - Consider moving patient to room near nurses station  Outcome: Progressing     Problem: Prexisting or High Potential for Compromised Skin Integrity  Goal: Skin integrity is maintained or improved  Description: INTERVENTIONS:  - Identify patients at risk for skin breakdown  - Assess and monitor skin integrity  - Assess and monitor nutrition and hydration status  - Monitor labs   - Assess for incontinence   - Turn and reposition patient  - Assist with mobility/ambulation  - Relieve pressure over bony prominences  - Avoid friction and shearing  - Provide appropriate hygiene as needed including keeping skin clean and dry  - Evaluate need for skin moisturizer/barrier cream  - Collaborate with interdisciplinary team   - Patient/family teaching  - Consider wound care consult   Outcome: Progressing     Problem: RESPIRATORY - ADULT  Goal: Achieves optimal ventilation and oxygenation  Description: INTERVENTIONS:  - Assess for changes in respiratory status  - Assess for changes in mentation and behavior  - Position to facilitate oxygenation and minimize respiratory effort  - Oxygen administered by appropriate delivery if ordered  - Initiate smoking cessation education as indicated  - Encourage  broncho-pulmonary hygiene including cough, deep breathe, Incentive Spirometry  - Assess the need for suctioning and aspirate as needed  - Assess and instruct to report SOB or any respiratory difficulty  - Respiratory Therapy support as indicated  Outcome: Progressing     Problem: METABOLIC, FLUID AND ELECTROLYTES - ADULT  Goal: Electrolytes maintained within normal limits  Description: INTERVENTIONS:  - Monitor labs and assess patient for signs and symptoms of electrolyte imbalances  - Administer electrolyte replacement as ordered  - Monitor response to electrolyte replacements, including repeat lab results as appropriate  - Instruct patient on fluid and nutrition as appropriate  Outcome: Progressing  Goal: Fluid balance maintained  Description: INTERVENTIONS:  - Monitor labs   - Monitor I/O and WT  - Instruct patient on fluid and nutrition as appropriate  - Assess for signs & symptoms of volume excess or deficit  Outcome: Progressing  Goal: Glucose maintained within target range  Description: INTERVENTIONS:  - Monitor Blood Glucose as ordered  - Assess for signs and symptoms of hyperglycemia and hypoglycemia  - Administer ordered medications to maintain glucose within target range  - Assess nutritional intake and initiate nutrition service referral as needed  Outcome: Progressing     Problem: SKIN/TISSUE INTEGRITY - ADULT  Goal: Pressure injury heals and does not worsen  Description: Interventions:  - Implement low air loss mattress or specialty surface (Criteria met)  - Apply silicone foam dressing  - Instruct/assist with weight shifting every 2 hrs when in chair   - Limit chair time to 2 hour intervals  - Use special pressure reducing interventions   - Apply fecal or urinary incontinence containment device   - Perform passive or active ROM every 2 hrs   - Turn and reposition patient & offload bony prominences every 2 hours   - Utilize friction reducing device or surface for transfers   - Consider consults to   interdisciplinary teams   - Use incontinent care products after each incontinent episode  - Consider nutrition services referral as needed  Outcome: Progressing     Problem: PAIN - ADULT  Goal: Verbalizes/displays adequate comfort level or baseline comfort level  Description: Interventions:  - Encourage patient to monitor pain and request assistance  - Assess pain using appropriate pain scale  - Administer analgesics based on type and severity of pain and evaluate response  - Implement non-pharmacological measures as appropriate and evaluate response  - Consider cultural and social influences on pain and pain management  - Notify physician/advanced practitioner if interventions unsuccessful or patient reports new pain  Outcome: Progressing     Problem: INFECTION - ADULT  Goal: Absence or prevention of progression during hospitalization  Description: INTERVENTIONS:  - Assess and monitor for signs and symptoms of infection  - Monitor lab/diagnostic results  - Monitor all insertion sites, i.e. indwelling lines, tubes, and drains  - Monitor endotracheal if appropriate and nasal secretions for changes in amount and color  - [place] appropriate cooling/warming therapies per order  - Administer medications as ordered  - Instruct and encourage patient and family to use good hand hygiene technique  - Identify and instruct in appropriate isolation precautions for identified infection/condition  Outcome: Progressing     Problem: SAFETY ADULT  Goal: Maintain or return to baseline ADL function  Description: INTERVENTIONS:  -  Assess patient's ability to carry out ADLs; assess patient's baseline for ADL function and identify physical deficits which impact ability to perform ADLs (bathing, care of mouth/teeth, toileting, grooming, dressing, etc.)  - Assess/evaluate cause of self-care deficits   - Assess range of motion  - Assess patient's mobility; develop plan if impaired  - Assess patient's need for assistive devices and  provide as appropriate  - Encourage maximum independence but intervene and supervise when necessary  - Involve family in performance of ADLs  - Assess for home care needs following discharge   - Consider OT consult to assist with ADL evaluation and planning for discharge  - Provide patient education as appropriate  Outcome: Progressing  Goal: Maintains/Returns to pre admission functional level  Description: INTERVENTIONS:  - Perform AM-PAC 6 Click Basic Mobility/ Daily Activity assessment daily.  - Set and communicate daily mobility goal to care team and patient/family/caregiver.   - Collaborate with rehabilitation services on mobility goals if consulted  - Perform Range of Motion 3 times a day.  - Reposition patient every 2 hours.  -out of bed for toileting  - Record patient progress and toleration of activity level   Outcome: Progressing     Problem: DISCHARGE PLANNING  Goal: Discharge to home or other facility with appropriate resources  Description: INTERVENTIONS:  - Identify barriers to discharge w/patient and caregiver  - Arrange for needed discharge resources and transportation as appropriate  - Identify discharge learning needs (meds, wound care, etc.)  - Arrange for interpretive services to assist at discharge as needed  - Refer to Case Management Department for coordinating discharge planning if the patient needs post-hospital services based on physician/advanced practitioner order or complex needs related to functional status, cognitive ability, or social support system  Outcome: Progressing     Problem: Knowledge Deficit  Goal: Patient/family/caregiver demonstrates understanding of disease process, treatment plan, medications, and discharge instructions  Description: Complete learning assessment and assess knowledge base.  Interventions:  - Provide teaching at level of understanding  - Provide teaching via preferred learning methods  Outcome: Progressing

## 2024-02-19 NOTE — PHYSICAL THERAPY NOTE
Physical Therapy: SCREEN    Pt. Name: Jacobo Ascencio  Pt. Age: 78 y.o.  MRN: [id number]  LENGTH OF STAY: 1    Patient Active Problem List   Diagnosis    Olecranon bursitis of right elbow    Essential hypertension    Paroxysmal atrial fibrillation (HCC)    Type 2 diabetes mellitus without complication, with long-term current use of insulin (HCC)    Lower extremity edema    Generalized weakness    Sepsis (HCC)    Ambulatory dysfunction    Bacteremia    Stage 2 chronic kidney disease    Pneumonia of right upper lobe due to infectious organism    HLD (hyperlipidemia)    Bilateral hand pain    Positive colorectal cancer screening using Cologuard test    Gastroesophageal reflux disease without esophagitis    Anticoagulant long-term use    Hypokalemia    Gaseous abdominal distention    Acute respiratory insufficiency    Chronic systolic CHF (congestive heart failure) (HCC)       Admitting Diagnoses:   Pneumonia [J18.9]  SOB (shortness of breath) [R06.02]  Hypoxia [R09.02]    Past Medical History:   Diagnosis Date    Atrial fibrillation (HCC)     Bilateral hand pain 07/10/2019    Diabetes (HCC)     H/O agent Orange exposure     HLD (hyperlipidemia) 07/10/2019    Hyperlipidemia     Hypertension     Neuropathy     Paroxysmal A-fib (HCC)     PTSD (post-traumatic stress disorder)        Past Surgical History:   Procedure Laterality Date    ABLATION OF DYSRHYTHMIC FOCUS      for a-fib    BACK SURGERY      CHOLECYSTECTOMY      CHOLECYSTECTOMY      TOTAL SHOULDER REPLACEMENT Right        Imaging Studies:  XR chest 1 view portable   Final Result by Hitesh Infante MD (02/19 0916)      New airspace opacities in the right lung, suspicious for pneumonia. Recommend treatment and follow-up imaging in 6 to 8 weeks to assess for resolution.      The study was marked in EPIC for immediate notification.            Workstation performed: [id number]              02/19/24 1052   PT Last Visit   PT Visit Date 02/19/24   Note Type   Note  type Screen   Additional Comments PT consult received. Chart reviewed. Pt is resident at [facility]. Pt reports requiring assist w/ all ADLs at baseline and uses Kishor lift for all OOB transfers. No acute skilled PT needs at this time. D/C pt from formal PT at this time.     Fariba Terry, PT

## 2024-02-19 NOTE — SPEECH THERAPY NOTE
"Speech Language/Pathology  Speech/Language Pathology  Assessment    Patient Name: Jacobo Ascencio  Today's Date: 2/19/2024     Problem List  Principal Problem:    Acute respiratory insufficiency  Active Problems:    Essential hypertension    Paroxysmal atrial fibrillation (HCC)    Type 2 diabetes mellitus without complication, with long-term current use of insulin (HCC)    Ambulatory dysfunction    Stage 2 chronic kidney disease    Pneumonia of right upper lobe due to infectious organism    Chronic systolic CHF (congestive heart failure) (HCC)    Past Medical History  Past Medical History:   Diagnosis Date    Atrial fibrillation (HCC)     Bilateral hand pain 07/10/2019    Diabetes (HCC)     H/O agent Orange exposure     HLD (hyperlipidemia) 07/10/2019    Hyperlipidemia     Hypertension     Neuropathy     Paroxysmal A-fib (HCC)     PTSD (post-traumatic stress disorder)      Past Surgical History  Past Surgical History:   Procedure Laterality Date    ABLATION OF DYSRHYTHMIC FOCUS      for a-fib    BACK SURGERY      CHOLECYSTECTOMY      CHOLECYSTECTOMY      TOTAL SHOULDER REPLACEMENT Right       Bedside Swallow Evaluation:    Summary:  Pt presented w/ oral and pharyngeal stages of swallow WNL. He was positioned upright and alert. He accepted turkey, carrots, mashed potatoes w/ gravy and thin liquids via straw with single/successive sips. He benefited from assistance with set up. Mastication, bolus control and formation were WNL. Swallows appeared prompt. Laryngeal rise upon palpation appeared adequate. No overt s/s of aspiration. Pt is verbose he does need cues to attend tray rather than converse with ST.  Noted he does get short of breath when shifting himself in the bed and talking while feeding self. Pt denied difficulties with chewing or swallowing. Denied globus sensation w/ meats, breads, and medications. Denied recent unexpected weight loss. Pt did report he will often lay flat in bed while eating. Stated \"my " "wife always yells at me not to do that though\". ST reviewed aspiration precautions and safe swallow strategies. Pt understood.     Recommendations:  Diet: Regular  Liquid: Thin   Meds:As tolerated:  Positioning:Upright  Strategies: slow rate, position upright, alternate liquids with solids, and swallow prior to additional po.   Pt to take PO/Meds only when fully alert and upright.   Oral care  Aspiration precautions  Reflux precautions  Eval only, No f/u tx indicated.     Consider consult w/:  Rehab  Nutrition    H&P/Admit info/ pertinent provider notes: (PMH noted above)  Jacobo Ascencio is a 78 y.o. male with pmhx of paf, htn, ckd2, chronic chf, and recently dx with flu. He resides at [facility] in which he was recently dx with flu and started on tamiflu. Pt continues to have a dry cough and feel short of breath. He was sent to the ed for eval. He was found to be 90% on room air. He had a cxr that was concerning for pna in which he was started on ceftriaxone. No f/c no n/v/d no abd pain        Special Studies:  XR chest 1 view portable (02/19/2024) Impression   New airspace opacities in the right lung, suspicious for pneumonia. Recommend treatment and follow-up imaging in 6 to 8 weeks to assess for resolution.       Procalcitonin: 0.51                       02/19/2024   WBC: 9.30                       02/18/2024     Code Status Level 1 Full code     Previous MBS: none     Patient's goal: \" you are here again\"    Did the pt report pain? No   If yes, was nursing notified/was it addressed? N/a    Reason for consult:  R/o aspiration  Determine safest and least restrictive diet  respiratory compromise    Precautions:  Contact  Droplet    Food Allergies: No known    Current Diet: Regular and thin liquids  (1500 ml fluid restrictions)   Premorbid diet: Regular and thin liquids    O2 requirement: 1 liter NC    Social/Prior living Lives alone    Voice/Speech: WNL   Follows commands: Basic    Cognitive status: " Alert      Oral [facility] exam:  Dentition: Natural   Lips (VII):WNL  Tongue (XII): midline   Mandible (V):adequate ROM  Face/oral sensation (V): WNL, symmetrical   Velum (X):WNL    Items administered:  Turkey, carrots, mashed potatoes w/ gravy, and thin liquids via straw w/ single successive sips    Oral stage:  Lip closure: WNL  Mastication: WNL  Bolus formation:WNL  Bolus control:WNL  Transfer:WNL    Pharyngeal stage:  Swallow promptness:prompt  Laryngeal rise: adequate :  No overt s/s aspiration    Esophageal stage:  No s/s reported  H/o GERD  H/o EGD completed 02/19/2024   IMPRESSION:  The esophagus appeared normal.  Stomach-in the gastric cardia and fundus dark pigmentation was noted on the folds status post biopsies.  Linear erythema in the antrum.  Biopsies done to check for H pylori.  The duodenum appeared normal.  RECOMMENDATION:  Await pathology results   Continue omeprazole      Results d/w:  Pt, nursing,

## 2024-02-19 NOTE — OCCUPATIONAL THERAPY NOTE
Occupational Therapy Screen:    Patient Name: Jacobo Ascencio  Today's Date: 2/19/2024    OT consult received. Chart reviewed. Pt is resident at [facility]. Pt reports requiring assist w/ all ADLs at baseline and uses Kishor lift for all OOB transfers. No acute skilled OT needs at this time. Will D/C OT. CM aware.    Mar Dubose, OTR/L

## 2024-02-19 NOTE — ASSESSMENT & PLAN NOTE
Chest x-ray with evidence of pneumonia in the right upper lobe  Continue IV antibiotics  Respiratory protocol

## 2024-02-19 NOTE — DISCHARGE INSTR - OTHER ORDERS
Wound Care Plan:   1-Apply silicone bordered foam dressings to bilateral heels for prevention.  Change dressing every 3 days and as needed.  2-Elevate heels off of bed/chair surface to offload pressure.  3-Offloading air cushion in chair when out of bed.  4-Moisturize skin daily with skin nourishing lotion.  5-Turn/reposition every 2 hours while in bed using positioning wedges; and weight shift frequently while in chair for pressure re-distribution on skin.   6-Low air-loss mattress.  7-Right 2nd toe--apply 3m cavilon no sting skin barrier/skin prep daily.  Leave open to air.  8-Buttocks/sacrum--cleanse with soap and water, pat dry.  IF CONSISTENTLY INCONTINENT, apply Calazime paste three times daily and as needed with incontinence care.  IF CONTINENT, discontinue use of Calazime paste and apply silicone bordered foam dressing.  Change dressing every other day and as needed.    Follow-up at the [facility]--[phone].

## 2024-02-19 NOTE — ASSESSMENT & PLAN NOTE
Lab Results   Component Value Date    HGBA1C 6.6 (H) 11/01/2023       Recent Labs     02/18/24  1656 02/18/24  2130 02/19/24  0754   POCGLU 135 192* 151*     Hold home alogliptin  On lantus 67 units in am and 18 units in pm at home  Will reduce lantus to 50 units in am and 10 units in pm  Monitor blood glucose and adjust insulin as needed

## 2024-02-19 NOTE — ASSESSMENT & PLAN NOTE
Pt resides at [facility]  He was recently dx with flu approximately 3 to 4 days ago  He continues to have increased cough that is nonproductive and shortness of breath  Required 2 L nasal cannula at time of admission, weaned to 1 L nasal cannula today  Acute respiratory insuff is due to flu and possible pna  Chest x-ray with evidence of new airspace opacities in right lung suspicious for pneumonia  Procalcitonin remains elevated this morning  Speech consulted  Obtain sputum culture  Urine for legionella and strep negative  Blood cultures pending  Continue IV Rocephin and Zithromax, day 2  Complete course of Tamiflu

## 2024-02-19 NOTE — PROGRESS NOTES
[facility]  Progress Note  Name: Jacobo Ascencio I  MRN: [id number]  Unit/Bed#: E2 -01 I Date of Admission: 2/18/2024   Date of Service: 2/19/2024 I Hospital Day: 1    Assessment/Plan   * Acute respiratory insufficiency  Assessment & Plan  Pt resides at [facility]  He was recently dx with flu approximately 3 to 4 days ago  He continues to have increased cough that is nonproductive and shortness of breath  Required 2 L nasal cannula at time of admission, weaned to 1 L nasal cannula today  Acute respiratory insuff is due to flu and possible pna  Chest x-ray with evidence of new airspace opacities in right lung suspicious for pneumonia  Procalcitonin remains elevated this morning  Speech consulted  Obtain sputum culture  Urine for legionella and strep negative  Blood cultures pending  Continue IV Rocephin and Zithromax, day 2  Complete course of Tamiflu      Chronic systolic CHF (congestive heart failure) (HCC)  Assessment & Plan  Wt Readings from Last 3 Encounters:   02/18/24 125 kg (274 lb 11.1 oz)   10/18/21 111 kg (245 lb)   08/23/19 99.8 kg (220 lb)     Ef mildly decreased at 45%  BNP WNL  Continue monitoring with daily weights, intake and output  Continue home dose Lasix 60 mg p.o.daily        Pneumonia of right upper lobe due to infectious organism  Assessment & Plan  Chest x-ray with evidence of pneumonia in the right upper lobe  Continue IV antibiotics  Respiratory protocol    Stage 2 chronic kidney disease  Assessment & Plan  Lab Results   Component Value Date    EGFR 65 02/18/2024    EGFR 74 02/16/2024    EGFR 61 02/06/2024    CREATININE 1.09 02/18/2024    CREATININE 1.04 02/16/2024    CREATININE 1.22 02/06/2024     Creatinine stable at baseline  Will check bmp in am.     Ambulatory dysfunction  Assessment & Plan  PT/OT consults pending    Type 2 diabetes mellitus without complication, with long-term current use of insulin ([facility])  Assessment & Plan  Lab Results    Component Value Date    HGBA1C 6.6 (H) 11/01/2023       Recent Labs     02/18/24  1656 02/18/24  2130 02/19/24  0754   POCGLU 135 192* 151*     Hold home alogliptin  On lantus 67 units in am and 18 units in pm at home  Will reduce lantus to 50 units in am and 10 units in pm  Monitor blood glucose and adjust insulin as needed     Paroxysmal atrial fibrillation (HCC)  Assessment & Plan  Rate controlled  Continue coreg and eliquis     Essential hypertension  Assessment & Plan  Bp currently stable   Continue norvasc, coreg             VTE Pharmacologic Prophylaxis: VTE Score: 4 Moderate Risk (Score 3-4) - Pharmacological DVT Prophylaxis Ordered: apixaban (Eliquis).    Mobility:   Basic Mobility Inpatient Raw Score: 8  -HLM Goal: 3: Sit at edge of bed  JH-HLM Achieved: 2: Bed activities/Dependent transfer  HLM Goal NOT achieved. Continue with multidisciplinary rounding and encourage appropriate mobility to improve upon HLM goals.    Patient Centered Rounds: I performed bedside rounds with nursing staff today.   Discussions with Specialists or Other Care Team Provider: CM     Education and Discussions with Family / Patient: Attempted to update  (wife) via phone. Unable to contact.    Total Time Spent on Date of Encounter in care of patient:  This time was spent on one or more of the following: performing physical exam; counseling and coordination of care; obtaining or reviewing history; documenting in the medical record; reviewing/ordering tests, medications or procedures; communicating with other healthcare professionals and discussing with patient's family/caregivers.    Current Length of Stay: 1 day(s)  Current Patient Status: Inpatient   Certification Statement: The patient will continue to require additional inpatient hospital stay due to influenza, pneumonia requiring IV antibiotics and supplemental oxygen  Discharge Plan: Anticipate discharge in 48 hrs to nursing facility    Code Status: Level 1 -  Full Code    Subjective:   Patient seen and examined at bedside.  Reports he continues to have shortness of breath.  Does report he feels as though he has mucus in his lungs but unable to expectorate.  Does report some improvement with nebulizers.  Resides at nursing facility at baseline.  He does report his wife was recently brought to the hospital at [facility] and he is worried about her.    Objective:     Vitals:   Temp (24hrs), Av.2 °F (36.8 °C), Min:97.7 °F (36.5 °C), Max:99.1 °F (37.3 °C)    Temp:  [97.7 °F (36.5 °C)-99.1 °F (37.3 °C)] 97.7 °F (36.5 °C)  HR:  [] 58  Resp:  [20-24] 20  BP: (110-130)/(55-68) 125/66  SpO2:  [94 %-98 %] 97 %  Body mass index is 35.27 kg/m².     Input and Output Summary (last 24 hours):     Intake/Output Summary (Last 24 hours) at 2024 1023  Last data filed at 2024 0920  Gross per 24 hour   Intake 240 ml   Output --   Net 240 ml       Physical Exam:   Physical Exam  Vitals reviewed.   Constitutional:       General: He is not in acute distress.  HENT:      Head: Normocephalic and atraumatic.   Eyes:      General: No scleral icterus.     Conjunctiva/sclera: Conjunctivae normal.   Cardiovascular:      Rate and Rhythm: Normal rate and regular rhythm.      Heart sounds: No murmur heard.  Pulmonary:      Effort: Pulmonary effort is normal. No respiratory distress.      Breath sounds: Wheezing and rales present.   Abdominal:      General: Bowel sounds are normal. There is no distension.      Palpations: Abdomen is soft.      Tenderness: There is no abdominal tenderness.   Musculoskeletal:      Cervical back: Neck supple.      Right lower leg: No edema.      Left lower leg: No edema.   Skin:     General: Skin is warm and dry.   Neurological:      General: No focal deficit present.      Mental Status: He is alert. Mental status is at baseline.   Psychiatric:         Mood and Affect: Mood normal.         Behavior: Behavior normal.          Additional Data:      Labs:  Results from last 7 days   Lab Units 02/18/24  1324 02/18/24  1252   WBC Thousand/uL  --  9.30   HEMOGLOBIN g/dL  --  11.4*   I STAT HEMOGLOBIN g/dl 11.2*  --    HEMATOCRIT %  --  37.5   HEMATOCRIT, ISTAT % 33*  --    PLATELETS Thousands/uL  --  203   NEUTROS PCT %  --  82*   LYMPHS PCT %  --  5*   MONOS PCT %  --  11   EOS PCT %  --  1     Results from last 7 days   Lab Units 02/18/24  1324 02/18/24  1252   SODIUM mmol/L  --  141   POTASSIUM mmol/L  --  3.4*   CHLORIDE mmol/L  --  101   CO2 mmol/L  --  32   CO2, I-STAT mmol/L 30  --    BUN mg/dL  --  33*   CREATININE mg/dL  --  1.09   ANION GAP mmol/L  --  8   CALCIUM mg/dL  --  8.3*   GLUCOSE RANDOM mg/dL  --  159*         Results from last 7 days   Lab Units 02/19/24  0754 02/18/24  2130 02/18/24  1656   POC GLUCOSE mg/dl 151* 192* 135         Results from last 7 days   Lab Units 02/19/24  0441 02/18/24  1702 02/18/24  1517 02/18/24  1252   LACTIC ACID mmol/L  --   --  1.5 2.3*   PROCALCITONIN ng/ml 0.51* 0.42*  --   --        Lines/Drains:  Invasive Devices       Peripheral Intravenous Line  Duration             Peripheral IV 02/18/24 Right Hand <1 day              Drain  Duration             External Urinary Catheter Small 852 days                          Imaging: Reviewed radiology reports from this admission including: chest xray    Recent Cultures (last 7 days):   Results from last 7 days   Lab Units 02/18/24  1902 02/18/24  1300 02/18/24  1252   BLOOD CULTURE   --  Received in Microbiology Lab. Culture in Progress. Received in Microbiology Lab. Culture in Progress.   LEGIONELLA URINARY ANTIGEN  Negative  --   --        Last 24 Hours Medication List:   Current Facility-Administered Medications   Medication Dose Route Frequency Provider Last Rate    acetaminophen  650 mg Oral Q6H PRN Angle Ambron, DO      ALPRAZolam  0.25 mg Oral Daily PRN Angle Ambron, DO      amLODIPine  5 mg Oral Daily Angle Ambron, DO      apixaban  5 mg Oral BID Angle  Ambron, DO      azithromycin  500 mg Oral Q24H Angle Ambron, DO      benzonatate  100 mg Oral TID PRN Angle Ambron, DO      carvedilol  25 mg Oral BID With Meals Angle Ambron, DO      cefTRIAXone  1,000 mg Intravenous Q24H Angle Ambron, DO      cholecalciferol  5,000 Units Oral Daily Angle Ambron, DO      famotidine  20 mg Oral BID AC Angle Ambron, DO      finasteride  5 mg Oral Daily Angle Ambron, DO      furosemide  60 mg Oral Daily Angle Ambron, DO      guaiFENesin  600 mg Oral Q12H AICHA Taisha Thacker PA-C      guaiFENesin  200 mg Oral Q4H PRN Angle Ambron, DO      insulin glargine  10 Units Subcutaneous HS Angle Ambron, DO      insulin glargine  50 Units Subcutaneous QAM Angle Ambron, DO      insulin lispro  1-5 Units Subcutaneous TID AC Angle Ambron, DO      insulin lispro  1-5 Units Subcutaneous HS Angle Ambron, DO      ipratropium-albuterol  3 mL Nebulization TID Angle Ambron, DO      lisinopril  10 mg Oral Daily Angle Ambron, DO      metoclopramide  5 mg Oral TID Angle Ambron, DO      oseltamivir  75 mg Oral Q12H [facility] Angle Ambron, DO      pantoprazole  20 mg Oral Daily Before Breakfast Angle Ambron, DO      polyethylene glycol  17 g Oral Daily Angle Ambron, DO      potassium chloride  20 mEq Oral Daily Angle Ambron, DO      pregabalin  75 mg Oral BID Angle Ambron, DO      sertraline  100 mg Oral Daily Angle Ambron, DO      tamsulosin  0.4 mg Oral Daily With Dinner Angle Ambron, DO      traZODone  50 mg Oral HS Angle Ambron, DO          Today, Patient Was Seen By: Taisha Thacker PA-C    **Please Note: This note may have been constructed using a voice recognition system.**

## 2024-02-19 NOTE — ASSESSMENT & PLAN NOTE
Wt Readings from Last 3 Encounters:   02/18/24 125 kg (274 lb 11.1 oz)   10/18/21 111 kg (245 lb)   08/23/19 99.8 kg (220 lb)     Ef mildly decreased at 45%  BNP WNL  Continue monitoring with daily weights, intake and output  Continue home dose Lasix 60 mg p.o.daily

## 2024-02-19 NOTE — WOUND OSTOMY CARE
Consult Note - Wound   Jacobo Ascencio 78 y.o. male MRN: [id number]  Unit/Bed#: E2 -01 Encounter: [id number]      History and Present Illness:  78 year old male presented to the hospital with shortness of breath and cough secondary to influenza.  Patient's history significant for HTN, atrial fibrillation, DM, ambulatory dysfunction, CKD, CHF.    Assessment Findings:   Patient agreeable to assessment.  He requires assist x 2 to turn in bed.  Awaiting delivery of P500 low air-loss mattress.  Incontinent of stool on exam--emelina-anal care provided.  Patient states this is new for him due to laxatives at the hospital.  Bilateral heel intact, pink, blanchable--preventative foam dressings applied.  Hyperpigmented scar to left posterior tibial.  Skin folds intact.  Nutrition team following.  Present on admission evolving deep tissue pressure injury to bilateral buttocks and sacrum--wound likely with mixed etiology of pressure and moisture.  Left buttock with non-blanchable purple maroon coloration, no induration.  Mid sacrum and right buttock with beefy red full thickness tissue loss.  Emelina-wound with blanchable erythema.  Mild maceration to sacrum.    Deep tissue pressure injuries can be stage 3, 4, or unstageable when fully evolved.    Diabetic ulcer to right 2nd toe--dry, brown, well adhered eschar.  Emelina-wound intact.      See flowsheet for wound details.  Patient refusing offloading heel boots--states he cannot stand how restrictive feeling they area.  Agreeable to elevation of heels with pillows.  Recommend ATR positioning system for patient--primary RN to apply.    Wound Care Plan:   1-Apply silicone bordered foam dressings to bilateral heels for prevention.  Jett with P.  Peel back at least daily for skin assessment and re-apply.  Change dressing every 3 days and PRN.  2-Elevate heels off of bed/chair surface to offload pressure.  3-Offloading air cushion in chair when out of bed.  4-Moisturize skin daily with  skin nourishing lotion.  5-Turn/reposition every 2 hours while in bed using positioning wedges; and weight shift frequently while in chair for pressure re-distribution on skin.   6-P500 low air-loss mattress.  7-Right 2nd toe--apply 3m cavilon no sting skin barrier/skin prep daily.  Leave open to air.  8-Buttocks/sacrum--cleanse with soap and water, pat dry.  IF CONSISTENTLY INCONTINENT, apply Calazime paste three times daily and as needed with incontinence care.  IF CONTINENT, discontinue use of Calazime paste and apply silicone bordered foam dressing.  Change dressing every other day and as needed.    Wound care team to follow.  Plan of care reviewed with primary RN.    Patient states he does not have an air mattress at his facility.  Discussed with case management that patient should have an air mattress on return to his facility due to bilateral buttock and sacral wounds, decreased mobility, and occasional incontinence.  Case management to work on getting this.      Patient would benefit from follow-up at the wound center.    Wound 02/18/24 Pressure Injury Buttocks (Active)   Wound Image   02/19/24 1141   Wound Description Beefy red;Non-blanchable erythema;Light purple 02/19/24 1141   Pressure Injury Stage DTPI 02/19/24 1141   Emelina-wound Assessment Erythema 02/19/24 1141   Wound Length (cm) 5 cm 02/19/24 1141   Wound Width (cm) 9 cm 02/19/24 1141   Wound Depth (cm) 0.1 cm 02/19/24 1141   Wound Surface Area (cm^2) 45 cm^2 02/19/24 1141   Wound Volume (cm^3) 4.5 cm^3 02/19/24 1141   Calculated Wound Volume (cm^3) 4.5 cm^3 02/19/24 1141   Drainage Amount None 02/19/24 1141   Non-staged Wound Description Full thickness 02/19/24 1141   Treatments Cleansed 02/19/24 1141   Dressing Protective barrier 02/19/24 1141   Patient Tolerance Tolerated well 02/19/24 1141       Wound 02/19/24 Diabetic Ulcer Toe D2, second Anterior;Right (Active)   Enter Davison score: Davison Grade 0: No open lesion or a preulcerative lesion - may  have a deformity or cellulitis 02/19/24 1137   Wound Image   02/19/24 1137   Wound Description Brown;Dry;Eschar 02/19/24 1137   Emelina-wound Assessment Intact 02/19/24 1137   Wound Length (cm) 0.5 cm 02/19/24 1137   Wound Width (cm) 0.5 cm 02/19/24 1137   Wound Depth (cm) 0 cm 02/19/24 1137   Wound Surface Area (cm^2) 0.25 cm^2 02/19/24 1137   Wound Volume (cm^3) 0 cm^3 02/19/24 1137   Calculated Wound Volume (cm^3) 0 cm^3 02/19/24 1137   Drainage Amount None 02/19/24 1137   Dressing Open to air 02/19/24 1137       Tosha PIZARRON, RN, CWON

## 2024-02-20 LAB
ANION GAP SERPL CALCULATED.3IONS-SCNC: 6 MMOL/L
BUN SERPL-MCNC: 38 MG/DL (ref 5–25)
CALCIUM SERPL-MCNC: 8 MG/DL (ref 8.4–10.2)
CHLORIDE SERPL-SCNC: 103 MMOL/L (ref 96–108)
CO2 SERPL-SCNC: 31 MMOL/L (ref 21–32)
CREAT SERPL-MCNC: 1.31 MG/DL (ref 0.6–1.3)
ERYTHROCYTE [DISTWIDTH] IN BLOOD BY AUTOMATED COUNT: 16.9 % (ref 11.6–15.1)
GFR SERPL CREATININE-BSD FRML MDRD: 52 ML/MIN/1.73SQ M
GLUCOSE SERPL-MCNC: 130 MG/DL (ref 65–140)
GLUCOSE SERPL-MCNC: 151 MG/DL (ref 65–140)
GLUCOSE SERPL-MCNC: 204 MG/DL (ref 65–140)
GLUCOSE SERPL-MCNC: 86 MG/DL (ref 65–140)
GLUCOSE SERPL-MCNC: 97 MG/DL (ref 65–140)
HCT VFR BLD AUTO: 33.2 % (ref 36.5–49.3)
HGB BLD-MCNC: 10.1 G/DL (ref 12–17)
MCH RBC QN AUTO: 26.4 PG (ref 26.8–34.3)
MCHC RBC AUTO-ENTMCNC: 30.4 G/DL (ref 31.4–37.4)
MCV RBC AUTO: 87 FL (ref 82–98)
PLATELET # BLD AUTO: 182 THOUSANDS/UL (ref 149–390)
PMV BLD AUTO: 10 FL (ref 8.9–12.7)
POTASSIUM SERPL-SCNC: 3.5 MMOL/L (ref 3.5–5.3)
RBC # BLD AUTO: 3.82 MILLION/UL (ref 3.88–5.62)
SODIUM SERPL-SCNC: 140 MMOL/L (ref 135–147)
WBC # BLD AUTO: 8.22 THOUSAND/UL (ref 4.31–10.16)

## 2024-02-20 PROCEDURE — 99232 SBSQ HOSP IP/OBS MODERATE 35: CPT | Performed by: HOSPITALIST

## 2024-02-20 PROCEDURE — 82948 REAGENT STRIP/BLOOD GLUCOSE: CPT

## 2024-02-20 PROCEDURE — 85027 COMPLETE CBC AUTOMATED: CPT | Performed by: PHYSICIAN ASSISTANT

## 2024-02-20 PROCEDURE — 94760 N-INVAS EAR/PLS OXIMETRY 1: CPT

## 2024-02-20 PROCEDURE — 80048 BASIC METABOLIC PNL TOTAL CA: CPT | Performed by: PHYSICIAN ASSISTANT

## 2024-02-20 PROCEDURE — 94640 AIRWAY INHALATION TREATMENT: CPT

## 2024-02-20 RX ADMIN — PREGABALIN 75 MG: 75 CAPSULE ORAL at 08:43

## 2024-02-20 RX ADMIN — LISINOPRIL 10 MG: 10 TABLET ORAL at 08:43

## 2024-02-20 RX ADMIN — AZITHROMYCIN DIHYDRATE 500 MG: 250 TABLET ORAL at 17:13

## 2024-02-20 RX ADMIN — APIXABAN 5 MG: 5 TABLET, FILM COATED ORAL at 08:44

## 2024-02-20 RX ADMIN — POTASSIUM CHLORIDE 20 MEQ: 1500 TABLET, EXTENDED RELEASE ORAL at 08:43

## 2024-02-20 RX ADMIN — CEFTRIAXONE 1000 MG: 1 INJECTION, SOLUTION INTRAVENOUS at 12:12

## 2024-02-20 RX ADMIN — FAMOTIDINE 20 MG: 20 TABLET ORAL at 17:13

## 2024-02-20 RX ADMIN — OSELTAMIVIR PHOSPHATE 75 MG: 75 CAPSULE ORAL at 22:10

## 2024-02-20 RX ADMIN — GUAIFENESIN 600 MG: 600 TABLET, EXTENDED RELEASE ORAL at 22:10

## 2024-02-20 RX ADMIN — Medication 5000 UNITS: at 08:43

## 2024-02-20 RX ADMIN — INSULIN LISPRO 1 UNITS: 100 INJECTION, SOLUTION INTRAVENOUS; SUBCUTANEOUS at 12:05

## 2024-02-20 RX ADMIN — GUAIFENESIN 600 MG: 600 TABLET, EXTENDED RELEASE ORAL at 08:43

## 2024-02-20 RX ADMIN — METOCLOPRAMIDE 5 MG: 5 TABLET ORAL at 22:11

## 2024-02-20 RX ADMIN — IPRATROPIUM BROMIDE AND ALBUTEROL SULFATE 3 ML: 2.5; .5 SOLUTION RESPIRATORY (INHALATION) at 19:32

## 2024-02-20 RX ADMIN — FUROSEMIDE 60 MG: 40 TABLET ORAL at 08:43

## 2024-02-20 RX ADMIN — PANTOPRAZOLE SODIUM 20 MG: 20 TABLET, DELAYED RELEASE ORAL at 08:42

## 2024-02-20 RX ADMIN — AMLODIPINE BESYLATE 5 MG: 5 TABLET ORAL at 08:43

## 2024-02-20 RX ADMIN — INSULIN GLARGINE 50 UNITS: 100 INJECTION, SOLUTION SUBCUTANEOUS at 08:42

## 2024-02-20 RX ADMIN — CARVEDILOL 25 MG: 25 TABLET, FILM COATED ORAL at 08:43

## 2024-02-20 RX ADMIN — CARVEDILOL 25 MG: 25 TABLET, FILM COATED ORAL at 17:13

## 2024-02-20 RX ADMIN — FINASTERIDE 5 MG: 5 TABLET, FILM COATED ORAL at 08:42

## 2024-02-20 RX ADMIN — METOCLOPRAMIDE 5 MG: 5 TABLET ORAL at 08:44

## 2024-02-20 RX ADMIN — FAMOTIDINE 20 MG: 20 TABLET ORAL at 08:43

## 2024-02-20 RX ADMIN — METOCLOPRAMIDE 5 MG: 5 TABLET ORAL at 17:14

## 2024-02-20 RX ADMIN — PREGABALIN 75 MG: 75 CAPSULE ORAL at 22:11

## 2024-02-20 RX ADMIN — IPRATROPIUM BROMIDE AND ALBUTEROL SULFATE 3 ML: 2.5; .5 SOLUTION RESPIRATORY (INHALATION) at 13:12

## 2024-02-20 RX ADMIN — TAMSULOSIN HYDROCHLORIDE 0.4 MG: 0.4 CAPSULE ORAL at 17:13

## 2024-02-20 RX ADMIN — POLYETHYLENE GLYCOL 3350 17 G: 17 POWDER, FOR SOLUTION ORAL at 08:42

## 2024-02-20 RX ADMIN — INSULIN LISPRO 1 UNITS: 100 INJECTION, SOLUTION INTRAVENOUS; SUBCUTANEOUS at 17:13

## 2024-02-20 RX ADMIN — INSULIN GLARGINE 10 UNITS: 100 INJECTION, SOLUTION SUBCUTANEOUS at 22:10

## 2024-02-20 RX ADMIN — TRAZODONE HYDROCHLORIDE 50 MG: 50 TABLET ORAL at 22:10

## 2024-02-20 RX ADMIN — IPRATROPIUM BROMIDE AND ALBUTEROL SULFATE 3 ML: 2.5; .5 SOLUTION RESPIRATORY (INHALATION) at 07:10

## 2024-02-20 RX ADMIN — SERTRALINE HYDROCHLORIDE 100 MG: 100 TABLET ORAL at 08:42

## 2024-02-20 RX ADMIN — OSELTAMIVIR PHOSPHATE 75 MG: 75 CAPSULE ORAL at 08:44

## 2024-02-20 RX ADMIN — APIXABAN 5 MG: 5 TABLET, FILM COATED ORAL at 22:10

## 2024-02-20 NOTE — PROGRESS NOTES
[facility]  Progress Note  Name: Jacobo Ascencio I  MRN: [id number]  Unit/Bed#: E2 -01 I Date of Admission: 2024   Date of Service: 2024 I Hospital Day: 2    Assessment/Plan   * Pneumonia of right upper lobe due to infectious organism  Assessment & Plan  May have bacterial pneumonia or influenza pneumonia.  He is doing better on tamiflu and antibiotics.    Weaned off of oxygen    Passed swallow eval.    Type 2 diabetes mellitus without complication, with long-term current use of insulin (Formerly [facility])  Assessment & Plan  Lab Results   Component Value Date    HGBA1C 6.6 (H) 2023       Recent Labs     24  2051 24  0736 24  1138 24  1636   POCGLU 133 97 204* 151*     Blood sugars well controlled               Subjective:   Feelsbetter  Wened off of oxygen  He is still coughing but non productive.        Objective:     Vitals:   Temp (24hrs), Av.6 °F (36.4 °C), Min:97.1 °F (36.2 °C), Max:97.8 °F (36.6 °C)    Temp:  [97.1 °F (36.2 °C)-97.8 °F (36.6 °C)] 97.8 °F (36.6 °C)  HR:  [52-61] 60  Resp:  [20-22] 22  BP: (112-133)/(59-72) 133/72  SpO2:  [90 %-95 %] 90 %  Body mass index is 35.27 kg/m².     Input and Output Summary (last 24 hours):       Intake/Output Summary (Last 24 hours) at 2024 1708  Last data filed at 2024 0627  Gross per 24 hour   Intake --   Output 550 ml   Net -550 ml       Physical Exam:     Physical Exam  Vitals and nursing note reviewed.   HENT:      Head: Normocephalic and atraumatic.   Eyes:      Pupils: Pupils are equal, round, and reactive to light.   Cardiovascular:      Rate and Rhythm: Normal rate and regular rhythm.      Heart sounds: No murmur heard.     No friction rub. No gallop.   Pulmonary:      Effort: Pulmonary effort is normal.      Breath sounds: Normal breath sounds. No wheezing or rales.   Abdominal:      General: Bowel sounds are normal.      Palpations: Abdomen is soft.      Tenderness: There is no  abdominal tenderness.   Musculoskeletal:      Right lower leg: No edema.      Left lower leg: No edema.       .       Additional Data:     Labs:    Results from last 7 days   Lab Units 02/20/24  0548 02/18/24  1324 02/18/24  1252   WBC Thousand/uL 8.22  --  9.30   HEMOGLOBIN g/dL 10.1*  --  11.4*   I STAT HEMOGLOBIN   --    < >  --    HEMATOCRIT % 33.2*  --  37.5   HEMATOCRIT, ISTAT   --    < >  --    PLATELETS Thousands/uL 182  --  203   NEUTROS PCT %  --   --  82*   LYMPHS PCT %  --   --  5*   MONOS PCT %  --   --  11   EOS PCT %  --   --  1    < > = values in this interval not displayed.     Results from last 7 days   Lab Units 02/20/24  0548 02/18/24  1324   POTASSIUM mmol/L 3.5  --    CHLORIDE mmol/L 103  --    CO2 mmol/L 31  --    CO2, I-STAT mmol/L  --  30   BUN mg/dL 38*  --    CREATININE mg/dL 1.31*  --    CALCIUM mg/dL 8.0*  --    GLUCOSE, ISTAT mg/dl  --  152*         Results from last 7 days   Lab Units 02/20/24  1636 02/20/24  1138 02/20/24  0736 02/19/24  2051 02/19/24  1603 02/19/24  1139 02/19/24  0754 02/18/24  2130 02/18/24  1656   POC GLUCOSE mg/dl 151* 204* 97 133 150* 214* 151* 192* 135               * I Have Reviewed All Lab Data     Recent Cultures (last 7 days):     Results from last 7 days   Lab Units 02/18/24  1902 02/18/24  1300 02/18/24  1252   BLOOD CULTURE   --  No Growth at 48 hrs. No Growth at 48 hrs.   LEGIONELLA URINARY ANTIGEN  Negative  --   --          Last 24 Hours Medication List:   Current Facility-Administered Medications   Medication Dose Route Frequency Provider Last Rate    acetaminophen  650 mg Oral Q6H PRN Angle Ambron, DO      ALPRAZolam  0.25 mg Oral Daily PRN Angle Ambron, DO      amLODIPine  5 mg Oral Daily Angle Ambron, DO      apixaban  5 mg Oral BID Angle Ambron, DO      azithromycin  500 mg Oral Q24H Angle Ambron, DO      benzonatate  100 mg Oral TID PRN Angle Alvarenga DO      carvedilol  25 mg Oral BID With Meals Angle Alvarenga DO      cefTRIAXone   1,000 mg Intravenous Q24H Angle Ambron, DO 1,000 mg (02/20/24 1212)    cholecalciferol  5,000 Units Oral Daily Angle Ambron, DO      famotidine  20 mg Oral BID AC Angle Ambron, DO      finasteride  5 mg Oral Daily Angle Ambron, DO      furosemide  60 mg Oral Daily Angle Ambron, DO      guaiFENesin  600 mg Oral Q12H [facility] Taisha Thacker PA-C      guaiFENesin  200 mg Oral Q4H PRN Angle Ambron, DO      insulin glargine  10 Units Subcutaneous HS Angle Ambron, DO      insulin glargine  50 Units Subcutaneous QAM Angle Ambron, DO      insulin lispro  1-5 Units Subcutaneous TID AC Angle Ambron, DO      insulin lispro  1-5 Units Subcutaneous HS Angle Ambron, DO      ipratropium-albuterol  3 mL Nebulization TID Angle Ambron, DO      lisinopril  10 mg Oral Daily Angle Ambron, DO      metoclopramide  5 mg Oral TID Angle Ambron, DO      oseltamivir  75 mg Oral Q12H [facility] Angle Ambron, DO      pantoprazole  20 mg Oral Daily Before Breakfast Angle Ambron, DO      polyethylene glycol  17 g Oral Daily Angle Ambron, DO      potassium chloride  20 mEq Oral Daily Angle Ambron, DO      pregabalin  75 mg Oral BID Angle Ambron, DO      sertraline  100 mg Oral Daily Angle Ambron, DO      tamsulosin  0.4 mg Oral Daily With Dinner Angle Ambron, DO      traZODone  50 mg Oral HS Angle Ambron, DO           VTE Pharmacologic Prophylaxis:   Pharmacologic: Apixaban (Eliquis)      Current Length of Stay: 2 day(s)    Current Patient Status: Inpatient       Discharge Plan: home likely tomorrow.    Code Status: Level 1 - Full Code           Today, Patient Was Seen By: Jesse Callejas DO    ** Please Note: Dictation voice to text software may have been used in the creation of this document. **

## 2024-02-20 NOTE — ASSESSMENT & PLAN NOTE
Lab Results   Component Value Date    HGBA1C 6.6 (H) 11/01/2023       Recent Labs     02/19/24 2051 02/20/24  0736 02/20/24  1138 02/20/24  1636   POCGLU 133 97 204* 151*     Blood sugars well controlled

## 2024-02-20 NOTE — ASSESSMENT & PLAN NOTE
May have bacterial pneumonia or influenza pneumonia.  He is doing better on tamiflu and antibiotics.    Weaned off of oxygen    Passed swallow eval.

## 2024-02-20 NOTE — PLAN OF CARE
Problem: Potential for Falls  Goal: Patient will remain free of falls  Description: INTERVENTIONS:  - Educate patient/family on patient safety including physical limitations  - Instruct patient to call for assistance with activity   - Consult OT/PT to assist with strengthening/mobility   - Keep Call bell within reach  - Keep bed low and locked with side rails adjusted as appropriate  - Keep care items and personal belongings within reach  - Initiate and maintain comfort rounds  - Make Fall Risk Sign visible to staff  - Offer Toileting every 2 Hours, in advance of need  - Initiate/Maintain bed alarm  - Obtain necessary fall risk management equipment: bed alarm  - Apply yellow socks and bracelet for high fall risk patients  - Consider moving patient to room near nurses station  Outcome: Progressing     Problem: Prexisting or High Potential for Compromised Skin Integrity  Goal: Skin integrity is maintained or improved  Description: INTERVENTIONS:  - Identify patients at risk for skin breakdown  - Assess and monitor skin integrity  - Assess and monitor nutrition and hydration status  - Monitor labs   - Assess for incontinence   - Turn and reposition patient  - Assist with mobility/ambulation  - Relieve pressure over bony prominences  - Avoid friction and shearing  - Provide appropriate hygiene as needed including keeping skin clean and dry  - Evaluate need for skin moisturizer/barrier cream  - Collaborate with interdisciplinary team   - Patient/family teaching  - Consider wound care consult   Outcome: Progressing     Problem: RESPIRATORY - ADULT  Goal: Achieves optimal ventilation and oxygenation  Description: INTERVENTIONS:  - Assess for changes in respiratory status  - Assess for changes in mentation and behavior  - Position to facilitate oxygenation and minimize respiratory effort  - Oxygen administered by appropriate delivery if ordered  - Initiate smoking cessation education as indicated  - Encourage  broncho-pulmonary hygiene including cough, deep breathe, Incentive Spirometry  - Assess the need for suctioning and aspirate as needed  - Assess and instruct to report SOB or any respiratory difficulty  - Respiratory Therapy support as indicated  Outcome: Progressing     Problem: METABOLIC, FLUID AND ELECTROLYTES - ADULT  Goal: Electrolytes maintained within normal limits  Description: INTERVENTIONS:  - Monitor labs and assess patient for signs and symptoms of electrolyte imbalances  - Administer electrolyte replacement as ordered  - Monitor response to electrolyte replacements, including repeat lab results as appropriate  - Instruct patient on fluid and nutrition as appropriate  Outcome: Progressing  Goal: Fluid balance maintained  Description: INTERVENTIONS:  - Monitor labs   - Monitor I/O and WT  - Instruct patient on fluid and nutrition as appropriate  - Assess for signs & symptoms of volume excess or deficit  Outcome: Progressing  Goal: Glucose maintained within target range  Description: INTERVENTIONS:  - Monitor Blood Glucose as ordered  - Assess for signs and symptoms of hyperglycemia and hypoglycemia  - Administer ordered medications to maintain glucose within target range  - Assess nutritional intake and initiate nutrition service referral as needed  Outcome: Progressing     Problem: SKIN/TISSUE INTEGRITY - ADULT  Goal: Pressure injury heals and does not worsen  Description: Interventions:  - Implement low air loss mattress or specialty surface (Criteria met)  - Apply silicone foam dressing  - Instruct/assist with weight shifting every 2 hrs when in chair   - Limit chair time to 2 hour intervals  - Use special pressure reducing interventions   - Apply fecal or urinary incontinence containment device   - Perform passive or active ROM every 2 hrs   - Turn and reposition patient & offload bony prominences every 2 hours   - Utilize friction reducing device or surface for transfers   - Consider consults to   interdisciplinary teams   - Use incontinent care products after each incontinent episode  - Consider nutrition services referral as needed  Outcome: Progressing     Problem: PAIN - ADULT  Goal: Verbalizes/displays adequate comfort level or baseline comfort level  Description: Interventions:  - Encourage patient to monitor pain and request assistance  - Assess pain using appropriate pain scale  - Administer analgesics based on type and severity of pain and evaluate response  - Implement non-pharmacological measures as appropriate and evaluate response  - Consider cultural and social influences on pain and pain management  - Notify physician/advanced practitioner if interventions unsuccessful or patient reports new pain  Outcome: Progressing     Problem: INFECTION - ADULT  Goal: Absence or prevention of progression during hospitalization  Description: INTERVENTIONS:  - Assess and monitor for signs and symptoms of infection  - Monitor lab/diagnostic results  - Monitor all insertion sites, i.e. indwelling lines, tubes, and drains  - Monitor endotracheal if appropriate and nasal secretions for changes in amount and color  - Lee appropriate cooling/warming therapies per order  - Administer medications as ordered  - Instruct and encourage patient and family to use good hand hygiene technique  - Identify and instruct in appropriate isolation precautions for identified infection/condition  Outcome: Progressing     Problem: SAFETY ADULT  Goal: Maintain or return to baseline ADL function  Description: INTERVENTIONS:  -  Assess patient's ability to carry out ADLs; assess patient's baseline for ADL function and identify physical deficits which impact ability to perform ADLs (bathing, care of mouth/teeth, toileting, grooming, dressing, etc.)  - Assess/evaluate cause of self-care deficits   - Assess range of motion  - Assess patient's mobility; develop plan if impaired  - Assess patient's need for assistive devices and  provide as appropriate  - Encourage maximum independence but intervene and supervise when necessary  - Involve family in performance of ADLs  - Assess for home care needs following discharge   - Consider OT consult to assist with ADL evaluation and planning for discharge  - Provide patient education as appropriate  Outcome: Progressing  Goal: Maintains/Returns to pre admission functional level  Description: INTERVENTIONS:  - Perform AM-PAC 6 Click Basic Mobility/ Daily Activity assessment daily.  - Set and communicate daily mobility goal to care team and patient/family/caregiver.   - Collaborate with rehabilitation services on mobility goals if consulted  - Perform Range of Motion 3 times a day.  - Reposition patient every 2 hours.  -out of bed for toileting  - Record patient progress and toleration of activity level   Outcome: Progressing     Problem: DISCHARGE PLANNING  Goal: Discharge to home or other facility with appropriate resources  Description: INTERVENTIONS:  - Identify barriers to discharge w/patient and caregiver  - Arrange for needed discharge resources and transportation as appropriate  - Identify discharge learning needs (meds, wound care, etc.)  - Arrange for interpretive services to assist at discharge as needed  - Refer to Case Management Department for coordinating discharge planning if the patient needs post-hospital services based on physician/advanced practitioner order or complex needs related to functional status, cognitive ability, or social support system  Outcome: Progressing     Problem: Knowledge Deficit  Goal: Patient/family/caregiver demonstrates understanding of disease process, treatment plan, medications, and discharge instructions  Description: Complete learning assessment and assess knowledge base.  Interventions:  - Provide teaching at level of understanding  - Provide teaching via preferred learning methods  Outcome: Progressing

## 2024-02-20 NOTE — PLAN OF CARE
Problem: Potential for Falls  Goal: Patient will remain free of falls  Description: INTERVENTIONS:  - Educate patient/family on patient safety including physical limitations  - Instruct patient to call for assistance with activity   - Consult OT/PT to assist with strengthening/mobility   - Keep Call bell within reach  - Keep bed low and locked with side rails adjusted as appropriate  - Keep care items and personal belongings within reach  - Initiate and maintain comfort rounds  - Make Fall Risk Sign visible to staff  - Offer Toileting every 2 Hours, in advance of need  - Initiate/Maintain bed alarm  - Obtain necessary fall risk management equipment: bed alarm  - Apply yellow socks and bracelet for high fall risk patients  - Consider moving patient to room near nurses station  Outcome: Progressing     Problem: Prexisting or High Potential for Compromised Skin Integrity  Goal: Skin integrity is maintained or improved  Description: INTERVENTIONS:  - Identify patients at risk for skin breakdown  - Assess and monitor skin integrity  - Assess and monitor nutrition and hydration status  - Monitor labs   - Assess for incontinence   - Turn and reposition patient  - Assist with mobility/ambulation  - Relieve pressure over bony prominences  - Avoid friction and shearing  - Provide appropriate hygiene as needed including keeping skin clean and dry  - Evaluate need for skin moisturizer/barrier cream  - Collaborate with interdisciplinary team   - Patient/family teaching  - Consider wound care consult   Outcome: Progressing     Problem: RESPIRATORY - ADULT  Goal: Achieves optimal ventilation and oxygenation  Description: INTERVENTIONS:  - Assess for changes in respiratory status  - Assess for changes in mentation and behavior  - Position to facilitate oxygenation and minimize respiratory effort  - Oxygen administered by appropriate delivery if ordered  - Initiate smoking cessation education as indicated  - Encourage  broncho-pulmonary hygiene including cough, deep breathe, Incentive Spirometry  - Assess the need for suctioning and aspirate as needed  - Assess and instruct to report SOB or any respiratory difficulty  - Respiratory Therapy support as indicated  Outcome: Progressing     Problem: METABOLIC, FLUID AND ELECTROLYTES - ADULT  Goal: Electrolytes maintained within normal limits  Description: INTERVENTIONS:  - Monitor labs and assess patient for signs and symptoms of electrolyte imbalances  - Administer electrolyte replacement as ordered  - Monitor response to electrolyte replacements, including repeat lab results as appropriate  - Instruct patient on fluid and nutrition as appropriate  Outcome: Progressing     Problem: SKIN/TISSUE INTEGRITY - ADULT  Goal: Pressure injury heals and does not worsen  Description: Interventions:  - Implement low air loss mattress or specialty surface (Criteria met)  - Apply silicone foam dressing  - Instruct/assist with weight shifting every 2 hrs when in chair   - Limit chair time to 2 hour intervals  - Use special pressure reducing interventions   - Apply fecal or urinary incontinence containment device   - Perform passive or active ROM every 2 hrs   - Turn and reposition patient & offload bony prominences every 2 hours   - Utilize friction reducing device or surface for transfers   - Consider consults to  interdisciplinary teams   - Use incontinent care products after each incontinent episode  - Consider nutrition services referral as needed  Outcome: Progressing

## 2024-02-21 VITALS
SYSTOLIC BLOOD PRESSURE: 117 MMHG | DIASTOLIC BLOOD PRESSURE: 56 MMHG | RESPIRATION RATE: 16 BRPM | HEART RATE: 69 BPM | BODY MASS INDEX: 35.27 KG/M2 | WEIGHT: 274.69 LBS | TEMPERATURE: 97 F | OXYGEN SATURATION: 93 %

## 2024-02-21 PROBLEM — J18.9 PNEUMONIA OF RIGHT UPPER LOBE DUE TO INFECTIOUS ORGANISM: Status: RESOLVED | Noted: 2019-07-10 | Resolved: 2024-02-21

## 2024-02-21 PROBLEM — A41.9 SEPSIS (HCC): Status: RESOLVED | Noted: 2018-11-24 | Resolved: 2024-02-21

## 2024-02-21 LAB
FLUAV RNA RESP QL NAA+PROBE: POSITIVE
FLUBV RNA RESP QL NAA+PROBE: NEGATIVE
GLUCOSE SERPL-MCNC: 103 MG/DL (ref 65–140)
GLUCOSE SERPL-MCNC: 217 MG/DL (ref 65–140)
RSV RNA RESP QL NAA+PROBE: NEGATIVE
SARS-COV-2 RNA RESP QL NAA+PROBE: NEGATIVE

## 2024-02-21 PROCEDURE — 99239 HOSP IP/OBS DSCHRG MGMT >30: CPT | Performed by: HOSPITALIST

## 2024-02-21 PROCEDURE — 0241U HB NFCT DS VIR RESP RNA 4 TRGT: CPT | Performed by: HOSPITALIST

## 2024-02-21 PROCEDURE — 82948 REAGENT STRIP/BLOOD GLUCOSE: CPT

## 2024-02-21 PROCEDURE — 94640 AIRWAY INHALATION TREATMENT: CPT

## 2024-02-21 PROCEDURE — 94760 N-INVAS EAR/PLS OXIMETRY 1: CPT

## 2024-02-21 RX ORDER — OSELTAMIVIR PHOSPHATE 75 MG/1
75 CAPSULE ORAL EVERY 12 HOURS SCHEDULED
Start: 2024-02-21 | End: 2024-02-23

## 2024-02-21 RX ORDER — AZITHROMYCIN 500 MG/1
500 TABLET, FILM COATED ORAL EVERY 24 HOURS
Start: 2024-02-21 | End: 2024-02-24

## 2024-02-21 RX ORDER — INSULIN LISPRO 100 [IU]/ML
1-5 INJECTION, SOLUTION INTRAVENOUS; SUBCUTANEOUS
Start: 2024-02-21

## 2024-02-21 RX ADMIN — METOCLOPRAMIDE 5 MG: 5 TABLET ORAL at 09:41

## 2024-02-21 RX ADMIN — PREGABALIN 75 MG: 75 CAPSULE ORAL at 09:37

## 2024-02-21 RX ADMIN — APIXABAN 5 MG: 5 TABLET, FILM COATED ORAL at 09:35

## 2024-02-21 RX ADMIN — OSELTAMIVIR PHOSPHATE 75 MG: 75 CAPSULE ORAL at 09:37

## 2024-02-21 RX ADMIN — INSULIN LISPRO 1 UNITS: 100 INJECTION, SOLUTION INTRAVENOUS; SUBCUTANEOUS at 12:24

## 2024-02-21 RX ADMIN — POTASSIUM CHLORIDE 20 MEQ: 1500 TABLET, EXTENDED RELEASE ORAL at 09:37

## 2024-02-21 RX ADMIN — PANTOPRAZOLE SODIUM 20 MG: 20 TABLET, DELAYED RELEASE ORAL at 06:36

## 2024-02-21 RX ADMIN — FAMOTIDINE 20 MG: 20 TABLET ORAL at 06:36

## 2024-02-21 RX ADMIN — AMLODIPINE BESYLATE 5 MG: 5 TABLET ORAL at 09:36

## 2024-02-21 RX ADMIN — INSULIN GLARGINE 50 UNITS: 100 INJECTION, SOLUTION SUBCUTANEOUS at 09:34

## 2024-02-21 RX ADMIN — LISINOPRIL 10 MG: 10 TABLET ORAL at 09:35

## 2024-02-21 RX ADMIN — GUAIFENESIN 600 MG: 600 TABLET, EXTENDED RELEASE ORAL at 09:36

## 2024-02-21 RX ADMIN — FINASTERIDE 5 MG: 5 TABLET, FILM COATED ORAL at 09:34

## 2024-02-21 RX ADMIN — FUROSEMIDE 60 MG: 40 TABLET ORAL at 09:36

## 2024-02-21 RX ADMIN — POLYETHYLENE GLYCOL 3350 17 G: 17 POWDER, FOR SOLUTION ORAL at 09:34

## 2024-02-21 RX ADMIN — Medication 5000 UNITS: at 09:44

## 2024-02-21 RX ADMIN — CEFTRIAXONE 1000 MG: 1 INJECTION, SOLUTION INTRAVENOUS at 12:27

## 2024-02-21 RX ADMIN — IPRATROPIUM BROMIDE AND ALBUTEROL SULFATE 3 ML: 2.5; .5 SOLUTION RESPIRATORY (INHALATION) at 07:33

## 2024-02-21 RX ADMIN — IPRATROPIUM BROMIDE AND ALBUTEROL SULFATE 3 ML: 2.5; .5 SOLUTION RESPIRATORY (INHALATION) at 13:53

## 2024-02-21 RX ADMIN — SERTRALINE HYDROCHLORIDE 100 MG: 100 TABLET ORAL at 09:36

## 2024-02-21 NOTE — PLAN OF CARE
Problem: Potential for Falls  Goal: Patient will remain free of falls  Description: INTERVENTIONS:  - Educate patient/family on patient safety including physical limitations  - Instruct patient to call for assistance with activity   - Consult OT/PT to assist with strengthening/mobility   - Keep Call bell within reach  - Keep bed low and locked with side rails adjusted as appropriate  - Keep care items and personal belongings within reach  - Initiate and maintain comfort rounds  - Make Fall Risk Sign visible to staff  - Offer Toileting every 2 Hours, in advance of need  - Initiate/Maintain bed alarm  - Obtain necessary fall risk management equipment: bed alarm  - Apply yellow socks and bracelet for high fall risk patients  - Consider moving patient to room near nurses station  Outcome: Progressing     Problem: Prexisting or High Potential for Compromised Skin Integrity  Goal: Skin integrity is maintained or improved  Description: INTERVENTIONS:  - Identify patients at risk for skin breakdown  - Assess and monitor skin integrity  - Assess and monitor nutrition and hydration status  - Monitor labs   - Assess for incontinence   - Turn and reposition patient  - Assist with mobility/ambulation  - Relieve pressure over bony prominences  - Avoid friction and shearing  - Provide appropriate hygiene as needed including keeping skin clean and dry  - Evaluate need for skin moisturizer/barrier cream  - Collaborate with interdisciplinary team   - Patient/family teaching  - Consider wound care consult   Outcome: Progressing     Problem: RESPIRATORY - ADULT  Goal: Achieves optimal ventilation and oxygenation  Description: INTERVENTIONS:  - Assess for changes in respiratory status  - Assess for changes in mentation and behavior  - Position to facilitate oxygenation and minimize respiratory effort  - Oxygen administered by appropriate delivery if ordered  - Initiate smoking cessation education as indicated  - Encourage  broncho-pulmonary hygiene including cough, deep breathe, Incentive Spirometry  - Assess the need for suctioning and aspirate as needed  - Assess and instruct to report SOB or any respiratory difficulty  - Respiratory Therapy support as indicated  Outcome: Progressing     Problem: METABOLIC, FLUID AND ELECTROLYTES - ADULT  Goal: Electrolytes maintained within normal limits  Description: INTERVENTIONS:  - Monitor labs and assess patient for signs and symptoms of electrolyte imbalances  - Administer electrolyte replacement as ordered  - Monitor response to electrolyte replacements, including repeat lab results as appropriate  - Instruct patient on fluid and nutrition as appropriate  Outcome: Progressing  Goal: Fluid balance maintained  Description: INTERVENTIONS:  - Monitor labs   - Monitor I/O and WT  - Instruct patient on fluid and nutrition as appropriate  - Assess for signs & symptoms of volume excess or deficit  Outcome: Progressing  Goal: Glucose maintained within target range  Description: INTERVENTIONS:  - Monitor Blood Glucose as ordered  - Assess for signs and symptoms of hyperglycemia and hypoglycemia  - Administer ordered medications to maintain glucose within target range  - Assess nutritional intake and initiate nutrition service referral as needed  Outcome: Progressing     Problem: SKIN/TISSUE INTEGRITY - ADULT  Goal: Pressure injury heals and does not worsen  Description: Interventions:  - Implement low air loss mattress or specialty surface (Criteria met)  - Apply silicone foam dressing  - Instruct/assist with weight shifting every 2 hrs when in chair   - Limit chair time to 2 hour intervals  - Use special pressure reducing interventions   - Apply fecal or urinary incontinence containment device   - Perform passive or active ROM every 2 hrs   - Turn and reposition patient & offload bony prominences every 2 hours   - Utilize friction reducing device or surface for transfers   - Consider consults to   interdisciplinary teams   - Use incontinent care products after each incontinent episode  - Consider nutrition services referral as needed  Outcome: Progressing     Problem: PAIN - ADULT  Goal: Verbalizes/displays adequate comfort level or baseline comfort level  Description: Interventions:  - Encourage patient to monitor pain and request assistance  - Assess pain using appropriate pain scale  - Administer analgesics based on type and severity of pain and evaluate response  - Implement non-pharmacological measures as appropriate and evaluate response  - Consider cultural and social influences on pain and pain management  - Notify physician/advanced practitioner if interventions unsuccessful or patient reports new pain  Outcome: Progressing     Problem: INFECTION - ADULT  Goal: Absence or prevention of progression during hospitalization  Description: INTERVENTIONS:  - Assess and monitor for signs and symptoms of infection  - Monitor lab/diagnostic results  - Monitor all insertion sites, i.e. indwelling lines, tubes, and drains  - [place] appropriate cooling/warming therapies per order  - Administer medications as ordered  - Instruct and encourage patient and family to use good hand hygiene technique  - Identify and instruct in appropriate isolation precautions for identified infection/condition  Outcome: Progressing     Problem: SAFETY ADULT  Goal: Maintain or return to baseline ADL function  Description: INTERVENTIONS:  -  Assess patient's ability to carry out ADLs; assess patient's baseline for ADL function and identify physical deficits which impact ability to perform ADLs (bathing, care of mouth/teeth, toileting, grooming, dressing, etc.)  - Assess/evaluate cause of self-care deficits   - Assess range of motion  - Assess patient's mobility; develop plan if impaired  - Assess patient's need for assistive devices and provide as appropriate  - Encourage maximum independence but intervene and supervise when  necessary  - Involve family in performance of ADLs  - Assess for home care needs following discharge   - Consider OT consult to assist with ADL evaluation and planning for discharge  - Provide patient education as appropriate  Outcome: Progressing  Goal: Maintains/Returns to pre admission functional level  Description: INTERVENTIONS:  - Perform AM-PAC 6 Click Basic Mobility/ Daily Activity assessment daily.  - Set and communicate daily mobility goal to care team and patient/family/caregiver.   - Collaborate with rehabilitation services on mobility goals if consulted  - Perform Range of Motion 3 times a day.  - Reposition patient every 2 hours.  -out of bed for toileting  - Record patient progress and toleration of activity level   Outcome: Progressing     Problem: DISCHARGE PLANNING  Goal: Discharge to home or other facility with appropriate resources  Description: INTERVENTIONS:  - Identify barriers to discharge w/patient and caregiver  - Arrange for needed discharge resources and transportation as appropriate  - Identify discharge learning needs (meds, wound care, etc.)  - Arrange for interpretive services to assist at discharge as needed  - Refer to Case Management Department for coordinating discharge planning if the patient needs post-hospital services based on physician/advanced practitioner order or complex needs related to functional status, cognitive ability, or social support system  Outcome: Progressing     Problem: Knowledge Deficit  Goal: Patient/family/caregiver demonstrates understanding of disease process, treatment plan, medications, and discharge instructions  Description: Complete learning assessment and assess knowledge base.  Interventions:  - Provide teaching at level of understanding  - Provide teaching via preferred learning methods  Outcome: Progressing

## 2024-02-21 NOTE — CASE MANAGEMENT
[facility] stating [facility] and is good.Stated patient has established a transport consult good for one year starting on 12/06/2023. Stated to call [facility] to [facility]. Called [facility] (P#: [phone]) and was transferred to Carmen scott/ [facility] who stated since patient was utilized a non-[facility] transport company they would not organize transport. Stated [facility] uses specific transport companies they are partnered with and set up ride once consult is finished. Stated the bill would need to be sent to the address below. CM notified.     [facility]   [address] [address]  [facility] [address]

## 2024-02-21 NOTE — DISCHARGE SUMMARY
[facility]  Discharge- Jacobo Ascnecio 1945, 78 y.o. male MRN: [id number]  Unit/Bed#: E2 -01 Encounter: [id number]  Primary Care Provider: Pavel Dailey MD   Date and time admitted to hospital: 2/18/2024 11:54 AM    * Pneumonia of right upper lobe due to infectious organism  Assessment & Plan  May have bacterial pneumonia or influenza pneumonia.  He is doing better on tamiflu and antibiotics.    Weaned off of oxygen    Passed swallow eval.    He is okay to go back to his short-term rehab with a course of Tamiflu and azithromycin    Acute respiratory insufficiency  Assessment & Plan  This was due to influenza, possibly viral pneumonia, possibly bacterial pneumonia  This has resolved with treatment with Tamiflu and antibiotics    Is now on room air      Type 2 diabetes mellitus without complication, with long-term current use of insulin (HCC)  Assessment & Plan  Lab Results   Component Value Date    HGBA1C 6.6 (H) 11/01/2023       Recent Labs     02/20/24  1636 02/20/24  2126 02/21/24  0648 02/21/24  1122   POCGLU 151* 130 103 217*     We did decrease his regimen dosage here in the hospital.  Continue that as discharge.  As he recovers and starts to eat more then we need to start to increase his insulin    Paroxysmal atrial fibrillation (HCC)  Assessment & Plan  Rate controlled  Continue coreg and eliquis       Discharging Physician / Practitioner: Jesse Callejas DO  PCP: Pavel Dailey MD  Admission Date:   Admission Orders (From admission, onward)       Ordered        02/18/24 1345  INPATIENT ADMISSION  Once                          Discharge Date: 02/21/24    Medical Problems       Resolved Problems  Date Reviewed: 2/19/2024   None               Reason for Admission: Shortness of breath      Hospital Course:     Jacobo sAcencio is a 78 y.o. male patient who originally presented to the hospital on 2/18/2024 due to shortness of breath.  Recently diagnosed with influenza.   States he has a cough and is more short of breath.  Placed on oxygen in the emergency room.  There was infiltrate on chest x-ray.  So he may have viral or bacterial pneumonia along with influenza.  He could be influenza pneumonia.  He quickly improved on Tamiflu, Rocephin, Zithromax.  He weaned off of oxygen.  He is okay to go back to his nursing facility with course of Tamiflu and Zithromax.    Please see above list of diagnoses and related plan for additional information.       Condition at Discharge: stable        Discharge Day Visit / Exam:     Subjective:  doing well  No SOB  Ready to go back to [facility]        Vitals: Blood Pressure: 117/56 (02/21/24 0935)  Pulse: 69 (02/21/24 0733)  Temperature: (!) 97 °F (36.1 °C) (02/21/24 0733)  Temp Source: Temporal (02/21/24 0733)  Respirations: 16 (02/21/24 0733)  Weight - Scale: 125 kg (274 lb 11.1 oz) (02/18/24 1206)  SpO2: 93 % (02/21/24 1353)    Exam:     Physical Exam  Vitals and nursing note reviewed.   HENT:      Head: Normocephalic and atraumatic.   Eyes:      Pupils: Pupils are equal, round, and reactive to light.   Cardiovascular:      Rate and Rhythm: Normal rate and regular rhythm.      Heart sounds: No murmur heard.     No friction rub. No gallop.   Pulmonary:      Effort: Pulmonary effort is normal.      Breath sounds: Normal breath sounds. No wheezing or rales.   Abdominal:      General: Bowel sounds are normal.      Palpations: Abdomen is soft.      Tenderness: There is no abdominal tenderness.   Musculoskeletal:      Right lower leg: No edema.      Left lower leg: No edema.       .,         Discharge instructions/Information to patient and family:   See after visit summary for information provided to patient and family.      Provisions for Follow-Up Care:  See after visit summary for information related to follow-up care and any pertinent home health orders.      Disposition:     Other: SNF       Discharge Statement:  I spent 40 minutes discharging the  patient. This time was spent on the day of discharge. I had direct contact with the patient on the day of discharge. Greater than 50% of the total time was spent examining patient, answering all patient questions, arranging and discussing plan of care with patient as well as directly providing post-discharge instructions.  Additional time then spent on discharge activities.    Discharge Medications:  See after visit summary for reconciled discharge medications provided to patient and family.      ** Please Note: This note has been constructed using a voice recognition system **

## 2024-02-21 NOTE — ASSESSMENT & PLAN NOTE
This was due to influenza, possibly viral pneumonia, possibly bacterial pneumonia  This has resolved with treatment with Tamiflu and antibiotics    Is now on room air

## 2024-02-21 NOTE — CASE MANAGEMENT
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    Hypokalemia 2021    Positive colorectal cancer screening using Cologuard test 04/15/2021    Gastroesophageal reflux disease without esophagitis 04/15/2021    Anticoagulant long-term use 04/15/2021    Pneumonia of right upper lobe due to infectious organism 07/10/2019    HLD (hyperlipidemia) 07/10/2019    Bilateral hand pain 07/10/2019    Bacteremia 2019    Stage 2 chronic kidney disease 2019    Ambulatory dysfunction 2019    Olecranon bursitis of right elbow 2018    Essential hypertension 2018    Paroxysmal atrial fibrillation (HCC) 2018    Type 2 diabetes mellitus without complication, with long-term current use of insulin (HCC) 2018    Lower extremity edema 2018    Generalized weakness 2018    Sepsis (HCC) 2018      LOS (days): 3  Geometric Mean LOS (GMLOS) (days):   Days to GMLOS:     OBJECTIVE:  Risk of Unplanned Readmission Score: 24         Current admission status: Inpatient   Preferred Pharmacy:   [id number] - [place], [place] - [address]  [address] [place] [address]  Phone: [phone] Fax: [phone]    Primary Care Provider: Pavel Dailey MD    Primary Insurance: [facility]  Secondary Insurance: MEDICARE    DISCHARGE DETAILS:    Discharge planning discussed with:: Patient  Freedom of Choice: Yes  Comments - Freedom of Choice: Agreeable to return to [facility]  CM contacted family/caregiver?: Yes  (VM left for spouse, Nehal)  Were Treatment Team discharge recommendations reviewed with patient/caregiver?: Yes  Did patient/caregiver verbalize understanding of patient care needs?: N/A- going to facility       Contacts  Patient Contacts: Nehal Nieto  Relationship to Patient:: Family  Contact Method: Phone  Phone Number: [phone]  Reason/Outcome: Discharge Planning (VM left with information on discharge back to [facility] today)    Requested Home Health Care         Is the patient interested in HHC at discharge?: No    DME Referral Provided  Referral made for DME?: No    Other Referral/Resources/Interventions Provided:  Interventions: Facility Return         Treatment Team Recommendation: Facility Return  Discharge Destination Plan:: Facility Return  Transport at Discharge : BLS Ambulance                    Transfer Mode: Stretcher  Accompanied by: EMS personnel  Discussed with Patient  there may be an out of pocket expense for transport.       IMM Given (Date):: 02/21/24  IMM Given to:: Patient  IMM was reviewed with the pt. Pt reports understanding of the ability to appeal discharge if feeling as though not medically stable for discharge. Verbal consent obtained due to pt being flu (+) and placed in the scan bin.       Additional Comments: CM left VM for spouse providing information on discharge back to [facility] today. Pt agreeable with return to [facility].    Accepting Facility Name, City & State : [facility]  Receiving Facility/Agency Phone Number: [phone]  Facility/Agency Fax Number: [phone]

## 2024-02-21 NOTE — ASSESSMENT & PLAN NOTE
May have bacterial pneumonia or influenza pneumonia.  He is doing better on tamiflu and antibiotics.    Weaned off of oxygen    Passed swallow eval.    He is okay to go back to his short-term rehab with a course of Tamiflu and azithromycin

## 2024-02-21 NOTE — CASE MANAGEMENT
[facility] received request for transport authorization from Care Manager.   Type of transport: BLS  Date of transport: 02/21  Transport company: "[employer]Kirsten NPI: [id number]  Start Location: [facility]  End Location: [facility]   Authorization initiated by contacting: ANNETTE Quiñones [employer] P#: [phone]    Spoke with Shawna who stated consult would need to started. Stated message will be left with PCP to begin consult/ review. CM notified.

## 2024-02-21 NOTE — ASSESSMENT & PLAN NOTE
Lab Results   Component Value Date    HGBA1C 6.6 (H) 11/01/2023       Recent Labs     02/20/24  1636 02/20/24  2126 02/21/24  0648 02/21/24  1122   POCGLU 151* 130 103 217*     We did decrease his regimen dosage here in the hospital.  Continue that as discharge.  As he recovers and starts to eat more then we need to start to increase his insulin

## 2024-02-21 NOTE — PLAN OF CARE
Problem: RESPIRATORY - ADULT  Goal: Achieves optimal ventilation and oxygenation  Description: INTERVENTIONS:  - Assess for changes in respiratory status  - Assess for changes in mentation and behavior  - Position to facilitate oxygenation and minimize respiratory effort  - Oxygen administered by appropriate delivery if ordered  - Initiate smoking cessation education as indicated  - Encourage broncho-pulmonary hygiene including cough, deep breathe, Incentive Spirometry  - Assess the need for suctioning and aspirate as needed  - Assess and instruct to report SOB or any respiratory difficulty  - Respiratory Therapy support as indicated  Outcome: Progressing     Problem: METABOLIC, FLUID AND ELECTROLYTES - ADULT  Goal: Electrolytes maintained within normal limits  Description: INTERVENTIONS:  - Monitor labs and assess patient for signs and symptoms of electrolyte imbalances  - Administer electrolyte replacement as ordered  - Monitor response to electrolyte replacements, including repeat lab results as appropriate  - Instruct patient on fluid and nutrition as appropriate  Outcome: Progressing  Goal: Fluid balance maintained  Description: INTERVENTIONS:  - Monitor labs   - Monitor I/O and WT  - Instruct patient on fluid and nutrition as appropriate  - Assess for signs & symptoms of volume excess or deficit  Outcome: Progressing  Goal: Glucose maintained within target range  Description: INTERVENTIONS:  - Monitor Blood Glucose as ordered  - Assess for signs and symptoms of hyperglycemia and hypoglycemia  - Administer ordered medications to maintain glucose within target range  - Assess nutritional intake and initiate nutrition service referral as needed  Outcome: Progressing     Problem: INFECTION - ADULT  Goal: Absence or prevention of progression during hospitalization  Description: INTERVENTIONS:  - Assess and monitor for signs and symptoms of infection  - Monitor lab/diagnostic results  - Monitor all insertion  sites, i.e. indwelling lines, tubes, and drains  - [place] appropriate cooling/warming therapies per order  - Administer medications as ordered  - Instruct and encourage patient and family to use good hand hygiene technique  - Identify and instruct in appropriate isolation precautions for identified infection/condition  Outcome: Progressing     Problem: DISCHARGE PLANNING  Goal: Discharge to home or other facility with appropriate resources  Description: INTERVENTIONS:  - Identify barriers to discharge w/patient and caregiver  - Arrange for needed discharge resources and transportation as appropriate  - Identify discharge learning needs (meds, wound care, etc.)  - Arrange for interpretive services to assist at discharge as needed  - Refer to Case Management Department for coordinating discharge planning if the patient needs post-hospital services based on physician/advanced practitioner order or complex needs related to functional status, cognitive ability, or social support system  Outcome: Progressing

## 2024-02-23 LAB
BACTERIA BLD CULT: NORMAL
BACTERIA BLD CULT: NORMAL

## 2024-10-28 ENCOUNTER — TELEPHONE (OUTPATIENT)
Dept: GASTROENTEROLOGY | Facility: CLINIC | Age: 79
End: 2024-10-28

## 2024-10-28 NOTE — TELEPHONE ENCOUNTER
Pt needs an Office Visit due to age to discuss Colonoscopy 3 years- Pre Cancerous tubular adenoma (Dr Elkins pt). Called and spoke to pt whom [facility]

## 2024-10-30 NOTE — TELEPHONE ENCOUNTER
Pt called back to ask what the appointment he needs to schedule is for. Advised pt is is for a colonoscopy consult. He is going to have the [facility] give us [facility] call

## 2024-10-31 NOTE — TELEPHONE ENCOUNTER
Thelma from [facility] called in regard to [facility]. She will call [facility] for the referral to be sent to the office.
